# Patient Record
Sex: MALE | Race: WHITE | NOT HISPANIC OR LATINO | Employment: OTHER | URBAN - METROPOLITAN AREA
[De-identification: names, ages, dates, MRNs, and addresses within clinical notes are randomized per-mention and may not be internally consistent; named-entity substitution may affect disease eponyms.]

---

## 2017-07-17 ENCOUNTER — GENERIC CONVERSION - ENCOUNTER (OUTPATIENT)
Dept: OTHER | Facility: OTHER | Age: 77
End: 2017-07-17

## 2017-08-18 ENCOUNTER — GENERIC CONVERSION - ENCOUNTER (OUTPATIENT)
Dept: OTHER | Facility: OTHER | Age: 77
End: 2017-08-18

## 2017-10-03 ENCOUNTER — ALLSCRIPTS OFFICE VISIT (OUTPATIENT)
Dept: OTHER | Facility: OTHER | Age: 77
End: 2017-10-03

## 2017-10-03 ENCOUNTER — TRANSCRIBE ORDERS (OUTPATIENT)
Dept: ADMINISTRATIVE | Facility: HOSPITAL | Age: 77
End: 2017-10-03

## 2017-10-03 DIAGNOSIS — G91.2 IDIOPATHIC NORMAL PRESSURE HYDROCEPHALUS (HCC): ICD-10-CM

## 2017-10-03 DIAGNOSIS — Z98.2 PRESENCE OF CEREBROSPINAL FLUID DRAINAGE DEVICE: ICD-10-CM

## 2017-10-03 DIAGNOSIS — G91.9 HYDROCEPHALUS (HCC): ICD-10-CM

## 2017-10-03 DIAGNOSIS — G91.2 LOW PRESSURE HYDROCEPHALUS (HCC): ICD-10-CM

## 2017-10-03 DIAGNOSIS — R26.9 ABNORMALITY OF GAIT: Primary | ICD-10-CM

## 2017-10-03 DIAGNOSIS — R26.9 ABNORMALITY OF GAIT AND MOBILITY: ICD-10-CM

## 2017-10-04 NOTE — CONSULTS
Assessment  1  INPH (idiopathic normal pressure hydrocephalus) (331 5) (G91 2)   2  S/P  shunt (V45 2) (Z98 2)   3  Cervical spondylosis with myelopathy (721 1) (M47 12)   4  Gait disorder (781 2) (R26 9)    Plan  Cervical spondylosis with myelopathy, Gait disorder, INPH (idiopathic normal pressure  hydrocephalus), S/P  shunt    · Follow-up visit in 3 weeks Evaluation and Treatment  Follow-up  Status: Hold For -  Scheduling  Requested for: [id number]   Ordered; For: Cervical spondylosis with myelopathy, Gait disorder, INPH (idiopathic normal pressure hydrocephalus), S/P  shunt; Ordered By: Ronda Faust Performed:  Due: 79KHD6416  Gait disorder    · EMG TWO EXTREMITIES WITH OR W/O RELATED PARASPINAL AREAS; Status:Hold For  - Scheduling; Requested KFZ:[id number];    Perform:[facility]; YQS:[id number]; Last Updated By:Sandra Godfrey; 10/3/2017 11:40:08 AM;Ordered; For:Gait disorder; Ordered By:Marty Bruce;  TWO : RLE  ONE : LLE  INPH (idiopathic normal pressure hydrocephalus), S/P  shunt    · * CT HEAD WO CONTRAST; Status:Active; Requested GXW:[id number];    Perform:[facility]; [phone]; Last Updated By:Sandra Godfrey; 10/3/2017 11:40:24 AM;Ordered; For:INPH (idiopathic normal pressure hydrocephalus), S/P  shunt; Ordered By:Marty Bruce;   · XR SHUNT SERIES; Status:Active; Requested APOLLO:[id number];    Perform:[facility]; [phone]; Ordered; For:INPH (idiopathic normal pressure hydrocephalus), S/P  shunt; Ordered By:Marty Bruce;    Discussion/Summary    72-year-old gentleman with progressive difficulties with gait and numbness and weakness down the left arm  I reviewed his history, physical examination and imaging in detail with him today  presentation is somewhat complicated  Cervical spondylosis and stenosis  He may have an early myelopathy explaining some of his deterioration in gait and upper extremity function   He is a candidate for posterior cervical decompression and instrumented fixation and fusion  I explained that he is likely at higher risk of acute spinal cord injury in the event of extreme range of motion of the cervical spine, though surgical prophylactic decompression is not necessarily recommended  The goal of surgery would be to prevent decline in neurological function secondary to cervical spondylosis and stenosis  The risks of surgery were reviewed in detail as well   examination is also concerning for possible peripheral neuropathy  I will obtain an EMG and nerve conduction study for further evaluation  NPH  The shunt seems to be set at 1  The patient and his wife were under the impression it should be set to 3  They will obtain their last office visit with the neurosurgeon describing the shunt setting  It is possible that the shunt reprogrammed following the MRI  I will also obtain a CT scan of the brain and shunt series for baseline imaging and to confirm the impression of a setting of 1  They will try to obtain his most recent CT scan of the head for comparison  other questions were answered to their satisfaction  I will see them to review the results of these investigations and for further surgical discussion  They will also be referred to local PCP practice to establish care in the area  I reviewed the signs and symptoms of progressive cervical radiculopathy and myelopathy with him today and asked him to contact my office should any concerns arise in the interim  Both the patient and his wife are in agreement with this course of action  The patient has the current Goals: Improved balance and walking  The patent has the current Barriers: NPH  peripheral neuropathy  Patient is able to Self-Care  Self Referrals: Yes      Chief Complaint  New patient self referred for second opinion on neck pain and bilateral leg numbness      History of Present Illness  30-year-old right-hand-dominant gentleman accompanied by his wife today   Past medical history significant for insertion of a right occipital parietal  shunt at 2012 by Dr Andrew Tucker  The patient is wife reports significant improvement in his gait following insertion of the shunt, though he did require some adjustments  He has a Certas valve, though the last setting is somewhat unclear  3 months ago, without inciting event he began to develop numbness and weakness in the left arm and left leg  He denies any neck pain or significant pain in his arms or legs aside from some left hamstring pain which may be chronic  He may have some mild weakness in the right leg  He denies any difficulties with bowel bladder function or changing perineal sensation  There has been some decline in handwriting but no difficulties with fine motor tasks  Balance has been an issue  Overall, while his gait remains significantly improved compared to prior to placing the  shunt, there has been a decline over the last 3 months in his gait and balance  followed up with his neurosurgeon that place the shunt and felt that his overall presentation was more concerning for possible cervical myelopathy  An MRI was undertaken  There was no verification of the shunt setting before after the MRI per the patient and his wife  He presents today for 2nd surgical opinion, though he and his wife have recently relocated to this area and are considering transferring neurosurgical care  Review of Systems    Constitutional: feeling tired, but-no fever,-not feeling poorly,-no recent weight gain,-no chills-and-no recent weight loss  Eyes: eyesight problems, but-no eye pain,-no dryness of the eyes,-eyes not red,-no purulent discharge from the eyes-and-no itching of the eyes  ENT: no complaints of earache, no hearing loss, no nosebleeds, no nasal discharge, no sore throat, no hoarseness  Cardiovascular: No complaints of slow heart rate, no fast heart rate, no chest pain, no palpitations, no leg claudication, no lower extremity     Respiratory: No complaints of shortness of breath, no wheezing, no cough, no SOB on exertion, no orthopnea or PND  Gastrointestinal: constipation, but-no abdominal pain,-no nausea,-no vomiting,-no diarrhea-and-no blood in stools  Genitourinary: incontinence, but-no dysuria,-no urinary hesitancy,-no genital lesions,-no nocturia-and-no testicular pain  Musculoskeletal: No complaints of arthralgia, no myalgias, no joint swelling or stiffness, no limb pain or swelling  Integumentary: No complaints of skin rash or skin lesions, no itching, no skin wound, no dry skin  Neurological: limb weakness,-difficulty walking-and-Balance issues, but-no headache,-no numbness,-no tingling,-no dizziness,-no convulsions-and-no fainting-   The patient presents with complaints of confusion (occasional short term memory )  Psychiatric: sleep disturbances-(In general, takes naps), but-not suicidal,-no anxiety,-no personality change,-no depression-and-no emotional problems  Endocrine: muscle weakness-(Left arm, left leg)-and-feelings of weakness, but-no proptosis,-no deepening of the voice,-no hot flashes-and-no erectile dysfunction  Hematologic/Lymphatic: No complaints of swollen glands, no swollen glands in the neck, does not bleed easily, no easy bruising  ROS reviewed  Past Medical History  1  History of abdominal pain (V13 89) (Z87 898)   2  History of hepatitis A virus infection () (Z86 19)   3  History of nasal congestion (V1) (Z87 09)    The active problems and past medical history were reviewed and updated today  Surgical History  1  History of Creation Of Ventriculo-Peritoneal CSF Shunt    The surgical history was reviewed and updated today  Family History  Mother    1  Family history of   Father    2  Family history of     The family history was reviewed and updated today         Social History   · Has 1 child   · Lives with spouse   ·    · Never a smoker   · No alcohol use   · Post graduate  The social history was reviewed and updated today  Current Meds   1  Daily Value Multivitamin Oral Tablet; TAKE 1 TABLET DAILY; Therapy: (Recorded:40Ejv3593) to Recorded   2  Enalapril-Hydrochlorothiazide 10-25 MG Oral Tablet; take 0 5 tablet daily; Therapy: (Recorded:43Eaf5246) to Recorded   3  Ibuprofen 200 MG Oral Tablet; TAKE 2 TABLET 3 times daily PRN; Therapy: [phone]) to Recorded   4  Lipitor 40 MG Oral Tablet; take 0 5 tablet daily; Therapy: [phone]) to Recorded   5  Pantoprazole Sodium 40 MG Oral Tablet Delayed Release; Take 1 tablet daily; Therapy: [phone]) to Recorded   6  Vitamin D3 5000 UNIT Oral Tablet; take 1 tablet every other day; Therapy: [phone]) to Recorded   7  Zantac 150 Maximum Strength 150 MG Oral Tablet; TAKE 1 TABLET Bedtime PRN; Therapy: (Recorded:80Wjg9926) to Recorded    Allergies  1  No Known Drug Allergies    Vitals  Vital Signs    Recorded: 05VYT4506 10:09AM   Temperature 97 4 F   Heart Rate 72   Respiration 16   Systolic 254   Diastolic 72   Height 5 ft 10 5 in   Weight 228 lb 3 oz   BMI Calculated 32 28   BSA Calculated 2 22   Pain Scale 1     Physical Exam     Constitutional Patient appears the stated age  No signs of acute distress present  No involuntary movement  Patient is cooperative  Respiratory Auscultation of lungs: Clear to auscultation bilaterally  Musculo: Spine Cervical Spine: Normal    Skin warm and dry  No rashes, lesions or ecchymosis  -Right occipital parietal shunt depresses and refills easily  No tenderness or swelling along the shunt tract  Neurologic - Mental Status: Alert and Oriented x3  -Mood and affect: Affect is normal  -Memory is intact  Cranial Nerve Exam:  3rd, 4th, and 6th cranial nerves: PERRLA, EOMI without lateral gaze nystagmus  -7th cranial nerve: Face symmetrical at grimace and at rest -12th cranial nerve: Tongue mideline, no atrophy present    Motor System 5/5 power in upper and lower extremities  Motor System - Upper Extremities: Muscle tone: Normal bilaterally  -Muscle Bulk: Normal bilaterally  Motor System - Lower Extremities: Muscle tone: Normal bilaterally  -Muscle Bulk: Normal bilaterally  Reflexes: Biceps reflexes are intact bilaterally  Brachioradialis reflexes are intact bilaterally  Achilles reflexes are 2+ bilaterally  Babinski's reflex is down going bilaterally  Godwin's sign is absent bilaterally  Deep tendon reflexes: 1+ right biceps,-1+ left biceps,-1+ right triceps,-1+ left triceps,-1+ right patella,-1+ left patella,-0 right ankle jerk-and-0 left ankle jerkno ankle clonus on the right-and-no ankle clonus on the left  Superficial/Primitive Reflexes: Babinski reflex absent on the right-and-Babinski reflex absent on the left  Godwin sign was not present:   Coordination:   Coordination: Abnormal  -(Some difficulty with heel-toe gait) Coordination: impaired balance, but-negative Romberg's sign-and-no dysdiadochokinesia  Sensory:   Sensation: Abnormal  -(Diminished light touch and pinprick sensation in a stocking distribution  Normal light touch and pinprick sensation in both hands  Diminished vibration sensation at both medial malleoli and patella  Normal vibration sensation upper extremities) Sensory exam: light touch was not intact,-pain and temperature sensation was not intact-and-vibration sense was not intact  Gait and Station:   Gait and station: Abnormal  -(Walks with a mildly ataxic wide-based gait  Turns in 2 steps) Gait evaluation demonstrated a wide-based and ataxic gait  Results/Data    I personally reviewed the mri in detail with the patient  MRI Review MRI of the cervical spine without contrast dated August 21st, 2017  Normal cervical lordosis  Multiple levels of degenerative disc disease and facet arthropathy   Apparent auto fusion of C2 and C3 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

## 2017-10-12 ENCOUNTER — HOSPITAL ENCOUNTER (OUTPATIENT)
Dept: CT IMAGING | Facility: HOSPITAL | Age: 77
Discharge: HOME | End: 2017-10-12
Attending: NEUROLOGICAL SURGERY
Payer: MEDICARE

## 2017-10-12 ENCOUNTER — HOSPITAL ENCOUNTER (OUTPATIENT)
Dept: RADIOLOGY | Facility: HOSPITAL | Age: 77
Discharge: HOME | End: 2017-10-12
Attending: NEUROLOGICAL SURGERY
Payer: MEDICARE

## 2017-10-12 ENCOUNTER — TRANSCRIBE ORDERS (OUTPATIENT)
Dept: ADMINISTRATIVE | Facility: HOSPITAL | Age: 77
End: 2017-10-12

## 2017-10-12 DIAGNOSIS — Z98.2 PRESENCE OF CEREBROSPINAL FLUID DRAINAGE DEVICE: ICD-10-CM

## 2017-10-12 DIAGNOSIS — G91.2 IDIOPATHIC NORMAL PRESSURE HYDROCEPHALUS (HCC): ICD-10-CM

## 2017-10-12 PROCEDURE — 71020 HB CHEST X-RAY 2VW FRONTAL&LATL: CPT

## 2017-10-12 PROCEDURE — 74000 HB X-RAY EXAM OF ABDOMEN (SINGLE ANTEROPOSTERIOR VIEW): CPT

## 2017-10-12 PROCEDURE — 70450 CT HEAD/BRAIN W/O DYE: CPT

## 2017-10-12 PROCEDURE — 70250 X-RAY EXAM OF SKULL: CPT

## 2017-10-17 ENCOUNTER — OFFICE VISIT (OUTPATIENT)
Dept: RADIOLOGY | Facility: CLINIC | Age: 77
End: 2017-10-17
Payer: MEDICARE

## 2017-10-17 DIAGNOSIS — R26.9 ABNORMALITY OF GAIT: ICD-10-CM

## 2017-10-17 PROCEDURE — 95886 MUSC TEST DONE W/N TEST COMP: CPT

## 2017-10-17 PROCEDURE — 95909 NRV CNDJ TST 5-6 STUDIES: CPT

## 2017-10-26 ENCOUNTER — APPOINTMENT (OUTPATIENT)
Dept: RADIOLOGY | Facility: CLINIC | Age: 77
End: 2017-10-26
Payer: MEDICARE

## 2017-10-26 ENCOUNTER — GENERIC CONVERSION - ENCOUNTER (OUTPATIENT)
Dept: OTHER | Facility: OTHER | Age: 77
End: 2017-10-26

## 2017-10-26 ENCOUNTER — TRANSCRIBE ORDERS (OUTPATIENT)
Dept: RADIOLOGY | Facility: CLINIC | Age: 77
End: 2017-10-26

## 2017-10-26 DIAGNOSIS — Z98.2 PRESENCE OF CEREBROSPINAL FLUID DRAINAGE DEVICE: ICD-10-CM

## 2017-10-26 DIAGNOSIS — G91.9 HYDROCEPHALUS (HCC): ICD-10-CM

## 2017-10-26 PROCEDURE — 70250 X-RAY EXAM OF SKULL: CPT

## 2017-10-27 ENCOUNTER — GENERIC CONVERSION - ENCOUNTER (OUTPATIENT)
Dept: OTHER | Facility: OTHER | Age: 77
End: 2017-10-27

## 2017-11-08 ENCOUNTER — ALLSCRIPTS OFFICE VISIT (OUTPATIENT)
Dept: OTHER | Facility: OTHER | Age: 77
End: 2017-11-08

## 2017-11-08 DIAGNOSIS — G91.2 IDIOPATHIC NORMAL PRESSURE HYDROCEPHALUS (HCC): ICD-10-CM

## 2017-11-08 DIAGNOSIS — Z98.2 PRESENCE OF CEREBROSPINAL FLUID DRAINAGE DEVICE: ICD-10-CM

## 2017-11-08 DIAGNOSIS — R26.9 ABNORMALITY OF GAIT AND MOBILITY: ICD-10-CM

## 2017-11-08 DIAGNOSIS — H91.93 HEARING LOSS OF BOTH EARS: ICD-10-CM

## 2017-11-10 NOTE — PROGRESS NOTES
Assessment    1  S/P  shunt (V45 2) (Z98 2)   2  Hyperlipidemia (272 4) (E78 5)   3  Hearing loss, bilateral (389 9) (H91 93)   4  Elevated PSA (790 93) (R97 20)   5  Severe obesity with body mass index (BMI) of 35 0 to 39 9 (278 01) (E66 01)   6  Rhinitis (472 0) (J31 0)    Plan  Hearing loss, bilateral    · *1 - [facility] AUDIOLOGY Co-Management  *  Status: Active  Requested for:08Nov2017  Care Summary provided  : Yes    Discussion/Summary  Discussion Summary:   HTN, hyperlipidemia  BP controlled on enalapril  Recent lipids show elevated TG, takes statin sometimes  Unsteady gait, LE weakness  Physical therapy scheduled, using cane  Cervical spondylosis  Recently saw neurosurgery, ? surgerys/p  shunt  Hearing loss  Recommend hearing test Elevated PSA, family history of prostate cancer  Follow up with urology next year  Rhinitis  Recommend to try saline nasal spray 1 to 2 times a day  GERD  Takes pantoprazole daily  HM  Vaccinations updated  Colonoscopy 2016  in 4 months or p r n  Counseling Documentation With Imm: The patient was counseled regarding diagnostic results,-- instructions for management,-- risk factor reductions,-- impressions  Chief Complaint  Chief Complaint Free Text Note Form: PT IS HERE AS NEW PT FOR CHECK UP      History of Present Illness  HPI: Mr Lisa Bhatti is here with his wife  recently moved from [place]  is having problems with neck pain and extremity tingling or numbness, occasional weakness  He recently saw Neurosurgery  He is also having frequent low back pain, with balance issues  He feels dizzy sometimes, denies any chest pain, shortness of breath, palpitations  He uses a cane all the time  Denies any recent falls  He has physical therapy scheduled  takes enalapril and atorvastatin most days of the week, usually takes half a tablet only  He takes his pantoprazole regularly   had an infection in his prostate that have that led to an elevated PSA   He was recently seen by Urology in [facility]  Elevated PSA (Brief): The patient is being seen for an initial evaluation of an elevated prostate-specific antigen  The patient is currently asymptomatic  Hyperlipidemia (Initial): The patient is being seen for an initial evaluation of an existing diagnosis of hyperlipidemia  No associated symptoms are reported  Current treatment includes statins  By report, there is fair compliance with treatment and good symptom control  Hearing Loss (Brief): The patient is being seen for an initial evaluation of an existing diagnosis of hearing loss  The patient is currently experiencing symptoms  No associated symptoms are reported  Review of Systems  Complete-Male:  Constitutional: not feeling poorly-- and-- not feeling tired  Eyes: no eyesight problems  ENT: no nasal discharge  Cardiovascular: no chest pain,-- no palpitations-- and-- no extremity edema  Respiratory: no cough-- and-- no shortness of breath during exertion  Gastrointestinal: no abdominal pain,-- no nausea-- and-- no constipation  Genitourinary: no dysuria  Musculoskeletal: arthralgias-- and-- joint stiffness, but-- as noted in HPI  Integumentary: no rashes  Neurological: limb weakness, but-- no headache,-- no confusion-- and-- no fainting  Psychiatric: no sleep disturbances  Endocrine: feelings of weakness  Hematologic/Lymphatic: no tendency for easy bruising  Active Problems  1  Arthritis (716 90) (M19 90)   2  Cervical spondylosis with myelopathy (721 1) (M47 12)   3  Fatigue (780 79) (R53 83)   4  Gait disorder (781 2) (R26 9)   5  GERD (gastroesophageal reflux disease) (530 81) (K21 9)   6  Hydrocephalus (331 4) (G91 9)   7  INPH (idiopathic normal pressure hydrocephalus) (331 5) (G91 2)   8  Left leg pain (729 5) (M79 605)   9  S/P  shunt (V45 2) (Z98 2)   10  Sleep disturbances (780 50) (G47 9)   11  Urinary incontinence (788 30) (R32)   12  Wears glasses (V49 89) (Z97 3)    Past Medical History  1   History of abdominal pain (V13 89) (Z87 898)   2  History of hepatitis A virus infection (V12 09) (Z86 19)   3  History of nasal congestion (V12 69) (Z87 09)  Active Problems And Past Medical History Reviewed: The active problems and past medical history were reviewed and updated today  Surgical History  1  History of Appendectomy   2  History of Creation Of Ventriculo-Peritoneal CSF Shunt    Family History  Mother    1  Denied: Family history of Alcoholism and drug addiction in family   2  Denied: Family history of Anxiety and depression   3  Family history of    4  Family history of rheumatic heart disease (V17 49) (Z82 49)  Father    5  Denied: Family history of Alcoholism and drug addiction in family   6  Denied: Family history of Anxiety and depression   7  Family history of    6  Family history of malignant neoplasm of prostate (V16 42) (Z80 42)  Child    9  Denied: Family history of Alcoholism and drug addiction in family   8  Denied: Family history of Anxiety and depression  Sibling    6  Denied: Family history of Alcoholism and drug addiction in family   15  Denied: Family history of Anxiety and depression  Maternal Uncle    15  Family history of malignant neoplasm (V16 9) (Z80 9)    Social History     · Drinks coffee   · Has 1 child   · Lives with spouse   ·    · Never a smoker   · No alcohol use   · No illicit drug use   · Post graduate   · Work history  Social History Reviewed: The social history was reviewed and updated today  Current Meds   1  Daily Value Multivitamin Oral Tablet; TAKE 1 TABLET DAILY; Therapy: (Recorded:2017) to Recorded   2  Enalapril-Hydrochlorothiazide 10-25 MG Oral Tablet; take 0 5 tablet daily; Therapy: (Recorded:2017) to Recorded   3  Ibuprofen 200 MG Oral Tablet; TAKE 2 TABLET 3 times daily PRN; Therapy: ([id number]) to Recorded   4  Lipitor 40 MG Oral Tablet; take 0 5 tablet daily; Therapy: () to Recorded   5  Pantoprazole Sodium 40 MG Oral Tablet Delayed Release; Take 1 tablet daily; Therapy: ([phone]) to Recorded   6  Vitamin D3 5000 UNIT Oral Tablet; take 1 tablet every other day; Therapy: ([phone]) to Recorded   7  Zantac 150 Maximum Strength 150 MG Oral Tablet; TAKE 1 TABLET Bedtime PRN; Therapy: (Recorded:03Oct2017) to Recorded    Allergies    1  No Known Drug Allergies    Vitals  Signs   Recorded: 46CNE5309 03:27PM   Temperature: 98 1 F  Heart Rate: 76  Respiration: 18  Systolic: 005  Diastolic: 72  Height: 5 ft 6 5 in  Weight: 227 lb 4 oz  BMI Calculated: 36 13  BSA Calculated: 2 12  O2 Saturation: 98    Physical Exam   Constitutional  General appearance: No acute distress, well appearing and well nourished  comfortable,-- obese,-- clothing appropriate-- and-- well hydrated  Head and Face  Head and face: Normal    Palpation of the face and sinuses: No sinus tenderness  Eyes  Conjunctiva and lids: No erythema, swelling or discharge  Pupils and irises: Equal, round, reactive to light  Ears, Nose, Mouth, and Throat  External inspection of ears and nose: Normal    Otoscopic examination: Tympanic membranes translucent with normal light reflex  Canals patent without erythema  Oropharynx: Normal with no erythema, edema, exudate or lesions  Neck  Neck: Supple, symmetric, trachea midline, no masses  Thyroid: Normal, no thyromegaly  Pulmonary  Respiratory effort: No increased work of breathing or signs of respiratory distress  Auscultation of lungs: Clear to auscultation  Cardiovascular  Auscultation of heart: Normal rate and rhythm, normal S1 and S2, no murmurs  Examination of extremities for edema and/or varicosities: Normal    Abdomen  Abdomen: Non-tender, no masses  The abdomen was obese  The abdomen was soft and nontender  The abdomen was normal to percussion  Liver and spleen: No hepatomegaly or splenomegaly  Examination for hernias: No hernias appreciated     Lymphatic Palpation of lymph nodes in neck: No lymphadenopathy  no posterior cervical node enlargement-- and-- no supraclavicular node enlargement  Palpation of lymph nodes in other areas: No lymphadenopathy  no anterior cervical node enlargement-- and-- no submandibular node enlargement  Musculoskeletal  Gait and station: Abnormal  -- uses cane  Inspection/palpation of joints, bones, and muscles: Normal    Skin  Skin and subcutaneous tissue: Normal without rashes or lesions  Palpation of skin and subcutaneous tissue: Normal turgor  Neurologic  Cranial nerves: Cranial nerves 2-12 intact  Cortical function: Normal mental status  Psychiatric  Orientation to person, place and time: Normal    Mood and affect: Normal        Results/Data  PHQ-2 Adult Depression Screening [id number] 03:28PM User, s     Test Name Result Flag Reference   PHQ-2 Adult Depression Score 0       Over the last two weeks, how often have you been bothered by any of the following problems? Little interest or pleasure in doing things: Not at all - 0 Feeling down, depressed, or hopeless: Not at all - 0   PHQ-2 Adult Depression Screening Negative         Future Appointments    Date/Time Provider Specialty Site   11/29/2017 01:00 PM ELISA Parker   [address]       Signatures   Electronically signed by : Andrae Scanlon MD; Nov 8 2017  5:18PM EST                       (Author)

## 2017-11-27 ENCOUNTER — HOSPITAL ENCOUNTER (OUTPATIENT)
Dept: CT IMAGING | Facility: HOSPITAL | Age: 77
Discharge: HOME | End: 2017-11-27
Attending: NEUROLOGICAL SURGERY
Payer: MEDICARE

## 2017-11-27 DIAGNOSIS — G91.2 IDIOPATHIC NORMAL PRESSURE HYDROCEPHALUS (HCC): ICD-10-CM

## 2017-11-27 DIAGNOSIS — R26.9 ABNORMALITY OF GAIT AND MOBILITY: ICD-10-CM

## 2017-11-27 PROCEDURE — 70450 CT HEAD/BRAIN W/O DYE: CPT

## 2017-11-29 ENCOUNTER — APPOINTMENT (OUTPATIENT)
Dept: RADIOLOGY | Facility: CLINIC | Age: 77
End: 2017-11-29
Payer: MEDICARE

## 2017-11-29 ENCOUNTER — ALLSCRIPTS OFFICE VISIT (OUTPATIENT)
Dept: OTHER | Facility: OTHER | Age: 77
End: 2017-11-29

## 2017-11-29 ENCOUNTER — APPOINTMENT (OUTPATIENT)
Dept: PHYSICAL THERAPY | Facility: CLINIC | Age: 77
End: 2017-11-29
Payer: MEDICARE

## 2017-11-29 ENCOUNTER — TRANSCRIBE ORDERS (OUTPATIENT)
Dept: RADIOLOGY | Facility: CLINIC | Age: 77
End: 2017-11-29

## 2017-11-29 DIAGNOSIS — R26.9 ABNORMALITY OF GAIT AND MOBILITY: ICD-10-CM

## 2017-11-29 DIAGNOSIS — Z98.2 PRESENCE OF CEREBROSPINAL FLUID DRAINAGE DEVICE: ICD-10-CM

## 2017-11-29 DIAGNOSIS — G91.2 IDIOPATHIC NORMAL PRESSURE HYDROCEPHALUS (HCC): ICD-10-CM

## 2017-11-29 PROCEDURE — 70250 X-RAY EXAM OF SKULL: CPT

## 2017-11-29 PROCEDURE — 97161 PT EVAL LOW COMPLEX 20 MIN: CPT

## 2017-11-29 PROCEDURE — G8979 MOBILITY GOAL STATUS: HCPCS

## 2017-11-29 PROCEDURE — G8978 MOBILITY CURRENT STATUS: HCPCS

## 2017-11-30 NOTE — PROGRESS NOTES
Assessment  1  INPH (idiopathic normal pressure hydrocephalus) (331 5) (G91 2)   2  S/P  shunt (V45 2) (Z98 2)    Plan     · XR SKULL < 4 VIEW; Status:Active - Retrospective By Protocol Authorization; Requestedfor:[id number];    Perform: [facility]; [phone]; Last Updated By:Ganesh Maldonado; 11/29/2017 1:48:53 PM;Ordered; For:Gait disorder, INPH (idiopathic normal pressure hydrocephalus), S/P  shunt; Ordered By:Marty Bruce;     · Follow-up visit in 1 month Evaluation and Treatment  Follow-up  Status: Hold For -Scheduling  Requested for: [id number]   Ordered;INPH (idiopathic normal pressure hydrocephalus), S/P  shunt; Ordered By: Parker Yeung Performed:  Due: 16YRK0299    Discussion/Summary    68-year-old gentleman with normal pressure hydrocephalus and cervical spondylosis and stenosis with possible early myelopathy  I reviewed his history, physical examination and imaging in detail with him and his wife today  wife has noticed improvement in his overall function and activity level since adjusting his shunt from 1-3  He is more interested in exercising and can walk on the treadmill for longer periods of time  The patient himself has noticed some improvement but seems to be more concerned with the feeling of pressure when he coughs or sneezes  This causes in considerable anxiety  I explained that this may be related to the change in shunt setting, but I do not feel that this is likely to result in any injury  CT scan does demonstrate some increase in the ventricles, which would be expected but resolution of the small right-sided hygroma  extensive discussion regarding the risks, benefits and alternatives to adjusting the shunt, the patient asked to have his shunt adjusted to 2 to see if he can sustain the improvements in his gait and balance while diminishing the pressure sensation with coughing and sneezing  This was undertaken in the office and confirmed with plain films of the skull    other questions were answered to their satisfaction  He will be seen again in 1 months time with repeat CT scan of the head and PT and cognitive assessment  They will contact my office should any concerns arise in the interim  Both the patient and his wife are in agreement with this course of action  The patient, patient's family was counseled regarding diagnostic results,-- instructions for management,-- risk factor reductions,-- prognosis,-- patient and family education,-- impressions,-- risks and benefits of treatment options,-- importance of compliance with treatment  The patient has the current Goals: Improved standing and walking  The patent has the current Barriers: Obesity  cervical spondylotic myelopathy  pressure hydrocephalus  Patient is able to Self-Care  Self Referrals: No      Chief Complaint  Patient presents today for normal pressure hydrocephalus evaluation  History of Present Illness  Patient presents to follow up after adjusting his  shunt for normal pressure hydrocephalus from a setting of 1-3  He is accompanied by his wife today  She feels that his gait and overall activity level has improved significantly since his last visit  No difficulties with urinary incontinence  No significant change in cognition  The patient is somewhat concerned about a âpressureâ taken sensation when he coughs and sneezes spontaneously  Otherwise, he denies any headaches, nausea vomiting or change in vision  Review of Systems   Constitutional: feeling tired, but-- no fever,-- not feeling poorly,-- no recent weight gain,-- no chills-- and-- no recent weight loss  Eyes: no eye pain,-- no dryness of the eyes,-- eyes not red,-- no purulent discharge from the eyes-- and-- no itching of the eyes--   The patient presents with complaints of eyesight problems (Blurry vision)  ENT: no complaints of earache, no hearing loss, no nosebleeds, no nasal discharge, no sore throat, no hoarseness    Cardiovascular: No complaints of slow heart rate, no fast heart rate, no chest pain, no palpitations, no leg claudication, no lower extremity  Respiratory: No complaints of shortness of breath, no wheezing, no cough, no SOB on exertion, no orthopnea or PND  Gastrointestinal: constipation, but-- no abdominal pain,-- no nausea,-- no vomiting,-- no diarrhea-- and-- no blood in stools  Genitourinary: incontinence-- (Difficulty starting), but-- no dysuria,-- no urinary hesitancy,-- no genital lesions,-- no nocturia-- and-- no testicular pain  Musculoskeletal: myalgias-- and-- joint stiffness--   The patient presents with complaints of arthralgias (Left leg)  Integumentary: No complaints of skin rash or skin lesions, no itching, no skin wound, no dry skin  Neurological: numbness,-- limb weakness,-- difficulty walking-- and-- Balance issues, but-- no headache,-- no tingling,-- no convulsions-- and-- no fainting--   The patient presents with complaints of confusion (occasional short term memory )  The patient presents with complaints of dizziness (Increase in dizziness since last visit )  Psychiatric: sleep disturbances, but-- not suicidal,-- no anxiety,-- no personality change,-- no depression-- and-- no emotional problems  Endocrine: muscle weakness-- and-- feelings of weakness, but-- no proptosis,-- no deepening of the voice,-- no hot flashes-- and-- no erectile dysfunction  Hematologic/Lymphatic: No complaints of swollen glands, no swollen glands in the neck, does not bleed easily, no easy bruising  ROS reviewed  Active Problems  1  Arthritis (716 90) (M19 90)   2  Cervical spondylosis with myelopathy (721 1) (M47 12)   3  Elevated PSA (790 93) (R97 20)   4  Fatigue (780 79) (R53 83)   5  Gait disorder (781 2) (R26 9)   6  GERD (gastroesophageal reflux disease) (530 81) (K21 9)   7  Hearing loss, bilateral (389 9) (H91 93)   8  Hiatal hernia (553 3) (K44 9)   9  Hydrocephalus (331 4) (G91 9)   10   Hyperlipidemia (272 4) (E78 5)   11  Hypertension (401 9) (I10)   12  INPH (idiopathic normal pressure hydrocephalus) (331 5) (G91 2)   13  Left leg pain (729 5) (M79 605)   14  Rhinitis (472 0) (J31 0)   15  S/P  shunt (V45 2) (Z98 2)   16  Severe obesity with body mass index (BMI) of 35 0 to 39 9 (278 01) (E66 01)   17  Sleep disturbances (780 50) (G47 9)   18  Urinary incontinence (788 30) (R32)   19  Wears glasses (V49 89) (Z97 3)    Past Medical History  1  History of abdominal pain (V13 89) (Z87 898)   2  History of hepatitis A virus infection (V12 09) (Z86 19)   3  History of nasal congestion (V12 69) (Z87 09)    The active problems and past medical history were reviewed and updated today  Surgical History  1  History of Appendectomy   2  History of Creation Of Ventriculo-Peritoneal CSF Shunt    The surgical history was reviewed and updated today  Family History  Mother    1  Denied: Family history of Alcoholism and drug addiction in family   2  Denied: Family history of Anxiety and depression   3  Family history of    4  Family history of rheumatic heart disease (V17 49) (Z82 49)  Father    5  Denied: Family history of Alcoholism and drug addiction in family   6  Denied: Family history of Anxiety and depression   7  Family history of    6  Family history of malignant neoplasm of prostate (V16 42) (Z80 42)  Child    9  Denied: Family history of Alcoholism and drug addiction in family   8  Denied: Family history of Anxiety and depression  Sibling    6  Denied: Family history of Alcoholism and drug addiction in family   15  Denied: Family history of Anxiety and depression  Maternal Uncle    15  Family history of malignant neoplasm (V16 9) (Z80 9)    The family history was reviewed and updated today         Social History     · Drinks coffee   · Has 1 child   · Lives with spouse   ·    · Never a smoker   · No alcohol use   · No illicit drug use   · Post graduate   · Work history  The social history was reviewed and updated today  Current Meds   1  Daily Value Multivitamin Oral Tablet; TAKE 1 TABLET DAILY; Therapy: (Recorded:03Oct2017) to Recorded   2  Enalapril-Hydrochlorothiazide 10-25 MG Oral Tablet; TAKE 1 TABLET DAILY  Requested for: 06ILU0927; Last Rx:09Nov2017 Ordered   3  Glucosamine Chondroitin Complx TABS; Take 1 tablet daily; Therapy: (Recorded:29Nov2017) to Recorded   4  Ibuprofen 200 MG Oral Tablet; TAKE 2 TABLET 3 times daily PRN; Therapy: ([phone]) to Recorded   5  Lipitor 40 MG Oral Tablet; take 0 5 tablet daily; Therapy: () to Recorded   6  Pantoprazole Sodium 40 MG Oral Tablet Delayed Release; Take 1 tablet daily; Therapy: () to Recorded   7  Vitamin D3 5000 UNIT Oral Tablet; take 1 tablet every other day; Therapy: () to Recorded   8  Zantac 150 Maximum Strength 150 MG Oral Tablet; TAKE 1 TABLET Bedtime PRN; Therapy: ([phone]) to Recorded    The medication list was reviewed and updated today  Allergies  1  No Known Drug Allergies    Vitals  Vital Signs    Recorded: 11VVX2942 12:59PM   Temperature 97 3 F   Heart Rate 77   Respiration 20   Systolic 930   Diastolic 78   Height 5 ft 6 5 in   Weight 229 lb 2 oz   BMI Calculated 36 43   BSA Calculated 2 13   O2 Saturation 96   Pain Scale 0       Physical Exam    Constitutional Patient appears the stated age  No signs of acute distress present  No involuntary movement  Patient is cooperative  Musculo: Spine Cervical Spine: Normal   Skin warm and dry  No rashes, lesions or ecchymosis  -- Right occipital parietal shunt depresses and refills easily  No tenderness or swelling along the shunt tract  Neurologic - Mental Status: Alert and Oriented x3  -- Mood and affect: Affect is normal  -- Memory is intact  Cranial Nerve Exam:  3rd, 4th, and 6th cranial nerves: PERRLA, EOMI without lateral gaze nystagmus  -- 7th cranial nerve:  Face symmetrical at grimace and at rest -- 12th cranial nerve: Tongue mideline, no atrophy present  Motor System 5/5 power in upper and lower extremities  Motor System - Upper Extremities: Muscle tone: Normal bilaterally  -- Muscle Bulk: Normal bilaterally  Motor System - Lower Extremities: Muscle tone: Normal bilaterally  -- Muscle Bulk: Normal bilaterally  Reflexes: Biceps reflexes are intact bilaterally  Brachioradialis reflexes are intact bilaterally  Achilles reflexes are 2+ bilaterally  Babinski's reflex is down going bilaterally  Godwin's sign is absent bilaterally  Deep tendon reflexes: 1+ right biceps,-- 1+ left biceps,-- 1+ right triceps,-- 1+ left triceps,-- 1+ right patella,-- 1+ left patella,-- 0 right ankle jerk-- and-- 0 left ankle jerkno ankle clonus on the right-- and-- no ankle clonus on the left  Superficial/Primitive Reflexes: Babinski reflex absent on the right-- and-- Babinski reflex absent on the left  Godwin sign was not present:  Coordination:  Coordination: Abnormal  -- (Some difficulty with heel-toe gait) Coordination: impaired balance, but-- negative Romberg's sign-- and-- no dysdiadochokinesia  Sensory:  Sensation: Abnormal  -- (Diminished light touch and pinprick sensation in a stocking distribution  Normal light touch and pinprick sensation in both hands  Diminished vibration sensation at both medial malleoli and patella  Normal vibration sensation upper extremities) Sensory exam: light touch was not intact,-- pain and temperature sensation was not intact-- and-- vibration sense was not intact  Gait and Station:  Gait and station: Abnormal  -- (Walks with a steady gait  Turns in 2 steps  Overall seems to be improved compared to previous following shunt adjustment) Gait evaluation demonstrated a wide-based and ataxic gait  Results/Data  Diagnostic Studies Reviewed Neurosurger [facility]:  I personally reviewed the ct in detail with the patient     CT Scan Review CT scan of the head without contrast dated November 27, 2017  Comparison to previous imaging  Interval mild enlargement of ventricular system  Previous small right-sided subdural hygroma has resolved  No new intra-axial or extra-axial lesions  Results Review PT gait assessment dated November 29, 2017  TUG 12 sec, TUGc 11 sec, DGI 22/24  dated November 29, 2017, 22/30  Procedure  Right occipital parietal  shunt adjusted from setting of 3-2  Confirmed with plain films of the skull and with         Future Appointments    Date/Time Provider Specialty Site   02/16/2018 01:00 PM Barbara Stanley DO Geriatrics [address]       Signatures   Electronically signed by : ELISA Irby ; Nov 29 2017  2:32PM EST                       (Author)

## 2017-12-06 ENCOUNTER — GENERIC CONVERSION - ENCOUNTER (OUTPATIENT)
Dept: OTHER | Facility: OTHER | Age: 77
End: 2017-12-06

## 2018-01-04 ENCOUNTER — HOSPITAL ENCOUNTER (OUTPATIENT)
Dept: CT IMAGING | Facility: HOSPITAL | Age: 78
Discharge: HOME | End: 2018-01-04
Attending: NEUROLOGICAL SURGERY
Payer: MEDICARE

## 2018-01-04 DIAGNOSIS — Z98.2 PRESENCE OF CEREBROSPINAL FLUID DRAINAGE DEVICE: ICD-10-CM

## 2018-01-04 DIAGNOSIS — R26.9 ABNORMALITY OF GAIT AND MOBILITY: ICD-10-CM

## 2018-01-04 DIAGNOSIS — G91.2 IDIOPATHIC NORMAL PRESSURE HYDROCEPHALUS (HCC): ICD-10-CM

## 2018-01-04 PROCEDURE — 70450 CT HEAD/BRAIN W/O DYE: CPT

## 2018-01-05 ENCOUNTER — ALLSCRIPTS OFFICE VISIT (OUTPATIENT)
Dept: OTHER | Facility: OTHER | Age: 78
End: 2018-01-05

## 2018-01-05 ENCOUNTER — APPOINTMENT (OUTPATIENT)
Dept: PHYSICAL THERAPY | Facility: CLINIC | Age: 78
End: 2018-01-05
Payer: MEDICARE

## 2018-01-05 PROCEDURE — G8978 MOBILITY CURRENT STATUS: HCPCS

## 2018-01-05 PROCEDURE — 97162 PT EVAL MOD COMPLEX 30 MIN: CPT

## 2018-01-05 PROCEDURE — G8979 MOBILITY GOAL STATUS: HCPCS

## 2018-01-13 VITALS
HEIGHT: 67 IN | BODY MASS INDEX: 35.96 KG/M2 | DIASTOLIC BLOOD PRESSURE: 78 MMHG | OXYGEN SATURATION: 96 % | HEART RATE: 77 BPM | WEIGHT: 229.13 LBS | TEMPERATURE: 97.3 F | SYSTOLIC BLOOD PRESSURE: 150 MMHG | RESPIRATION RATE: 20 BRPM

## 2018-01-13 VITALS
SYSTOLIC BLOOD PRESSURE: 120 MMHG | DIASTOLIC BLOOD PRESSURE: 72 MMHG | BODY MASS INDEX: 35.67 KG/M2 | HEART RATE: 76 BPM | RESPIRATION RATE: 18 BRPM | HEIGHT: 67 IN | TEMPERATURE: 98.1 F | OXYGEN SATURATION: 98 % | WEIGHT: 227.25 LBS

## 2018-01-15 VITALS
TEMPERATURE: 97.4 F | DIASTOLIC BLOOD PRESSURE: 72 MMHG | RESPIRATION RATE: 16 BRPM | BODY MASS INDEX: 31.95 KG/M2 | SYSTOLIC BLOOD PRESSURE: 128 MMHG | HEART RATE: 72 BPM | WEIGHT: 228.19 LBS | HEIGHT: 71 IN

## 2018-01-17 NOTE — MISCELLANEOUS
Dear Reyes Fraise,     As discussed with Elgin Cash, you have been referred for further evaluation through the NPH (normal pressure hydrocephalus) Center  The following appointments  have been scheduled:     Monday, November 27th ([facility])  ·  CT head 10:15am (arrive 10:00am)   Script enclosed     Wednesday, November 29th  ([facility])    · PT (physical therapy) gait assessment at 12 noon  (arrive at 11:45 am)  appointment in our office with Dr Star Day to follow at 1:00pm       Both PT and our office are located on the 2nd floor of the Medical Office Building at [facility]; [facility] [address] [facility], located at the intersection of [facility] and [facility]  If you use a cane or walker, be sure  to bring these assistive devices with you  I am enclosing information about NPH and the NPH Center along with paperwork for our office; please complete prior and bring to your appointment with you  If you have any questions or need to cancel or reschedule,  please call [phone]  Thank you           Fabien Torrez RN  Coordinator, NPH Niecy Aguiar MD  Director, [facility]  [facility]        Electronically signed Yolie Watkins RN  Oct 27 2017  2:46PM EST Author

## 2018-01-22 VITALS
SYSTOLIC BLOOD PRESSURE: 140 MMHG | TEMPERATURE: 97.6 F | OXYGEN SATURATION: 98 % | BODY MASS INDEX: 31.85 KG/M2 | RESPIRATION RATE: 16 BRPM | WEIGHT: 227.5 LBS | DIASTOLIC BLOOD PRESSURE: 76 MMHG | HEART RATE: 74 BPM | HEIGHT: 71 IN

## 2018-01-23 VITALS
SYSTOLIC BLOOD PRESSURE: 148 MMHG | RESPIRATION RATE: 18 BRPM | TEMPERATURE: 97.7 F | WEIGHT: 230 LBS | HEART RATE: 80 BPM | DIASTOLIC BLOOD PRESSURE: 88 MMHG | HEIGHT: 67 IN | BODY MASS INDEX: 36.1 KG/M2

## 2018-01-23 NOTE — PROGRESS NOTES
Patient was seen in the office on 1/5/18  He had a [employer]  shunt placed back in June of 2012 for normal pressure hydrocephalus  Patient is okay to undergo any dental  work that is necessary and does NOT require any pre-medication prior to any dental procedures  Feel free to contact the office with any further questions or concerns  Thank you for your understanding  Varun Ortiz RN  Coordinator, NPH Shaun Gamino MD  Director, [facility]      Electronically signed Minus Vanessa COLLADO  Jan 5 2018 11:29AM EST Author       Electronically signed by:Marty CARDOZO    Jan 5 2018  1:10PM BENJAMIN

## 2018-01-23 NOTE — MISCELLANEOUS
Dear Eric Blue,     As discussed with Koby Veliz, you have been referred for further evaluation through the NPH (normal pressure hydrocephalus) Center  The following appointments  have been scheduled:     Thursday, January 4th ([address] [place] [address])  ·  CT head 3pm (arrive 2:45pm)   Script enclosed     Friday, January 5th ([address] [place] [address])    · PT (physical therapy) gait assessment at 10am  (arrive at 9:45am)  appointment in our office with Dr Josie Nguyen to follow at 11:00am       Both PT and our office are located on the 2nd floor of the Medical Office Building at [address] [place] [address]; [facility] [address] [facility], located at the intersection of [facility] and [facility]  If you use a cane or walker, be sure  to bring these assistive devices with you  I am enclosing information about NPH and the NPH Center along with paperwork for our office; please complete prior and bring to your appointment with you  If you have any questions or need to cancel or reschedule,  please call [phone]  Thank you           Gogo Vazquez RN  Coordinator, NPH Beatriz Genao MD  Director, [facility]  [facility]      Electronically signed Gemma Cortes RN  Dec  6 2017  2:17PM EST Author

## 2018-02-16 ENCOUNTER — OFFICE VISIT (OUTPATIENT)
Dept: GERIATRICS | Age: 78
End: 2018-02-16
Payer: MEDICARE

## 2018-02-16 VITALS
HEART RATE: 72 BPM | BODY MASS INDEX: 35.31 KG/M2 | HEIGHT: 68 IN | RESPIRATION RATE: 18 BRPM | WEIGHT: 233 LBS | SYSTOLIC BLOOD PRESSURE: 154 MMHG | DIASTOLIC BLOOD PRESSURE: 90 MMHG

## 2018-02-16 DIAGNOSIS — R41.89 COGNITIVE IMPAIRMENT: ICD-10-CM

## 2018-02-16 DIAGNOSIS — G91.2 NPH (NORMAL PRESSURE HYDROCEPHALUS) (HCC): ICD-10-CM

## 2018-02-16 DIAGNOSIS — R26.81 UNSTEADY GAIT: ICD-10-CM

## 2018-02-16 DIAGNOSIS — R41.89 COGNITIVE DECLINE: Primary | ICD-10-CM

## 2018-02-16 PROBLEM — H91.90 HEARING LOSS: Status: ACTIVE | Noted: 2018-02-16

## 2018-02-16 PROBLEM — E78.5 HLD (HYPERLIPIDEMIA): Status: ACTIVE | Noted: 2018-02-16

## 2018-02-16 PROBLEM — I10 HTN (HYPERTENSION): Status: ACTIVE | Noted: 2018-02-16

## 2018-02-16 PROCEDURE — 99215 OFFICE O/P EST HI 40 MIN: CPT | Performed by: INTERNAL MEDICINE

## 2018-02-16 RX ORDER — ATORVASTATIN CALCIUM 40 MG/1
0.5 TABLET, FILM COATED ORAL DAILY
COMMUNITY
End: 2021-01-19 | Stop reason: SDUPTHER

## 2018-02-16 RX ORDER — ENALAPRIL MALEATE AND HYDROCHLOROTHIAZIDE 10; 25 MG/1; MG/1
1 TABLET ORAL DAILY
COMMUNITY
End: 2018-04-25 | Stop reason: ALTCHOICE

## 2018-02-16 RX ORDER — PANTOPRAZOLE SODIUM 40 MG/1
1 TABLET, DELAYED RELEASE ORAL DAILY
COMMUNITY
End: 2018-04-25 | Stop reason: SDUPTHER

## 2018-02-16 NOTE — PROGRESS NOTES
Assessment/Plan:    1) Cognitive decline - in setting of NPH s/p shunt placement, reviewed CT 1/2018 no acute intracranial abnormality  Will order TSH, B12 and folate  Return to clinic in 2-3 weeks after [employer] testing  MOCA 24/29 (missing fluency component), GDS 3/15  2) NPH - s/p shunt placement 2012; close follow up with neurosurgery, ambulating well    3) Ambulatory dysfunction - multifactorial, continue with supportive care, TUGS 8 seconds  Agree with fall precautions around the house  4) Polypharmacy - reviewed medications, appropriate at this time; no medication changes recommended today  Blood pressure at goal with Enalapril-HCTZ  RTC in 2-3 weeks after [employer] testing is completed  Problem List Items Addressed This Visit     None      Visit Diagnoses     Cognitive decline    -  Primary            Subjective:      Patient ID: Flor Coelho is a 78 y o  male  Mr Dana Bragg is here with his wife today, referred by Dr Lenora Suero, followed for NPH; moved from [address] [place] [address] 5 months ago  Neurologist in [address] [place] [address] suggested neck sx, pt doesn't want the surgery  Mobility affected since hydrocephalus; frustrated he cant move around, headaches, foggy cloudiness, Tested for dementia, doesn't drive very much any more, wife does most of the driving  He was a , physics, , studied at [employer]  Recently unable to fill out a check at the [employer] and he was turned away; frustration and anger issues  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, he doesn't like it, angry  Inability to focus  Prior to shunt being placed there were minor memory changes but the main dysfunction was ambulatory, almost wheelchair bound  Shunt was successful  Cericval stenosis cause left sided lower and upper ext weakness  Nonspecific complaints  1 month ago had a fall in bathroom, head went through the sheet rock, had neuro eval, shunt eval and everything was negative  Doesnt shower when he is home alone  Home has been adapted for fall risks  No family hx of cognitive decline, parents are from [place], raised in [address], [place] is his 5th language  They have 1 daughter, lives in [address]  Interview with Mr Rosen - "all my troubles are physical", "problems with equilibrium"  Nerve impairment from cervical stenosis  "These tests are too simple"; forgetful since his 20's, states that in his 29's had an episode where he forgot someone's name  Worked for [employer]  Now tries to stay mentally sharp by going back to some old research on sequential analysis and doing a lot of reading  Independent for hygiene and grooming  The following portions of the patient's history were reviewed and updated as appropriate: allergies, current medications, past family history, past medical history, past social history, past surgical history and problem list     Review of Systems   Constitutional: Negative for appetite change and unexpected weight change  HENT: Positive for hearing loss  Negative for trouble swallowing  Eyes:        Wears eye glasses   Respiratory: Negative for shortness of breath  Cardiovascular: Negative for chest pain and palpitations  Gastrointestinal: Negative for abdominal distention  Genitourinary: Negative for difficulty urinating  Musculoskeletal: Positive for gait problem  Skin: Negative for rash  Age related skin changes   Neurological: Positive for weakness and numbness  Negative for dizziness, facial asymmetry, light-headedness and headaches  Psychiatric/Behavioral: Positive for sleep disturbance  Negative for behavioral problems           Objective:      /90 (BP Location: Left arm, Patient Position: Standing, Cuff Size: Standard)   Pulse 72   Resp 18   Ht 5' 8" (1 727 m)   Wt 106 kg (233 lb)   BMI 35 43 kg/m²       MOCA - 24/30 (with out fluency section)   GDS -  3/15  TUGS - 8 seconds     Physical Exam   Constitutional: He is oriented to person, place, and time  He appears well-developed and well-nourished  No distress  HENT:   Head: Normocephalic and atraumatic  Mouth/Throat: Oropharynx is clear and moist    Eyes: Conjunctivae and EOM are normal  Pupils are equal, round, and reactive to light  Right eye exhibits no discharge  Left eye exhibits no discharge  No scleral icterus  Cardiovascular: Normal rate and regular rhythm  Pulmonary/Chest: Effort normal and breath sounds normal  No respiratory distress  He has no wheezes  He has no rales  He exhibits no tenderness  Abdominal: Soft  He exhibits no distension  Musculoskeletal: Normal range of motion  He exhibits no edema  Neurological: He is alert and oriented to person, place, and time  No cranial nerve deficit  Coordination normal    Slight tremor bilateral hands, fine  No cogwheel rigdity on exam today, no shuffling gait  Skin: Skin is warm and dry  No rash noted  He is not diaphoretic  Age related skin changes    Psychiatric: He has a normal mood and affect  His behavior is normal    Vitals reviewed  Attending:  Patient seen examined with fellow Dr Pema Madison  I have reviewed her note and I am in agreement with findings and plan  Time spent 60 minutes with 35 minutes to coordination of care/plan of care/counseling with patient/staff/case management

## 2018-02-16 NOTE — PATIENT INSTRUCTIONS
RTC in 2-3 weeks; please return to clinic for [employer] testing, have TSH, CMP, CBC, folate and B12 done

## 2018-02-16 NOTE — PROGRESS NOTES
MSW met with patient for Geriatric Assessment  Pt presented as alert, and oriented x3  Pt completed MoCA 24/29 (missing fluency component) and Geriatric Depression Scale 3/15  Pt report [facility] recommend he have an overall geriatric evaluation  MSW met with pt wife Sudarshan Garcias) who reports pt lack of motivation and negativity as concerns  Reports pt did sign up for [facility] at [facility]  Will discuss with team and create care plan

## 2018-04-25 ENCOUNTER — OFFICE VISIT (OUTPATIENT)
Dept: INTERNAL MEDICINE CLINIC | Facility: CLINIC | Age: 78
End: 2018-04-25
Payer: MEDICARE

## 2018-04-25 VITALS
DIASTOLIC BLOOD PRESSURE: 80 MMHG | BODY MASS INDEX: 35.49 KG/M2 | SYSTOLIC BLOOD PRESSURE: 124 MMHG | OXYGEN SATURATION: 98 % | WEIGHT: 234.2 LBS | HEART RATE: 82 BPM | RESPIRATION RATE: 16 BRPM | HEIGHT: 68 IN

## 2018-04-25 DIAGNOSIS — I10 ESSENTIAL HYPERTENSION: ICD-10-CM

## 2018-04-25 DIAGNOSIS — E78.49 OTHER HYPERLIPIDEMIA: ICD-10-CM

## 2018-04-25 DIAGNOSIS — K21.9 GASTROESOPHAGEAL REFLUX DISEASE, ESOPHAGITIS PRESENCE NOT SPECIFIED: ICD-10-CM

## 2018-04-25 DIAGNOSIS — R26.81 UNSTEADY GAIT: ICD-10-CM

## 2018-04-25 DIAGNOSIS — D12.5 ADENOMATOUS POLYP OF SIGMOID COLON: ICD-10-CM

## 2018-04-25 DIAGNOSIS — R25.1 TREMOR: Primary | ICD-10-CM

## 2018-04-25 PROBLEM — M19.90 ARTHRITIS: Status: ACTIVE | Noted: 2017-10-03

## 2018-04-25 PROBLEM — E66.01 SEVERE OBESITY WITH BODY MASS INDEX (BMI) OF 35.0 TO 39.9: Status: ACTIVE | Noted: 2017-11-08

## 2018-04-25 PROBLEM — M47.12 CERVICAL SPONDYLOSIS WITH MYELOPATHY: Status: ACTIVE | Noted: 2017-10-03

## 2018-04-25 PROBLEM — G47.9 SLEEP DISTURBANCES: Status: ACTIVE | Noted: 2017-10-03

## 2018-04-25 PROBLEM — R60.0 LOCALIZED EDEMA: Status: ACTIVE | Noted: 2018-04-25

## 2018-04-25 PROBLEM — K44.9 HIATAL HERNIA: Status: ACTIVE | Noted: 2017-11-17

## 2018-04-25 PROBLEM — R97.20 ELEVATED PSA: Status: ACTIVE | Noted: 2017-11-08

## 2018-04-25 PROBLEM — J31.0 RHINITIS: Status: ACTIVE | Noted: 2017-11-08

## 2018-04-25 PROBLEM — R32 URINARY INCONTINENCE: Status: ACTIVE | Noted: 2017-10-03

## 2018-04-25 PROBLEM — R42 POSITIONAL LIGHTHEADEDNESS: Status: ACTIVE | Noted: 2018-01-05

## 2018-04-25 PROBLEM — G91.9 HYDROCEPHALUS (HCC): Status: ACTIVE | Noted: 2017-10-03

## 2018-04-25 PROBLEM — H91.93 HEARING LOSS, BILATERAL: Status: ACTIVE | Noted: 2018-02-16

## 2018-04-25 PROCEDURE — 99214 OFFICE O/P EST MOD 30 MIN: CPT | Performed by: INTERNAL MEDICINE

## 2018-04-25 RX ORDER — PANTOPRAZOLE SODIUM 40 MG/1
40 TABLET, DELAYED RELEASE ORAL DAILY
Qty: 180 TABLET | Refills: 1 | Status: SHIPPED | OUTPATIENT
Start: 2018-04-25 | End: 2019-01-27 | Stop reason: SDUPTHER

## 2018-04-25 RX ORDER — ENALAPRIL MALEATE 10 MG/1
5 TABLET ORAL DAILY
Qty: 90 TABLET | Refills: 1 | Status: SHIPPED | OUTPATIENT
Start: 2018-04-25 | End: 2019-04-26 | Stop reason: SDUPTHER

## 2018-04-25 RX ORDER — IBUPROFEN 200 MG
2 TABLET ORAL 3 TIMES DAILY
COMMUNITY
End: 2019-04-01 | Stop reason: ALTCHOICE

## 2018-04-25 RX ORDER — HYDROCHLOROTHIAZIDE 25 MG/1
TABLET ORAL
Qty: 90 TABLET | Refills: 1 | Status: SHIPPED | OUTPATIENT
Start: 2018-04-25 | End: 2020-02-05 | Stop reason: SDUPTHER

## 2018-04-25 NOTE — ASSESSMENT & PLAN NOTE
Suspect essential tremors  Recommend against medication at this time since symptoms not particularly bothersome  He is worried about Parkinson's, refer to Neurology

## 2018-05-01 ENCOUNTER — TELEPHONE (OUTPATIENT)
Dept: INTERNAL MEDICINE CLINIC | Facility: CLINIC | Age: 78
End: 2018-05-01

## 2018-05-01 NOTE — TELEPHONE ENCOUNTER
PT SAID HE USED TO BE ON PANTOPRAZOLE 40MG TAKEN TWICE A DAY  HIS NEW PRESCRIPTION IS FOR ONCE A DAY  PLEASE CALL PT  [phone] TO CLARIFY DOSING INSTRUCTIONS

## 2018-05-01 NOTE — TELEPHONE ENCOUNTER
Can try taking it once a day and see if you have no symptoms, if symptoms recur, to restart twice a day dosing  Prefer to take it first thing in the morning

## 2018-06-06 ENCOUNTER — TELEPHONE (OUTPATIENT)
Dept: NEUROSURGERY | Facility: CLINIC | Age: 78
End: 2018-06-06

## 2018-06-06 DIAGNOSIS — R26.9 GAIT DISTURBANCE: Primary | ICD-10-CM

## 2018-06-13 ENCOUNTER — DOCUMENTATION (OUTPATIENT)
Dept: NEUROSURGERY | Facility: CLINIC | Age: 78
End: 2018-06-13

## 2018-06-13 NOTE — PROGRESS NOTES
Dear Olivia Noonan,      As discussed with Jose Bolanos, it is time for your continued care through the NPH (normal pressure hydrocephalus) Center   The following appointments have been scheduled:         Friday, July 20th ([facility]):     ·        PT (physical therapy) gait assessment at 10am  (arrive at 9:45am)  ·        appointment in our office with Dr Gracia Edwards to follow completion of PT at 11am  ·        both PT and our office are on the 2nd floor of the Medical Office Building  ·        if you use a cane or walker, be sure to bring these assistive devices with you        If you need to cancel or reschedule, please call me directly at [phone]   Thank you              Anam Stover RN  Coordinator, NPH Brigid Brittle, MD  Director, [address] [place] [address]

## 2018-06-13 NOTE — TELEPHONE ENCOUNTER
Spoke with Pedro Pablo Leger and scheduled the following appts:    Friday, July 20th ([facility]):    PT @ 10am, followed by appt with DR @ 11am     Emailed wife information

## 2018-07-20 ENCOUNTER — APPOINTMENT (OUTPATIENT)
Dept: PHYSICAL THERAPY | Facility: CLINIC | Age: 78
End: 2018-07-20
Payer: MEDICARE

## 2018-07-24 ENCOUNTER — APPOINTMENT (OUTPATIENT)
Dept: PHYSICAL THERAPY | Facility: CLINIC | Age: 78
End: 2018-07-24
Payer: MEDICARE

## 2018-09-04 ENCOUNTER — OFFICE VISIT (OUTPATIENT)
Dept: PHYSICAL THERAPY | Facility: CLINIC | Age: 78
End: 2018-09-04
Payer: MEDICARE

## 2018-09-04 ENCOUNTER — OFFICE VISIT (OUTPATIENT)
Dept: NEUROSURGERY | Facility: CLINIC | Age: 78
End: 2018-09-04
Payer: MEDICARE

## 2018-09-04 VITALS
HEIGHT: 70 IN | RESPIRATION RATE: 16 BRPM | DIASTOLIC BLOOD PRESSURE: 88 MMHG | WEIGHT: 230 LBS | BODY MASS INDEX: 32.93 KG/M2 | SYSTOLIC BLOOD PRESSURE: 142 MMHG | TEMPERATURE: 97.8 F

## 2018-09-04 DIAGNOSIS — M47.12 CERVICAL SPONDYLOSIS WITH MYELOPATHY: Primary | ICD-10-CM

## 2018-09-04 DIAGNOSIS — G91.2 INPH (IDIOPATHIC NORMAL PRESSURE HYDROCEPHALUS) (HCC): ICD-10-CM

## 2018-09-04 DIAGNOSIS — R26.9 GAIT ABNORMALITY: Primary | ICD-10-CM

## 2018-09-04 PROCEDURE — 97161 PT EVAL LOW COMPLEX 20 MIN: CPT | Performed by: PHYSICAL THERAPIST

## 2018-09-04 PROCEDURE — 99215 OFFICE O/P EST HI 40 MIN: CPT | Performed by: NEUROLOGICAL SURGERY

## 2018-09-04 PROCEDURE — G8979 MOBILITY GOAL STATUS: HCPCS | Performed by: PHYSICAL THERAPIST

## 2018-09-04 PROCEDURE — G8980 MOBILITY D/C STATUS: HCPCS | Performed by: PHYSICAL THERAPIST

## 2018-09-04 PROCEDURE — G8978 MOBILITY CURRENT STATUS: HCPCS | Performed by: PHYSICAL THERAPIST

## 2018-09-04 NOTE — PROGRESS NOTES
Office Note - Neurosurgery   Umberto Rosen 70 y o  male MRN: [id number]      Assessment:    Patient is stable  22-year-old gentleman with known cervical spondylosis and stenosis with possible early myelopathy and status post placement of right sided ventriculoperitoneal shunt for normal pressure hydrocephalus  No objective signs or symptoms of radiculopathy or myelopathy  NPH testing is stable to improved  I reviewed the signs and symptoms of shunt malfunction and progressive myelopathy and asked him to contact my office should any concerns arise  Otherwise he will follow-up in 1 year's time with repeat PT and cognitive assessment  History, physical examination and diagnostic tests were reviewed and questions answered  Diagnosis, care plan and treatment options were discussed  The patient and spouse/SO understand instructions and will follow up as directed  Plan:    Follow-up: in 1 year(s)    Problem List Items Addressed This Visit        Nervous and Auditory    INPH (idiopathic normal pressure hydrocephalus)    Cervical spondylosis with myelopathy - Primary          Subjective/Objective     Chief Complaint    None  HPI    22-year-old gentleman accompanied by his wife today  Overall they feel that his gait and cognition are stable  Balance is stable  He denies any significant neck pain  Denies any pain, weakness or numbness is arms or legs or difficulties with bowel bladder function or changing perineal sensation  Both he and his wife are pleased with his level of function and quality of life  He presents today for ongoing follow-up for normal pressure hydrocephalus and cervical spondylotic stenosis with possible early myelopathy  ROS    Review of Systems   Constitutional: Negative  HENT: Negative  Eyes: Negative  Respiratory: Negative  Cardiovascular: Negative  Gastrointestinal: Negative  Endocrine: Negative      Genitourinary: Negative  Musculoskeletal: Negative  Skin: Negative  Allergic/Immunologic: Negative  Neurological: Positive for dizziness  Hematological: Negative  Psychiatric/Behavioral: Positive for decreased concentration         Family History    Family History   Problem Relation Age of Onset    Heart disease Mother         RHEUM    Prostate cancer Father     Cancer Maternal Uncle     Substance Abuse Neg Hx        Social History    Social History     Social History    Marital status: /Civil Union     Spouse name: N/A    Number of children: 1    Years of education: N/A     Occupational History    PHYSICIST, [employer]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[employer]      Social History Main Topics    Smoking status: Never Smoker    Smokeless tobacco: Not on file    Alcohol use No    Drug use: No    Sexual activity: Not on file     Other Topics Concern    Not on file     Social History Narrative    Lives at home with wife    Retired        [employer]     Ino Nur            Past Medical History    Past Medical History:   Diagnosis Date    Arthritis     Hypertension        Surgical History    Past Surgical History:   Procedure Laterality Date    APPENDECTOMY  1952    VENTRICULOATRIAL SHUNT  06/2012    VENTRICULOPERITONEAL SHUNT         Medications      Current Outpatient Prescriptions:     atorvastatin (LIPITOR) 40 mg tablet, Take 0 5 tablets by mouth daily, Disp: , Rfl:     enalapril (VASOTEC) 10 mg tablet, Take 0 5 tablets (5 mg total) by mouth daily, Disp: 90 tablet, Rfl: 1    hydrochlorothiazide (HYDRODIURIL) 25 mg tablet, Take 1/2 tablet daily, may take full tablet if with leg swelling, Disp: 90 tablet, Rfl: 1    ibuprofen (MOTRIN) 200 mg tablet, Take 2 tablets by mouth 3 (three) times a day, Disp: , Rfl:     Multiple Vitamin (MULTI-VITAMIN DAILY PO), Take 1 tablet by mouth daily, Disp: , Rfl:     pantoprazole (PROTONIX) 40 mg tablet, Take 1 tablet (40 mg total) by mouth daily, Disp: 180 tablet, Rfl: 1    RaNITidine HCl (ZANTAC PO), Take 1 tablet by mouth, Disp: , Rfl:     Cholecalciferol (VITAMIN D-3) 5000 units TABS, Take 1 tablet by mouth every other day, Disp: , Rfl:     Allergies    No Known Allergies    The following portions of the patient's history were reviewed and updated as appropriate: allergies, current medications, past family history, past medical history, past social history, past surgical history and problem list     Investigations    I personally reviewed the NPH PT and NPH Cognitive results with the patient:    NPH scale dated 9/4/2018, 14/15  PT gait assessment dated 9/4/2018  TUG 9 sec , TUGc 10 sec, DGI 22/24   MoCA dated 9/4/2018, 29/30  Physical Exam    Vitals:  Blood pressure 142/88, temperature 97 8 °F (36 6 °C), resp  rate 16, height 5' 10" (1 778 m), weight 104 kg (230 lb)  ,Body mass index is 33 kg/m²  Physical Exam   Constitutional: He is oriented to person, place, and time  He appears well-developed and well-nourished  No distress  Neurological: He is alert and oriented to person, place, and time  No cranial nerve deficit  Walks with a steady gait  On bloc turn in 3 steps  No shuffling  Full power in upper extremities  No pronator or parietal drift  No Rosalee  No dysdiadochokinesia  Skin: Skin is warm and dry  Psychiatric: His speech is normal and behavior is normal  His mood appears anxious  Cognition and memory are normal    Vitals reviewed  Neurologic Exam     Mental Status   Oriented to person, place, and time     Speech: speech is normal

## 2018-09-04 NOTE — LETTER
September 4, 2018     Ella Priest MD  [address]  [id number] [place] [address] Road [facility]    Patient: Karlene Skiff   YOB: 1940   Date of Visit: 9/4/2018       Dear Dr Wilver Rooney: Thank you for referring Karlene Skiff to me for evaluation  Below are my notes for this consultation  If you have questions, please do not hesitate to call me  I look forward to following your patient along with you  Sincerely,        Alan James MD        CC: No Recipients  Alan James MD  9/4/2018  1:19 PM  Sign at close encounter  Office Note - Neurosurgery   Yosi Ji [address] 70 y o  male MRN: [id number]      Assessment:    Patient is stable  79-year-old gentleman with known cervical spondylosis and stenosis with possible early myelopathy and status post placement of right sided ventriculoperitoneal shunt for normal pressure hydrocephalus  No objective signs or symptoms of radiculopathy or myelopathy  NPH testing is stable to improved  I reviewed the signs and symptoms of shunt malfunction and progressive myelopathy and asked him to contact my office should any concerns arise  Otherwise he will follow-up in 1 year's time with repeat PT and cognitive assessment  History, physical examination and diagnostic tests were reviewed and questions answered  Diagnosis, care plan and treatment options were discussed  The patient and spouse/SO understand instructions and will follow up as directed  Plan:    Follow-up: in 1 year(s)    Problem List Items Addressed This Visit        Nervous and Auditory    INPH (idiopathic normal pressure hydrocephalus)    Cervical spondylosis with myelopathy - Primary          Subjective/Objective     Chief Complaint    None  HPI    79-year-old gentleman accompanied by his wife today  Overall they feel that his gait and cognition are stable  Balance is stable  He denies any significant neck pain    Denies any pain, weakness or numbness is arms or legs or difficulties with bowel bladder function or changing perineal sensation  Both he and his wife are pleased with his level of function and quality of life  He presents today for ongoing follow-up for normal pressure hydrocephalus and cervical spondylotic stenosis with possible early myelopathy  ROS    Review of Systems   Constitutional: Negative  HENT: Negative  Eyes: Negative  Respiratory: Negative  Cardiovascular: Negative  Gastrointestinal: Negative  Endocrine: Negative  Genitourinary: Negative  Musculoskeletal: Negative  Skin: Negative  Allergic/Immunologic: Negative  Neurological: Positive for dizziness  Hematological: Negative  Psychiatric/Behavioral: Positive for decreased concentration         Family History    Family History   Problem Relation Age of Onset    Heart disease Mother         RHEUM    Prostate cancer Father     Cancer Maternal Uncle     Substance Abuse Neg Hx        Social History    Social History     Social History    Marital status: /Civil Union     Spouse name: N/A    Number of children: 1    Years of education: N/A     Occupational History    PHYSICIST, TELE[employer]      Social History Main Topics    Smoking status: Never Smoker    Smokeless tobacco: Not on file    Alcohol use No    Drug use: No    Sexual activity: Not on file     Other Topics Concern    Not on file     Social History Narrative    Lives at home with wife    Retired        [employer]     Ino Sterlingana            Past Medical History    Past Medical History:   Diagnosis Date    Arthritis     Hypertension        Surgical History    Past Surgical History:   Procedure Laterality Date    APPENDECTOMY  1952    VENTRICULOATRIAL SHUNT  06/2012    VENTRICULOPERITONEAL SHUNT         Medications      Current Outpatient Prescriptions:     atorvastatin (LIPITOR) 40 mg tablet, Take 0 5 tablets by mouth daily, Disp: , Rfl:     enalapril (VASOTEC) 10 mg tablet, Take 0 5 tablets (5 mg total) by mouth daily, Disp: 90 tablet, Rfl: 1    hydrochlorothiazide (HYDRODIURIL) 25 mg tablet, Take 1/2 tablet daily, may take full tablet if with leg swelling, Disp: 90 tablet, Rfl: 1    ibuprofen (MOTRIN) 200 mg tablet, Take 2 tablets by mouth 3 (three) times a day, Disp: , Rfl:     Multiple Vitamin (MULTI-VITAMIN DAILY PO), Take 1 tablet by mouth daily, Disp: , Rfl:     pantoprazole (PROTONIX) 40 mg tablet, Take 1 tablet (40 mg total) by mouth daily, Disp: 180 tablet, Rfl: 1    RaNITidine HCl (ZANTAC PO), Take 1 tablet by mouth, Disp: , Rfl:     Cholecalciferol (VITAMIN D-3) 5000 units TABS, Take 1 tablet by mouth every other day, Disp: , Rfl:     Allergies    No Known Allergies    The following portions of the patient's history were reviewed and updated as appropriate: allergies, current medications, past family history, past medical history, past social history, past surgical history and problem list     Investigations    I personally reviewed the NPH PT and NPH Cognitive results with the patient:    NPH scale dated 9/4/2018, 14/15  PT gait assessment dated 9/4/2018  TUG 9 sec , TUGc 10 sec, DGI 22/24   MoCA dated 9/4/2018, 29/30  Physical Exam    Vitals:  Blood pressure 142/88, temperature 97 8 °F (36 6 °C), resp  rate 16, height 5' 10" (1 778 m), weight 104 kg (230 lb)  ,Body mass index is 33 kg/m²  Physical Exam   Constitutional: He is oriented to person, place, and time  He appears well-developed and well-nourished  No distress  Neurological: He is alert and oriented to person, place, and time  No cranial nerve deficit  Walks with a steady gait  On bloc turn in 3 steps  No shuffling  Full power in upper extremities  No pronator or parietal drift  No Rosalee  No dysdiadochokinesia  Skin: Skin is warm and dry  Psychiatric: His speech is normal and behavior is normal  His mood appears anxious   Cognition and memory are normal    Vitals reviewed  Neurologic Exam     Mental Status   Oriented to person, place, and time     Speech: speech is normal

## 2018-09-04 NOTE — PROGRESS NOTES
PT Evaluation     Today's date: 2018  Patient name: Lucinda Beatty  : 1940  MRN: [id number]  Referring provider: Svetlana Arias MD  Dx:   Encounter Diagnosis     ICD-10-CM    1  Gait abnormality R26 9                   Assessment    Assessment details: Pt presents with minimal to nil risk for falls given recent history or objective findings  Pt at this time is actively engaged within his community, exercising regularly and likely does not need skilled PT intervention at this time  Pt if he is to have a decline in his current status, he is welcome to return as needed  Thank you very much for this kind and familiar referral    Understanding of Dx/Px/POC: good   Prognosis: good    Goals  STG 4 week:  1  Follow up PRN  LTG 8:  1  Follow up prn    Plan  Duration in weeks: 6  Treatment plan discussed with: family and patient  Plan details: Follow up prn  Subjective Evaluation    History of Present Illness  Date of onset: 2018  Mechanism of injury: Pt is a 70 yomale who is familiar to this facility presents today that he has not had any falls, he has been active within the community and exercising frequently, at least 1x a day  Pt reports that he has not had any falls, states that he is not concerned about falling at this time  He states that he does not have a RW, and he uses the [facility] only as needed  Pt lives with his wife in a twin home that is a split level with 6 Step up and down with railing on R  Pt reports 2 YANETH front of house, with railing, and no YANETH in the garage  Pt reports that he has been feeling well, and his wife's health is well  Pt follows up with Dr Juma Rhodes later today      Quality of life: good    Pain  No pain reported    Social Support  Steps to enter house: yes  Stairs in house: yes   Lives in: multiple-level home  Lives with: spouse          Objective     Ambulation     Comments   TU 51" without AD  TUG Cog:  10 36" without AD  DGI:   For all other details please refer to NPH form              Precautions: Falls, HTN, NPH s/p  Shunt

## 2018-09-20 NOTE — PROGRESS NOTES
PT Discharge    Today's date: 2018  Patient name: Karlene Skiff  : 1940  MRN: [id number]  Referring provider: Luna Orta MD  Dx:   Encounter Diagnosis     ICD-10-CM    1  Gait abnormality R26 9 PT plan of care cert/re-cert       Start Time: 1150  Stop Time: 1230  Total time in clinic (min): 40 minutes    Assessment/Plan Pt has not been present since 18  Pt's chart will be DC in compliance of facility policy as all Charts are DC within 30 days of last scheduled visit          Subjective    Objective    Flowsheet Rows      Most Recent Value   PT/OT G-Codes   Assessment Type  Discharge   G code set  Mobility: Walking & Moving Around   Mobility: Walking and Moving Around Current Status ()  CI   Mobility: Walking and Moving Around Goal Status ()  CI   Mobility: Walking and Moving Around Discharge Status ()  CI

## 2018-11-27 ENCOUNTER — OFFICE VISIT (OUTPATIENT)
Dept: GASTROENTEROLOGY | Facility: CLINIC | Age: 78
End: 2018-11-27
Payer: MEDICARE

## 2018-11-27 VITALS
BODY MASS INDEX: 32.47 KG/M2 | WEIGHT: 226.8 LBS | SYSTOLIC BLOOD PRESSURE: 128 MMHG | HEIGHT: 70 IN | DIASTOLIC BLOOD PRESSURE: 74 MMHG | HEART RATE: 89 BPM | TEMPERATURE: 97.5 F

## 2018-11-27 DIAGNOSIS — K59.00 CONSTIPATION, UNSPECIFIED CONSTIPATION TYPE: ICD-10-CM

## 2018-11-27 DIAGNOSIS — K44.9 HIATAL HERNIA: ICD-10-CM

## 2018-11-27 DIAGNOSIS — D12.5 ADENOMATOUS POLYP OF SIGMOID COLON: ICD-10-CM

## 2018-11-27 DIAGNOSIS — K21.9 GASTROESOPHAGEAL REFLUX DISEASE, ESOPHAGITIS PRESENCE NOT SPECIFIED: Primary | ICD-10-CM

## 2018-11-27 PROCEDURE — 99204 OFFICE O/P NEW MOD 45 MIN: CPT | Performed by: INTERNAL MEDICINE

## 2018-11-27 RX ORDER — POLYETHYLENE GLYCOL 3350, SODIUM SULFATE, SODIUM CHLORIDE, POTASSIUM CHLORIDE, ASCORBIC ACID, SODIUM ASCORBATE 7.5-2.691G
2000 KIT ORAL ONCE
Qty: 2000 ML | Refills: 0 | Status: SHIPPED | OUTPATIENT
Start: 2018-11-27 | End: 2019-04-01 | Stop reason: ALTCHOICE

## 2018-11-27 NOTE — PATIENT INSTRUCTIONS
Colon/EGD scheduled 01/23/19 with Thompson Srinivasan at [facility] instructions given in office

## 2018-11-27 NOTE — LETTER
November 27, 2018     Maggie Avendaño MD  [address]  [address] [place] [address]    Patient: Horacio Monterroso   YOB: 1940   Date of Visit: 11/27/2018       Dear Dr Hunter Villagomez: Thank you for referring Horacio Monterroso to me for evaluation  Below are my notes for this consultation  If you have questions, please do not hesitate to call me  I look forward to following your patient along with you  Sincerely,        Ganesh Hudson MD        CC: MD Ganesh Gomez MD  11/27/2018 10:44 AM  Sign at close encounter  [facility] - Outpatient Consultation  Horacio Monterroso 70 y o  male MRN: [id number]  Encounter: [id number]          ASSESSMENT AND PLAN:      1  Gastroesophageal reflux disease, esophagitis presence not specified  2  Hiatal hernia  He has reflux symptoms likely due to his known hiatal hernia  I will schedule him for an upper endoscopy to evaluate for Fairbanks's esophagus and I have asked him to continue the pantoprazole for now  - Case request operating room: ESOPHAGOGASTRODUODENOSCOPY (EGD)    3  Adenomatous polyp of sigmoid colon  He is due for a surveillance colonoscopy because of his history of adenomatous colon polyps  I will schedule this at the same time as his upper endoscopy  My medical assistant will review the instructions for his bowel preparation with him in the office today  - MOVIPREP 100 g; Take 2,000 mL by mouth once for 1 dose  Dispense: 2000 mL; Refill: 0  - Case request operating room: COLONOSCOPY    4  Constipation, unspecified constipation type  I encouraged him to try MiraLax daily for his constipation  This sounds like it is functional but during his colonoscopy I will also evaluate for evidence of an obstruction or stricture      ______________________________________________________________________    HPI:  He presents for evaluation because of his history of reflux and hiatal hernia as well as his history of adenomatous colon polyps and constipation  His last upper endoscopy was in 2017 and was notable for a hiatal hernia and possible Fairbanks's esophagus but we do not have the biopsy results to confirm this  He was told he had nothing to be concerned about after the upper endoscopy was completed  His last colonoscopy was in 2013 and that was notable for adenomatous colon polyps so he was asked repeat the procedure in five years  He has had some mild constipation but denies any diarrhea, bleeding, and weight loss  He has had some weight gain  He said his reflux symptoms are at his baseline and he has not had any dysphagia or vomiting  REVIEW OF SYSTEMS:    CONSTITUTIONAL: Denies any fever, chills, rigors, and weight loss  HEENT: No earache or tinnitus  Denies hearing loss or visual disturbances  CARDIOVASCULAR: No chest pain or palpitations  RESPIRATORY: Denies any cough, hemoptysis, shortness of breath or dyspnea on exertion  GASTROINTESTINAL: As noted in the History of Present Illness  GENITOURINARY: No problems with urination  Denies any hematuria or dysuria  NEUROLOGIC: No dizziness or vertigo, denies headaches  MUSCULOSKELETAL: Denies any joint pain, but has neck and back pain  SKIN: Denies skin rashes or itching  ENDOCRINE: Denies excessive thirst  Denies intolerance to heat or cold  PSYCHOSOCIAL: Denies depression or anxiety  Denies any recent memory loss         Historical Information   Past Medical History:   Diagnosis Date    Arthritis     Hypertension      Past Surgical History:   Procedure Laterality Date    APPENDECTOMY  200    VENTRICULOATRIAL SHUNT  06/2012    VENTRICULOPERITONEAL SHUNT       Social History   History   Alcohol Use No     History   Drug Use No     History   Smoking Status    Never Smoker   Smokeless Tobacco    Never Used     Family History   Problem Relation Age of Onset    Heart disease Mother         RHEUM    Prostate cancer Father     Cancer Maternal Uncle     Substance Abuse Neg Hx        Meds/Allergies       Current Outpatient Prescriptions:     atorvastatin (LIPITOR) 40 mg tablet    Cholecalciferol (VITAMIN D-3) 5000 units TABS    enalapril (VASOTEC) 10 mg tablet    hydrochlorothiazide (HYDRODIURIL) 25 mg tablet    ibuprofen (MOTRIN) 200 mg tablet    Multiple Vitamin (MULTI-VITAMIN DAILY PO)    pantoprazole (PROTONIX) 40 mg tablet    RaNITidine HCl (ZANTAC PO)    MOVIPREP 100 g    No Known Allergies        Objective     Blood pressure 128/74, pulse 89, temperature 97 5 °F (36 4 °C), temperature source Tympanic, height 5' 10" (1 778 m), weight 103 kg (226 lb 12 8 oz)  Body mass index is 32 54 kg/m²  PHYSICAL EXAM:      General Appearance:   Alert, cooperative, no distress   HEENT:   Normocephalic, atraumatic, anicteric      Neck:  Supple, symmetrical, trachea midline   Lungs:   Clear to auscultation bilaterally; no rales, rhonchi or wheezing; respirations unlabored    Heart[de-identified]   Regular rate and rhythm; no murmur, rub, or gallop  Abdomen:   Soft, obese, non-tender, non-distended; normal bowel sounds; no masses, no organomegaly    Genitalia:   Deferred    Rectal:   Deferred    Extremities:  No cyanosis, clubbing or edema    Pulses:  2+ and symmetric    Skin:  No jaundice, rashes, or lesions    Lymph nodes:  No palpable cervical lymphadenopathy        Lab Results:   No visits with results within 1 Day(s) from this visit  Latest known visit with results is:   No results found for any previous visit  Radiology Results:   No results found

## 2018-11-27 NOTE — PROGRESS NOTES
[facility] - Outpatient Consultation  Pastor Anderson 70 y o  male MRN: [id number]  Encounter: [id number]          ASSESSMENT AND PLAN:      1  Gastroesophageal reflux disease, esophagitis presence not specified  2  Hiatal hernia  He has reflux symptoms likely due to his known hiatal hernia  I will schedule him for an upper endoscopy to evaluate for Fairbanks's esophagus and I have asked him to continue the pantoprazole for now  - Case request operating room: ESOPHAGOGASTRODUODENOSCOPY (EGD)    3  Adenomatous polyp of sigmoid colon  He is due for a surveillance colonoscopy because of his history of adenomatous colon polyps  I will schedule this at the same time as his upper endoscopy  My medical assistant will review the instructions for his bowel preparation with him in the office today  - MOVIPREP 100 g; Take 2,000 mL by mouth once for 1 dose  Dispense: 2000 mL; Refill: 0  - Case request operating room: COLONOSCOPY    4  Constipation, unspecified constipation type  I encouraged him to try MiraLax daily for his constipation  This sounds like it is functional but during his colonoscopy I will also evaluate for evidence of an obstruction or stricture  ______________________________________________________________________    HPI:  He presents for evaluation because of his history of reflux and hiatal hernia as well as his history of adenomatous colon polyps and constipation  His last upper endoscopy was in 2017 and was notable for a hiatal hernia and possible Fairbanks's esophagus but we do not have the biopsy results to confirm this  He was told he had nothing to be concerned about after the upper endoscopy was completed  His last colonoscopy was in 2013 and that was notable for adenomatous colon polyps so he was asked repeat the procedure in five years  He has had some mild constipation but denies any diarrhea, bleeding, and weight loss  He has had some weight gain    He said his reflux symptoms are at his baseline and he has not had any dysphagia or vomiting  REVIEW OF SYSTEMS:    CONSTITUTIONAL: Denies any fever, chills, rigors, and weight loss  HEENT: No earache or tinnitus  Denies hearing loss or visual disturbances  CARDIOVASCULAR: No chest pain or palpitations  RESPIRATORY: Denies any cough, hemoptysis, shortness of breath or dyspnea on exertion  GASTROINTESTINAL: As noted in the History of Present Illness  GENITOURINARY: No problems with urination  Denies any hematuria or dysuria  NEUROLOGIC: No dizziness or vertigo, denies headaches  MUSCULOSKELETAL: Denies any joint pain, but has neck and back pain  SKIN: Denies skin rashes or itching  ENDOCRINE: Denies excessive thirst  Denies intolerance to heat or cold  PSYCHOSOCIAL: Denies depression or anxiety  Denies any recent memory loss         Historical Information   Past Medical History:   Diagnosis Date    Arthritis     Hypertension      Past Surgical History:   Procedure Laterality Date    APPENDECTOMY  200    VENTRICULOATRIAL SHUNT  06/2012    VENTRICULOPERITONEAL SHUNT       Social History   History   Alcohol Use No     History   Drug Use No     History   Smoking Status    Never Smoker   Smokeless Tobacco    Never Used     Family History   Problem Relation Age of Onset    Heart disease Mother         RHEUM    Prostate cancer Father     Cancer Maternal Uncle     Substance Abuse Neg Hx        Meds/Allergies       Current Outpatient Prescriptions:     atorvastatin (LIPITOR) 40 mg tablet    Cholecalciferol (VITAMIN D-3) 5000 units TABS    enalapril (VASOTEC) 10 mg tablet    hydrochlorothiazide (HYDRODIURIL) 25 mg tablet    ibuprofen (MOTRIN) 200 mg tablet    Multiple Vitamin (MULTI-VITAMIN DAILY PO)    pantoprazole (PROTONIX) 40 mg tablet    RaNITidine HCl (ZANTAC PO)    MOVIPREP 100 g    No Known Allergies        Objective     Blood pressure 128/74, pulse 89, temperature 97 5 °F (36 4 °C), temperature source Tympanic, height 5' 10" (1 778 m), weight 103 kg (226 lb 12 8 oz)  Body mass index is 32 54 kg/m²  PHYSICAL EXAM:      General Appearance:   Alert, cooperative, no distress   HEENT:   Normocephalic, atraumatic, anicteric      Neck:  Supple, symmetrical, trachea midline   Lungs:   Clear to auscultation bilaterally; no rales, rhonchi or wheezing; respirations unlabored    Heart[de-identified]   Regular rate and rhythm; no murmur, rub, or gallop  Abdomen:   Soft, obese, non-tender, non-distended; normal bowel sounds; no masses, no organomegaly    Genitalia:   Deferred    Rectal:   Deferred    Extremities:  No cyanosis, clubbing or edema    Pulses:  2+ and symmetric    Skin:  No jaundice, rashes, or lesions    Lymph nodes:  No palpable cervical lymphadenopathy        Lab Results:   No visits with results within 1 Day(s) from this visit  Latest known visit with results is:   No results found for any previous visit  Radiology Results:   No results found

## 2019-01-07 ENCOUNTER — TELEPHONE (OUTPATIENT)
Dept: INTERNAL MEDICINE CLINIC | Facility: CLINIC | Age: 79
End: 2019-01-07

## 2019-01-18 ENCOUNTER — TELEPHONE (OUTPATIENT)
Dept: GASTROENTEROLOGY | Facility: CLINIC | Age: 79
End: 2019-01-18

## 2019-01-18 NOTE — TELEPHONE ENCOUNTER
I let pt know there is a plenvu sample for pickup at [place] office   Pancho Martinez put one aside for pt

## 2019-01-18 NOTE — TELEPHONE ENCOUNTER
Danni Jacobs pt    Pt called to advise the pts prep isnt covered  Please send to [facility] in [place] [phone]   Pt is scheduled on 1/23

## 2019-01-22 ENCOUNTER — ANESTHESIA EVENT (OUTPATIENT)
Dept: GASTROENTEROLOGY | Facility: MEDICAL CENTER | Age: 79
End: 2019-01-22
Payer: MEDICARE

## 2019-01-23 ENCOUNTER — HOSPITAL ENCOUNTER (OUTPATIENT)
Facility: MEDICAL CENTER | Age: 79
Setting detail: OUTPATIENT SURGERY
Discharge: HOME | End: 2019-01-23
Attending: INTERNAL MEDICINE | Admitting: INTERNAL MEDICINE
Payer: MEDICARE

## 2019-01-23 ENCOUNTER — ANESTHESIA (OUTPATIENT)
Dept: GASTROENTEROLOGY | Facility: MEDICAL CENTER | Age: 79
End: 2019-01-23
Payer: MEDICARE

## 2019-01-23 VITALS
HEART RATE: 82 BPM | DIASTOLIC BLOOD PRESSURE: 78 MMHG | WEIGHT: 220 LBS | OXYGEN SATURATION: 97 % | RESPIRATION RATE: 20 BRPM | TEMPERATURE: 99.4 F | SYSTOLIC BLOOD PRESSURE: 142 MMHG | BODY MASS INDEX: 31.5 KG/M2 | HEIGHT: 70 IN

## 2019-01-23 DIAGNOSIS — K44.9 HIATAL HERNIA: ICD-10-CM

## 2019-01-23 DIAGNOSIS — D12.5 ADENOMATOUS POLYP OF SIGMOID COLON: ICD-10-CM

## 2019-01-23 DIAGNOSIS — K21.9 GASTROESOPHAGEAL REFLUX DISEASE, ESOPHAGITIS PRESENCE NOT SPECIFIED: ICD-10-CM

## 2019-01-23 PROCEDURE — G0105 COLORECTAL SCRN; HI RISK IND: HCPCS | Performed by: INTERNAL MEDICINE

## 2019-01-23 PROCEDURE — 43239 EGD BIOPSY SINGLE/MULTIPLE: CPT | Performed by: INTERNAL MEDICINE

## 2019-01-23 PROCEDURE — 88305 TISSUE EXAM BY PATHOLOGIST: CPT | Performed by: PATHOLOGY

## 2019-01-23 RX ORDER — PROPOFOL 10 MG/ML
INJECTION, EMULSION INTRAVENOUS AS NEEDED
Status: DISCONTINUED | OUTPATIENT
Start: 2019-01-23 | End: 2019-01-23 | Stop reason: SURG

## 2019-01-23 RX ORDER — METOCLOPRAMIDE HYDROCHLORIDE 5 MG/ML
10 INJECTION INTRAMUSCULAR; INTRAVENOUS ONCE
Status: COMPLETED | OUTPATIENT
Start: 2019-01-23 | End: 2019-01-23

## 2019-01-23 RX ORDER — SODIUM CHLORIDE 9 MG/ML
125 INJECTION, SOLUTION INTRAVENOUS CONTINUOUS
Status: DISCONTINUED | OUTPATIENT
Start: 2019-01-23 | End: 2019-01-23 | Stop reason: HOSPADM

## 2019-01-23 RX ORDER — LIDOCAINE HYDROCHLORIDE 20 MG/ML
INJECTION, SOLUTION EPIDURAL; INFILTRATION; INTRACAUDAL; PERINEURAL AS NEEDED
Status: DISCONTINUED | OUTPATIENT
Start: 2019-01-23 | End: 2019-01-23 | Stop reason: SURG

## 2019-01-23 RX ADMIN — LIDOCAINE HYDROCHLORIDE 100 MG: 20 INJECTION, SOLUTION EPIDURAL; INFILTRATION; INTRACAUDAL; PERINEURAL at 14:59

## 2019-01-23 RX ADMIN — PROPOFOL 50 MG: 10 INJECTION, EMULSION INTRAVENOUS at 15:17

## 2019-01-23 RX ADMIN — SODIUM CHLORIDE 125 ML/HR: 0.9 INJECTION, SOLUTION INTRAVENOUS at 14:41

## 2019-01-23 RX ADMIN — PROPOFOL 50 MG: 10 INJECTION, EMULSION INTRAVENOUS at 15:12

## 2019-01-23 RX ADMIN — PROPOFOL 100 MG: 10 INJECTION, EMULSION INTRAVENOUS at 14:59

## 2019-01-23 RX ADMIN — PROPOFOL 50 MG: 10 INJECTION, EMULSION INTRAVENOUS at 15:05

## 2019-01-23 RX ADMIN — METOCLOPRAMIDE 10 MG: 5 INJECTION, SOLUTION INTRAMUSCULAR; INTRAVENOUS at 14:41

## 2019-01-23 NOTE — H&P
History and Physical -  [facility]  Pastor Anderson 70 y o  male MRN: [id number]                  HPI: Pastor Anderson is a 70y o  year old male who presents for:    1  Gastroesophageal reflux disease, esophagitis presence not specified  2  Hiatal hernia  3  Adenomatous polyp of sigmoid colon  4  Constipation, unspecified constipation type      REVIEW OF SYSTEMS: Per the HPI, and otherwise unremarkable  Historical Information   Past Medical History:   Diagnosis Date    Arthritis     Hypertension      Past Surgical History:   Procedure Laterality Date    APPENDECTOMY  200    VENTRICULOATRIAL SHUNT  06/2012    VENTRICULOPERITONEAL SHUNT       Social History   History   Alcohol Use No     History   Drug Use No     History   Smoking Status    Never Smoker   Smokeless Tobacco    Never Used     Family History   Problem Relation Age of Onset    Heart disease Mother         RHEUM    Prostate cancer Father     Cancer Maternal Uncle     Substance Abuse Neg Hx        Meds/Allergies     Prescriptions Prior to Admission   Medication    Cholecalciferol (VITAMIN D-3) 5000 units TABS    enalapril (VASOTEC) 10 mg tablet    hydrochlorothiazide (HYDRODIURIL) 25 mg tablet    Multiple Vitamin (MULTI-VITAMIN DAILY PO)    atorvastatin (LIPITOR) 40 mg tablet    ibuprofen (MOTRIN) 200 mg tablet    MOVIPREP 100 g    pantoprazole (PROTONIX) 40 mg tablet    RaNITidine HCl (ZANTAC PO)       No Known Allergies    Objective     There were no vitals taken for this visit  PHYSICAL EXAM    Gen: NAD, but is wearing cervical collar  CV: RRR  CHEST: Clear  ABD: soft, NT/ND  EXT: no edema      ASSESSMENT/PLAN:  This is a 70y o  year old male here for upper endoscopy and colonoscopy, and he is stable and optimized for his procedure

## 2019-01-23 NOTE — OP NOTE
ESOPHAGOGASTRODUODENOSCOPY(EGD) and COLONOSCOPY    SEDATION: Monitored anesthesia care, check anesthesia records    ASA Class: 3    INDICATIONS:  Reflux, hiatal hernia, hx adenomatous colon polyp, and constipation    CONSENT:  Informed consent was obtained for the procedure, including sedation after explaining the risks and benefits of the procedure  Risks including but not limited to bleeding, perforation, infection, and missed lesion  PREPARATION:   Telemetry, pulse oximetry, blood pressure were monitored throughout the procedure  Patient was identified by myself both verbally and by visual inspection of ID band  PROCEDURE: EGD    DESCRIPTION:   Patient was placed in the left lateral decubitus position and was sedated with the above medication  The gastroscope was introduced in to the oropharynx and the esophagus was intubated under direct visualization  Scope was passed down the esophagus up to 2nd part of the duodenum  A careful inspection was made as the gastroscope was withdrawn, including a retroflexed view of the stomach; findings and interventions are described below  FINDINGS:    #1  Esophagus- there was evidence of 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 along with LA class D erosive reflux esophagitis  Biopsies were taken to confirm Fairbanks's  #2  Stomach- there was a medium-size hiatal hernia, but the stomach was otherwise unremarkable  Random biopsies were taken to rule out Helicobacter pylori infection  #3  Duodenum- normal   Biopsies were taken to rule out celiac sprue  PROCEDURE: COLONOSCOPY    DESCRIPTION:     Prior colonoscopy: 6 years ago  Informed consent was obtained for the procedure, including sedation  Risks of perforation, hemorrhage, adverse drug reaction and aspiration were discussed  The patient was placed in the left lateral decubitus position    Based on the pre-procedure assessment, including review of the patient's medical history, medications, allergies, and review of systems, he had been deemed to be an appropriate candidate for conscious sedation; he was therefore sedated with the medications listed below  The patient was monitored continuously with telemetry, pulse oximetry, blood pressure monitoring, and direct observations  A rectal examination was performed  The colonoscope was inserted into the rectum and advanced under direct vision to the cecum, which was identified by the ileocecal valve and appendiceal orifice  The quality of the colonic preparation was good  A careful inspection was made as the colonoscope was withdrawn, including a retroflexed view of the rectum; findings and interventions are described below  FINDINGS:  Normal colonoscopy to the cecum including the retroflexed view of the rectum  COMPLICATIONS:   None; patient tolerated the procedure well  IMPRESSION:    Normal colonoscopy to the cecum  RECOMMENDATIONS:  Repeat colonoscopy in 5 years or sooner if clinically indicated  Repeat colonoscopy is being recommended at an interval of less than 10 years, this is because of a personal history of polyps or colon cancer  Await pathology results    Follow-up with endoscopist     SPECIMENS:    ID Type Source Tests Collected by Time Destination   1 : Duodenum biopsy r/o celiac Tissue Duodenum TISSUE EXAM Tank Blanco MD 1/23/2019 1502    2 : GAstric body r/o h pylori Gastritis Tissue Stomach TISSUE EXAM Tank Blanco MD 1/23/2019 1503    3 : Distal esophagus biopsy r/o Fairbanks's esophagus  Tissue Esophagus TISSUE EXAM Tank Blanco MD 1/23/2019 1505        ESTIMATED BLOOD LOSS:  Minimal

## 2019-01-23 NOTE — DISCHARGE INSTR - AVS FIRST PAGE
ESOPHAGOGASTRODUODENOSCOPY(EGD) and COLONOSCOPY    SEDATION: Monitored anesthesia care, check anesthesia records    ASA Class: 3    INDICATIONS:  Reflux, hiatal hernia, hx adenomatous colon polyp, and constipation    CONSENT:  Informed consent was obtained for the procedure, including sedation after explaining the risks and benefits of the procedure  Risks including but not limited to bleeding, perforation, infection, and missed lesion  PREPARATION:   Telemetry, pulse oximetry, blood pressure were monitored throughout the procedure  Patient was identified by myself both verbally and by visual inspection of ID band  PROCEDURE: EGD    DESCRIPTION:   Patient was placed in the left lateral decubitus position and was sedated with the above medication  The gastroscope was introduced in to the oropharynx and the esophagus was intubated under direct visualization  Scope was passed down the esophagus up to 2nd part of the duodenum  A careful inspection was made as the gastroscope was withdrawn, including a retroflexed view of the stomach; findings and interventions are described below  FINDINGS:    #1  Esophagus- there was evidence of 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 along with LA class D erosive reflux esophagitis  Biopsies were taken to confirm Fairbanks's  #2  Stomach- there was a medium-size hiatal hernia, but the stomach was otherwise unremarkable  Random biopsies were taken to rule out Helicobacter pylori infection  #3  Duodenum- normal   Biopsies were taken to rule out celiac sprue  PROCEDURE: COLONOSCOPY    DESCRIPTION:     Prior colonoscopy: 6 years ago  Informed consent was obtained for the procedure, including sedation  Risks of perforation, hemorrhage, adverse drug reaction and aspiration were discussed  The patient was placed in the left lateral decubitus position    Based on the pre-procedure assessment, including review of the patient's medical history, medications, allergies, and review of systems, he had been deemed to be an appropriate candidate for conscious sedation; he was therefore sedated with the medications listed below  The patient was monitored continuously with telemetry, pulse oximetry, blood pressure monitoring, and direct observations  A rectal examination was performed  The colonoscope was inserted into the rectum and advanced under direct vision to the cecum, which was identified by the ileocecal valve and appendiceal orifice  The quality of the colonic preparation was good  A careful inspection was made as the colonoscope was withdrawn, including a retroflexed view of the rectum; findings and interventions are described below  FINDINGS:  Normal colonoscopy to the cecum including the retroflexed view of the rectum  COMPLICATIONS:   None; patient tolerated the procedure well  IMPRESSION:    Normal colonoscopy to the cecum  RECOMMENDATIONS:  Repeat colonoscopy in 5 years or sooner if clinically indicated  Repeat colonoscopy is being recommended at an interval of less than 10 years, this is because of a personal history of polyps or colon cancer  Await pathology results    Follow-up with endoscopist     SPECIMENS:    ID Type Source Tests Collected by Time Destination   1 : Duodenum biopsy r/o celiac Tissue Duodenum TISSUE EXAM Fany Fitzpatrick MD 1/23/2019 1502    2 : GAstric body r/o h pylori Gastritis Tissue Stomach TISSUE EXAM Fany Fitzpatrick MD 1/23/2019 1503    3 : Distal esophagus biopsy r/o Fairbanks's esophagus  Tissue Esophagus TISSUE EXAM Fany Fitzpatrick MD 1/23/2019 1505        ESTIMATED BLOOD LOSS:  Minimal

## 2019-01-23 NOTE — ANESTHESIA PREPROCEDURE EVALUATION
Review of Systems/Medical History  Patient summary reviewed  Chart reviewed      Cardiovascular  Hyperlipidemia, Hypertension controlled,    Pulmonary  Negative pulmonary ROS        GI/Hepatic    GERD , Liver disease , Hepatitis A,  Hiatal hernia,   Comment: Past hx of Hepatitis A     Negative  ROS        Endo/Other  Negative endo/other ROS      GYN  Negative gynecology ROS          Hematology  Negative hematology ROS      Musculoskeletal  Negative musculoskeletal ROS   Arthritis     Neurology  Negative neurology ROS     Comment: V P  Shunt for hydrocephalus Psychology   Negative psychology ROS              Physical Exam    Airway    Mallampati score: III  TM Distance: >3 FB  Neck ROM: limited     Dental   No notable dental hx     Cardiovascular  Rhythm: regular, Rate: normal, Cardiovascular exam normal    Pulmonary  Pulmonary exam normal Breath sounds clear to auscultation,     Other Findings        Anesthesia Plan  ASA Score- 2     Anesthesia Type- IV sedation with anesthesia with ASA Monitors  Additional Monitors:   Airway Plan:         Plan Factors- Patient instructed to abstain from smoking on day of procedure  Patient did not smoke on day of surgery  Induction- intravenous  Postoperative Plan-     Informed Consent- Anesthetic plan and risks discussed with patient

## 2019-01-23 NOTE — DISCHARGE INSTRUCTIONS

## 2019-01-27 DIAGNOSIS — K21.9 GASTROESOPHAGEAL REFLUX DISEASE, ESOPHAGITIS PRESENCE NOT SPECIFIED: ICD-10-CM

## 2019-01-27 RX ORDER — PANTOPRAZOLE SODIUM 40 MG/1
40 TABLET, DELAYED RELEASE ORAL
Qty: 180 TABLET | Refills: 1 | Status: SHIPPED | OUTPATIENT
Start: 2019-01-27 | End: 2020-02-03 | Stop reason: SDUPTHER

## 2019-01-28 ENCOUNTER — TELEPHONE (OUTPATIENT)
Dept: INTERNAL MEDICINE CLINIC | Facility: CLINIC | Age: 79
End: 2019-01-28

## 2019-01-28 ENCOUNTER — TELEPHONE (OUTPATIENT)
Dept: NEUROSURGERY | Facility: CLINIC | Age: 79
End: 2019-01-28

## 2019-01-28 NOTE — TELEPHONE ENCOUNTER
This pt was last seen 9/2018 with next f/u 9/2019 for cervical spondylosis, and INPH  Wife reports pt has been going to [facility] but wife feels it is not structured enough for the pt  He has lower extremity weakness, with balance issues  When questioned if these symptoms are new she reports they are unchanged from the last time he was seen they have not progressed  They request a script so he can have some formal more structured therapy to strengthen      Please advise if we can provide or refer to PCP  Thank You

## 2019-01-28 NOTE — PROGRESS NOTES
I called him with his results  Long segment Fairbanks's so I will repeat EGD in 6 months to take 4 quadrant biopsies every 2 cm and increase his Protonix to 40mg PO BID

## 2019-01-29 ENCOUNTER — TELEPHONE (OUTPATIENT)
Dept: INTERNAL MEDICINE CLINIC | Facility: CLINIC | Age: 79
End: 2019-01-29

## 2019-01-29 DIAGNOSIS — R26.81 UNSTEADY GAIT: Primary | ICD-10-CM

## 2019-01-29 NOTE — TELEPHONE ENCOUNTER
Call placed to Beck Dasilva to review response  No answer and message left reviewing response  He was encouraged to please call back with any questions or concerns

## 2019-01-29 NOTE — TELEPHONE ENCOUNTER
Patient would like a script to go to Physical Therapy for an exercise program      Rolanda Chatterjee doing a presentation at the [facility]  Been at [employer] and not working for him he would like to do an exercise program with PT to get a better workout        Dr Segundo Costa wouldn't write the script he told them they need to contact PCP office first

## 2019-02-01 ENCOUNTER — EVALUATION (OUTPATIENT)
Dept: PHYSICAL THERAPY | Facility: CLINIC | Age: 79
End: 2019-02-01
Payer: MEDICARE

## 2019-02-01 DIAGNOSIS — R26.9 GAIT ABNORMALITY: Primary | ICD-10-CM

## 2019-02-01 PROCEDURE — 97162 PT EVAL MOD COMPLEX 30 MIN: CPT | Performed by: PHYSICAL THERAPIST

## 2019-02-01 PROCEDURE — 97110 THERAPEUTIC EXERCISES: CPT | Performed by: PHYSICAL THERAPIST

## 2019-02-01 NOTE — PROGRESS NOTES
PT Evaluation     Today's date: 2019  Patient name: Ibeth Ramon  : 1940  MRN: [id number]  Referring provider: Ame Ramirez MD  Dx:   Encounter Diagnosis     ICD-10-CM    1  Gait abnormality R26 9                   Assessment  Assessment details: Pt presents with balance and gait dysfunction secondary to postural dysfunction, weakness and cervical stenosis  Pt will benefit from PT to address impairments and restore function  Impairments: abnormal coordination, abnormal gait, abnormal muscle firing, abnormal muscle tone, abnormal or restricted ROM, abnormal movement, activity intolerance, impaired balance, impaired physical strength, lacks appropriate home exercise program, pain with function, scapular dyskinesis, poor posture  and poor body mechanics    Goals  ST-4 weeks  1   Pt will decrease pain > 25%  2   Pt will increase B cv AROM Rot > 45 deg  3   Pt will increase B tandem stance > 30 sec  LT-8 weeks  1   Pt will decrease pain > 50%  2   Pt will increase SLS > 5 sec B  Plan  Planned therapy interventions: joint mobilization, manual therapy, neuromuscular re-education, balance, balance/weight bearing training, strengthening, stretching, therapeutic activities, therapeutic exercise, therapeutic training, flexibility, functional ROM exercises, gait training and home exercise program  Frequency: 2x week  Duration in weeks: 8        Subjective Evaluation    History of Present Illness  Mechanism of injury: Pt is a 70 yom c/o cervical pain, and decreased balance since 2017    Pain  Current pain ratin  At best pain ratin  At worst pain ratin  Quality: dull ache  Relieving factors: support    Patient Goals  Patient goals for therapy: decreased pain, improved balance, increased motion, increased strength, independence with ADLs/IADLs and return to sport/leisure activities          Objective     Neurological Testing     Sensation   Cervical/Thoracic   Left Intact: light touch    Right   Intact: light touch    Reflexes   Left   Deltoid (C5): normal (2+)  Triceps (C7): normal (2+)    Right   Deltoid (C5): normal (2+)  Triceps (C7): normal (2+)    Active Range of Motion   Cervical/Thoracic Spine       Cervical    Flexion: 30 degrees   Extension: 20 degrees      Left rotation: 30 degrees  Right rotation: 30 degrees           Lumbar   Flexion: 60 degrees   Extension: 5 degrees     Additional Active Range of Motion Details  Gait:  Pt demonstrated mod L trunk flexion t/o gait pattern, increased CRIS, decreased step-length  Pt ambulates with R SPC  Balance: FT EO: > 20 sec, FT EC: 13 sec, tandem stance: R: 15, L: 20 sec, SLS: R: 1 sec, L: 2 sec  Passive Range of Motion   Left Hip   Flexion: 90 degrees with pain  External rotation (90/90): 30 degrees with pain  Internal rotation (90/90): 0 degrees with pain    Right Hip   Flexion: 80 degrees with pain  External rotation (90/90): 30 degrees with pain  Internal rotation (90/90): 0 degrees with pain    Joint Play     Hypomobile: C2, C3, C4, C5, C6, C7 and T1     Strength/Myotome Testing     Left Shoulder     Planes of Motion   Flexion: 3+   Abduction: 3+     Right Shoulder     Planes of Motion   Flexion: 3+   Abduction: 3+     Left Elbow   Flexion: 4-  Extension: 4-    Right Elbow   Flexion: 4-  Extension: 4-    Left Hip   Planes of Motion   Flexion: 3  Extension: 3  Abduction: 3+  Adduction: 3+    Right Hip   Planes of Motion   Flexion: 3+  Extension: 3+  Abduction: 3+  Adduction: 3+    Left Knee   Flexion: 3+  Extension: 3+    Right Knee   Flexion: 3+  Extension: 3+    Left Ankle/Foot   Dorsiflexion: 3+  Plantar flexion: 3+    Right Ankle/Foot   Dorsiflexion: 4-  Plantar flexion: 4-    Additional Strength Details   strength: R: 42, L: 49#  Pt is R hand dominant  Precautions: increased fall risk, UTILIZE GAIT BELT  PMHx: Hydrocephalus, GERD, hiatal hernia, Rhinitis, HTN, hyperlipidemia      Dx: Cervical stenosis with myelopathy, balance deficits, gait dysfunction, L>R LE weakness and tightness      Daily Treatment Diary     Manual  2/1            C4-6 B Rot MWM             B hip flexion, ER, IR PROM                                                        Exercise Diary  2/1            Gait training             Tandem stance balance 1x30" ea            SLS 1x10" ea            Step-ups/ downs  4"           DL heelraises             Standing GA stretch             scap retraction iso 5"x3 ea            4 finger B cv Rot 1x5 ea            Pulleys flex, ABD                                                                                                                                                                Modalities

## 2019-02-05 ENCOUNTER — OFFICE VISIT (OUTPATIENT)
Dept: PHYSICAL THERAPY | Facility: CLINIC | Age: 79
End: 2019-02-05
Payer: MEDICARE

## 2019-02-05 DIAGNOSIS — R26.9 GAIT ABNORMALITY: Primary | ICD-10-CM

## 2019-02-05 PROCEDURE — 97110 THERAPEUTIC EXERCISES: CPT | Performed by: PHYSICAL THERAPIST

## 2019-02-05 PROCEDURE — 97116 GAIT TRAINING THERAPY: CPT | Performed by: PHYSICAL THERAPIST

## 2019-02-05 PROCEDURE — 97140 MANUAL THERAPY 1/> REGIONS: CPT | Performed by: PHYSICAL THERAPIST

## 2019-02-05 PROCEDURE — 97112 NEUROMUSCULAR REEDUCATION: CPT | Performed by: PHYSICAL THERAPIST

## 2019-02-05 NOTE — PROGRESS NOTES
Daily Note     Today's date: 2019  Patient name: Ame Hyde  : 1940  MRN: [id number]  Referring provider: Micki Harris MD  Dx:   Encounter Diagnosis     ICD-10-CM    1  Gait abnormality R26 9                   Subjective: Pt reports having difficulty with cv Rot AROM during his HEP  Pt reports no falls, L > R weakness with stairs and L quad soreness after performing a SL Leg Press at the gym 3 days ago  Objective: See treatment diary below  Corrected technique with cv Rot AROM for HEP  Assessment: Pt demonstrated difficulty with SLS balance B, poor eccentric control with L step-downs  Plan: Cont POC  Precautions: increased fall risk, UTILIZE GAIT BELT      PMHx: Hydrocephalus, GERD, hiatal hernia, Rhinitis, HTN, hyperlipidemia      Dx:  Cervical stenosis with myelopathy, balance deficits, gait dysfunction, L>R LE weakness and tightness      Daily Treatment Diary      Manual                     C4-6 B Rot MWM    1x10 ea                   B hip flexion, ER, IR PROM    5 min                   B HA stretch   15"x1 ea                                                                         Exercise Diary    25                   Gait training    no AD CG 1x300, SLS pause 1x150 ft                    Tandem stance balance 1x30" ea  1x60" ea                   SLS 1x10" ea  1x20" ea                   Step-ups/ downs   4"/ 1x10 ea, 6"/ 1x10 ea                   DL heelraises    1x15                   Standing GA stretch    15"x1 ea                   scap retraction iso 5"x3   5"x15                   B cv Rot 1x5 ea  1"x15 ea                   Pulleys flex, ABD    5"x10 ea                    lateral eccentric step-downs   L/ 6" 1x10                    Biodex FT EO   L9/ 1x60"                    Biodex FT EC   1x60"

## 2019-02-08 ENCOUNTER — OFFICE VISIT (OUTPATIENT)
Dept: PHYSICAL THERAPY | Facility: CLINIC | Age: 79
End: 2019-02-08
Payer: MEDICARE

## 2019-02-08 DIAGNOSIS — R26.9 GAIT ABNORMALITY: Primary | ICD-10-CM

## 2019-02-08 PROCEDURE — 97140 MANUAL THERAPY 1/> REGIONS: CPT | Performed by: PHYSICAL THERAPIST

## 2019-02-08 PROCEDURE — 97116 GAIT TRAINING THERAPY: CPT | Performed by: PHYSICAL THERAPIST

## 2019-02-08 PROCEDURE — 97110 THERAPEUTIC EXERCISES: CPT | Performed by: PHYSICAL THERAPIST

## 2019-02-08 PROCEDURE — 97112 NEUROMUSCULAR REEDUCATION: CPT | Performed by: PHYSICAL THERAPIST

## 2019-02-08 NOTE — PROGRESS NOTES
Daily Note     Today's date: 2019  Patient name: Alysha Mcintyre  : 1940  MRN: [id number]  Referring provider: Jessica Gray MD  Dx:   Encounter Diagnosis     ICD-10-CM    1  Gait abnormality R26 9                   Subjective: Pt reports 24 hours of improved depression and pain after last visit  Objective: See treatment diary below  Pt demonstrated correct form for HEP  Assessment: Pt demonstrated improved B cv Rot AROM  Pt demonstrated a near fall during low hurdles  Plan: Cont POC  Precautions: increased fall risk, UTILIZE GAIT BELT      PMHx: Hydrocephalus, GERD, hiatal hernia, Rhinitis, HTN, hyperlipidemia      Dx:  Cervical stenosis with myelopathy, balance deficits, gait dysfunction, L>R LE weakness and tightness      Daily Treatment Diary      Manual                   C4-6 B Rot MWM    1x10 ea  1x10 ea                 B hip flexion, ER, IR PROM    5 min  5 min                 B HA stretch   15"x1 ea  15"x1 ea                                                                       Exercise Diary                   Gait training    no AD CG 1x300, SLS pause 1x150 ft   no AD 1 x350  Ft, 1x150 ft                  Tandem stance balance 1x30" ea  1x60" ea  1x60" ea                 SLS 1x10" ea  1x20" ea  1x20" ea                 Step-ups/ downs   4"/ 1x10 ea, 6"/ 1x10 ea  6"/ 1x10 ea                 DL heelraises    1x15  1x20                 Standing GA stretch    15"x1 ea  15"x1 ea                 scap retraction iso 5"x3   5"x15  5"x15                 B cv Rot 1x5 ea  1"x15 ea  1"x15 ea                 Pulleys flex, ABD    5"x10 ea  5"x10 ea                  lateral eccentric step-downs   L/ 6" 1x10  L/ 6"/ 1x10                  Biodex FT EO   L9/ 1x60"  L9 1x60"                  Biodex FT EC   1x60"  1x60"                  Stepping over hurdles      4"/ 2x10 ft * 1 near fall                  standing toe taps     1x30

## 2019-02-12 ENCOUNTER — APPOINTMENT (OUTPATIENT)
Dept: PHYSICAL THERAPY | Facility: CLINIC | Age: 79
End: 2019-02-12
Payer: MEDICARE

## 2019-02-15 ENCOUNTER — APPOINTMENT (OUTPATIENT)
Dept: PHYSICAL THERAPY | Facility: CLINIC | Age: 79
End: 2019-02-15
Payer: MEDICARE

## 2019-02-18 ENCOUNTER — OFFICE VISIT (OUTPATIENT)
Dept: PHYSICAL THERAPY | Facility: CLINIC | Age: 79
End: 2019-02-18
Payer: MEDICARE

## 2019-02-18 DIAGNOSIS — R26.9 GAIT ABNORMALITY: Primary | ICD-10-CM

## 2019-02-18 PROCEDURE — 97110 THERAPEUTIC EXERCISES: CPT | Performed by: PHYSICAL THERAPIST

## 2019-02-18 PROCEDURE — 97112 NEUROMUSCULAR REEDUCATION: CPT | Performed by: PHYSICAL THERAPIST

## 2019-02-18 NOTE — PROGRESS NOTES
Daily Note     Today's date: 2019  Patient name: Shantal Villa  : 1940  MRN: [id number]  Referring provider: Lissett Li MD  Dx:   Encounter Diagnosis     ICD-10-CM    1  Gait abnormality R26 9                   Subjective: Pt reports noticing improved cervical ROM  Objective: See treatment diary below  Assessment: Pt demonstrated improved tandem balance and ambulation tolerance  Plan: Cont POC  Precautions: increased fall risk, UTILIZE GAIT BELT      PMHx: Hydrocephalus, GERD, hiatal hernia, Rhinitis, HTN, hyperlipidemia      Dx:  Cervical stenosis with myelopathy, balance deficits, gait dysfunction, L>R LE weakness and tightness      Daily Treatment Diary      Manual                 C4-6 B Rot MWM    1x10 ea  1x10 ea  1x10 ea               B hip flexion, ER, IR PROM    5 min  5 min  5 min               B HA stretch   15"x1 ea  15"x1 ea  15"x1 ea                                                                     Exercise Diary                 Gait training    no AD CG 1x300, SLS pause 1x150 ft   no AD 1 x350  Ft, 1x150 ft   no AD 1x450 ft               Tandem stance balance 1x30" ea  1x60" ea 1x60" ea 1x60" ea               SLS 1x10" ea  1x20" ea  3x10" ea               Step-ups/ downs   4"/ 1x10 ea, 6"/ 1x10 ea  6"/ 1x10 ea  6"/ 1x10 ea               DL heelraises    1x15  1x20 1x20                Standing GA stretch    15"x1 ea  15"x1 ea  15"x1 ea               scap retraction iso 5"x3   5"x15  5"x15  5'x15                B cv Rot 1x5 ea  1"x15 ea  1"x15 ea  1x15               Pulleys flex, ABD    5"x10 ea  5"x10 ea  5'x10 ea                lateral eccentric step-downs   L/ 6" 1x10  L/ 6"/ 1x10                  Biodex FT EO   L9/ 1x60"  L9 1x60"  L9/ 1x60"                Biodex FT EC   1x60"  1x60"  1x60"                Stepping over hurdles      4"/ 2x10 ft * 1 near fall                  standing toe taps     1x30  1x30             bridges        1x10                PPT with partial crunch        1x10

## 2019-02-21 ENCOUNTER — OFFICE VISIT (OUTPATIENT)
Dept: PHYSICAL THERAPY | Facility: CLINIC | Age: 79
End: 2019-02-21
Payer: MEDICARE

## 2019-02-21 DIAGNOSIS — R26.9 GAIT ABNORMALITY: Primary | ICD-10-CM

## 2019-02-21 PROCEDURE — 97112 NEUROMUSCULAR REEDUCATION: CPT | Performed by: PHYSICAL THERAPIST

## 2019-02-21 PROCEDURE — 97116 GAIT TRAINING THERAPY: CPT | Performed by: PHYSICAL THERAPIST

## 2019-02-21 PROCEDURE — 97110 THERAPEUTIC EXERCISES: CPT | Performed by: PHYSICAL THERAPIST

## 2019-02-21 NOTE — PROGRESS NOTES
Daily Note     Today's date: 2019  Patient name: Seth Montoya  : 1940  MRN: [id number]  Referring provider: Toña Mixon MD  Dx:   Encounter Diagnosis     ICD-10-CM    1  Gait abnormality R26 9                   Subjective: Pt reports feeling fatigued today  Objective: See treatment diary below  AROM Rot R: 50, L: 58 deg  Assessment: Pt demonstrated improved ambulation tolerance  Plan: Cont POC  Precautions: increased fall risk, UTILIZE GAIT BELT      PMHx: Hydrocephalus, GERD, hiatal hernia, Rhinitis, HTN, hyperlipidemia      Dx:  Cervical stenosis with myelopathy, balance deficits, gait dysfunction, L>R LE weakness and tightness      Daily Treatment Diary      Manual               C4-6 B Rot MWM    1x10 ea  1x10 ea  1x10 ea  1x10 ea             B hip flexion, ER, IR PROM    5 min  5 min  5 min  5 min             B HA stretch   15"x1 ea  15"x1 ea  15"x1 ea  15"x1 ea                                                                   Exercise Diary               Gait training    no AD CG 1x300, SLS pause 1x150 ft   no AD 1 x350  Ft, 1x150 ft   no AD 1x450 ft  no AD 1x750 ft             Tandem stance balance 1x30" ea  1x60" ea 1x60" ea 1x60" ea  1x60" ea             SLS 1x10" ea  1x20" ea  3x10" ea  3x10" ea             Step-ups/ downs   4"/ 1x10 ea, 6"/ 1x10 ea  6"/ 1x10 ea  6"/ 1x10 ea  6"/ 1x12 ea             DL heelraises    1x15  1x20 1x20   1x20             Standing GA stretch    15"x1 ea  15"x1 ea  15"x1 ea  15"x1 ea             scap retraction iso 5"x3   5"x15  5"x15  5"x15   5"x15             B cv Rot 1x5 ea  1"x15 ea  1"x15 ea  1x15  1x15             Pulleys flex, ABD    5"x10 ea  5"x10 ea  5"x10 ea  5"x10 ea              lateral eccentric step-downs   L/ 6" 1x10  L/ 6"/ 1x10                  Biodex FT EO   L9/ 1x60"  L9 1x60"  L9/ 1x60"  L9/ 1x60"              Biodex FT EC   1x60"  1x60"  1x60"  1x60"            Stepping over hurdles      4"/ 2x10 ft * 1 near fall                  standing toe taps     1x30  1x30  1x35             bridges        1x10  1x10              PPT with partial crunch        1x10  1x10

## 2019-02-25 ENCOUNTER — OFFICE VISIT (OUTPATIENT)
Dept: PHYSICAL THERAPY | Facility: CLINIC | Age: 79
End: 2019-02-25
Payer: MEDICARE

## 2019-02-25 DIAGNOSIS — R26.9 GAIT ABNORMALITY: Primary | ICD-10-CM

## 2019-02-25 PROCEDURE — 97140 MANUAL THERAPY 1/> REGIONS: CPT

## 2019-02-25 PROCEDURE — 97110 THERAPEUTIC EXERCISES: CPT

## 2019-02-25 PROCEDURE — 97112 NEUROMUSCULAR REEDUCATION: CPT

## 2019-02-25 NOTE — PROGRESS NOTES
Daily Note     Today's date: 2019  Patient name: Letty Buckley  : 1940  MRN: [id number]  Referring provider: Alejandro Alegria MD  Dx:   Encounter Diagnosis     ICD-10-CM    1  Gait abnormality R26 9                   Subjective: Pt reports poor balance but no falls since last tx session  Objective: See treatment diary below  Assessment: Pt demonstrated improved tandem stance but continued difficulty with SLS balance  Pt demonstrated moderate restriction with cv rotation PROM  Plan: Cont POC as per PT  Precautions: increased fall risk, UTILIZE GAIT BELT      PMHx: Hydrocephalus, GERD, hiatal hernia, Rhinitis, HTN, hyperlipidemia      Dx:  Cervical stenosis with myelopathy, balance deficits, gait dysfunction, L>R LE weakness and tightness      Daily Treatment Diary      Manual             C4-6 B Rot MWM    1x10 ea  1x10 ea  1x10 ea  1x10 ea  5"x10 ea   PROM           B hip flexion, ER, IR PROM    5 min  5 min  5 min  5 min  5 min           B HA stretch   15"x1 ea  15"x1 ea  15"x1 ea  15"x1 ea  15"x1 ea                                                                 Exercise Diary             Gait training    no AD CG 1x300, SLS pause 1x150 ft   no AD 1 x350  Ft, 1x150 ft   no AD 1x450 ft  no AD 1x750 ft  no AD 1x750 ft           Tandem stance balance 1x30" ea  1x60" ea 1x60" ea 1x60" ea  1x60" ea  1x60" ea           SLS 1x10" ea  1x20" ea  3x10" ea  3x10" ea  3x10"ea           Step-ups/ downs   4"/ 1x10 ea, 6"/ 1x10 ea  6"/ 1x10 ea  6"/ 1x10 ea  6"/ 1x12 ea  6"/ 1x12 ea           DL heelraises    1x15  1x20 1x20   1x20  1x25           Standing GA stretch    15"x1 ea  15"x1 ea  15"x1 ea  15"x1 ea  15"x1 ea           scap retraction iso 5"x3   5"x15  5"x15  5"x15   5"x15  5"x15           B cv Rot 1x5 ea  1"x15 ea  1"x15 ea  1x15  1x15  1x15           Pulleys flex, ABD    5"x10 ea  5"x10 ea  5"x10 ea  5"x10 ea  5"x10 ea            lateral eccentric step-downs   L/ 6" 1x10  L/ 6"/ 1x10                  Biodex FT EO   L9/ 1x60"  L9 1x60"  L9/ 1x60"  L9/ 1x60"  L8/  1x60"            Biodex FT EC   1x60"  1x60"  1x60"  1x60"  1x60"            Stepping over hurdles      4"/ 2x10 ft * 1 near fall                  standing toe taps     1x30  1x30  1x35  1x35           bridges        1x10  1x10  1x12            PPT with partial crunch        1x10  1x10  1x10

## 2019-03-04 ENCOUNTER — OFFICE VISIT (OUTPATIENT)
Dept: PHYSICAL THERAPY | Facility: CLINIC | Age: 79
End: 2019-03-04
Payer: MEDICARE

## 2019-03-04 DIAGNOSIS — R26.9 GAIT ABNORMALITY: Primary | ICD-10-CM

## 2019-03-04 PROCEDURE — 97110 THERAPEUTIC EXERCISES: CPT | Performed by: PHYSICAL THERAPIST

## 2019-03-04 PROCEDURE — 97140 MANUAL THERAPY 1/> REGIONS: CPT | Performed by: PHYSICAL THERAPIST

## 2019-03-04 PROCEDURE — 97112 NEUROMUSCULAR REEDUCATION: CPT | Performed by: PHYSICAL THERAPIST

## 2019-03-04 NOTE — PROGRESS NOTES
Daily Note     Today's date: 3/4/2019  Patient name: Shantal Villa  : 1940  MRN: [id number]  Referring provider: Lissett Li MD  Dx:   Encounter Diagnosis     ICD-10-CM    1  Gait abnormality R26 9                   Subjective: Pt reports several losses of balance since last treatment session; however no falls  Pt reports that he is able to self correct loss of balance at home  Objective: See treatment diary below  TTP C2/3  Assessment: Pt demonstrated difficulty with static balance exercise today and displayed two posterior LOB during FT EO on Biodex  Pt utilized upper extremity support and therapist assist posteriorly to prevent fall  Pt unable to maintain SLS for 10 seconds without support from contralateral LE or UE support  Plan: Cont as per current plan of care  Progress static and dynamic balance therapeutic exercise  Precautions: increased fall risk, UTILIZE GAIT BELT      PMHx: Hydrocephalus, GERD, hiatal hernia, Rhinitis, HTN, hyperlipidemia      Dx:  Cervical stenosis with myelopathy, balance deficits, gait dysfunction, L>R LE weakness and tightness      Daily Treatment Diary      Manual    3/4         C4-6 B Rot MWM    1x10 ea  1x10 ea  1x10 ea  1x10 ea  5"x10 ea   PROM  1x10 ea (C2/3)         B hip flexion, ER, IR PROM    5 min  5 min  5 min  5 min  5 min  5 min         B HA stretch   15"x1 ea  15"x1 ea  15"x1 ea  15"x1 ea  15"x1 ea  15"x1 ea                                                               Exercise Diary    3/4         Gait training    no AD CG 1x300, SLS pause 1x150 ft   no AD 1 x350  Ft, 1x150 ft   no AD 1x450 ft  no AD 1x750 ft  no AD 1x750 ft  no AD [id number]         Tandem stance balance 1x30" ea  1x60" ea 1x60" ea 1x60" ea  1x60" ea  1x60" ea  1x60" ea         SLS 1x10" ea  1x20" ea  3x10" ea  3x10" ea  3x10"ea  3x10" ea         Step-ups/ downs   4"/ 1x10 ea, 6"/ 1x10 ea  6"/ 1x10 ea  6"/ 1x10 ea  6"/ 1x12 ea  6"/ 1x12 ea  6" / 1x12 ea         DL heelraises    1x15  1x20 1x20   1x20  1x25  1x25         Standing GA stretch    15"x1 ea  15"x1 ea  15"x1 ea  15"x1 ea  15"x1 ea  15"x1 ea         scap retraction iso 5"x3   5"x15  5"x15  5"x15   5"x15  5"x15  5"x10         B cv Rot 1x5 ea  1"x15 ea  1"x15 ea  1x15  1x15  1x15  1x15         Pulleys flex, ABD    5"x10 ea  5"x10 ea  5"x10 ea  5"x10 ea  5"x10 ea  5"x10          lateral eccentric step-downs   L/ 6" 1x10  L/ 6"/ 1x10                  Biodex FT EO   L9/ 1x60"  L9 1x60"  L9/ 1x60"  L9/ 1x60"  L8/  1x60"  L8/ 1x60"          Biodex FT EC   1x60"  1x60"  1x60"  1x60"  1x60"  1x60"          Stepping over hurdles      4"/ 2x10 ft * 1 near fall                  standing toe taps     1x30  1x30  1x35  1x35  1x35         bridges        1x10  1x10  1x12  1x12          PPT with partial crunch        1x10  1x10  1x10  1x10

## 2019-03-11 ENCOUNTER — OFFICE VISIT (OUTPATIENT)
Dept: PHYSICAL THERAPY | Facility: CLINIC | Age: 79
End: 2019-03-11
Payer: MEDICARE

## 2019-03-11 DIAGNOSIS — R26.9 GAIT ABNORMALITY: Primary | ICD-10-CM

## 2019-03-11 PROCEDURE — 97112 NEUROMUSCULAR REEDUCATION: CPT | Performed by: PHYSICAL THERAPIST

## 2019-03-11 PROCEDURE — 97110 THERAPEUTIC EXERCISES: CPT | Performed by: PHYSICAL THERAPIST

## 2019-03-11 PROCEDURE — 97116 GAIT TRAINING THERAPY: CPT | Performed by: PHYSICAL THERAPIST

## 2019-03-11 PROCEDURE — 97140 MANUAL THERAPY 1/> REGIONS: CPT | Performed by: PHYSICAL THERAPIST

## 2019-03-11 NOTE — PROGRESS NOTES
Daily Note     Today's date: 3/11/2019  Patient name: Jon Benson  : 1940  MRN: [id number]  Referring provider: Primitivo Brannon MD  Dx:   Encounter Diagnosis     ICD-10-CM    1  Gait abnormality R26 9                   Subjective: Pt reports vision deficits that occasionally causes him to loose his balance  Objective: See treatment diary below  Assessment: Pt demonstrated a 20 minute ambulation tolerance outside on grass, with hills, ramps and curbs  Pt demonstrated 2 small LOB on grass  This happened when the pt was distracted  Plan: Cont POC  RE NV  Precautions: increased fall risk, UTILIZE GAIT BELT      PMHx: Hydrocephalus, GERD, hiatal hernia, Rhinitis, HTN, hyperlipidemia      Dx:  Cervical stenosis with myelopathy, balance deficits, gait dysfunction, L>R LE weakness and tightness      Daily Treatment Diary      Manual  2/1  2/5  2/8  2/18  2/21  3/11           C4-6 B Rot MWM    1x10 ea  1x10 ea  1x10 ea  1x10 ea  held           B hip flexion, ER, IR PROM    5 min  5 min  5 min  5 min 5 min           B HA stretch   15"x1 ea  15"x1 ea  15"x1 ea  15"x1 ea  15"x1 ea            Supine B CV PROM            5 min                                         Exercise Diary  2/1  2/5  2/8  2/18  2/21  3/11           Gait training    no AD CG 1x300, SLS pause 1x150 ft   no AD 1 x350  Ft, 1x150 ft   no AD 1x450 ft  no AD 1x750 ft  20 min outside on grass, hills, curbs and ramps           Tandem stance balance 1x30" ea  1x60" ea 1x60" ea 1x60" ea  1x60" ea  1x60" ea           SLS 1x10" ea  1x20" ea  3x10" ea  3x10" ea  3x10" ea           Step-ups/ downs   4"/ 1x10 ea, 6"/ 1x10 ea  6"/ 1x10 ea  6"/ 1x10 ea  6"/ 1x12 ea  8"/ 1x10 ea           DL heelraises    1x15  1x20 1x20   1x20             Standing GA stretch    15"x1 ea  15"x1 ea  15"x1 ea  15"x1 ea             scap retraction iso 5"x3   5"x15  5"x15  5"x15   5"x15  5"x15           B cv Rot 1x5 ea  1"x15 ea  1"x15 ea  1x15  1x15  1x15           Pulleys flex, ABD    5"x10 ea  5"x10 ea  5"x10 ea  5"x10 ea  3"x15 ea            lateral eccentric step-downs   L/ 6" 1x10  L/ 6"/ 1x10                  Biodex FT EO   L9/ 1x60"  L9 1x60"  L9/ 1x60"  L9/ 1x60"  L9/ 1x60"            Biodex FT EC   1x60"  1x60"  1x60"  1x60"  1x60"            Stepping over hurdles      4"/ 2x10 ft * 1 near fall                  standing toe taps     1x30  1x30  1x35             bridges        1x10  1x10  1x10            PPT with partial crunch        1x10  1x10  1x10

## 2019-03-21 ENCOUNTER — APPOINTMENT (OUTPATIENT)
Dept: PHYSICAL THERAPY | Facility: CLINIC | Age: 79
End: 2019-03-21
Payer: MEDICARE

## 2019-03-27 ENCOUNTER — APPOINTMENT (OUTPATIENT)
Dept: PHYSICAL THERAPY | Facility: CLINIC | Age: 79
End: 2019-03-27
Payer: MEDICARE

## 2019-04-01 ENCOUNTER — OFFICE VISIT (OUTPATIENT)
Dept: INTERNAL MEDICINE CLINIC | Facility: CLINIC | Age: 79
End: 2019-04-01
Payer: MEDICARE

## 2019-04-01 VITALS
HEIGHT: 70 IN | RESPIRATION RATE: 18 BRPM | BODY MASS INDEX: 33.44 KG/M2 | HEART RATE: 81 BPM | DIASTOLIC BLOOD PRESSURE: 70 MMHG | WEIGHT: 233.6 LBS | OXYGEN SATURATION: 97 % | SYSTOLIC BLOOD PRESSURE: 120 MMHG | TEMPERATURE: 97.8 F

## 2019-04-01 DIAGNOSIS — K21.9 GASTROESOPHAGEAL REFLUX DISEASE, ESOPHAGITIS PRESENCE NOT SPECIFIED: ICD-10-CM

## 2019-04-01 DIAGNOSIS — G47.9 SLEEP DISTURBANCES: ICD-10-CM

## 2019-04-01 DIAGNOSIS — Z00.00 HEALTH MAINTENANCE EXAMINATION: ICD-10-CM

## 2019-04-01 DIAGNOSIS — E66.01 SEVERE OBESITY WITH BODY MASS INDEX (BMI) OF 35.0 TO 39.9 WITH SERIOUS COMORBIDITY (HCC): ICD-10-CM

## 2019-04-01 DIAGNOSIS — I10 ESSENTIAL HYPERTENSION: Primary | ICD-10-CM

## 2019-04-01 DIAGNOSIS — K21.00 GASTROESOPHAGEAL REFLUX DISEASE WITH ESOPHAGITIS: ICD-10-CM

## 2019-04-01 DIAGNOSIS — K59.00 CONSTIPATION, UNSPECIFIED CONSTIPATION TYPE: ICD-10-CM

## 2019-04-01 DIAGNOSIS — E78.49 OTHER HYPERLIPIDEMIA: ICD-10-CM

## 2019-04-01 DIAGNOSIS — H61.22 IMPACTED CERUMEN OF LEFT EAR: ICD-10-CM

## 2019-04-01 DIAGNOSIS — R26.81 UNSTEADY GAIT: ICD-10-CM

## 2019-04-01 PROBLEM — H26.9 CATARACT: Status: ACTIVE | Noted: 2019-04-01

## 2019-04-01 PROCEDURE — G0438 PPPS, INITIAL VISIT: HCPCS | Performed by: INTERNAL MEDICINE

## 2019-04-01 PROCEDURE — 99214 OFFICE O/P EST MOD 30 MIN: CPT | Performed by: INTERNAL MEDICINE

## 2019-04-01 PROCEDURE — 69209 REMOVE IMPACTED EAR WAX UNI: CPT | Performed by: INTERNAL MEDICINE

## 2019-04-02 ENCOUNTER — OFFICE VISIT (OUTPATIENT)
Dept: PHYSICAL THERAPY | Facility: CLINIC | Age: 79
End: 2019-04-02
Payer: MEDICARE

## 2019-04-02 DIAGNOSIS — R26.9 GAIT ABNORMALITY: Primary | ICD-10-CM

## 2019-04-02 PROCEDURE — 97110 THERAPEUTIC EXERCISES: CPT | Performed by: PHYSICAL THERAPIST

## 2019-04-02 PROCEDURE — 97112 NEUROMUSCULAR REEDUCATION: CPT | Performed by: PHYSICAL THERAPIST

## 2019-04-26 DIAGNOSIS — I10 ESSENTIAL HYPERTENSION: ICD-10-CM

## 2019-04-26 RX ORDER — ENALAPRIL MALEATE 10 MG/1
TABLET ORAL
Qty: 45 TABLET | Refills: 1 | Status: SHIPPED | OUTPATIENT
Start: 2019-04-26 | End: 2020-02-05 | Stop reason: SDUPTHER

## 2019-05-13 ENCOUNTER — APPOINTMENT (OUTPATIENT)
Dept: LAB | Facility: CLINIC | Age: 79
End: 2019-05-13
Payer: MEDICARE

## 2019-05-13 LAB
ALBUMIN SERPL BCP-MCNC: 3.7 G/DL (ref 3.5–5)
ALP SERPL-CCNC: 87 U/L (ref 46–116)
ALT SERPL W P-5'-P-CCNC: 30 U/L (ref 12–78)
ANION GAP SERPL CALCULATED.3IONS-SCNC: 7 MMOL/L (ref 4–13)
AST SERPL W P-5'-P-CCNC: 16 U/L (ref 5–45)
BASOPHILS # BLD AUTO: 0.03 THOUSANDS/ΜL (ref 0–0.1)
BASOPHILS NFR BLD AUTO: 0 % (ref 0–1)
BILIRUB SERPL-MCNC: 0.6 MG/DL (ref 0.2–1)
BUN SERPL-MCNC: 18 MG/DL (ref 5–25)
CALCIUM SERPL-MCNC: 9.9 MG/DL (ref 8.3–10.1)
CHLORIDE SERPL-SCNC: 103 MMOL/L (ref 100–108)
CHOLEST SERPL-MCNC: 143 MG/DL (ref 50–200)
CO2 SERPL-SCNC: 27 MMOL/L (ref 21–32)
CREAT SERPL-MCNC: 1.11 MG/DL (ref 0.6–1.3)
EOSINOPHIL # BLD AUTO: 0.24 THOUSAND/ΜL (ref 0–0.61)
EOSINOPHIL NFR BLD AUTO: 3 % (ref 0–6)
ERYTHROCYTE [DISTWIDTH] IN BLOOD BY AUTOMATED COUNT: 13.2 % (ref 11.6–15.1)
GFR SERPL CREATININE-BSD FRML MDRD: 64 ML/MIN/1.73SQ M
GLUCOSE P FAST SERPL-MCNC: 94 MG/DL (ref 65–99)
HCT VFR BLD AUTO: 44.1 % (ref 36.5–49.3)
HDLC SERPL-MCNC: 53 MG/DL (ref 40–60)
HGB BLD-MCNC: 14.4 G/DL (ref 12–17)
IMM GRANULOCYTES # BLD AUTO: 0.02 THOUSAND/UL (ref 0–0.2)
IMM GRANULOCYTES NFR BLD AUTO: 0 % (ref 0–2)
LDLC SERPL CALC-MCNC: 68 MG/DL (ref 0–100)
LYMPHOCYTES # BLD AUTO: 1.71 THOUSANDS/ΜL (ref 0.6–4.47)
LYMPHOCYTES NFR BLD AUTO: 20 % (ref 14–44)
MCH RBC QN AUTO: 28.7 PG (ref 26.8–34.3)
MCHC RBC AUTO-ENTMCNC: 32.7 G/DL (ref 31.4–37.4)
MCV RBC AUTO: 88 FL (ref 82–98)
MONOCYTES # BLD AUTO: 0.78 THOUSAND/ΜL (ref 0.17–1.22)
MONOCYTES NFR BLD AUTO: 9 % (ref 4–12)
NEUTROPHILS # BLD AUTO: 5.61 THOUSANDS/ΜL (ref 1.85–7.62)
NEUTS SEG NFR BLD AUTO: 68 % (ref 43–75)
NONHDLC SERPL-MCNC: 90 MG/DL
NRBC BLD AUTO-RTO: 0 /100 WBCS
PLATELET # BLD AUTO: 242 THOUSANDS/UL (ref 149–390)
PMV BLD AUTO: 10.5 FL (ref 8.9–12.7)
POTASSIUM SERPL-SCNC: 4 MMOL/L (ref 3.5–5.3)
PROT SERPL-MCNC: 7.4 G/DL (ref 6.4–8.2)
RBC # BLD AUTO: 5.01 MILLION/UL (ref 3.88–5.62)
SODIUM SERPL-SCNC: 137 MMOL/L (ref 136–145)
T4 FREE SERPL-MCNC: 0.86 NG/DL (ref 0.76–1.46)
TRIGL SERPL-MCNC: 112 MG/DL
TSH SERPL DL<=0.05 MIU/L-ACNC: 4.37 UIU/ML (ref 0.36–3.74)
VIT B12 SERPL-MCNC: 661 PG/ML (ref 100–900)
WBC # BLD AUTO: 8.39 THOUSAND/UL (ref 4.31–10.16)

## 2019-05-13 PROCEDURE — 84439 ASSAY OF FREE THYROXINE: CPT | Performed by: INTERNAL MEDICINE

## 2019-05-13 PROCEDURE — 80053 COMPREHEN METABOLIC PANEL: CPT | Performed by: INTERNAL MEDICINE

## 2019-05-13 PROCEDURE — 84443 ASSAY THYROID STIM HORMONE: CPT | Performed by: INTERNAL MEDICINE

## 2019-05-13 PROCEDURE — 36415 COLL VENOUS BLD VENIPUNCTURE: CPT | Performed by: INTERNAL MEDICINE

## 2019-05-13 PROCEDURE — 85025 COMPLETE CBC W/AUTO DIFF WBC: CPT | Performed by: INTERNAL MEDICINE

## 2019-05-13 PROCEDURE — 80061 LIPID PANEL: CPT | Performed by: INTERNAL MEDICINE

## 2019-05-13 PROCEDURE — 82607 VITAMIN B-12: CPT | Performed by: INTERNAL MEDICINE

## 2019-05-14 ENCOUNTER — TELEPHONE (OUTPATIENT)
Dept: INTERNAL MEDICINE CLINIC | Facility: CLINIC | Age: 79
End: 2019-05-14

## 2019-08-05 ENCOUNTER — TELEPHONE (OUTPATIENT)
Dept: NEUROSURGERY | Facility: CLINIC | Age: 79
End: 2019-08-05

## 2019-08-05 NOTE — TELEPHONE ENCOUNTER
----- Message from David Kaminski RN sent at 7/8/2019 11:27 AM EDT -----  Regarding: FW: NPH-1 yr f/u due sept 2019      ----- Message -----  From: David Kaminski RN  Sent: 9/4/2018   1:24 PM EDT  To: David Kaminski RN  Subject: NPH-1 yr f/u                                     Patient to f/u in 1 yr (due in sept 2019)

## 2019-08-05 NOTE — TELEPHONE ENCOUNTER
Called patient/patient's wife, Azell Closs to schedule NPH follow up and was unable to leave message, will try calling again

## 2019-08-08 ENCOUNTER — APPOINTMENT (OUTPATIENT)
Dept: LAB | Facility: CLINIC | Age: 79
End: 2019-08-08
Payer: MEDICARE

## 2019-08-08 ENCOUNTER — TRANSCRIBE ORDERS (OUTPATIENT)
Dept: LAB | Facility: CLINIC | Age: 79
End: 2019-08-08

## 2019-08-08 DIAGNOSIS — N40.1 BENIGN PROSTATIC HYPERPLASIA WITH LOWER URINARY TRACT SYMPTOMS, SYMPTOM DETAILS UNSPECIFIED: ICD-10-CM

## 2019-08-08 DIAGNOSIS — N40.1 BENIGN PROSTATIC HYPERPLASIA WITH LOWER URINARY TRACT SYMPTOMS, SYMPTOM DETAILS UNSPECIFIED: Primary | ICD-10-CM

## 2019-08-08 LAB — PSA SERPL-MCNC: 6.4 NG/ML (ref 0–4)

## 2019-08-08 PROCEDURE — 84153 ASSAY OF PSA TOTAL: CPT

## 2019-08-13 DIAGNOSIS — R26.9 GAIT DISTURBANCE: Primary | ICD-10-CM

## 2019-08-13 NOTE — TELEPHONE ENCOUNTER
Received call from patient's wife, Kulwinder John, scheduled NPH appointment for Thursday, Oct 10 at 11 am with PT at 8 am   Patient's wife aware and agreeable

## 2019-08-13 NOTE — TELEPHONE ENCOUNTER
Called patient/patient's wife, Jose Begun to schedule NPH follow up and was unable to leave message, stated mailbox full  Called cell on file in patient's chart, spoke with patient, patient stated his wife does the scheduling and transportation, he will make her aware and have her call to schedule

## 2019-09-05 ENCOUNTER — OFFICE VISIT (OUTPATIENT)
Dept: INTERNAL MEDICINE CLINIC | Facility: CLINIC | Age: 79
End: 2019-09-05
Payer: MEDICARE

## 2019-09-05 VITALS
BODY MASS INDEX: 32.9 KG/M2 | HEART RATE: 62 BPM | RESPIRATION RATE: 18 BRPM | WEIGHT: 229.8 LBS | SYSTOLIC BLOOD PRESSURE: 110 MMHG | OXYGEN SATURATION: 95 % | HEIGHT: 70 IN | TEMPERATURE: 97.1 F | DIASTOLIC BLOOD PRESSURE: 68 MMHG

## 2019-09-05 DIAGNOSIS — Z01.818 PRE-OP EVALUATION: Primary | ICD-10-CM

## 2019-09-05 DIAGNOSIS — H25.9 AGE-RELATED CATARACT OF BOTH EYES, UNSPECIFIED AGE-RELATED CATARACT TYPE: ICD-10-CM

## 2019-09-05 DIAGNOSIS — I10 ESSENTIAL HYPERTENSION: ICD-10-CM

## 2019-09-05 DIAGNOSIS — M47.12 CERVICAL SPONDYLOSIS WITH MYELOPATHY: ICD-10-CM

## 2019-09-05 DIAGNOSIS — K21.9 GASTROESOPHAGEAL REFLUX DISEASE, ESOPHAGITIS PRESENCE NOT SPECIFIED: ICD-10-CM

## 2019-09-05 DIAGNOSIS — G91.2 INPH (IDIOPATHIC NORMAL PRESSURE HYDROCEPHALUS) (HCC): ICD-10-CM

## 2019-09-05 PROCEDURE — 99214 OFFICE O/P EST MOD 30 MIN: CPT | Performed by: NURSE PRACTITIONER

## 2019-09-05 NOTE — PROGRESS NOTES
Assessment/Plan:    Medically stable for cataract surgery  GERD (gastroesophageal reflux disease)  Symptoms controlled on PPI     Essential hypertension  Stable  Controlled on current regimen     INPH (idiopathic normal pressure hydrocephalus)   shunt, no recent issues     Cervical spondylosis with myelopathy  Upcoming appointment with neurosurgery to discuss options        Diagnoses and all orders for this visit:    Pre-op evaluation    Age-related cataract of both eyes, unspecified age-related cataract type    Essential hypertension    Gastroesophageal reflux disease, esophagitis presence not specified    INPH (idiopathic normal pressure hydrocephalus)    Cervical spondylosis with myelopathy          Subjective:      Patient ID: Alfredia Cockayne is a 70 y o  male  Here today for pre op evaluation   Scheduled for cataract surgery Left eye  9/17, right eye 10/1 with Dr Adelaida Wells  Denies any issues with anesthesia in the past   No cardiopulmonary complaints           The following portions of the patient's history were reviewed and updated as appropriate: allergies, current medications, past family history, past medical history, past social history, past surgical history and problem list     Review of Systems   Constitutional: Negative for activity change, appetite change and fatigue  Eyes: Negative for visual disturbance  Respiratory: Negative for cough, chest tightness and shortness of breath  Cardiovascular: Negative for chest pain, palpitations and leg swelling  Gastrointestinal: Negative for abdominal pain, blood in stool, constipation and diarrhea  Genitourinary: Negative for difficulty urinating  Musculoskeletal: Positive for arthralgias and neck pain  Skin: Negative for rash  Neurological: Negative for dizziness, weakness, light-headedness and headaches  Psychiatric/Behavioral: Negative for sleep disturbance           Objective:      /68   Pulse 62   Temp (!) 97 1 °F (36 2 °C) (Oral)   Resp 18   Ht 5' 10" (1 778 m)   Wt 104 kg (229 lb 12 8 oz)   SpO2 95%   BMI 32 97 kg/m²          Physical Exam   Constitutional: He is oriented to person, place, and time  He appears well-developed and well-nourished  HENT:   Head: Normocephalic and atraumatic  Mouth/Throat: Oropharynx is clear and moist    Eyes: Pupils are equal, round, and reactive to light  Conjunctivae are normal    Neck: No thyromegaly present  Cardiovascular: Normal rate, regular rhythm, normal heart sounds and intact distal pulses  Pulmonary/Chest: Effort normal and breath sounds normal    Abdominal: Soft  Bowel sounds are normal    Musculoskeletal: Normal range of motion  He exhibits edema  Trace edema bilateral lower extremities    Lymphadenopathy:     He has no cervical adenopathy  Neurological: He is alert and oriented to person, place, and time  Skin: Skin is warm and dry  Psychiatric: He has a normal mood and affect  His behavior is normal    Vitals reviewed

## 2019-10-10 ENCOUNTER — APPOINTMENT (OUTPATIENT)
Dept: PHYSICAL THERAPY | Facility: CLINIC | Age: 79
End: 2019-10-10
Payer: MEDICARE

## 2019-10-11 ENCOUNTER — TELEPHONE (OUTPATIENT)
Dept: NEUROSURGERY | Facility: CLINIC | Age: 79
End: 2019-10-11

## 2019-10-11 NOTE — TELEPHONE ENCOUNTER
Patient scheduled for the following appts:    Tuesday, November 5th (SLT):    PT @ 2pm  Appt with DR @ 3pm    Confirmed information over the phone with wife, Mallika Acevedo  She was appreciative

## 2019-10-11 NOTE — TELEPHONE ENCOUNTER
----- Message from Mason Higginbotham RN sent at 10/11/2019  1:54 PM EDT -----  Regarding: RE: needs to reschedule tues appt  Wife would like an afternoon on Nov 5th       ----- Message -----  From: Mason Higginbotham RN  Sent: 10/11/2019   1:49 PM EDT  To: Mason Higginbotham RN  Subject: needs to reschedule tues appt                    Received call from patient's wife stating that they need to reschedule his NPH appt that is scheduled for Tuesday and change it to a date in November  Attempted to call her back with no answer  Bina LOYA and awaiting call back

## 2019-10-15 ENCOUNTER — APPOINTMENT (OUTPATIENT)
Dept: PHYSICAL THERAPY | Facility: CLINIC | Age: 79
End: 2019-10-15
Payer: MEDICARE

## 2019-10-24 ENCOUNTER — APPOINTMENT (OUTPATIENT)
Dept: LAB | Facility: CLINIC | Age: 79
End: 2019-10-24
Payer: MEDICARE

## 2019-10-24 ENCOUNTER — TRANSCRIBE ORDERS (OUTPATIENT)
Dept: LAB | Facility: CLINIC | Age: 79
End: 2019-10-24

## 2019-10-24 DIAGNOSIS — R97.20 ELEVATED PROSTATE SPECIFIC ANTIGEN (PSA): ICD-10-CM

## 2019-10-24 DIAGNOSIS — R97.20 ELEVATED PROSTATE SPECIFIC ANTIGEN (PSA): Primary | ICD-10-CM

## 2019-10-24 PROCEDURE — 84153 ASSAY OF PSA TOTAL: CPT

## 2019-10-24 PROCEDURE — 36415 COLL VENOUS BLD VENIPUNCTURE: CPT

## 2019-10-24 PROCEDURE — 84154 ASSAY OF PSA FREE: CPT

## 2019-10-25 LAB
PSA FREE MFR SERPL: 9.4 %
PSA FREE SERPL-MCNC: 0.72 NG/ML
PSA SERPL-MCNC: 7.7 NG/ML (ref 0–4)

## 2019-10-28 ENCOUNTER — TELEPHONE (OUTPATIENT)
Dept: NEUROSURGERY | Facility: CLINIC | Age: 79
End: 2019-10-28

## 2019-10-28 NOTE — TELEPHONE ENCOUNTER
Received call from patient's wife asking to reschedule NPH appt on 11/5/19  Rescheduled for 11/12/19 PT @ 9, appt with DR @ 10  Wife was appreciative

## 2019-11-05 ENCOUNTER — APPOINTMENT (OUTPATIENT)
Dept: PHYSICAL THERAPY | Facility: CLINIC | Age: 79
End: 2019-11-05
Payer: MEDICARE

## 2019-11-08 NOTE — PROGRESS NOTES
Office Note - Neurosurgery   Gopi Toni Rosen 70 y o  male MRN: [id number]      Assessment:    51-year-old gentleman with known cervical spondylosis and stenosis with possible early myelopathy and right frontal  shunt for normal pressure hydrocephalus  Both he and his wife have noticed some decline in his overall activity level and possibly difficulties with gait  Cognitively he seems to be at his baseline  His wife does note that overall he has disc interested in any type of physical activity and really spends most of his time at home with relatively sedentary activities like reading or searching on his iPad  He has no objective findings of myelopathy or radiculopathy on examination today  He is in the course of being investigated for possible prostate cancer  We had a detailed discussion regarding his presentation and I suspect his shunt is working well  Would not recommend any adjustment at present  With respect to cervical spondylosis and stenosis, overall his examination appears to be stable  However if they are noticing some progression in difficulties with fine motor tasks and balance, surgical intervention in the form of cervical decompression fusion could be considered, though they understand there would be significant recovery time and the results would be difficult to predict  As such, we agreed that he will follow-up in 6 months time clinically for NPH and cervical spondylosis  An MRI of the cervical spine could be obtained should they feel that his symptoms are becoming worse and they are interested in addressing his cervical spine  The status of prostate cancer and treatment for this may influence time line as well  In the interim, I encouraged him to increase his physical activity level and try to avoid becoming deconditioned  History, physical examination and diagnostic tests were reviewed and questions answered  Diagnosis, care plan and treatment options were discussed   The patient and spouse/SO understand instructions and will follow up as directed  Plan:    Follow-up: in 6 month(s)    Problem List Items Addressed This Visit        Nervous and Auditory    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    Cervical spondylosis with myelopathy       Other    Severe obesity with body mass index (BMI) of 35 0 to 39 9          Subjective/Objective     Chief Complaint    Decreased balance  HPI    72-year-old gentleman accompanied by his wife today  He is being seen in follow-up for known cervical spondylosis and stenosis with possible early myelopathy in addition to idiopathic normal pressure hydrocephalus post placement of right frontal  shunt  Per the patient, he has had a decline in his overall level of function  He feels that his gait is less steady and he has some difficulties with coordination in his hands  He also has intermittent sensation of cold in his feet  He denies any significant neck pain  His wife has noticed that his balance seems somewhat worse than it did in the past   From a cognitive perspective he seems to be at his baseline and spends most of his time reading  She is somewhat frustrated that he is less active and really does not leave the house much  He is able to shave in turn pages of a book without difficulty  At times his wife notes him freezing when he walks down the hallway  KATIE WILSON personally reviewed and updated  Review of Systems   Constitutional: Negative  HENT: Negative  Eyes: Negative  Respiratory: Negative  Cardiovascular: Negative  Gastrointestinal: Negative  Endocrine: Negative  Genitourinary: Positive for urgency  Musculoskeletal: Negative  Skin: Negative  Allergic/Immunologic: Negative  Neurological: Positive for dizziness, weakness (feels "less nimble" and has fallen "slid" off bed and chair) and light-headedness  Hematological: Negative      Psychiatric/Behavioral: Negative          Family History    Family History   Problem Relation Age of Onset    Heart disease Mother         RHEUM    Prostate cancer Father     Cancer Maternal Uncle     Alcohol abuse Neg Hx     Depression Neg Hx     Mental illness Neg Hx     Substance Abuse Neg Hx        Social History    Social History     Socioeconomic History    Marital status: /Civil Union     Spouse name: Not on file    Number of children: 1    Years of education: Not on file    Highest education level: Not on file   Occupational History    Occupation: PHYSICIST, [employer] [place] Financial resource strain: Not on file    Food insecurity:     Worry: Not on file     Inability: Not on file   Flavorvanil needs:     Medical: Not on file     Non-medical: Not on file   Tobacco Use    Smoking status: Never Smoker    Smokeless tobacco: Never Used   Substance and Sexual Activity    Alcohol use: No    Drug use: No    Sexual activity: Not on file   Lifestyle    Physical activity:     Days per week: Not on file     Minutes per session: Not on file    Stress: Not on file   Relationships    Social connections:     Talks on phone: Not on file     Gets together: Not on file     Attends Buddhism service: Not on file     Active member of club or organization: Not on file     Attends meetings of clubs or organizations: Not on file     Relationship status: Not on file    Intimate partner violence:     Fear of current or ex partner: Not on file     Emotionally abused: Not on file     Physically abused: Not on file     Forced sexual activity: Not on file   Other Topics Concern    Not on file   Social History Narrative    Lives at home with wife    Retired        [employer]     [address]        Past Medical History    Past Medical History:   Diagnosis Date    Arthritis     GERD (gastroesophageal reflux disease)     Hiatal hernia     Hypertension     Infectious viral hepatitis     Obesity     Spinal stenosis        Surgical History    Past Surgical History:   Procedure Laterality Date    APPENDECTOMY  1952    CA COLONOSCOPY FLX DX W/COLLJ [facility] WHEN PFRMD N/A 1/23/2019    Procedure: COLONOSCOPY;  Surgeon: Jasmin Deleon MD;  Location: [facility]; Service: Gastroenterology    CA ESOPHAGOGASTRODUODENOSCOPY TRANSORAL DIAGNOSTIC N/A 1/23/2019    Procedure: ESOPHAGOGASTRODUODENOSCOPY (EGD); Surgeon: Jasmin Deleon MD;  Location: [facility]; Service: Gastroenterology    VENTRICULOATRIAL SHUNT  06/2012    VENTRICULOPERITONEAL SHUNT         Medications      Current Outpatient Medications:     Cholecalciferol (VITAMIN D-3) 5000 units TABS, Take 1 tablet by mouth every other day, Disp: , Rfl:     enalapril (VASOTEC) 10 mg tablet, TAKE 1/2 (ONE-HALF) TABLET BY MOUTH ONCE DAILY, Disp: 45 tablet, Rfl: 1    hydrochlorothiazide (HYDRODIURIL) 25 mg tablet, Take 1/2 tablet daily, may take full tablet if with leg swelling, Disp: 90 tablet, Rfl: 1    Multiple Vitamin (MULTI-VITAMIN DAILY PO), Take 1 tablet by mouth daily, Disp: , Rfl:     pantoprazole (PROTONIX) 40 mg tablet, Take 1 tablet (40 mg total) by mouth 2 (two) times a day before meals, Disp: 180 tablet, Rfl: 1    atorvastatin (LIPITOR) 40 mg tablet, Take 0 5 tablets by mouth daily, Disp: , Rfl:     Allergies    No Known Allergies    The following portions of the patient's history were reviewed and updated as appropriate: allergies, current medications, past family history, past medical history, past social history, past surgical history and problem list     Investigations    I personally reviewed the NPH PT and NPH Cognitive results with the patient:    NPH scale dated 11/12/2019, 12/15  PT gait assessment dated 11/12/2019  TUG 11 sec , TUGc 11 sec, DGI 20/24   MoCA dated 11/12/2019, 27/30  Physical Exam    Vitals:  Blood pressure 138/78, temperature (!) 97 3 °F (36 3 °C), resp   rate 18, height 5' 10" (1 778 m), weight 104 kg (229 lb)  ,Body mass index is 32 86 kg/m²  Physical Exam   Constitutional: He is oriented to person, place, and time  He appears well-developed and well-nourished  No distress  HENT:   Head: Atraumatic  Eyes: EOM are normal    Pulmonary/Chest: Effort normal  No respiratory distress  Musculoskeletal: Deformity: Stands with a stooped forward posture, but is able to correct when prompted to do so  Neurological: He is alert and oriented to person, place, and time  No cranial nerve deficit  5/5 power in upper lower extremities  Walks with a steady gait, with on bloc turn in 3 steps  No dysdiadochokinesia  Godwin's negative bilaterally  Reports normal light touch sensation in upper extremities  Romberg negative  No clonus  Skin: Skin is warm and dry  Right frontal  shunt depresses and refills easily  Psychiatric: His behavior is normal  His mood appears anxious  Vitals reviewed  Neurologic Exam     Mental Status   Oriented to person, place, and time       Cranial Nerves     CN III, IV, VI   Extraocular motions are normal

## 2019-11-12 ENCOUNTER — OFFICE VISIT (OUTPATIENT)
Dept: PHYSICAL THERAPY | Facility: CLINIC | Age: 79
End: 2019-11-12
Payer: MEDICARE

## 2019-11-12 ENCOUNTER — OFFICE VISIT (OUTPATIENT)
Dept: NEUROSURGERY | Facility: CLINIC | Age: 79
End: 2019-11-12
Payer: MEDICARE

## 2019-11-12 ENCOUNTER — APPOINTMENT (OUTPATIENT)
Dept: LAB | Facility: CLINIC | Age: 79
End: 2019-11-12
Payer: MEDICARE

## 2019-11-12 VITALS
RESPIRATION RATE: 18 BRPM | SYSTOLIC BLOOD PRESSURE: 138 MMHG | WEIGHT: 229 LBS | DIASTOLIC BLOOD PRESSURE: 78 MMHG | HEIGHT: 70 IN | TEMPERATURE: 97.3 F | BODY MASS INDEX: 32.78 KG/M2

## 2019-11-12 DIAGNOSIS — R97.20 ELEVATED PROSTATE SPECIFIC ANTIGEN (PSA): ICD-10-CM

## 2019-11-12 DIAGNOSIS — M47.12 CERVICAL SPONDYLOSIS WITH MYELOPATHY: ICD-10-CM

## 2019-11-12 DIAGNOSIS — G91.2 INPH (IDIOPATHIC NORMAL PRESSURE HYDROCEPHALUS) (HCC): Primary | ICD-10-CM

## 2019-11-12 DIAGNOSIS — R26.9 GAIT DISTURBANCE: Primary | ICD-10-CM

## 2019-11-12 DIAGNOSIS — E66.01 SEVERE OBESITY WITH BODY MASS INDEX (BMI) OF 35.0 TO 39.9 WITH SERIOUS COMORBIDITY (HCC): ICD-10-CM

## 2019-11-12 LAB
BUN SERPL-MCNC: 20 MG/DL (ref 5–25)
CREAT SERPL-MCNC: 1.07 MG/DL (ref 0.6–1.3)
GFR SERPL CREATININE-BSD FRML MDRD: 67 ML/MIN/1.73SQ M

## 2019-11-12 PROCEDURE — 84520 ASSAY OF UREA NITROGEN: CPT

## 2019-11-12 PROCEDURE — 82565 ASSAY OF CREATININE: CPT

## 2019-11-12 PROCEDURE — 99214 OFFICE O/P EST MOD 30 MIN: CPT | Performed by: NEUROLOGICAL SURGERY

## 2019-11-12 PROCEDURE — 36415 COLL VENOUS BLD VENIPUNCTURE: CPT

## 2019-11-12 PROCEDURE — 97161 PT EVAL LOW COMPLEX 20 MIN: CPT | Performed by: PHYSICAL THERAPIST

## 2019-11-12 NOTE — PROGRESS NOTES
PT Evaluation  and Discharge    Today's date: 2019  Patient name: Tram Salazar  : 1940  MRN: [id number]  Referring provider: Adarsh Cruz MD  Dx:   Encounter Diagnosis     ICD-10-CM    1  Gait disturbance R26 9 Ambulatory referral to Physical Therapy                  Assessment  Assessment details: Please refer to the NPH Exam form for all objective measures  Patient was seen for a gait and balance assessment/screen  He will be seen for re-assessment of their gait/balance as needed as part of the management of their condition  Thank you for this pleasant referral       Impairments: abnormal gait, abnormal or restricted ROM, activity intolerance, impaired balance, impaired physical strength, lacks appropriate home exercise program, pain with function and safety issue  Understanding of Dx/Px/POC: good   Prognosis: good    Goals  1  Patient to be reassessed as needed in 4 weeks  Plan  Plan details: Patient to return only as needed  Referral necessary: No  Planned modality interventions: cryotherapy  Planned therapy interventions: IADL retraining, joint mobilization, manual therapy, motor coordination training, neuromuscular re-education, patient education, postural training, self care, strengthening, stretching, therapeutic activities, therapeutic exercise, home exercise program, flexibility, ADL training, balance and body mechanics training  Treatment plan discussed with: patient        Subjective Evaluation    History of Present Illness  Mechanism of injury: Chief Complaint: Difficulty walking    History:  Patient has NPH and had a shunt placed in   Pt notes a worsening of symptoms over the past 6 months  He notes he is not walking as well as previously  He has a [facility] which he "rarely" uses  He feels more secure with it for community mobility  He lives with his wife  He is retired  He denies changes to his cognition or issues with his continence     His home is 2 floors and he uses a step to or reciprocal pattern  PMH: B eye lens implants secondary to cataracts            Quality of life: good    Pain  No pain reported          Objective     Ambulation     Comments   NPH Exam Finding:  TU 33 sec no AD  TUG Cognitive: 11 07 sec, no AD, did not count  DGI: 20/24  Stairs: Reciprocal, handrail use  Retropulsion: (-)               Precautions: (-)      Manual                                                                                   Exercise Diary                                                                                                                                                                                                                                                                                      Modalities

## 2020-01-08 ENCOUNTER — TRANSCRIBE ORDERS (OUTPATIENT)
Dept: NEUROSURGERY | Facility: CLINIC | Age: 80
End: 2020-01-08

## 2020-01-08 DIAGNOSIS — M62.81 MUSCLE WEAKNESS (GENERALIZED): Primary | ICD-10-CM

## 2020-01-08 DIAGNOSIS — R26.81 UNSTEADY GAIT: ICD-10-CM

## 2020-01-08 DIAGNOSIS — R42 POSITIONAL LIGHTHEADEDNESS: ICD-10-CM

## 2020-01-16 ENCOUNTER — APPOINTMENT (OUTPATIENT)
Dept: PHYSICAL THERAPY | Facility: CLINIC | Age: 80
End: 2020-01-16
Payer: MEDICARE

## 2020-01-23 ENCOUNTER — EVALUATION (OUTPATIENT)
Dept: PHYSICAL THERAPY | Facility: CLINIC | Age: 80
End: 2020-01-23
Payer: MEDICARE

## 2020-01-23 DIAGNOSIS — R53.1 GENERALIZED WEAKNESS: Primary | ICD-10-CM

## 2020-01-23 PROCEDURE — 97530 THERAPEUTIC ACTIVITIES: CPT | Performed by: PHYSICAL THERAPIST

## 2020-01-23 PROCEDURE — 97162 PT EVAL MOD COMPLEX 30 MIN: CPT | Performed by: PHYSICAL THERAPIST

## 2020-01-23 PROCEDURE — 97110 THERAPEUTIC EXERCISES: CPT | Performed by: PHYSICAL THERAPIST

## 2020-01-23 NOTE — PROGRESS NOTES
PT Evaluation     Today's date: 2020  Patient name: Lamont Mirza  : 1940  MRN: [id number]  Referring provider: Sapna Aiken MD  Dx:   Encounter Diagnosis     ICD-10-CM    1  Generalized weakness R53 1                   Assessment  Assessment details: Pt demonstrates gait abnormalities, balance deficits, LE weakness, limited functional mobility and increased fall risks  Pt will benefit from PT to address impairments and restore function  Impairments: abnormal gait, activity intolerance, impaired balance, impaired physical strength, lacks appropriate home exercise program, safety issue and poor posture     Goals  ST-4 weeks  1   Pt will increase 3 min walk test > 400 ft   2   Pt will increase balance > 10 sec on FT EC, tandem stance and SLS  LT-8 weeks  1   Pt will increase LE MMT > 3+/5 in all planes  2   Pt will ambulate safely for > 10 minutes  Plan  Planned therapy interventions: abdominal trunk stabilization, neuromuscular re-education, patient education, postural training, balance, strengthening, stretching, therapeutic activities, therapeutic exercise, therapeutic training, gait training and home exercise program  Frequency: 2x week  Duration in weeks: 4        Subjective Evaluation    History of Present Illness  Mechanism of injury: Pt is a 70 yom c/o progressively increasing weakness, difficulty getting up from a chair, difficulty with stairs and increased falls    Pain  No pain reported    Patient Goals  Patient goals for therapy: independence with ADLs/IADLs, increased strength, improved balance and return to sport/leisure activities          Objective     Strength/Myotome Testing     Left Hip   Planes of Motion   Flexion: 3+  Extension: 3+  Abduction: 3+  Adduction: 3+    Right Hip   Planes of Motion   Flexion: 3+  Extension: 3+  Abduction: 3+  Adduction: 3+    Left Knee   Flexion: 3+  Extension: 3+    Right Knee   Flexion: 4-  Extension: 4-    Left Ankle/Foot Dorsiflexion: 3+  Plantar flexion: 3+    Right Ankle/Foot   Dorsiflexion: 4-  Plantar flexion: 3+    Additional Strength Details  3 min walk test: 300 ft no AD  Balance: FT EC: 10 sec, tandem stance: R: 20 sec, L: 10 sec, SLS: R: 5 sec, L: 3 sec  STS: max UE assist, min post LOB  Gait: shuffling gait pattern, wide CRIS, mod lateral sway  Precautions: GERD, HTN, Cervical Spondylosis with myelopathy, obesity, Prostate CA  Dx:  Generalized muscle weakness, increased falls        Manual  1/23                                                                                 Exercise Diary  1/23            Gait training 1x300 ft CG no AD            Mini-squats holding onto railing 2x10            Step-ups  4"           Step-downs  4"           DL heelraises 2x10            DL toe raises 2x10            Standing Hip ABD 2x10            Standing GA stretch 2x10"            FT EC balance 1x20"            Tandem stance balance 1x10" ea            SLS balance 1x5" ea                                                                                                                                     Modalities

## 2020-01-28 ENCOUNTER — OFFICE VISIT (OUTPATIENT)
Dept: PHYSICAL THERAPY | Facility: CLINIC | Age: 80
End: 2020-01-28
Payer: MEDICARE

## 2020-01-28 DIAGNOSIS — R53.1 GENERALIZED WEAKNESS: Primary | ICD-10-CM

## 2020-01-28 PROCEDURE — 97110 THERAPEUTIC EXERCISES: CPT | Performed by: PHYSICAL THERAPIST

## 2020-01-28 PROCEDURE — 97112 NEUROMUSCULAR REEDUCATION: CPT | Performed by: PHYSICAL THERAPIST

## 2020-01-28 NOTE — PROGRESS NOTES
Daily Note     Today's date: 2020  Patient name: Mario Martin  : 1940  MRN: [id number]  Referring provider: Hay Christensen MD  Dx:   Encounter Diagnosis     ICD-10-CM    1  Generalized weakness R53 1                   Subjective: Pt reports fatigue with ambulation and STS  Objective: See treatment diary below  Assessment: Pt demonstrated mod lateral drift with ambulation, mod L trunk lean and poor L LE balance  Plan: Cont POC  Precautions: GERD, HTN, Cervical Spondylosis with myelopathy, obesity, Prostate CA  Dx:  Generalized muscle weakness, increased falls        Manual                                                                                   Exercise Diary             Gait training 1x300 ft CG no AD 1x400 ft CG no AD           Mini-squats holding onto railing 2x10            Step-ups   4"          Step-downs   4"          DL heelraises 2x10 2x10           DL toe raises 2x10 2x10           Standing Hip ABD 2x10 2x10           Standing GA stretch 2x10" 2x15" ea           FT EC balance 1x20" 1x45"            Tandem stance balance 1x10" ea 1x45" ea           SLS balance 1x5" ea 2x10" ea           STS foam  2x8                                                                                                                       Modalities

## 2020-02-03 ENCOUNTER — TELEPHONE (OUTPATIENT)
Dept: GASTROENTEROLOGY | Facility: AMBULARY SURGERY CENTER | Age: 80
End: 2020-02-03

## 2020-02-03 DIAGNOSIS — K21.9 GASTROESOPHAGEAL REFLUX DISEASE, ESOPHAGITIS PRESENCE NOT SPECIFIED: ICD-10-CM

## 2020-02-03 RX ORDER — PANTOPRAZOLE SODIUM 40 MG/1
40 TABLET, DELAYED RELEASE ORAL
Qty: 180 TABLET | Refills: 3 | Status: SHIPPED | OUTPATIENT
Start: 2020-02-03 | End: 2021-01-11 | Stop reason: SDUPTHER

## 2020-02-03 NOTE — TELEPHONE ENCOUNTER
Patient aware that he needs to schedule an EGD and will call to get it scheduled  He will also keep the OV scheduled for 5/7 with Dr Gail Amaya as a follow up after the EGD  Wife also aware

## 2020-02-03 NOTE — TELEPHONE ENCOUNTER
Left message that Protonix was senrt to pharmacy and asked patient to call back concerning a repeat EGD that needs to be scheduled

## 2020-02-03 NOTE — TELEPHONE ENCOUNTER
Would you be able to order the EGD for Serban? I will call him and give him the message you wrote  He will be due in July Thanks

## 2020-02-03 NOTE — TELEPHONE ENCOUNTER
Patients GI provider:  Dr David Dominguez    Number to return call: ( [phone]    Reason for call: Pt calling to set up egd w/ kayla    Scheduled procedure/appointment date if applicable: Apt/procedure

## 2020-02-04 ENCOUNTER — OFFICE VISIT (OUTPATIENT)
Dept: PHYSICAL THERAPY | Facility: CLINIC | Age: 80
End: 2020-02-04
Payer: MEDICARE

## 2020-02-04 DIAGNOSIS — R53.1 GENERALIZED WEAKNESS: Primary | ICD-10-CM

## 2020-02-04 PROCEDURE — 97112 NEUROMUSCULAR REEDUCATION: CPT | Performed by: PHYSICAL THERAPIST

## 2020-02-04 PROCEDURE — 97110 THERAPEUTIC EXERCISES: CPT | Performed by: PHYSICAL THERAPIST

## 2020-02-04 PROCEDURE — 97116 GAIT TRAINING THERAPY: CPT | Performed by: PHYSICAL THERAPIST

## 2020-02-04 NOTE — TELEPHONE ENCOUNTER
Patients GI provider:  Dr Mi Mercer    Number to return call: [phone]    Reason for call: Pt calling to schedule EGD with Dr Mi Mercer    Scheduled procedure/appointment date if applicable: Apt/procedure

## 2020-02-04 NOTE — TELEPHONE ENCOUNTER
Pt is scheduled with dr Aaron Man at [facility] for egd on 2/21/20, Patient is aware she will need a  to and from   She will get a call the day before with an exact time for arrival

## 2020-02-05 DIAGNOSIS — I10 ESSENTIAL HYPERTENSION: ICD-10-CM

## 2020-02-05 RX ORDER — ENALAPRIL MALEATE 10 MG/1
TABLET ORAL
Qty: 45 TABLET | Refills: 1 | Status: SHIPPED | OUTPATIENT
Start: 2020-02-05 | End: 2020-10-20

## 2020-02-05 RX ORDER — HYDROCHLOROTHIAZIDE 25 MG/1
TABLET ORAL
Qty: 45 TABLET | Refills: 1 | Status: SHIPPED | OUTPATIENT
Start: 2020-02-05 | End: 2020-10-20

## 2020-02-05 NOTE — PROGRESS NOTES
Daily Note     Today's date: 2020  Patient name: Carmina Pedersen  : 1940  MRN: [id number]  Referring provider: Shant Phipps MD  Dx:   Encounter Diagnosis     ICD-10-CM    1  Generalized weakness R53 1                   Subjective: Pt reports difficulty putting on shoes  Objective: See treatment diary below  Assessment: Pt demonstrated both HA and hip flexion restrictions limiting ability to don shoes  Plan: Cont POC  Precautions: GERD, HTN, Cervical Spondylosis with myelopathy, obesity, Prostate CA  Dx:  Generalized muscle weakness, increased falls        Manual                                                                                   Exercise Diary            Gait training 1x300 ft CG no AD 1x400 ft CG no AD 1x400 ft CG no AD          Mini-squats holding onto railing 2x10            Step-ups   4"          Step-downs   4"          DL heelraises 2x10 2x10           DL toe raises 2x10 2x10           Standing Hip ABD 2x10 2x10           Standing GA stretch 2x10" 2x15" ea           FT EC balance 1x20" 1x45"  L11/ 1x45"          Tandem stance balance 1x10" ea 1x45" ea L11/ 1x45" ea          SLS balance 1x5" ea 2x10" ea 2x10" ea          STS foam  2x8           bridges   2x10          SLR   2x10          Supine HA stretch   15"x3 ea          Heelslides   5"x10 ea          SKC   5"x3 ea                                                     Modalities

## 2020-02-11 ENCOUNTER — OFFICE VISIT (OUTPATIENT)
Dept: PHYSICAL THERAPY | Facility: CLINIC | Age: 80
End: 2020-02-11
Payer: MEDICARE

## 2020-02-11 DIAGNOSIS — R53.1 GENERALIZED WEAKNESS: Primary | ICD-10-CM

## 2020-02-11 PROCEDURE — 97112 NEUROMUSCULAR REEDUCATION: CPT | Performed by: PHYSICAL THERAPIST

## 2020-02-11 PROCEDURE — 97116 GAIT TRAINING THERAPY: CPT | Performed by: PHYSICAL THERAPIST

## 2020-02-11 PROCEDURE — 97110 THERAPEUTIC EXERCISES: CPT | Performed by: PHYSICAL THERAPIST

## 2020-02-11 NOTE — PROGRESS NOTES
Daily Note     Today's date: 2020  Patient name: Po Manjarrez  : 1940  MRN: [id number]  Referring provider: Olive Ye MD  Dx:   Encounter Diagnosis     ICD-10-CM    1  Generalized weakness R53 1                   Subjective: Pt reports having several good days where he feels stronger and able to do more recently  Objective: See treatment diary below  Assessment: Pt demonstrated significant difficulty with SLS, improved ambulation endurance  Plan: Cont POC  Precautions: GERD, HTN, Cervical Spondylosis with myelopathy, obesity, Prostate CA  Dx:  Generalized muscle weakness, increased falls        Manual                                                                                   Exercise Diary           Gait training 1x300 ft CG no AD 1x400 ft CG no AD 1x400 ft CG no AD 1x450 ft CG no AD         Mini-squats holding onto railing 2x10            Step-ups   4"          Step-downs   4"          DL heelraises 2x10 2x10  2x12         DL toe raises 2x10 2x10  2x10         Standing Hip ABD 2x10 2x10  2x10         Standing GA stretch 2x10" 2x15" ea  2x15"         FT EC balance 1x20" 1x45"  L11/ 1x45" Static/ 1x45         Tandem stance balance 1x10" ea 1x45" ea L11/ 1x45" ea Static/ 1x45" ea         SLS balance 1x5" ea 2x10" ea 2x10" ea 2x10" ea          STS foam  2x8  1x10         bridges   2x10          SLR   2x10          Supine HA stretch   15"x3 ea          Heelslides   5"x10 ea          SKC   5"x3 ea                                                     Modalities

## 2020-02-18 ENCOUNTER — OFFICE VISIT (OUTPATIENT)
Dept: PHYSICAL THERAPY | Facility: CLINIC | Age: 80
End: 2020-02-18
Payer: MEDICARE

## 2020-02-18 DIAGNOSIS — R53.1 GENERALIZED WEAKNESS: Primary | ICD-10-CM

## 2020-02-18 PROCEDURE — 97110 THERAPEUTIC EXERCISES: CPT | Performed by: PHYSICAL THERAPIST

## 2020-02-18 PROCEDURE — 97530 THERAPEUTIC ACTIVITIES: CPT | Performed by: PHYSICAL THERAPIST

## 2020-02-18 PROCEDURE — 97112 NEUROMUSCULAR REEDUCATION: CPT | Performed by: PHYSICAL THERAPIST

## 2020-02-18 PROCEDURE — 97116 GAIT TRAINING THERAPY: CPT | Performed by: PHYSICAL THERAPIST

## 2020-02-18 NOTE — PROGRESS NOTES
Daily Note     Today's date: 2020  Patient name: Char Olson  : 1940  MRN: [id number]  Referring provider: Summer Bustillos MD  Dx:   Encounter Diagnosis     ICD-10-CM    1  Generalized weakness R53 1                   Subjective: Pt reports no falls, but reports poor equilibrium today  Objective: See treatment diary below  Assessment: Pt demonstrated improved ambulation endurance, STS were limited secondary to knee pain  Plan: Cont POC  Precautions: GERD, HTN, Cervical Spondylosis with myelopathy, obesity, Prostate CA  Dx:  Generalized muscle weakness, increased falls        Manual          B GA/HA stretch     15"x3 ea        B knee flex PROM     5"x4 ea                                                   Exercise Diary          Gait training 1x300 ft CG no AD 1x400 ft CG no AD 1x400 ft CG no AD 1x450 ft CG no AD [id number] CG no AD        Mini-squats holding onto railing 2x10            Step-ups   4"  4"/ 1x10        Step-downs   4"  4"/ 1x10        DL heelraises 2x10 2x10  2x12         DL toe raises 2x10 2x10  2x10         Standing Hip ABD 2x10 2x10  2x10         Standing GA stretch 2x10" 2x15" ea  2x15"         FT EO balance     L9/ 1x60"        FT EC balance 1x20" 1x45"  L11/ 1x45" Static/ 1x45 S/ 1x60"        Tandem stance balance 1x10" ea 1x45" ea L11/ 1x45" ea Static/ 1x45" ea S/ 1x45" ea        SLS balance 1x5" ea 2x10" ea 2x10" ea 2x10" ea  2x10"ea        STS foam  2x8  1x10 1x10        bridges   2x10  2x10        SLR   2x10  2x10        Supine HA stretch   15"x3 ea          Heelslides   5"x10 ea          SKC   5"x3 ea          Leg Extension machine     DL/15# 2x10        Leg Curl machine     DL/ 40# 2x10                         Modalities

## 2020-02-20 ENCOUNTER — ANESTHESIA EVENT (OUTPATIENT)
Dept: GASTROENTEROLOGY | Facility: MEDICAL CENTER | Age: 80
End: 2020-02-20

## 2020-02-21 ENCOUNTER — HOSPITAL ENCOUNTER (OUTPATIENT)
Dept: GASTROENTEROLOGY | Facility: MEDICAL CENTER | Age: 80
Setting detail: OUTPATIENT SURGERY
Discharge: HOME | End: 2020-02-21
Admitting: ANESTHESIOLOGY
Payer: MEDICARE

## 2020-02-21 ENCOUNTER — ANESTHESIA (OUTPATIENT)
Dept: GASTROENTEROLOGY | Facility: MEDICAL CENTER | Age: 80
End: 2020-02-21

## 2020-02-21 VITALS
WEIGHT: 220 LBS | RESPIRATION RATE: 16 BRPM | HEART RATE: 66 BPM | BODY MASS INDEX: 31.5 KG/M2 | SYSTOLIC BLOOD PRESSURE: 121 MMHG | HEIGHT: 70 IN | TEMPERATURE: 99.1 F | OXYGEN SATURATION: 93 % | DIASTOLIC BLOOD PRESSURE: 68 MMHG

## 2020-02-21 DIAGNOSIS — K21.00 GASTROESOPHAGEAL REFLUX DISEASE WITH ESOPHAGITIS: ICD-10-CM

## 2020-02-21 PROCEDURE — 88305 TISSUE EXAM BY PATHOLOGIST: CPT | Performed by: PATHOLOGY

## 2020-02-21 PROCEDURE — 43239 EGD BIOPSY SINGLE/MULTIPLE: CPT | Performed by: INTERNAL MEDICINE

## 2020-02-21 RX ORDER — PROPOFOL 10 MG/ML
INJECTION, EMULSION INTRAVENOUS AS NEEDED
Status: DISCONTINUED | OUTPATIENT
Start: 2020-02-21 | End: 2020-02-21 | Stop reason: SURG

## 2020-02-21 RX ORDER — SODIUM CHLORIDE 9 MG/ML
125 INJECTION, SOLUTION INTRAVENOUS CONTINUOUS
Status: DISCONTINUED | OUTPATIENT
Start: 2020-02-21 | End: 2020-02-25 | Stop reason: HOSPADM

## 2020-02-21 RX ORDER — LIDOCAINE HYDROCHLORIDE 20 MG/ML
INJECTION, SOLUTION EPIDURAL; INFILTRATION; INTRACAUDAL; PERINEURAL AS NEEDED
Status: DISCONTINUED | OUTPATIENT
Start: 2020-02-21 | End: 2020-02-21 | Stop reason: SURG

## 2020-02-21 RX ORDER — PROPOFOL 10 MG/ML
INJECTION, EMULSION INTRAVENOUS CONTINUOUS PRN
Status: DISCONTINUED | OUTPATIENT
Start: 2020-02-21 | End: 2020-02-21 | Stop reason: SURG

## 2020-02-21 RX ADMIN — PROPOFOL 170 MCG/KG/MIN: 10 INJECTION, EMULSION INTRAVENOUS at 09:15

## 2020-02-21 RX ADMIN — LIDOCAINE HYDROCHLORIDE 3 ML: 20 INJECTION, SOLUTION EPIDURAL; INFILTRATION; INTRACAUDAL; PERINEURAL at 09:15

## 2020-02-21 RX ADMIN — SODIUM CHLORIDE: 0.9 INJECTION, SOLUTION INTRAVENOUS at 09:05

## 2020-02-21 RX ADMIN — PROPOFOL 120 MG: 10 INJECTION, EMULSION INTRAVENOUS at 09:15

## 2020-02-21 NOTE — DISCHARGE INSTRUCTIONS
Upper Endoscopy   WHAT YOU NEED TO KNOW:   An upper endoscopy is also called an upper gastrointestinal (GI) endoscopy, or an esophagogastroduodenoscopy (EGD)  You may feel bloated, gassy, or have some abdominal discomfort after your procedure  Your throat may be sore for 24 to 36 hours  You may burp or pass gas from air that is still inside your body  DISCHARGE INSTRUCTIONS:   Call 911 if:   · You have sudden chest pain or trouble breathing  Seek care immediately if:   · You feel dizzy or faint  · You have trouble swallowing  · You have severe throat pain  · Your bowel movements are very dark or black  · Your abdomen is hard and firm and you have severe pain  · You vomit blood  Contact your healthcare provider if:   · You feel full or bloated and cannot burp or pass gas  · You have not had a bowel movement for 3 days after your procedure  · You have neck pain  · You have a fever or chills  · You have nausea or are vomiting  · You have a rash or hives  · You have questions or concerns about your endoscopy  Relieve a sore throat:  Suck on throat lozenges or crushed ice  Gargle with a small amount of warm salt water  Mix 1 teaspoon of salt and 1 cup of warm water to make salt water  Relieve gas and discomfort from bloating:  Lie on your right side with a heating pad on your abdomen  Take short walks to help pass gas  Eat small meals until bloating is relieved  Rest after your procedure:  Do not drive or make important decisions until the day after your procedure  Return to your normal activity as directed  You can usually return to work the day after your procedure  Follow up with your healthcare provider as directed:  Write down your questions so you remember to ask them during your visits  © 2017 [employer] Information is for End User's use only and may not be sold, redistributed or otherwise used for commercial purposes   All illustrations and images included in CareNotes® are the copyrighted property of [employer]  or Hussein Castro  The above information is an  only  It is not intended as medical advice for individual conditions or treatments  Talk to your doctor, nurse or pharmacist before following any medical regimen to see if it is safe and effective for you  Fairbanks Esophagus   WHAT YOU NEED TO KNOW:   Fairbanks esophagus is a condition in which the cells that line your esophagus are damaged  The damage can cause abnormal changes in the cells  These abnormal changes increase your risk of esophageal cancer  DISCHARGE INSTRUCTIONS:   Medicines:   · Anti-reflux medicines  may be needed to help decrease the stomach acid that can irritate your esophagus and stomach  These medicines may include proton pump inhibitors (PPI) and histamine type-2 receptor (H2) blockers  You may also be given medicines to stop vomiting  · Take your medicine as directed  Contact your healthcare provider if you think your medicine is not helping or if you have side effects  Tell him if you are allergic to any medicine  Keep a list of the medicines, vitamins, and herbs you take  Include the amounts, and when and why you take them  Bring the list or the pill bottles to follow-up visits  Carry your medicine list with you in case of an emergency  Follow up with your healthcare provider as directed: Your healthcare provider may need to repeat your endoscopy and biopsy  These tests help look for early signs of esophageal cancer  Write down your questions so you remember to ask them during your visits  Nutrition:  Do not eat foods that make your symptoms worse  Examples are chocolate, garlic, onions, spicy or fatty foods, citrus fruits (oranges), and tomato-based foods (spaghetti sauce)  Do not drink alcohol, drinks that contain caffeine, or carbonated drinks, such as soda   Ask your healthcare provider if there are other foods and drinks you should not have  Maintain a healthy weight: If you are overweight, weight loss may help relieve symptoms  Ask your healthcare provider about safe ways to lose weight  Do not smoke: If you smoke, it is never too late to quit  Smoking may worsen acid reflux  Ask your healthcare provider for information if you need help quitting  Contact your healthcare provider if:   · Your symptoms do not improve with treatment  · You have questions or concerns about your condition or care  Seek care immediately or call 911 if:   · You have severe chest pain and shortness of breath  · Your bowel movements are black, bloody, or tarry  · Your vomit looks like coffee grounds or has blood in it  © 2017 [facility] Information is for End User's use only and may not be sold, redistributed or otherwise used for commercial purposes  All illustrations and images included in CareNotes® are the copyrighted property of [employer] M [employer]  or Hussein Castro  The above information is an  only  It is not intended as medical advice for individual conditions or treatments  Talk to your doctor, nurse or pharmacist before following any medical regimen to see if it is safe and effective for you  Hiatal Hernia   WHAT YOU NEED TO KNOW:   A hiatal hernia is a condition that causes part of your stomach to bulge through the hiatus (small opening) in your diaphragm  The part of the stomach may move up and down, or it may get trapped above the diaphragm  DISCHARGE INSTRUCTIONS:   Seek care immediately if:   · You have severe abdominal pain  · You try to vomit but nothing comes out (retching)  · You have severe chest pain and sudden trouble breathing  · Your bowel movements are black or bloody  · Your vomit looks like coffee grounds or has blood in it  Contact your healthcare provider if:   · Your symptoms are getting worse  · You have nausea, and you are vomiting      · You are losing weight without trying  · You have questions or concerns about your condition or care  Medicines:   · Medicines  may be given to relieve heartburn symptoms  These medicines help to decrease or block stomach acid  You may also be given medicines that help to tighten the esophageal sphincter  · Take your medicine as directed  Contact your healthcare provider if you think your medicine is not helping or if you have side effects  Tell him or her if you are allergic to any medicine  Keep a list of the medicines, vitamins, and herbs you take  Include the amounts, and when and why you take them  Bring the list or the pill bottles to follow-up visits  Carry your medicine list with you in case of an emergency  Follow up with your healthcare provider as directed:  Write down your questions so you remember to ask them during your visits  Self care:   · Avoid foods that make your symptoms worse  These may include spicy foods, fruit juices, alcohol, caffeine, chocolate, and mint  · Eat several small meals during the day  Small meals give your stomach less food to digest     · Avoid lying down and bending forward after you eat  Do not eat meals 2 to 3 hours before bedtime  This decreases your risk for reflux  · Maintain a healthy weight  If you are overweight, weight loss may help relieve your symptoms  · Sleep with your head elevated  at least 6 inches  · Do not smoke  Smoking can increase your symptoms of heartburn  © 2017 [employer] Information is for End User's use only and may not be sold, redistributed or otherwise used for commercial purposes  All illustrations and images included in CareNotes® are the copyrighted property of [employer]  or Hussein Castro  The above information is an  only  It is not intended as medical advice for individual conditions or treatments   Talk to your doctor, nurse or pharmacist before following any medical regimen to see if it is safe and effective for you

## 2020-02-21 NOTE — ANESTHESIA POSTPROCEDURE EVALUATION
Post-Op Assessment Note    CV Status:  Stable    Pain management: adequate     Mental Status:  Alert and awake   Hydration Status:  Euvolemic   PONV Controlled:  Controlled   Airway Patency:  Patent   Post Op Vitals Reviewed: Yes      Staff: Anesthesiologist           /68 (02/21/20 0943)    Temp      Pulse 66 (02/21/20 0943)   Resp 16 (02/21/20 0943)    SpO2 93 % (02/21/20 0943)

## 2020-02-21 NOTE — H&P
History and Physical - [facility]  Natalia Ennis 70 y o  male MRN: [id number]                  HPI: Natalia Ennis is a 70y o  year old male who presents for long segment Fairbanks's esophagus  REVIEW OF SYSTEMS: Per the HPI, and otherwise unremarkable  Historical Information   Past Medical History:   Diagnosis Date    Arthritis     GERD (gastroesophageal reflux disease)     Hiatal hernia     Hypertension     Infectious viral hepatitis     Obesity     Spinal stenosis      Past Surgical History:   Procedure Laterality Date    APPENDECTOMY  1952    OR COLONOSCOPY FLX DX W/COLLJ SPEC WHEN PFRMD N/A 1/23/2019    Procedure: COLONOSCOPY;  Surgeon: Blas Lyles MD;  Location: [facility]; Service: Gastroenterology    OR ESOPHAGOGASTRODUODENOSCOPY TRANSORAL DIAGNOSTIC N/A 1/23/2019    Procedure: ESOPHAGOGASTRODUODENOSCOPY (EGD); Surgeon: Blas Lyles MD;  Location: [facility]; Service: Gastroenterology    VENTRICULOATRIAL SHUNT  06/2012    VENTRICULOPERITONEAL SHUNT       Social History   Social History     Substance and Sexual Activity   Alcohol Use No     Social History     Substance and Sexual Activity   Drug Use No     Social History     Tobacco Use   Smoking Status Never Smoker   Smokeless Tobacco Never Used     Family History   Problem Relation Age of Onset    Heart disease Mother         RHEUM    Prostate cancer Father     Cancer Maternal Uncle     Alcohol abuse Neg Hx     Depression Neg Hx     Mental illness Neg Hx     Substance Abuse Neg Hx        Meds/Allergies       (Not in a hospital admission)    No Known Allergies    Objective     There were no vitals taken for this visit  PHYSICAL EXAM    Gen: NAD  CV: RRR  CHEST: Clear  ABD: soft, NT/ND  EXT: no edema      ASSESSMENT/PLAN:  This is a 70y o  year old male here for upper endoscopy with biopsies, and he is stable and optimized for his procedure

## 2020-02-25 ENCOUNTER — OFFICE VISIT (OUTPATIENT)
Dept: PHYSICAL THERAPY | Facility: CLINIC | Age: 80
End: 2020-02-25
Payer: MEDICARE

## 2020-02-25 DIAGNOSIS — R53.1 GENERALIZED WEAKNESS: Primary | ICD-10-CM

## 2020-02-25 PROCEDURE — 97110 THERAPEUTIC EXERCISES: CPT | Performed by: PHYSICAL THERAPIST

## 2020-02-25 PROCEDURE — 97112 NEUROMUSCULAR REEDUCATION: CPT | Performed by: PHYSICAL THERAPIST

## 2020-02-25 PROCEDURE — 97530 THERAPEUTIC ACTIVITIES: CPT | Performed by: PHYSICAL THERAPIST

## 2020-02-25 NOTE — PROGRESS NOTES
Daily Note     Today's date: 2020  Patient name: Char Olson  : 1940  MRN: [id number]  Referring provider: Summer Bustillos MD  Dx:   Encounter Diagnosis     ICD-10-CM    1  Generalized weakness R53 1                   Subjective: Pt reports no falls, but reports poor equilibrium today  Objective: See treatment diary below  Assessment: Pt demonstrated improved ambulation endurance, STS were limited secondary to knee pain  Plan: Cont POC  Precautions: GERD, HTN, Cervical Spondylosis with myelopathy, obesity, Prostate CA  Dx:  Generalized muscle weakness, increased falls        Manual         B GA/HA stretch     15"x3 ea 15"x3ea       B knee flex PROM     5"x4 ea 5"x5 ea                                                  Exercise Diary         Gait training 1x300 ft CG no AD 1x400 ft CG no AD 1x400 ft CG no AD 1x450 ft CG no AD [id number] CG no AD 1x650 ft CG no AD       Mini-squats holding onto railing 2x10            Step-ups   4"  4"/ 1x10 6"/ 1x10       Step-downs   4"  4"/ 1x10 6"/ 1x10       DL heelraises 2x10 2x10  2x12         DL toe raises 2x10 2x10  2x10         Standing Hip ABD 2x10 2x10  2x10         Standing GA stretch 2x10" 2x15" ea  2x15"         FT EO balance     L9/ 1x60" L12/1x60"       FT EC balance 1x20" 1x45"  L11/ 1x45" Static/ 1x45 S/ 1x60" S/ 1x60"       Tandem stance balance 1x10" ea 1x45" ea L11/ 1x45" ea Static/ 1x45" ea S/ 1x45" ea S/ 1x45" ea       SLS balance 1x5" ea 2x10" ea 2x10" ea 2x10" ea  2x10"ea 2x10" ea       STS foam  2x8  1x10 1x10        bridges   2x10  2x10        SLR   2x10  2x10        Supine HA stretch   15"x3 ea          Heelslides   5"x10 ea          SKC   5"x3 ea          Leg Extension machine     DL/15# 2x10 DL/ 15# 3x10       Leg Curl machine     DL/ 40# 2x10 DL/ 40#/ 3x10                        Modalities Note Text (......Xxx Chief Complaint.): This diagnosis correlates with the Other (Free Text): Courtesy LN2 x1 Detail Level: Zone

## 2020-03-02 ENCOUNTER — TELEPHONE (OUTPATIENT)
Dept: GASTROENTEROLOGY | Facility: CLINIC | Age: 80
End: 2020-03-02

## 2020-03-02 NOTE — TELEPHONE ENCOUNTER
----- Message from Johnny Hassan MD sent at 3/1/2020  7:00 PM EST -----  My medical assistant will call him with his results  Fairbanks's but no dysplasia so can repeat EGD for Fairbanks's surveillance in 3 years or sooner if clinically indicated

## 2020-03-02 NOTE — RESULT ENCOUNTER NOTE
My medical assistant will call him with his results  Fairbanks's but no dysplasia so can repeat EGD for Fairbanks's surveillance in 3 years or sooner if clinically indicated

## 2020-03-03 ENCOUNTER — OFFICE VISIT (OUTPATIENT)
Dept: PHYSICAL THERAPY | Facility: CLINIC | Age: 80
End: 2020-03-03
Payer: MEDICARE

## 2020-03-03 DIAGNOSIS — R53.1 GENERALIZED WEAKNESS: Primary | ICD-10-CM

## 2020-03-03 PROCEDURE — 97530 THERAPEUTIC ACTIVITIES: CPT | Performed by: PHYSICAL THERAPIST

## 2020-03-03 PROCEDURE — 97112 NEUROMUSCULAR REEDUCATION: CPT | Performed by: PHYSICAL THERAPIST

## 2020-03-03 PROCEDURE — 97116 GAIT TRAINING THERAPY: CPT | Performed by: PHYSICAL THERAPIST

## 2020-03-03 NOTE — PROGRESS NOTES
Daily Note     Today's date: 3/3/2020  Patient name: Steven Rosenbaum  : 1940  MRN: [id number]  Referring provider: Era Fleming MD  Dx:   Encounter Diagnosis     ICD-10-CM    1  Generalized weakness R53 1                   Subjective: Pt reports no falls, but reports poor equilibrium and low energy today  Objective: See treatment diary below  Assessment: Pt demonstrated improved quad strength and stability during STS and stairs  Plan: Cont POC  Precautions: GERD, HTN, Cervical Spondylosis with myelopathy, obesity, Prostate CA  Dx:  Generalized muscle weakness, increased falls        Manual  1/23    2/18 2/25 3/3      B GA/HA stretch     15"x3 ea 15"x3ea 15"x3 ea      B knee flex PROM     5"x4 ea 5"x5 ea 5"x5 ea                                                 Exercise Diary  1/23 1/28 2/4 2/11 2/18 2/25 3/3      Gait training 1x300 ft CG no AD 1x400 ft CG no AD 1x400 ft CG no AD 1x450 ft CG no AD [id number] CG no AD 1x650 ft CG no AD 1x700 ft CG no AD      Mini-squats holding onto railing 2x10            Step-ups   4"  4"/ 1x10 6"/ 1x10 8"/ 1x10      Step-downs   4"  4"/ 1x10 6"/ 1x10 8"/ 1x10      DL heelraises 2x10 2x10  2x12         DL toe raises 2x10 2x10  2x10         Standing Hip ABD 2x10 2x10  2x10         Standing GA stretch 2x10" 2x15" ea  2x15"   2x15"      FT EO balance     L9/ 1x60" L12/1x60" L11/ 1x60"      FT EC balance 1x20" 1x45"  L11/ 1x45" Static/ 1x45 S/ 1x60" S/ 1x60" S/ 1x60"      Tandem stance balance 1x10" ea 1x45" ea L11/ 1x45" ea Static/ 1x45" ea S/ 1x45" ea S/ 1x45" ea S/ 1x45" ea      SLS balance 1x5" ea 2x10" ea 2x10" ea 2x10" ea  2x10"ea 2x10" ea 1x10" ea      STS   2x8  1x10 1x10  1x10      bridges   2x10  2x10        SLR   2x10  2x10        Supine HA stretch   15"x3 ea          Heelslides   5"x10 ea          SKC   5"x3 ea          Leg Extension machine     DL/15# 2x10 DL/ 15# 3x10       Leg Curl machine     DL/ 40# 2x10 DL/ 40#/ 3x10 Modalities

## 2020-03-10 ENCOUNTER — APPOINTMENT (OUTPATIENT)
Dept: PHYSICAL THERAPY | Facility: CLINIC | Age: 80
End: 2020-03-10
Payer: MEDICARE

## 2020-03-12 ENCOUNTER — OFFICE VISIT (OUTPATIENT)
Dept: PHYSICAL THERAPY | Facility: CLINIC | Age: 80
End: 2020-03-12
Payer: MEDICARE

## 2020-03-12 DIAGNOSIS — R53.1 GENERALIZED WEAKNESS: Primary | ICD-10-CM

## 2020-03-12 PROCEDURE — 97116 GAIT TRAINING THERAPY: CPT | Performed by: PHYSICAL THERAPIST

## 2020-03-12 PROCEDURE — 97110 THERAPEUTIC EXERCISES: CPT | Performed by: PHYSICAL THERAPIST

## 2020-03-12 NOTE — PROGRESS NOTES
Daily Note     Today's date: 3/12/2020  Patient name: David Calles  : 1940  MRN: [id number]  Referring provider: Raghu Rapp MD  Dx:   Encounter Diagnosis     ICD-10-CM    1  Generalized weakness R53 1                   Subjective: Pt reports poor equilibrium and low energy today  Objective: See treatment diary below  Assessment: Pt demonstrated mod L lateral trunk lean with ambulation  Added L side glides with no change in trunk lean  Plan: Cont POC  Precautions: GERD, HTN, Cervical Spondylosis with myelopathy, obesity, Prostate CA  Dx:  Generalized muscle weakness, increased falls        Manual  1/23    2/18 2/25 3/3 3/12     B GA/HA stretch     15"x3 ea 15"x3ea 15"x3 ea 15"x3 ea     B knee flex PROM     5"x4 ea 5"x5 ea 5"x5 ea 5"x5 ea                                                Exercise Diary  1/23 1/28 2/4 2/11 2/18 2/25 3/3 3/12     Gait training 1x300 ft CG no AD 1x400 ft CG no AD 1x400 ft CG no AD 1x450 ft CG no AD [id number] CG no AD 1x650 ft CG no AD 1x700 ft CG no AD 1x300 ft, 1x200 ft     Mini-squats holding onto railing 2x10            Step-ups   4"  4"/ 1x10 6"/ 1x10 8"/ 1x10      Step-downs   4"  4"/ 1x10 6"/ 1x10 8"/ 1x10      DL heelraises 2x10 2x10  2x12         DL toe raises 2x10 2x10  2x10         Standing Hip ABD 2x10 2x10  2x10         Standing GA stretch 2x10" 2x15" ea  2x15"   2x15"      FT EO balance     L9/ 1x60" L12/1x60" L11/ 1x60"      FT EC balance 1x20" 1x45"  L11/ 1x45" Static/ 1x45 S/ 1x60" S/ 1x60" S/ 1x60"      Tandem stance balance 1x10" ea 1x45" ea L11/ 1x45" ea Static/ 1x45" ea S/ 1x45" ea S/ 1x45" ea S/ 1x45" ea      SLS balance 1x5" ea 2x10" ea 2x10" ea 2x10" ea  2x10"ea 2x10" ea 1x10" ea      STS   2x8  1x10 1x10  1x10      bridges   2x10  2x10   2x10     SLR   2x10  2x10   2x10     Supine HA stretch   15"x3 ea     15"x3 ea     Heelslides   5"x10 ea          SKC   5"x3 ea          Leg Extension machine     DL/15# 2x10 DL/ 15# 3x10  DL/ 15#/ 3x10     Leg Curl machine     DL/ 40# 2x10 DL/ 40#/ 3x10  DL/ 40#/ 3x10     L side glides        1x15         Modalities

## 2020-03-17 ENCOUNTER — APPOINTMENT (OUTPATIENT)
Dept: PHYSICAL THERAPY | Facility: CLINIC | Age: 80
End: 2020-03-17
Payer: MEDICARE

## 2020-03-24 ENCOUNTER — APPOINTMENT (OUTPATIENT)
Dept: PHYSICAL THERAPY | Facility: CLINIC | Age: 80
End: 2020-03-24
Payer: MEDICARE

## 2020-03-31 ENCOUNTER — APPOINTMENT (OUTPATIENT)
Dept: PHYSICAL THERAPY | Facility: CLINIC | Age: 80
End: 2020-03-31
Payer: MEDICARE

## 2020-04-22 ENCOUNTER — TELEPHONE (OUTPATIENT)
Dept: NEUROSURGERY | Facility: CLINIC | Age: 80
End: 2020-04-22

## 2020-04-22 DIAGNOSIS — R26.9 GAIT DISTURBANCE: Primary | ICD-10-CM

## 2020-04-27 ENCOUNTER — TELEMEDICINE (OUTPATIENT)
Dept: GASTROENTEROLOGY | Facility: MEDICAL CENTER | Age: 80
End: 2020-04-27
Payer: MEDICARE

## 2020-04-27 DIAGNOSIS — K22.70 BARRETT'S ESOPHAGUS WITHOUT DYSPLASIA: ICD-10-CM

## 2020-04-27 DIAGNOSIS — K58.2 IRRITABLE BOWEL SYNDROME WITH BOTH CONSTIPATION AND DIARRHEA: ICD-10-CM

## 2020-04-27 DIAGNOSIS — D12.5 ADENOMATOUS POLYP OF SIGMOID COLON: Primary | ICD-10-CM

## 2020-04-27 PROBLEM — K21.9 GASTROESOPHAGEAL REFLUX DISEASE: Status: RESOLVED | Noted: 2018-11-27 | Resolved: 2020-04-27

## 2020-04-27 PROBLEM — K21.9 GERD (GASTROESOPHAGEAL REFLUX DISEASE): Status: RESOLVED | Noted: 2017-10-03 | Resolved: 2020-04-27

## 2020-04-27 PROCEDURE — 99215 OFFICE O/P EST HI 40 MIN: CPT | Performed by: INTERNAL MEDICINE

## 2020-05-13 ENCOUNTER — TELEMEDICINE (OUTPATIENT)
Dept: GASTROENTEROLOGY | Facility: MEDICAL CENTER | Age: 80
End: 2020-05-13
Payer: MEDICARE

## 2020-05-13 DIAGNOSIS — K59.00 CONSTIPATION, UNSPECIFIED CONSTIPATION TYPE: ICD-10-CM

## 2020-05-13 DIAGNOSIS — D12.5 ADENOMATOUS POLYP OF SIGMOID COLON: Primary | ICD-10-CM

## 2020-05-13 DIAGNOSIS — R19.8 FULLNESS OF ABDOMEN: ICD-10-CM

## 2020-05-13 DIAGNOSIS — K22.70 BARRETT'S ESOPHAGUS WITHOUT DYSPLASIA: ICD-10-CM

## 2020-05-13 PROCEDURE — 99214 OFFICE O/P EST MOD 30 MIN: CPT | Performed by: INTERNAL MEDICINE

## 2020-06-09 ENCOUNTER — APPOINTMENT (OUTPATIENT)
Dept: PHYSICAL THERAPY | Facility: CLINIC | Age: 80
End: 2020-06-09
Payer: MEDICARE

## 2020-06-17 ENCOUNTER — OFFICE VISIT (OUTPATIENT)
Dept: PODIATRY | Facility: CLINIC | Age: 80
End: 2020-06-17
Payer: MEDICARE

## 2020-06-17 VITALS
SYSTOLIC BLOOD PRESSURE: 132 MMHG | DIASTOLIC BLOOD PRESSURE: 80 MMHG | WEIGHT: 228.4 LBS | HEART RATE: 73 BPM | HEIGHT: 70 IN | BODY MASS INDEX: 32.7 KG/M2

## 2020-06-17 DIAGNOSIS — I87.2 VENOUS INSUFFICIENCY: ICD-10-CM

## 2020-06-17 DIAGNOSIS — I87.2 VENOUS INSUFFICIENCY OF BOTH LOWER EXTREMITIES: Primary | ICD-10-CM

## 2020-06-17 PROCEDURE — 3075F SYST BP GE 130 - 139MM HG: CPT | Performed by: PODIATRIST

## 2020-06-17 PROCEDURE — 3079F DIAST BP 80-89 MM HG: CPT | Performed by: PODIATRIST

## 2020-06-17 PROCEDURE — 4040F PNEUMOC VAC/ADMIN/RCVD: CPT | Performed by: PODIATRIST

## 2020-06-17 PROCEDURE — 99203 OFFICE O/P NEW LOW 30 MIN: CPT | Performed by: PODIATRIST

## 2020-06-17 PROCEDURE — 1160F RVW MEDS BY RX/DR IN RCRD: CPT | Performed by: PODIATRIST

## 2020-06-17 PROCEDURE — 1036F TOBACCO NON-USER: CPT | Performed by: PODIATRIST

## 2020-06-18 ENCOUNTER — TELEPHONE (OUTPATIENT)
Dept: NEUROSURGERY | Facility: CLINIC | Age: 80
End: 2020-06-18

## 2020-06-23 ENCOUNTER — APPOINTMENT (OUTPATIENT)
Dept: PHYSICAL THERAPY | Facility: CLINIC | Age: 80
End: 2020-06-23
Payer: MEDICARE

## 2020-06-24 ENCOUNTER — HOSPITAL ENCOUNTER (OUTPATIENT)
Dept: NON INVASIVE DIAGNOSTICS | Facility: HOSPITAL | Age: 80
Discharge: HOME | End: 2020-06-24
Attending: PODIATRIST
Payer: MEDICARE

## 2020-06-24 DIAGNOSIS — I87.2 VENOUS INSUFFICIENCY OF BOTH LOWER EXTREMITIES: ICD-10-CM

## 2020-06-24 PROCEDURE — 93970 EXTREMITY STUDY: CPT | Performed by: SURGERY

## 2020-06-24 PROCEDURE — 93970 EXTREMITY STUDY: CPT

## 2020-06-29 PROBLEM — I87.2 VENOUS INSUFFICIENCY: Status: ACTIVE | Noted: 2020-06-29

## 2020-07-08 ENCOUNTER — OFFICE VISIT (OUTPATIENT)
Dept: PODIATRY | Facility: CLINIC | Age: 80
End: 2020-07-08
Payer: MEDICARE

## 2020-07-08 VITALS
DIASTOLIC BLOOD PRESSURE: 79 MMHG | HEIGHT: 70 IN | HEART RATE: 71 BPM | BODY MASS INDEX: 32.5 KG/M2 | SYSTOLIC BLOOD PRESSURE: 129 MMHG | TEMPERATURE: 98.4 F | WEIGHT: 227 LBS

## 2020-07-08 DIAGNOSIS — I87.2 VENOUS INSUFFICIENCY: Primary | ICD-10-CM

## 2020-07-08 PROCEDURE — 99213 OFFICE O/P EST LOW 20 MIN: CPT | Performed by: PODIATRIST

## 2020-07-08 PROCEDURE — 1160F RVW MEDS BY RX/DR IN RCRD: CPT | Performed by: PODIATRIST

## 2020-07-08 PROCEDURE — 3078F DIAST BP <80 MM HG: CPT | Performed by: PODIATRIST

## 2020-07-08 PROCEDURE — 4040F PNEUMOC VAC/ADMIN/RCVD: CPT | Performed by: PODIATRIST

## 2020-07-08 PROCEDURE — 1036F TOBACCO NON-USER: CPT | Performed by: PODIATRIST

## 2020-07-08 PROCEDURE — 3074F SYST BP LT 130 MM HG: CPT | Performed by: PODIATRIST

## 2020-07-08 PROCEDURE — 3008F BODY MASS INDEX DOCD: CPT | Performed by: PODIATRIST

## 2020-07-08 NOTE — PROGRESS NOTES
This patient was seen on 7/8/20  Assessment:    Problem List Items Addressed This Visit        Cardiovascular and Mediastinum    Venous insufficiency - Primary          Plan:  I reviewed the venous duplex noting no DVT nor incompetent perforators  At this point, I feel that simple edema management is going to give him relief  I don't think he nor his wife have the hand strength to pull on conventional compression hose so I will prescribe a home compression pump and CircAid-type wraps  I recommended they reappoint with me once they have the devices  Diagnoses and all orders for this visit:    Venous insufficiency          Subjective:      Patient ID: Laney Pritchard is a 68 y o  male  Reagan Phipps is here for the cc of leg pain and edema  He did complete the venous doppler  The following portions of the patient's history were reviewed and updated as appropriate: allergies, current medications, past family history, past medical history, past social history, past surgical history and problem list     Review of Systems   Constitutional: Negative  Respiratory: Negative  Cardiovascular: Positive for leg swelling  Gastrointestinal: Negative  Musculoskeletal: Positive for back pain  Neurological: Negative  Psychiatric/Behavioral: Negative  Objective:      /79   Pulse 71   Temp 98 4 °F (36 9 °C) (Tympanic)   Ht 5' 10" (1 778 m) Comment: verbal  Wt 103 kg (227 lb)   BMI 32 57 kg/m²          Physical Exam   Constitutional: He is oriented to person, place, and time  He appears well-developed and well-nourished  No distress  HENT:   Head: Normocephalic  Eyes: Pupils are equal, round, and reactive to light  Cardiovascular:   Pulses:       Dorsalis pedis pulses are 3+ on the right side, and 3+ on the left side  Posterior tibial pulses are 3+ on the right side, and 3+ on the left side  Pulmonary/Chest: Effort normal and breath sounds normal    Abdominal: Soft   Bowel sounds are normal    Musculoskeletal: Normal range of motion  Feet:   Right Foot:   Protective Sensation: 10 sites tested  10 sites sensed  Left Foot:   Protective Sensation: 10 sites tested  10 sites sensed  Neurological: He is alert and oriented to person, place, and time  Skin: Skin is warm  He is not diaphoretic  Nursing note and vitals reviewed

## 2020-07-08 NOTE — PATIENT INSTRUCTIONS
Lymphedema   WHAT YOU NEED TO KNOW:   What do I need to know about lymphedema? The lymphatic system contains fluid, vessels, tissue, and organs  This system removes and carries fluid throughout the body  It also helps the body fight infection  Lymphedema is the buildup of lymph fluid in fat tissue under your skin  The buildup causes the area to swell  Lymphedema can happen any time that lymphatic vessels are blocked or damaged  What increases your risk for lymphedema? · Surgery to remove lymph nodes in the underarm, groin, pelvis, or neck    · Radiation to lymph nodes in the underarm, groin, pelvis, or neck    · Surgery to the chest, head, neck, or pelvis    · Being overweight or obese    · A history of venous insufficiency    · Infection or injury    · A history of diseases that affect the lymphatic system such as Lima disease  What are the signs and symptoms of lymphedema? Signs and symptoms may happen where lymph nodes were removed, or in the arm, leg, chest, or underarm  · Swelling or itching    · Pain, burning, or aching    · Tight, hard, or red skin    · Hair loss    · Heaviness or fullness    · Numbness or tingling    · Stiffness  How is lymphedema diagnosed? Your healthcare provider will examine areas of swelling and ask about your symptoms  Tell him if you have ever had cancer, radiation treatment, or surgery  You may need an ultrasound, MRI, CT, or nuclear scan  These tests take pictures of the lymphatic system  They may show areas of fluid buildup  You may be given dye to help the lymphatic system show up better in pictures  Tell the healthcare provider if you have ever had an allergic reaction to contrast liquid  Do not enter the MRI room with anything metal  Metal can cause serious injury  Tell the healthcare provider if you have any metal in or on your body  You may need other tests to check for other causes of swelling  How is lymphedema treated? There is no cure for lymphedema   Treatment can relieve symptoms and prevent lymphedema from getting worse  You may need any of the following:  · Therapeutic massage  helps move lymphatic fluid through your body  It is done by a specialist, who may also teach you how to perform the massage yourself  · Compression devices  are sleeves, gloves, boots, or bandages  These devices use pressure to help move lymphatic fluid and prevent fluid buildup  · Physical therapy  may help decrease swelling and pain  It may also help improve strength  · Surgery  may be needed if other treatments do not work  Surgery may be done to remove tissue, drain fluid, or create a path for lymphatic fluid to flow  How can I manage my symptoms? · Elevate  your arm or leg above the level of your heart as often as you can  This will help decrease swelling and pain  Prop your arm or leg on pillows or blankets to keep it elevated comfortably  · Wear compression socks, sleeves, or bandages  as directed  Compression devices must be fitted by a healthcare provider  Compression devices may need to be replaced every 3 to 6 months  · Exercise  can help you maintain or regain function of your arm or leg  Ask your healthcare provider what type of exercise to do and how often to do it  Start slow, take breaks, and gradually do more each day  Do not do vigorous, repeated exercises  Watch for changes in your arm or leg during exercise  Stop and rest if you have swelling, increased pain, or heaviness  Elevate your arm or leg above the level of your heart  · Change your position often  to help move lymphatic fluid through your body  Do not sit or  one position for more than 30 minutes  Do not cross your legs when you sit  These actions can cause lymphatic fluid to buildup  · Maintain a healthy weight  Ask your healthcare provider what you should weigh  Weight loss may improve your symptoms   If you need to lose weight, your healthcare provider can help you create a weight loss program   How do I care for my skin and help prevent infection? A skin infection can make lymphedema worse  Do the following to decrease your risk for a skin infection in your arm or leg with lymphedema:  · Wash your skin gently and dry it well  Use a mild soap to wash your skin  Gently pat your skin dry after you bathe  Apply a mild cream or lotion to moisturize your skin and prevent dryness or cracking  Keep your feet clean and dry  · Protect your skin from injury  Wear gloves when you garden and wash dishes  Cut your nails straight across to prevent injury to your fingers and toes  Use sunscreen and insect repellant to avoid burns and punctures  Wear slippers in the house  Wear shoes when you go outside  · Check your skin every day for signs of infection  Signs of infection include redness, swelling, increased heat, or pus  You may also have a fever or chills  · Care for cuts, scratches or burns  Apply antibiotic ointment to cuts and other small breaks in the skin  Apply a cold pack or cold water to a burn for 15 minutes  Then wash it with soap and water  Cover scratches, cuts, or burns with a clean, dry gauze or bandage as directed  Keep the area clean and dry  · Tell healthcare providers that you have lymphedema  Tell them not to do, IVs, blood draws, and blood pressure readings in the arm or leg with lymphedema  If there is lymphedema in both arms, ask them to take your blood pressure on your leg  Do not allow flu shots or vaccinations in your arm with lymphedema  When should I contact my healthcare provider? · You have a fever or chills  · You have an open area of skin that looks red or swollen, or drains pus  · Your symptoms, such as swelling or pain, get worse  · Your arms or legs feel heavy, or you cannot move them  · Your skin becomes hard, thick, or rough       · You have a skin wound that will not heal      · Your shoes, clothes, or jewelry feel tighter  · You have questions or concerns about your condition or care  CARE AGREEMENT:   You have the right to help plan your care  Learn about your health condition and how it may be treated  Discuss treatment options with your caregivers to decide what care you want to receive  You always have the right to refuse treatment  The above information is an  only  It is not intended as medical advice for individual conditions or treatments  Talk to your doctor, nurse or pharmacist before following any medical regimen to see if it is safe and effective for you  © 2017 [address]  Information is for End User's use only and may not be sold, redistributed or otherwise used for commercial purposes  All illustrations and images included in CareNotes® are the copyrighted property of [employer]  or Hussein Castro  Venous Insufficiency   AMBULATORY CARE:   Venous insufficiency  is a condition that prevents blood from flowing out of your legs and back to your heart  Veins contain valves that help blood flow in one direction  Venous insufficiency means the valves do not close correctly or fully  Blood flows back and pools in your leg  This can cause problems such as varicose veins  Venous insufficiency may also be called chronic venous insufficiency or venous stasis  Common signs and symptoms:   · Visible veins on your legs that may be small and red or large, thick, and blue    · Swelling in your ankles or calves    · Changes in skin color, such as dark or purple skin    · An ulcer (open sore) on your leg    · Leg pain that is worse when you are menstruating (women) or when you stand, and better when you elevate your legs    · Burning or itching    · Cramps that happen at night    · Thick, hard skin on your legs and ankles    · Feeling of heaviness in your legs  Seek care immediately if:   · You have a wound that does not heal or is infected      · You have an injury that has broken your skin and caused your varicose veins to bleed  · Your leg is swollen and hard  · You have pain in your leg that does not go away or gets worse  · Your legs or feet are turning blue or black  · Your leg feels warm, tender, and painful  It may look swollen and red  Contact your healthcare provider if:   · You have a fever  · You have varicose veins and they are painful  · You have new or worsening leg pain, swelling, or redness  · You have new or worsening ulcers or other sores on your leg  · You have questions or concerns about your condition or care  Treatment  may include any of the following:  · Medicine  may be given to improve blood flow  The medicines may thin your blood or reduce swelling to help blood flow  You may also need medicine to treat a bacterial infection  · Ablation  is a procedure used to close varicose veins  A catheter is guided until it is near the vein  A device will then be guided to the area  The device may produce energy through radiofrequency or a laser  The energy creates heat that will close the blood vessel  · Sclerotherapy  is a procedure used to fade visible veins  Your healthcare provider will inject a liquid into a spider vein or varicose vein  The liquid causes irritation in the vein  The vein swells and sticks together  Your body will then absorb the vein  · Surgery  may be needed if other treatments do not work  Surgery may be used to repair a leg vein valve or to clip or tie off a vein so blood cannot flow through it  You may need to have a veins removed during surgery called stripping  Surgery may be used to bypass (go around) the damaged vein  Blood will flow through a vein transplanted from another part of your body  Manage your symptoms:   · Wear pressure stockings as directed  Pressure stockings help keep blood from pooling in your leg veins  Your healthcare provider can prescribe stockings that are right for you   Do not buy over-the-counter pressure stockings unless your healthcare provider says it is okay  They may not fit correctly or may have elastic that cuts off your circulation  Ask your healthcare provider when to start wearing pressure stockings and how long to wear them each day  · Do not sit or stand for long periods of time  If you have to sit for a long time, flex and extend your legs, feet, and ankles  Do this about 10 times every 30 minutes to help keep blood flowing  If you have to stand for a long time, take breaks and sit with your legs elevated  · Elevate your legs  Elevate your legs above the level of your heart to reduce swelling  Your healthcare provider may recommend that you keep your legs elevated for 30 minutes at a time  You may need to do this 3 to 4 times per day, or more if your healthcare provider recommends  · Do not smoke  Nicotine and other chemicals in cigarettes and cigars can cause blood vessel damage  Ask your healthcare provider for information if you currently smoke and need help to quit  E-cigarettes or smokeless tobacco still contain nicotine  Talk to your healthcare provider before you use these products  · Reach or maintain a healthy weight  Extra weight can make venous insufficiency worse  Ask your healthcare provider how much you should weigh  He can help you create a weight loss plan if you need to lose weight  · Exercise as directed  Walking can help increase blood flow in your calves  Ask your healthcare provider how much exercise you need each day and which exercises are best for you  · Care for your skin  Keep your skin clean  Do not use any soaps or lotions that may dry your skin  For example, do not use products that contain fragrance or alcohol  If you have a skin ulcer, your healthcare provider may recommend a wet-to-dry bandage  To do this, apply a wet bandage to your wound and allow it to dry   This will help remove drainage from your wound each time you change the bandage  Your healthcare provider will tell you how often to change your bandage and which kind of bandage to use  Check your wound for signs of infection, such as swelling or pus  · Go to physical therapy (PT) as directed  A physical therapist can help you increase movement and range of motion in your legs  Follow up with your healthcare provider as directed:  Write down your questions so you remember to ask them during your visits  © 2017 [address] [facility] Information is for End User's use only and may not be sold, redistributed or otherwise used for commercial purposes  All illustrations and images included in CareNotes® are the copyrighted property of [employer]  or Hussein Castro  The above information is an  only  It is not intended as medical advice for individual conditions or treatments  Talk to your doctor, nurse or pharmacist before following any medical regimen to see if it is safe and effective for you

## 2020-07-20 ENCOUNTER — EVALUATION (OUTPATIENT)
Dept: PHYSICAL THERAPY | Facility: CLINIC | Age: 80
End: 2020-07-20
Payer: MEDICARE

## 2020-07-20 DIAGNOSIS — R26.81 UNSTEADY GAIT: Primary | ICD-10-CM

## 2020-07-20 DIAGNOSIS — M47.12 CERVICAL SPONDYLOSIS WITH MYELOPATHY: ICD-10-CM

## 2020-07-20 PROCEDURE — 97162 PT EVAL MOD COMPLEX 30 MIN: CPT | Performed by: PHYSICAL THERAPIST

## 2020-07-20 PROCEDURE — 97110 THERAPEUTIC EXERCISES: CPT | Performed by: PHYSICAL THERAPIST

## 2020-07-20 NOTE — PROGRESS NOTES
PT Evaluation     Today's date: 2020  Patient name: Horacio Monterroso  : 1940  MRN: [id number]  Referring provider: Kevin You MD  Dx:   Encounter Diagnosis     ICD-10-CM    1  Unsteady gait R26 81    2  Cervical spondylosis with myelopathy M47 12        Start Time: 1500  Stop Time: 1600  Total time in clinic (min): 60 minutes    Assessment  Assessment details: Patient reports to PT with gait abnormalities, impaired balance, LE weakness, and impaired cervical ROM  Brown balance scale identified specific impairment with tandem stance, sit to stand transfers, and 360 degree turns  Pt will benefit from skilled PT to improve functional mobility and address identified impairments  Impairments: abnormal gait, abnormal or restricted ROM, activity intolerance, impaired balance, impaired physical strength, lacks appropriate home exercise program, pain with function and poor posture   Functional limitations: climbing stairs, "unsteadiness" with walking   Prognosis: good    Goals  ST weeks  1  Patient will be I with HEP  2  Patient will demonstrate improved LE strength 4+/5  3  Patient Brown Balance Scale will improve to 43/56    LT weeks  1  Patient will ambulate >400 feet during 3 minute walk test  2  Patient BBS will improve to 47/56 (fall risk cutoff: 45/56)  3   Patient will demonstrate improved ability to climb steps and gait pattern    Plan  Patient would benefit from: skilled physical therapy  Planned therapy interventions: balance, functional ROM exercises, gait training, home exercise program, therapeutic exercise, therapeutic activities, stretching, strengthening, postural training, neuromuscular re-education and manual therapy  Frequency: 2x week  Duration in weeks: 8  Plan of Care beginning date: 2020  Plan of Care expiration date: 2020  Treatment plan discussed with: patient        Subjective Evaluation    History of Present Illness  Date of onset: 2020  Mechanism of injury: Patient is a 69 y/o male with a PMHx including HTN, venous insufficiency, cervical spondylosis, unsteady gait, tremor, UI, and positional lightheadedness  He reports to physical therapy with complaint of generalized weakness and ability to ambulate long distances  He states that he has been less active over the last few months secondary to COVID-19 and quarantine restrictions  He reports he is able to walk comfortably but is unsteady on his feet  Increased difficulty with climbing stairs, requiring B handrails  He reports his doctor may opt to do C/S surgery secondary to spondylosis, and he will be deciding in 2 weeks so he is hoping to improve mobility before then  Pain  No pain reported    Social Support  Steps to enter house: no  Stairs in house: yes   12    Patient Goals  Patient goals for therapy: increased strength, decreased pain, independence with ADLs/IADLs and improved balance  Patient goal: Avoid c/s surgery        Objective     Postural Observations  Seated posture: poor    Additional Postural Observation Details  Forward head and shoulder posture; scapular elevation noted with resting position  Active Range of Motion   Cervical/Thoracic Spine       Cervical    Flexion: 50 degrees   Extension: 12 degrees      Left lateral flexion: 10 degrees      Right lateral flexion: 8 degrees      Left rotation: 65 degrees  Right rotation: 70 degrees             Strength/Myotome Testing     Left Hip   Planes of Motion   Flexion: 4-  Abduction: 4-    Right Hip   Planes of Motion   Flexion: 4-  Abduction: 4-    Left Knee   Flexion: 4-  Extension: 4-    Right Knee   Flexion: 4-  Extension: 4-    Left Ankle/Foot   Dorsiflexion: 4-  Plantar flexion: 4-    Right Ankle/Foot   Dorsiflexion: 4-  Plantar flexion: 4-    Ambulation     Ambulation: Level Surfaces     Additional Level Surfaces Ambulation Details  Pt brought SPC to session, but tends to carry it rather than use it   Shuffling gait, wide CRIS, moderate lateral trunk sway noted    Neuro Exam:     Functional outcomes   Functional outcome comment: Brown Balance Scale: 37/56  3 minute walk test: 290 feet             Precautions: USE GAIT BELT, shuffling gait and unsteadiness on feet  Dx: Generalized weakness, balance dysfunction, impaired C/S mobility      Manuals 7/20            P-A mob T4-T8 gr IV             HA str                                       Neuro Re-Ed             4-finger rot 1x10 ea            Scap retract 1x10            Bridges             Biodex: EC  L11           Biodex: Tandem stance  L11           Biodex: SLS                          Ther Ex             DL heel raises             Standing GA str             SLR             Supine hip abd             Mini squats                                                    Ther Activity             Sit to stands                          Gait Training             Ambulation on floor; stride length 1x290' no AD, CG            Amb over hurdles             Modalities

## 2020-07-27 ENCOUNTER — OFFICE VISIT (OUTPATIENT)
Dept: PHYSICAL THERAPY | Facility: CLINIC | Age: 80
End: 2020-07-27
Payer: MEDICARE

## 2020-07-27 DIAGNOSIS — M47.12 CERVICAL SPONDYLOSIS WITH MYELOPATHY: ICD-10-CM

## 2020-07-27 DIAGNOSIS — R26.81 UNSTEADY GAIT: Primary | ICD-10-CM

## 2020-07-27 PROCEDURE — 97140 MANUAL THERAPY 1/> REGIONS: CPT | Performed by: PHYSICAL THERAPIST

## 2020-07-27 PROCEDURE — 97110 THERAPEUTIC EXERCISES: CPT | Performed by: PHYSICAL THERAPIST

## 2020-07-27 PROCEDURE — 97112 NEUROMUSCULAR REEDUCATION: CPT | Performed by: PHYSICAL THERAPIST

## 2020-07-27 NOTE — PROGRESS NOTES
Daily Note     Today's date: 2020  Patient name: Terry Conner  : 1940  MRN: [id number]  Referring provider: Carson Barry MD  Dx:   Encounter Diagnosis     ICD-10-CM    1  Unsteady gait R26 81    2  Cervical spondylosis with myelopathy M47 12        Start Time: 1731  Stop Time: 1700  Total time in clinic (min): 45 minutes    Subjective: Pt reports compliance with HEP  He reports doing well after IE, but has noticed that his walking has "gotten a bit worse" as he takes smaller steps  Objective: See treatment diary below      Assessment: Pt demonstrated decreased step length and impaired balance during session  He has tendency to attempt to sit in chair without fully turning around; educated on safety and importance of squaring shoulders/feet to chair  Block practice used with two chairs 5 feet apart to facilitate turning, technique, and safety  Family and pt educated on use of verbal cue "toes" as reminder to completely turn before sitting to improve safety awareness  Impaired balance and strength noted with exercises, HHA used for chava steps  Frequent cues provided with ambulation to increase step length  Plan: Assess response to tx and carryover of safety with sitting in chairs       Precautions: USE GAIT BELT, shuffling gait and unsteadiness on feet  Dx: Generalized weakness, balance dysfunction, impaired C/S mobility      Manuals            P-A mob T4-T8 gr IV  3x20 ea           HA str  2 min ea                                     Neuro Re-Ed             4-finger rot 1x10 ea 1x10 ea           Scap retract 1x10 1x10           Bridges  1x10           Biodex: EC  L11/ 1 min           Biodex: Partial Tandem stance  L12/ 1 min ea           Biodex: SLS  nv                        Ther Ex             DL heel raises  2x10           Standing GA str  15"x2           SLR  1x10           Supine hip abd  1x10           Mini squats  nv Ther Activity             Sit to stands (block practice)  10x                        Gait Training             Ambulation on floor; stride length 1x290' no AD,  ft, no AD, CG           Amb over hurdles  Low hurdles, HHA, 4x4           Modalities

## 2020-07-30 ENCOUNTER — OFFICE VISIT (OUTPATIENT)
Dept: PHYSICAL THERAPY | Facility: CLINIC | Age: 80
End: 2020-07-30
Payer: MEDICARE

## 2020-07-30 DIAGNOSIS — R26.81 UNSTEADY GAIT: Primary | ICD-10-CM

## 2020-07-30 DIAGNOSIS — M47.12 CERVICAL SPONDYLOSIS WITH MYELOPATHY: ICD-10-CM

## 2020-07-30 PROCEDURE — 97112 NEUROMUSCULAR REEDUCATION: CPT | Performed by: PHYSICAL THERAPIST

## 2020-07-30 PROCEDURE — 97140 MANUAL THERAPY 1/> REGIONS: CPT | Performed by: PHYSICAL THERAPIST

## 2020-07-30 PROCEDURE — 97110 THERAPEUTIC EXERCISES: CPT | Performed by: PHYSICAL THERAPIST

## 2020-07-30 NOTE — PROGRESS NOTES
Daily Note     Today's date: 2020  Patient name: Alysha Mcintyre  : 1940  MRN: [id number]  Referring provider: Jessica Gray MD  Dx:   Encounter Diagnosis     ICD-10-CM    1  Unsteady gait R26 81    2  Cervical spondylosis with myelopathy M47 12        Start Time: 1400  Stop Time: 2636  Total time in clinic (min): 45 minutes    Subjective: Pt reports that he "wants to take it easy today" as he is seeing his surgeon tomorrow  He reports he feels an improvement with his safety with vc provided last session  Objective: See treatment diary below  Assessment: Pt demonstrated good carryover with safety for sit to stands, completing full turn before sitting on ~80% of trials  Plan: Cont per POC  Cont additional balance exercises upon next session       Precautions: USE GAIT BELT, shuffling gait and unsteadiness on feet  Dx: Generalized weakness, balance dysfunction, impaired C/S mobility      Manuals           P-A mob T4-T8 gr IV  3x20 ea 3x20 ea          HA str  2 min ea 2 min                                    Neuro Re-Ed             4-finger rot 1x10 ea 1x10 ea 2x10           Scap retract 1x10 1x10 2x10          Bridges  1x10 1x10          Biodex: EC  L11/ 1 min L11/ 1 min          Biodex: Partial Tandem stance  L12/ 1 min ea L12/ 1 min ea          Biodex: SLS  nv nv                       Ther Ex             DL heel raises  2x10 2x10          Standing GA str  15"x2 15"x2          SLR  1x10 1x10          Supine hip abd  1x10 1x10          Mini squats  nv nv                                                 Ther Activity             Sit to stands (block practice)  10x 10x                       Gait Training             Ambulation on floor; stride length 1x290' no AD,  ft, no AD,  ft, no AD, CG          Amb over hurdles  Low hurdles, HHA, 4x4 np          Modalities

## 2020-07-31 ENCOUNTER — OFFICE VISIT (OUTPATIENT)
Dept: PHYSICAL THERAPY | Facility: CLINIC | Age: 80
End: 2020-07-31
Payer: MEDICARE

## 2020-07-31 ENCOUNTER — OFFICE VISIT (OUTPATIENT)
Dept: NEUROSURGERY | Facility: CLINIC | Age: 80
End: 2020-07-31
Payer: MEDICARE

## 2020-07-31 VITALS
BODY MASS INDEX: 31.35 KG/M2 | WEIGHT: 219 LBS | TEMPERATURE: 97.7 F | SYSTOLIC BLOOD PRESSURE: 136 MMHG | RESPIRATION RATE: 18 BRPM | DIASTOLIC BLOOD PRESSURE: 80 MMHG | HEIGHT: 70 IN

## 2020-07-31 DIAGNOSIS — M47.12 CERVICAL SPONDYLOSIS WITH MYELOPATHY: Primary | ICD-10-CM

## 2020-07-31 DIAGNOSIS — E66.01 SEVERE OBESITY WITH BODY MASS INDEX (BMI) OF 35.0 TO 39.9 WITH SERIOUS COMORBIDITY (HCC): ICD-10-CM

## 2020-07-31 DIAGNOSIS — R26.81 UNSTEADY GAIT: Primary | ICD-10-CM

## 2020-07-31 DIAGNOSIS — G91.2 INPH (IDIOPATHIC NORMAL PRESSURE HYDROCEPHALUS) (HCC): ICD-10-CM

## 2020-07-31 PROCEDURE — 3075F SYST BP GE 130 - 139MM HG: CPT | Performed by: NEUROLOGICAL SURGERY

## 2020-07-31 PROCEDURE — 99214 OFFICE O/P EST MOD 30 MIN: CPT | Performed by: NEUROLOGICAL SURGERY

## 2020-07-31 PROCEDURE — 3079F DIAST BP 80-89 MM HG: CPT | Performed by: NEUROLOGICAL SURGERY

## 2020-07-31 PROCEDURE — 1036F TOBACCO NON-USER: CPT | Performed by: NEUROLOGICAL SURGERY

## 2020-07-31 PROCEDURE — 1160F RVW MEDS BY RX/DR IN RCRD: CPT | Performed by: NEUROLOGICAL SURGERY

## 2020-07-31 PROCEDURE — 4040F PNEUMOC VAC/ADMIN/RCVD: CPT | Performed by: NEUROLOGICAL SURGERY

## 2020-07-31 PROCEDURE — 3008F BODY MASS INDEX DOCD: CPT | Performed by: NEUROLOGICAL SURGERY

## 2020-07-31 PROCEDURE — 97164 PT RE-EVAL EST PLAN CARE: CPT | Performed by: PHYSICAL THERAPIST

## 2020-07-31 RX ORDER — IBUPROFEN 200 MG
600 TABLET ORAL EVERY 6 HOURS PRN
COMMUNITY

## 2020-07-31 NOTE — PROGRESS NOTES
Office Note - Neurosurgery   Akiko Rosen 68 y o  male MRN: [id number]      Assessment:    Patient is stable  72-year-old gentleman with right-sided  shunt for normal pressure hydrocephalus  He also has known cervical spondylosis and stenosis with possible mild myelopathy  Overall his neurological examination  Objectively, his gait is relatively stable, though I suspect he is somewhat deconditioned secondary to decreased activity during the [place] pandemic  No signs or symptoms of shunt malfunction  He will follow-up in 1 year's time through the integrated normal pressure hydrocephalus program for further surveillance  They will contact this office should any concerns arise in the interim  History, physical examination and diagnostic tests were reviewed and questions answered  Diagnosis, care plan and treatment options were discussed  The patient and spouse/SO understand instructions and will follow up as directed  Plan:    Follow-up:  1 year    Problem List Items Addressed This Visit        Nervous and Auditory    INPH (idiopathic normal pressure hydrocephalus) (HCC)    Cervical spondylosis with myelopathy - Primary       Other    Severe obesity with body mass index (BMI) of 35 0 to 39 9          Subjective/Objective     Chief Complaint    None  HPI    72-year-old gentleman accompanied by his wife today  He feels that his gait is decline to some extent, but feels that this is likely secondary to relative inactivity during the [place] pandemic  He denies any significant neck pain or pain, weakness or numbness in his arms  He denied any headaches, nausea vomiting or change in vision  No tenderness along the shunt tract  No significant cognitive changes  KATIE WILSON personally reviewed and updated  Review of Systems   Constitutional: Negative  HENT: Negative  Eyes: Negative  Respiratory: Positive for shortness of breath (with exertion)  Cardiovascular: Negative  Gastrointestinal: Negative  Endocrine: Negative  Genitourinary: Positive for urgency  Musculoskeletal: Negative  Skin: Negative  Allergic/Immunologic: Negative  Neurological: Positive for dizziness and weakness  Hematological: Negative  Psychiatric/Behavioral: Positive for decreased concentration         Family History    Family History   Problem Relation Age of Onset    Heart disease Mother         RHEUM    Prostate cancer Father     Cancer Maternal Uncle     Alcohol abuse Neg Hx     Depression Neg Hx     Mental illness Neg Hx     Substance Abuse Neg Hx        Social History    Social History     Socioeconomic History    Marital status: /Civil Union     Spouse name: Not on file    Number of children: 1    Years of education: Not on file    Highest education level: Not on file   Occupational History    Occupation: PHYSICIST, [employer] resource strain: Not on file    Food insecurity:     Worry: Not on file     Inability: Not on file   [email or web address] needs:     Medical: Not on file     Non-medical: Not on file   Tobacco Use    Smoking status: Never Smoker    Smokeless tobacco: Never Used   Substance and Sexual Activity    Alcohol use: No    Drug use: No    Sexual activity: Not on file   Lifestyle    Physical activity:     Days per week: Not on file     Minutes per session: Not on file    Stress: Not on file   Relationships    Social connections:     Talks on phone: Not on file     Gets together: Not on file     Attends [employer] service: Not on file     Active member of club or organization: Not on file     Attends meetings of clubs or organizations: Not on file     Relationship status: Not on file    Intimate partner violence:     Fear of current or ex partner: Not on file     Emotionally abused: Not on file     Physically abused: Not on file     Forced sexual activity: Not on file   Other Topics Concern    Not on file   Social History Narrative    Lives at home with wife    Retired        [employer]     LIVES [address] [place] [address] [address]        Past Medical History    Past Medical History:   Diagnosis Date    Arthritis     GERD (gastroesophageal reflux disease)     Hiatal hernia     Hypertension     Infectious viral hepatitis     Obesity     Spinal stenosis        Surgical History    Past Surgical History:   Procedure Laterality Date    APPENDECTOMY  1952    VA COLONOSCOPY FLX DX W/COLLJ SPEC WHEN PFRMD N/A 1/23/2019    Procedure: COLONOSCOPY;  Surgeon: Dayday Pryor MD;  Location: [facility]; Service: Gastroenterology    VA ESOPHAGOGASTRODUODENOSCOPY TRANSORAL DIAGNOSTIC N/A 1/23/2019    Procedure: ESOPHAGOGASTRODUODENOSCOPY (EGD); Surgeon: Dayday Pryor MD;  Location: [facility];   Service: Gastroenterology    VENTRICULOATRIAL SHUNT  06/2012    VENTRICULOPERITONEAL SHUNT         Medications      Current Outpatient Medications:     Cholecalciferol (VITAMIN D-3) 5000 units TABS, Take 1 tablet by mouth every other day, Disp: , Rfl:     enalapril (VASOTEC) 10 mg tablet, Take 1/2 tab PO daily, Disp: 45 tablet, Rfl: 1    hydrochlorothiazide (HYDRODIURIL) 25 mg tablet, Take 1/2 tablet daily, may take full tablet if with leg swelling, Disp: 45 tablet, Rfl: 1    ibuprofen (MOTRIN) 200 mg tablet, Take 600 mg by mouth every 6 (six) hours as needed for mild pain, Disp: , Rfl:     Multiple Vitamin (MULTI-VITAMIN DAILY PO), Take 1 tablet by mouth daily, Disp: , Rfl:     pantoprazole (PROTONIX) 40 mg tablet, Take 1 tablet (40 mg total) by mouth 2 (two) times a day before meals, Disp: 180 tablet, Rfl: 3    atorvastatin (LIPITOR) 40 mg tablet, Take 0 5 tablets by mouth daily, Disp: , Rfl:     Allergies    No Known Allergies    The following portions of the patient's history were reviewed and updated as appropriate: allergies, current medications, past family history, past medical history, past social history, past surgical history and problem list     Investigations    I personally reviewed the NPH PT and NPH Cognitive results with the patient:    NPH scale dated 7/31/2020, 13/15  PT gait assessment dated 7/31/2020  TUG 16 sec , TUGc 24 sec, DGI 20/24   MoCA dated 7/31/2020, 26/30  Physical Exam    Vitals:  Blood pressure 136/80, temperature 97 7 °F (36 5 °C), resp  rate 18, height 5' 10" (1 778 m), weight 99 3 kg (219 lb)  ,Body mass index is 31 42 kg/m²  Physical Exam   Constitutional: He is oriented to person, place, and time  He appears well-developed and well-nourished  No distress  HENT:   Head: Atraumatic  Eyes: EOM are normal    Pulmonary/Chest: Effort normal  No respiratory distress  Musculoskeletal: He exhibits deformity (Exaggerated thoracic kyphosis)  Neurological: He is alert and oriented to person, place, and time  No pronator or parietal drift  No sensory extinction or neglect  Walks with a steady but mildly wide-based gait  Minimal shuffling  On bloc turn in 3 steps  No dysdiadochokinesia  Godwin's negative bilaterally  No clonus  Skin: Skin is warm and dry  Right-sided  shunt depresses and refills easily  Psychiatric: He has a normal mood and affect  His behavior is normal    Vitals reviewed  Neurologic Exam     Mental Status   Oriented to person, place, and time       Cranial Nerves     CN III, IV, VI   Extraocular motions are normal

## 2020-07-31 NOTE — PROGRESS NOTES
PT Re-Evaluation  and PT Discharge    Today's date: 2020  Patient name: Dilshad Rouse  : 1940  MRN: [id number]  Referring provider: Earl Narayan MD  Dx:   Encounter Diagnosis     ICD-10-CM    1  Unsteady gait R26 81                   Assessment  Assessment details: Pt presents with minimal to minimal risk for falls given recent history or objective findings  Pt would still benefit from PT as he is going at this time  Thank you very much for this kind and familiar referral    Understanding of Dx/Px/POC: good   Prognosis: good    Goals  STG 4 week:  1  Follow up PRN  LTG 8:  1  Follow up prn    Plan  Plan details: Follow up prn  Duration in weeks: 6  Treatment plan discussed with: family and patient        Subjective Evaluation    History of Present Illness  Date of onset: 2018  Mechanism of injury: Pt is a 69 yo male who is familiar to this facility presents today that he has not had any falls in the last year  He did have a  shunt placement in   He does feel his balance is off  He is walking in the community at least 1x a day  He feels like he has declined over the last 3 months due to Covid-19 as he feels weaker  He has been going to PT in our [place] location  He walks with the cane just for safety  He states that he does not have a RW, and he uses the [facility] only as needed  Pt lives with his wife in a twin home that is a split level with 6 Step up and down with railings B  Pt reports 2 YANETH front of house, with railings, and no YANETH in the garage  Quality of life: good    Pain  No pain reported    Social Support  Steps to enter house: yes  Stairs in house: yes   Lives in: multiple-level home  Lives with: spouse          Objective     Ambulation     Comments   TU 09" without AD slight shuffle to start  TUG Co 81" without AD  DGI:   For all other details please refer to NPH form              Precautions: Falls, HTN, NPH s/p  Shunt

## 2020-08-04 ENCOUNTER — APPOINTMENT (OUTPATIENT)
Dept: PHYSICAL THERAPY | Facility: CLINIC | Age: 80
End: 2020-08-04
Payer: MEDICARE

## 2020-08-10 ENCOUNTER — OFFICE VISIT (OUTPATIENT)
Dept: PHYSICAL THERAPY | Facility: CLINIC | Age: 80
End: 2020-08-10
Payer: MEDICARE

## 2020-08-10 DIAGNOSIS — R26.81 UNSTEADY GAIT: Primary | ICD-10-CM

## 2020-08-10 DIAGNOSIS — M47.12 CERVICAL SPONDYLOSIS WITH MYELOPATHY: ICD-10-CM

## 2020-08-10 PROCEDURE — 97116 GAIT TRAINING THERAPY: CPT | Performed by: PHYSICAL THERAPIST

## 2020-08-10 PROCEDURE — 97140 MANUAL THERAPY 1/> REGIONS: CPT | Performed by: PHYSICAL THERAPIST

## 2020-08-10 PROCEDURE — 97110 THERAPEUTIC EXERCISES: CPT | Performed by: PHYSICAL THERAPIST

## 2020-08-10 NOTE — PROGRESS NOTES
Daily Note     Today's date: 8/10/2020  Patient name: Pastor Anderson  : 1940  MRN: [id number]  Referring provider: Tri Kaur MD  Dx:   Encounter Diagnosis     ICD-10-CM    1  Unsteady gait  R26 81    2  Cervical spondylosis with myelopathy  M47 12                   Subjective: Pt reports having a bad day today  Objective: See treatment diary below  Assessment: Pt demonstrated a severe L lateral trunk lean and his L foot was catching during ambulation  Pt had difficulty correcting with cueing  Plan: Cont POC       Precautions: USE GAIT BELT, shuffling gait and unsteadiness on feet  Dx: Generalized weakness, balance dysfunction, impaired C/S mobility      Manuals 7/20 7/27 7/30 8/10         P-A mob T4-T8 gr IV  3x20 ea 3x20 ea 3x20 ea         HA str  2 min ea 2 min          Quad stretch    15"x3 ea                      Neuro Re-Ed             4-finger rot 1x10 ea 1x10 ea 2x10           Scap retract 1x10 1x10 2x10          Bridges  1x10 1x10          Biodex: EC  L11/ 1 min L11/ 1 min          Biodex: Partial Tandem stance  L12/ 1 min ea L12/ 1 min ea          Biodex: SLS  nv nv                       Ther Ex             DL heel raises  2x10 2x10 3x10         Standing GA str  15"x2 15"x2 15"x3         SLR  1x10 1x10          Supine hip abd  1x10 1x10          Mini squats  nv nv          L side glides    1x10         R lumbar SB    1x10         Leg ext machine DL    25#/ 3x10         Ther Activity             Sit to stands (block practice)  10x 10x                       Gait Training             Ambulation on floor; stride length 1x290' no AD,  ft, no AD,  ft, no AD,  ft, no AD CG         Amb over hurdles  Low hurdles, HHA, 4x4 np 4"/ 2x4         Tandem walking    2x15'         Modalities

## 2020-08-24 ENCOUNTER — OFFICE VISIT (OUTPATIENT)
Dept: PHYSICAL THERAPY | Facility: CLINIC | Age: 80
End: 2020-08-24
Payer: MEDICARE

## 2020-08-24 DIAGNOSIS — R26.81 UNSTEADY GAIT: Primary | ICD-10-CM

## 2020-08-24 DIAGNOSIS — M47.12 CERVICAL SPONDYLOSIS WITH MYELOPATHY: ICD-10-CM

## 2020-08-24 PROCEDURE — 97110 THERAPEUTIC EXERCISES: CPT | Performed by: PHYSICAL THERAPIST

## 2020-08-24 PROCEDURE — 97116 GAIT TRAINING THERAPY: CPT | Performed by: PHYSICAL THERAPIST

## 2020-08-24 PROCEDURE — 97112 NEUROMUSCULAR REEDUCATION: CPT | Performed by: PHYSICAL THERAPIST

## 2020-08-24 NOTE — PROGRESS NOTES
Daily Note     Today's date: 2020  Patient name: Bhumi White  : 1940  MRN: [id number]  Referring provider: Laurel Jauregui MD  Dx:   Encounter Diagnosis     ICD-10-CM    1  Unsteady gait  R26 81    2  Cervical spondylosis with myelopathy  M47 12                   Subjective: Pt reports having a bad day today  Objective: See treatment diary below  Assessment: Pt demonstrated a severe L lateral trunk lean and his L foot was catching during ambulation  Pt had difficulty correcting with cueing  Plan: Cont POC       Precautions: USE GAIT BELT, shuffling gait and unsteadiness on feet  Dx: Generalized weakness, balance dysfunction, impaired C/S mobility      Manuals 7/20 7/27 7/30 8/10 8/24        P-A mob T4-T8 gr IV  3x20 ea 3x20 ea 3x20 ea 3x20        HA str  2 min ea 2 min  15"x3 ea        Quad stretch    15"x3 ea                      Neuro Re-Ed             4-finger rot 1x10 ea 1x10 ea 2x10           Scap retract 1x10 1x10 2x10          Bridges  1x10 1x10  1x10        Biodex: EC  L11/ 1 min L11/ 1 min  S/ 1x60"        Biodex: Tandem stance  L12/ 1 min ea L12/ 1 min ea  S/ 1x45" ea        Biodex: SLS  nv nv  S/ 3x10" ea                     Ther Ex             DL heel raises  2x10 2x10 3x10 3x12        DL toe raises     3x10        Standing GA str  15"x2 15"x2 15"x3 15"x3        SLR  1x10 1x10          Supine hip abd  1x10 1x10          Mini squats  nv nv          L side glides    1x10         R lumbar SB    1x10         Leg ext machine DL    25#/ 3x10 25#/ 3x10        Ther Activity             Sit to stands (block practice)  10x 10x  1x10                     Gait Training             Ambulation on floor; stride length 1x290' no AD,  ft, no AD,  ft, no AD,  ft, no AD CG 200ft no AD, 1# 100 ft no AD        Amb over hurdles  Low hurdles, HHA, 4x4 np 4"/ 2x4         Tandem walking    2x15'         Modalities

## 2020-08-28 ENCOUNTER — OFFICE VISIT (OUTPATIENT)
Dept: PHYSICAL THERAPY | Facility: CLINIC | Age: 80
End: 2020-08-28
Payer: MEDICARE

## 2020-08-28 DIAGNOSIS — R26.81 UNSTEADY GAIT: Primary | ICD-10-CM

## 2020-08-28 DIAGNOSIS — M47.12 CERVICAL SPONDYLOSIS WITH MYELOPATHY: ICD-10-CM

## 2020-08-28 PROCEDURE — 97116 GAIT TRAINING THERAPY: CPT | Performed by: PHYSICAL THERAPIST

## 2020-08-28 PROCEDURE — 97112 NEUROMUSCULAR REEDUCATION: CPT | Performed by: PHYSICAL THERAPIST

## 2020-08-28 PROCEDURE — 97110 THERAPEUTIC EXERCISES: CPT | Performed by: PHYSICAL THERAPIST

## 2020-08-28 NOTE — PROGRESS NOTES
Daily Note     Today's date: 2020  Patient name: Marli Sorensen  : 1940  MRN: [id number]  Referring provider: Trevor Manzanares MD  Dx:   Encounter Diagnosis     ICD-10-CM    1  Unsteady gait  R26 81    2  Cervical spondylosis with myelopathy  M47 12                   Subjective: Pt reports feeling weak today  Objective: See treatment diary below  Assessment: Pt demonstrated fatigue, L lateral trunk lean with ambulation and low energy levels  Plan: Cont POC       Precautions: USE GAIT BELT, shuffling gait and unsteadiness on feet  Dx: Generalized weakness, balance dysfunction, impaired C/S mobility      Manuals 7/20 7/27 7/30 8/10 8/24 8/28       P-A mob T4-T8 gr IV  3x20 ea 3x20 ea 3x20 ea 3x20 3x20       HA str  2 min ea 2 min  15"x3 ea        Quad stretch    15"x3 ea  15"x3 ea                    Neuro Re-Ed             4-finger rot 1x10 ea 1x10 ea 2x10    1x15       4 finger SB      1x10       Scap retract 1x10 1x10 2x10   1x10       Bridges  1x10 1x10  1x10        Biodex: EC  L11/ 1 min L11/ 1 min  S/ 1x60" S/ 1x45"       Biodex: Tandem stance  L12/ 1 min ea L12/ 1 min ea  S/ 1x45" ea S/ 1x45" ea       Biodex: SLS  nv nv  S/ 3x10" ea S/ 3x10" ea       Standing Hip ABD      1#/ 3x10 ea       Ther Ex             DL heel raises  2x10 2x10 3x10 3x12 3x10       DL toe raises     3x10        Standing GA str  15"x2 15"x2 15"x3 15"x3 15"x3       SLR  1x10 1x10          Supine hip abd  1x10 1x10          Mini squats  nv nv          L side glides    1x10         R lumbar SB    1x10         Leg ext machine DL    25#/ 3x10 25#/ 3x10        Ther Activity             Sit to stands (block practice)  10x 10x  1x10        Step-ups      6"/ 1# 1x10 ea       Gait Training             Ambulation on floor; stride length 1x290' no AD,  ft, no AD,  ft, no AD,  ft, no AD CG 200ft no AD, 1# 100 ft no  ft, 1# 300 ft no AD       Amb over hurdles  Low hurdles, HHA, 4x4 np 4"/ 2x4 Tandem walking    2x15'         Modalities

## 2020-09-03 ENCOUNTER — OFFICE VISIT (OUTPATIENT)
Dept: PHYSICAL THERAPY | Facility: CLINIC | Age: 80
End: 2020-09-03
Payer: MEDICARE

## 2020-09-03 DIAGNOSIS — R26.81 UNSTEADY GAIT: Primary | ICD-10-CM

## 2020-09-03 DIAGNOSIS — M47.12 CERVICAL SPONDYLOSIS WITH MYELOPATHY: ICD-10-CM

## 2020-09-03 PROCEDURE — 97116 GAIT TRAINING THERAPY: CPT | Performed by: PHYSICAL THERAPIST

## 2020-09-03 PROCEDURE — 97110 THERAPEUTIC EXERCISES: CPT | Performed by: PHYSICAL THERAPIST

## 2020-09-03 PROCEDURE — 97112 NEUROMUSCULAR REEDUCATION: CPT | Performed by: PHYSICAL THERAPIST

## 2020-09-03 NOTE — PROGRESS NOTES
Daily Note     Today's date: 9/3/2020  Patient name: Laney Pritchard  : 1940  MRN: [id number]  Referring provider: Mi Caceres MD  Dx:   Encounter Diagnosis     ICD-10-CM    1  Unsteady gait  R26 81    2  Cervical spondylosis with myelopathy  M47 12                   Subjective: Pt reports feeling weak today  Objective: See treatment diary below  Assessment: Pt demonstrated difficulty with c/s SB, and SLS balance  Pt demonstrated low energy levels  Plan: Cont POC       Precautions: USE GAIT BELT, shuffling gait and unsteadiness on feet  Dx: Generalized weakness, balance dysfunction, impaired C/S mobility      Manuals 7/20 7/27 7/30 8/10 8/24 8/28 9/3      P-A mob T4-T8 gr IV  3x20 ea 3x20 ea 3x20 ea 3x20 3x20 3x20      HA str  2 min ea 2 min  15"x3 ea        Quad stretch    15"x3 ea  15"x3 ea 15"x3 ea                   Neuro Re-Ed             4-finger rot 1x10 ea 1x10 ea 2x10    1x15 1x15      4 finger SB      1x10 1x15      Scap retract 1x10 1x10 2x10   1x10 1x15      Bridges  1x10 1x10  1x10        Biodex: EC  L11/ 1 min L11/ 1 min  S/ 1x60" S/ 1x45" S/ 1x45"      Biodex: Tandem stance  L12/ 1 min ea L12/ 1 min ea  S/ 1x45" ea S/ 1x45" ea S/ 1x45" ea      Biodex: SLS  nv nv  S/ 3x10" ea S/ 3x10" ea S/ 3x10" ea      Standing Hip ABD      1#/ 3x10 ea 1#/ 3x10 ea      Ther Ex             DL heel raises  2x10 2x10 3x10 3x12 3x10 3x10      DL toe raises     3x10        Standing GA str  15"x2 15"x2 15"x3 15"x3 15"x3 15"x3      SLR  1x10 1x10          Supine hip abd  1x10 1x10          Mini squats  nv nv          L side glides    1x10         R lumbar SB    1x10         Leg ext machine DL    25#/ 3x10 25#/ 3x10        Ther Activity             Sit to stands (block practice)  10x 10x  1x10  1x10      Step-ups      6"/ 1# 1x10 ea 8"/ 1# 1x10 R, 6"/ 1# 1x10 L      Gait Training             Ambulation on floor; stride length 1x290' no AD,  ft, no AD,  ft, no AD,  ft, no AD CG 200ft no AD, 1# 100 ft no  ft, 1# 300 ft no  ft, 1#/ 250ft      Amb over hurdles  Low hurdles, HHA, 4x4 np 4"/ 2x4         Tandem walking    2x15'         Modalities

## 2020-09-14 ENCOUNTER — OFFICE VISIT (OUTPATIENT)
Dept: PHYSICAL THERAPY | Facility: CLINIC | Age: 80
End: 2020-09-14
Payer: MEDICARE

## 2020-09-14 DIAGNOSIS — R26.81 UNSTEADY GAIT: Primary | ICD-10-CM

## 2020-09-14 DIAGNOSIS — M47.12 CERVICAL SPONDYLOSIS WITH MYELOPATHY: ICD-10-CM

## 2020-09-14 PROCEDURE — 97112 NEUROMUSCULAR REEDUCATION: CPT | Performed by: PHYSICAL THERAPIST

## 2020-09-14 PROCEDURE — 97110 THERAPEUTIC EXERCISES: CPT | Performed by: PHYSICAL THERAPIST

## 2020-09-14 PROCEDURE — 97116 GAIT TRAINING THERAPY: CPT | Performed by: PHYSICAL THERAPIST

## 2020-09-14 NOTE — PROGRESS NOTES
Daily Note     Today's date: 2020  Patient name: Bhumi White  : 1940  MRN: [id number]  Referring provider: Laurel Jauregui MD  Dx:   Encounter Diagnosis     ICD-10-CM    1  Unsteady gait  R26 81    2  Cervical spondylosis with myelopathy  M47 12                   Subjective: Pt reports having low energy levels today and requested limited walking and no obstacles today  Objective: See treatment diary below  Assessment: Pt demonstrated mod L trunk lean and his L LE occasionally drags  Plan: Cont POC       Precautions: USE GAIT BELT, shuffling gait and unsteadiness on feet  Dx: Generalized weakness, balance dysfunction, impaired C/S mobility      Manuals 7/20 7/27 7/30 8/10 8/24 8/28 9/3 9/14     P-A mob T4-T8 gr IV  3x20 ea 3x20 ea 3x20 ea 3x20 3x20 3x20 3x20     HA str  2 min ea 2 min  15"x3 ea   15"x3 ea     Quad stretch    15"x3 ea  15"x3 ea 15"x3 ea                   Neuro Re-Ed             4-finger rot 1x10 ea 1x10 ea 2x10    1x15 1x15 1x15     4 finger SB      1x10 1x15 1x15     Scap retract 1x10 1x10 2x10   1x10 1x15 1x15     Bridges  1x10 1x10  1x10   1x10     Biodex: EC  L11/ 1 min L11/ 1 min  S/ 1x60" S/ 1x45" S/ 1x45"      Biodex: Tandem stance  L12/ 1 min ea L12/ 1 min ea  S/ 1x45" ea S/ 1x45" ea S/ 1x45" ea      Biodex: SLS  nv nv  S/ 3x10" ea S/ 3x10" ea S/ 3x10" ea      Standing Hip ABD      1#/ 3x10 ea 1#/ 3x10 ea 3x10 ea     Ther Ex             DL heel raises  2x10 2x10 3x10 3x12 3x10 3x10 3x10     DL toe raises     3x10        Standing GA str  15"x2 15"x2 15"x3 15"x3 15"x3 15"x3 15"x3     SLR  1x10 1x10          Supine hip abd  1x10 1x10          Mini squats  nv nv          L side glides    1x10         R lumbar SB    1x10         Leg ext machine DL    25#/ 3x10 25#/ 3x10        Ther Activity             Sit to stands (block practice)  10x 10x  1x10  1x10      Step-ups      6"/ 1# 1x10 ea 8"/ 1# 1x10 R, 6"/ 1# 1x10 L 8"/ 1x10 ea     Gait Training Ambulation on floor; stride length 1x290' no AD,  ft, no AD,  ft, no AD,  ft, no AD CG 200ft no AD, 1# 100 ft no  ft, 1# 300 ft no  ft, 1#/ 250ft 250 ft     Amb over hurdles  Low hurdles, [facility], 4x4 np 4"/ 2x4         Tandem walking    2x15'         Modalities

## 2020-09-17 ENCOUNTER — OFFICE VISIT (OUTPATIENT)
Dept: PHYSICAL THERAPY | Facility: CLINIC | Age: 80
End: 2020-09-17
Payer: MEDICARE

## 2020-09-17 DIAGNOSIS — M47.12 CERVICAL SPONDYLOSIS WITH MYELOPATHY: ICD-10-CM

## 2020-09-17 DIAGNOSIS — R26.81 UNSTEADY GAIT: Primary | ICD-10-CM

## 2020-09-17 PROCEDURE — 97116 GAIT TRAINING THERAPY: CPT | Performed by: PHYSICAL THERAPIST

## 2020-09-17 PROCEDURE — 97110 THERAPEUTIC EXERCISES: CPT | Performed by: PHYSICAL THERAPIST

## 2020-09-17 PROCEDURE — 97530 THERAPEUTIC ACTIVITIES: CPT | Performed by: PHYSICAL THERAPIST

## 2020-09-17 NOTE — PROGRESS NOTES
Daily Note     Today's date: 2020  Patient name: Lisa Burrows  : 1940  MRN: [id number]  Referring provider: Butch Lizama MD  Dx:   Encounter Diagnosis     ICD-10-CM    1  Unsteady gait  R26 81    2  Cervical spondylosis with myelopathy  M47 12                   Subjective: Pt reports having low energy levels today and requested to keep it short ttoday  Objective: See treatment diary below  Assessment: Pt demonstrated limited STS tolerance due to fatigue  Plan: Cont POC       Precautions: USE GAIT BELT, shuffling gait and unsteadiness on feet  Dx: Generalized weakness, balance dysfunction, impaired C/S mobility      Manuals 7/20 7/27 7/30 8/10 8/24 8/28 9/3 9/14 9/17    P-A mob T4-L5 gr IV  3x20 ea 3x20 ea 3x20 ea 3x20 3x20 3x20 3x20 seated 1x30    HA str  2 min ea 2 min  15"x3 ea   15"x3 ea 15"x3 ea    Quad stretch    15"x3 ea  15"x3 ea 15"x3 ea                   Neuro Re-Ed             4-finger rot 1x10 ea 1x10 ea 2x10    1x15 1x15 1x15     4 finger SB      1x10 1x15 1x15     Scap retract 1x10 1x10 2x10   1x10 1x15 1x15     Bridges  1x10 1x10  1x10   1x10 1x10    Biodex: EC  L11/ 1 min L11/ 1 min  S/ 1x60" S/ 1x45" S/ 1x45"      Biodex: Tandem stance  L12/ 1 min ea L12/ 1 min ea  S/ 1x45" ea S/ 1x45" ea S/ 1x45" ea      Biodex: SLS  nv nv  S/ 3x10" ea S/ 3x10" ea S/ 3x10" ea      Standing Hip ABD      1#/ 3x10 ea 1#/ 3x10 ea 3x10 ea     Ther Ex             DL heel raises  2x10 2x10 3x10 3x12 3x10 3x10 3x10 3x10    DL toe raises     3x10        Standing GA str  15"x2 15"x2 15"x3 15"x3 15"x3 15"x3 15"x3 15"x3    SLR  1x10 1x10          Supine hip abd  1x10 1x10          Mini squats  nv nv          L side glides    1x10         R lumbar SB    1x10         Leg ext machine DL    25#/ 3x10 25#/ 3x10    25#/ 3x10    Ther Activity             Sit to stands (block practice)  10x 10x  1x10  1x10  1x3    Step-ups      6"/ 1# 1x10 ea 8"/ 1# 1x10 R, 6"/ 1# 1x10 L 8"/ 1x10 ea 8/ 1x10 ea    Gait Training             Ambulation on floor; stride length 1x290' no AD,  ft, no AD,  ft, no AD,  ft, no AD CG 200ft no AD, 1# 100 ft no  ft, 1# 300 ft no  ft, 1#/ 250ft 250 ft 350 ft    Amb over hurdles  Low hurdles, HH, 4x4 np 4"/ 2x4         Tandem walking    2x15'         Modalities

## 2020-10-20 DIAGNOSIS — I10 ESSENTIAL HYPERTENSION: ICD-10-CM

## 2020-10-20 RX ORDER — ENALAPRIL MALEATE 10 MG/1
TABLET ORAL
Qty: 45 TABLET | Refills: 0 | Status: SHIPPED | OUTPATIENT
Start: 2020-10-20 | End: 2021-01-11 | Stop reason: SDUPTHER

## 2020-10-20 RX ORDER — HYDROCHLOROTHIAZIDE 25 MG/1
TABLET ORAL
Qty: 45 TABLET | Refills: 0 | Status: SHIPPED | OUTPATIENT
Start: 2020-10-20 | End: 2021-01-11 | Stop reason: SDUPTHER

## 2020-10-27 ENCOUNTER — TRANSCRIBE ORDERS (OUTPATIENT)
Dept: LAB | Facility: CLINIC | Age: 80
End: 2020-10-27

## 2020-10-27 ENCOUNTER — LAB (OUTPATIENT)
Dept: LAB | Facility: CLINIC | Age: 80
End: 2020-10-27
Payer: MEDICARE

## 2020-10-27 DIAGNOSIS — C61 MALIGNANT NEOPLASM OF PROSTATE (HCC): ICD-10-CM

## 2020-10-27 DIAGNOSIS — C61 MALIGNANT NEOPLASM OF PROSTATE (HCC): Primary | ICD-10-CM

## 2020-10-27 LAB — PSA SERPL-MCNC: 8.1 NG/ML (ref 0–4)

## 2020-10-27 PROCEDURE — 84153 ASSAY OF PSA TOTAL: CPT

## 2021-01-11 ENCOUNTER — TELEMEDICINE (OUTPATIENT)
Dept: INTERNAL MEDICINE CLINIC | Facility: CLINIC | Age: 81
End: 2021-01-11
Payer: MEDICARE

## 2021-01-11 VITALS
SYSTOLIC BLOOD PRESSURE: 108 MMHG | BODY MASS INDEX: 31.77 KG/M2 | DIASTOLIC BLOOD PRESSURE: 64 MMHG | WEIGHT: 221.4 LBS | TEMPERATURE: 98.4 F

## 2021-01-11 DIAGNOSIS — M47.12 CERVICAL SPONDYLOSIS WITH MYELOPATHY: ICD-10-CM

## 2021-01-11 DIAGNOSIS — R26.81 UNSTEADY GAIT: ICD-10-CM

## 2021-01-11 DIAGNOSIS — G91.2 INPH (IDIOPATHIC NORMAL PRESSURE HYDROCEPHALUS) (HCC): ICD-10-CM

## 2021-01-11 DIAGNOSIS — Z00.00 HEALTH MAINTENANCE EXAMINATION: ICD-10-CM

## 2021-01-11 DIAGNOSIS — Z79.899 ENCOUNTER FOR LONG-TERM CURRENT USE OF MEDICATION: ICD-10-CM

## 2021-01-11 DIAGNOSIS — E78.49 OTHER HYPERLIPIDEMIA: ICD-10-CM

## 2021-01-11 DIAGNOSIS — R97.20 ELEVATED PSA: Primary | ICD-10-CM

## 2021-01-11 DIAGNOSIS — I10 ESSENTIAL HYPERTENSION: ICD-10-CM

## 2021-01-11 DIAGNOSIS — K22.70 BARRETT'S ESOPHAGUS WITHOUT DYSPLASIA: ICD-10-CM

## 2021-01-11 DIAGNOSIS — R60.0 LOCALIZED EDEMA: ICD-10-CM

## 2021-01-11 DIAGNOSIS — K21.9 GASTROESOPHAGEAL REFLUX DISEASE: ICD-10-CM

## 2021-01-11 DIAGNOSIS — K59.00 CONSTIPATION, UNSPECIFIED CONSTIPATION TYPE: ICD-10-CM

## 2021-01-11 PROCEDURE — 1123F ACP DISCUSS/DSCN MKR DOCD: CPT | Performed by: INTERNAL MEDICINE

## 2021-01-11 PROCEDURE — 99214 OFFICE O/P EST MOD 30 MIN: CPT | Performed by: INTERNAL MEDICINE

## 2021-01-11 PROCEDURE — G0439 PPPS, SUBSEQ VISIT: HCPCS | Performed by: INTERNAL MEDICINE

## 2021-01-11 RX ORDER — PANTOPRAZOLE SODIUM 40 MG/1
40 TABLET, DELAYED RELEASE ORAL
Qty: 180 TABLET | Refills: 1 | Status: SHIPPED | OUTPATIENT
Start: 2021-01-11 | End: 2022-05-13 | Stop reason: SDUPTHER

## 2021-01-11 RX ORDER — HYDROCHLOROTHIAZIDE 25 MG/1
TABLET ORAL
Qty: 90 TABLET | Refills: 1 | Status: SHIPPED | OUTPATIENT
Start: 2021-01-11 | End: 2022-03-30 | Stop reason: SDUPTHER

## 2021-01-11 RX ORDER — ENALAPRIL MALEATE 10 MG/1
TABLET ORAL
Qty: 45 TABLET | Refills: 1 | Status: SHIPPED | OUTPATIENT
Start: 2021-01-11 | End: 2021-11-01

## 2021-01-11 NOTE — PROGRESS NOTES
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     Takes PPI bid  Relevant Medications    pantoprazole (PROTONIX) 40 mg tablet       Cardiovascular and Mediastinum    Essential hypertension     Monitor BP, instructed to hold HCTZ if SBP >120  Takes 1/2 tablet of HCTZ and enalapril  Relevant Medications    enalapril (VASOTEC) 10 mg tablet    hydrochlorothiazide (HYDRODIURIL) 25 mg tablet    Other Relevant Orders    CBC and differential    Comprehensive metabolic panel    TSH, 3rd generation with Free T4 reflex       Nervous and Auditory    Cervical spondylosis with myelopathy     He reports symptoms worse, considering surgery  Follow up with neurosurg  INPH (idiopathic normal pressure hydrocephalus) (HCC)     Followed by neurosurg  Other    Constipation     Instructed to take Metamucil or fiber supplement 1-2x a day  Suspect IBS  Elevated PSA - Primary     PSA gradually increasing, followed by Urology  Hyperlipidemia    Relevant Orders    Comprehensive metabolic panel    Lipid panel    TSH, 3rd generation with Free T4 reflex    Localized edema     Unable to put on compression stockings, try Tubigrip stockings instead  Unsteady gait     No recent falls  Suspect LE neuropathy  Encouraged to walk daily  Relevant Orders    Vitamin B12      Other Visit Diagnoses     Gastroesophageal reflux disease        Relevant Medications    pantoprazole (PROTONIX) 40 mg tablet    Encounter for long-term current use of medication        Relevant Orders    Vitamin B12    Health maintenance examination        Updated  BMI Counseling: Body mass index is 31 77 kg/m²  The BMI is above normal  Exercise recommendations include exercising 3-5 times per week and strength training exercises           Preventive health issues were discussed with patient, and age appropriate screening tests were ordered as noted in patient's After Visit Summary  Personalized health advice and appropriate referrals for health education or preventive services given if needed, as noted in patient's After Visit Summary  History of Present Illness:     Patient presents for Medicare Annual Wellness visit    Patient Care Team:  Nader Cazares MD as PCP - General  MD Jomar Tucker MD Lowella Lofty, MD as Endoscopist     Problem List:     Patient Active Problem List   Diagnosis    INPH (idiopathic normal pressure hydrocephalus) ([address] )    Cognitive impairment    Unsteady gait    Hearing loss, bilateral    Hyperlipidemia    Essential hypertension    Adenomatous polyp of sigmoid colon    Tremor    Severe obesity with body mass index (BMI) of 35 0 to 39 9    Rhinitis    Localized edema    Cervical spondylosis with myelopathy    Elevated PSA    Hiatal hernia    Hydrocephalus (HCC)    Positional lightheadedness    Sleep disturbances    Urinary incontinence    Arthritis    Constipation    Cataract    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    Venous insufficiency      Past Medical and Surgical History:     Past Medical History:   Diagnosis Date    Arthritis     GERD (gastroesophageal reflux disease)     Hiatal hernia     Hypertension     Infectious viral hepatitis     Obesity     Spinal stenosis      Past Surgical History:   Procedure Laterality Date    APPENDECTOMY  1952    OR COLONOSCOPY FLX DX W/COLLJ SPEC WHEN PFRMD N/A 1/23/2019    Procedure: COLONOSCOPY;  Surgeon: Vishnu Pritchett MD;  Location: [facility]; Service: Gastroenterology    OR ESOPHAGOGASTRODUODENOSCOPY TRANSORAL DIAGNOSTIC N/A 1/23/2019    Procedure: ESOPHAGOGASTRODUODENOSCOPY (EGD); Surgeon: Vishnu Pritchett MD;  Location: [facility];   Service: Gastroenterology    VENTRICULOATRIAL SHUNT  06/2012    VENTRICULOPERITONEAL SHUNT        Family History:     Family History   Problem Relation Age of Onset    Heart disease Mother         RHEUM    Prostate cancer Father     Cancer Maternal Uncle     Alcohol abuse Neg Hx     Depression Neg Hx     Mental illness Neg Hx     Substance Abuse Neg Hx       Social History:     E-Cigarette/Vaping    E-Cigarette Use Never User      E-Cigarette/Vaping Substances    Nicotine No     THC No     CBD No     Flavoring No     Other No     Unknown No      Social History     Socioeconomic History    Marital status: /Civil Union     Spouse name: None    Number of children: 1    Years of education: None    Highest education level: None   Occupational History    Occupation: PHYSICIST, [employer]   Social Needs    Financial resource strain: None    Food insecurity     Worry: None     Inability: None    Transportation needs     Medical: None     Non-medical: None   Tobacco Use    Smoking status: Never Smoker    Smokeless tobacco: Never Used   Substance and Sexual Activity    Alcohol use: No    Drug use: No    Sexual activity: None   Lifestyle    Physical activity     Days per week: None     Minutes per session: None    Stress: None   Relationships    Social connections     Talks on phone: None     Gets together: None     Attends [employer]ism service: None     Active member of club or organization: None     Attends meetings of clubs or organizations: None     Relationship status: None    Intimate partner violence     Fear of current or ex partner: None     Emotionally abused: None     Physically abused: None     Forced sexual activity: None   Other Topics Concern    None   Social History Narrative    Lives at home with wife    Retired        [employer]     LIVES WITH SPOUSE     POST GRADUATE       Medications and Allergies:     Current Outpatient Medications   Medication Sig Dispense Refill    atorvastatin (LIPITOR) 40 mg tablet Take 0 5 tablets by mouth daily      Cholecalciferol (VITAMIN D-3) 5000 units TABS Take 1 tablet by mouth every other day      enalapril (VASOTEC) 10 mg tablet Take 1/2 (one-half) tablet by mouth once daily 45 tablet 1    hydrochlorothiazide (HYDRODIURIL) 25 mg tablet TAKE 1/2 (ONE-HALF) TABLET BY MOUTH ONCE DAILY MAY  TAKE  1  FULL  TABLET  IF  LEG  SWELLING 90 tablet 1    ibuprofen (MOTRIN) 200 mg tablet Take 600 mg by mouth every 6 (six) hours as needed for mild pain      Multiple Vitamin (MULTI-VITAMIN DAILY PO) Take 1 tablet by mouth daily      pantoprazole (PROTONIX) 40 mg tablet Take 1 tablet (40 mg total) by mouth 2 (two) times a day before meals 180 tablet 1     No current facility-administered medications for this visit  No Known Allergies   Immunizations:     Immunization History   Administered Date(s) Administered    INFLUENZA 09/19/2017, 10/01/2018    Influenza Split High Dose Preservative Free IM 10/10/2019    Influenza, high dose seasonal 0 7 mL 08/24/2020    Pneumococcal Polysaccharide PPV23 10/01/2018      Health Maintenance:         Topic Date Due    Colonoscopy Surveillance  01/23/2024         Topic Date Due    DTaP,Tdap,and Td Vaccines (1 - Tdap) 02/26/1961      Medicare Health Risk Assessment:     /64   Temp 98 4 °F (36 9 °C)   Wt 100 kg (221 lb 6 4 oz)   BMI 31 77 kg/m²      Yinka Hurtado is here for his Subsequent Wellness visit  Last Medicare Wellness visit information reviewed, patient interviewed and updates made to the record today  Health Risk Assessment:   Patient rates overall health as fair  Patient feels that their physical health rating is slightly worse  Eyesight was rated as same  Hearing was rated as slightly worse  Patient feels that their emotional and mental health rating is same  Pain experienced in the last 7 days has been none  Patient states that he has experienced no weight loss or gain in last 6 months  Depression Screening:   PHQ-2 Score: 0      Fall Risk Screening:    In the past year, patient has experienced: no history of falling in past year      Home Safety:  Patient has trouble with stairs inside or outside of their home  Patient has working smoke alarms and has working carbon monoxide detector  Home safety hazards include: none  Nutrition:   Current diet is Regular  Medications:   Patient is currently taking over-the-counter supplements  OTC medications include: see medication list  Patient is not able to manage medications  Activities of Daily Living (ADLs)/Instrumental Activities of Daily Living (IADLs):   Walk and transfer into and out of bed and chair?: No  Dress and groom yourself?: Yes    Bathe or shower yourself?: No    Feed yourself? Yes  Do your laundry/housekeeping?: No  Manage your money, pay your bills and track your expenses?: No  Make your own meals?: No    Do your own shopping?: No    Previous Hospitalizations:   Any hospitalizations or ED visits within the last 12 months?: No      Advance Care Planning:   Living will: Yes    Durable POA for healthcare: Yes    Advanced directive: Yes    End of Life Decisions reviewed with patient: Yes      Cognitive Screening:   Provider or family/friend/caregiver concerned regarding cognition?: No    PREVENTIVE SCREENINGS      Cardiovascular Screening:    General: History Lipid Disorder    Due for: Lipid Panel      Diabetes Screening:       Due for: Blood Glucose      Colorectal Cancer Screening:     General: Screening Current      Prostate Cancer Screening:    General: History Prostate Cancer and Screening Current      Osteoporosis Screening:    General: Screening Not Indicated      Abdominal Aortic Aneurysm (AAA) Screening:        General: Screening Not Indicated      Lung Cancer Screening:     General: Screening Not Indicated      Hepatitis C Screening:    General: Screening Not Indicated    Other Counseling Topics:   Regular weightbearing exercise         [facility], MD  Virtual AWV Consent    Reason for visit is f/u, AWV    Encounter provider Maddy Veloz MD    Provider located at [address] [place] [id number]      Recent Visits  No visits were found meeting these conditions  Showing recent visits within past 7 days and meeting all other requirements     Today's Visits  Date Type Provider Dept   01/11/21 Telemedicine Aditya Santoro, [facility] Internal Med   Showing today's visits and meeting all other requirements     Future Appointments  No visits were found meeting these conditions  Showing future appointments within next 150 days and meeting all other requirements        After connecting through [employer], the patient was identified by name and date of birth  [facility] was informed that this is a telemedicine visit and that the visit is being conducted through [facility] and patient was informed that this is a secure, HIPAA-compliant platform  He agrees to proceed  My office door was closed  No one else was in the room  He acknowledged consent and understanding of privacy and security of the video platform  The patient has agreed to participate and understands they can discontinue the visit at any time    Patient is aware this is a billable service  Time spent directly with patient: 16 minutes

## 2021-01-11 NOTE — PROGRESS NOTES
Assessment/Plan:    Elevated PSA  PSA gradually increasing, followed by Urology  Cervical spondylosis with myelopathy  He reports symptoms worse, considering surgery  Follow up with neurosurg  Constipation  Instructed to take Metamucil or fiber supplement 1-2x a day  Suspect IBS  Essential hypertension  Monitor BP, instructed to hold HCTZ if SBP >120  Takes 1/2 tablet of HCTZ and enalapril  Unsteady gait  No recent falls  Suspect LE neuropathy  Encouraged to walk daily  Localized edema  Unable to put on compression stockings, try Tubigrip stockings instead  INPH (idiopathic normal pressure hydrocephalus)  Followed by neurosurg  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  Takes PPI bid  Diagnoses and all orders for this visit:    Elevated PSA    Essential hypertension  -     enalapril (VASOTEC) 10 mg tablet; Take 1/2 (one-half) tablet by mouth once daily  -     hydrochlorothiazide (HYDRODIURIL) 25 mg tablet; TAKE 1/2 (ONE-HALF) TABLET BY MOUTH ONCE DAILY MAY  TAKE  1  FULL  TABLET  IF  LEG  SWELLING  -     CBC and differential  -     Comprehensive metabolic panel  -     TSH, 3rd generation with Free T4 reflex    Gastroesophageal reflux disease  -     pantoprazole (PROTONIX) 40 mg tablet; Take 1 tablet (40 mg total) by mouth 2 (two) times a day before meals    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    Unsteady gait  -     Vitamin B12    INPH (idiopathic normal pressure hydrocephalus) (HCC)    Cervical spondylosis with myelopathy    Constipation, unspecified constipation type    Other hyperlipidemia  -     Comprehensive metabolic panel  -     Lipid panel  -     TSH, 3rd generation with Free T4 reflex    Encounter for long-term current use of medication  -     Vitamin B12    Localized edema    Health maintenance examination  Comments:  Updated  Follow up in 1 year or as needed  Subjective:      Patient ID: Rajni Veloz is a 68 y o  male for follow up, wife is present        He reports blood pressure slightly low today, takes half a tablet of both his blood pressure pills  He usually goes to the [facility] to use the treadmill  When it shut down for a few weeks, he noticed that his balance was affected  He denies any falls, does use a cane  He is able to catch himself if he feels he is about to fall  He reports neuropathy in both his hands and feet  He is interested in neck surgery, will discuss this with his neurosurgeon  He feels his hands are weak, has not dropped any objects  Wife reports more frequent bilateral leg swelling  They did see a podiatrist who had suggested compression stockings  She is unable to put this on him, since it is too tight  He suffers from constipation, occasionally has loose stools  He takes MiraLax or stool softeners as needed  Denies any incontinence  He continues to have trouble swallowing sometimes, did see GI  The following portions of the patient's history were reviewed and updated as appropriate: allergies, current medications, past medical history, past social history and problem list     Review of Systems   Constitutional: Positive for activity change  Negative for appetite change and fatigue  HENT: Positive for hearing loss  Negative for congestion  Eyes: Positive for visual disturbance  Respiratory: Negative for cough, chest tightness and shortness of breath  Cardiovascular: Positive for leg swelling  Negative for chest pain  Gastrointestinal: Negative for abdominal pain and constipation  Genitourinary: Negative for dysuria and frequency  Musculoskeletal: Positive for gait problem, neck pain and neck stiffness  Negative for arthralgias  Skin: Negative for rash and wound  Neurological: Negative for dizziness, light-headedness, numbness and headaches  Psychiatric/Behavioral: Negative for sleep disturbance  Objective: There were no vitals taken for this visit           Physical Exam  Constitutional:       General: He is not in acute distress  Appearance: Normal appearance  He is not ill-appearing  HENT:      Head: Normocephalic and atraumatic  Eyes:      Pupils: Pupils are equal, round, and reactive to light  Pulmonary:      Effort: Pulmonary effort is normal  No respiratory distress  Neurological:      General: No focal deficit present  Mental Status: He is alert  Psychiatric:         Mood and Affect: Mood normal          Behavior: Behavior normal            Labs & imaging results reviewed with patient

## 2021-01-12 ENCOUNTER — TELEPHONE (OUTPATIENT)
Dept: LAB | Facility: HOSPITAL | Age: 81
End: 2021-01-12

## 2021-01-18 ENCOUNTER — TELEPHONE (OUTPATIENT)
Dept: INTERNAL MEDICINE CLINIC | Facility: CLINIC | Age: 81
End: 2021-01-18

## 2021-01-18 ENCOUNTER — APPOINTMENT (OUTPATIENT)
Dept: LAB | Facility: HOSPITAL | Age: 81
End: 2021-01-18
Payer: MEDICARE

## 2021-01-18 DIAGNOSIS — E78.49 OTHER HYPERLIPIDEMIA: Primary | ICD-10-CM

## 2021-01-18 LAB
ALBUMIN SERPL BCP-MCNC: 3.9 G/DL (ref 3.5–5)
ALP SERPL-CCNC: 98 U/L (ref 46–116)
ALT SERPL W P-5'-P-CCNC: 34 U/L (ref 12–78)
ANION GAP SERPL CALCULATED.3IONS-SCNC: 2 MMOL/L (ref 4–13)
AST SERPL W P-5'-P-CCNC: 19 U/L (ref 5–45)
BASOPHILS # BLD AUTO: 0.03 THOUSANDS/ΜL (ref 0–0.1)
BASOPHILS NFR BLD AUTO: 0 % (ref 0–1)
BILIRUB SERPL-MCNC: 0.57 MG/DL (ref 0.2–1)
BUN SERPL-MCNC: 15 MG/DL (ref 5–25)
CALCIUM SERPL-MCNC: 9.7 MG/DL (ref 8.3–10.1)
CHLORIDE SERPL-SCNC: 106 MMOL/L (ref 100–108)
CHOLEST SERPL-MCNC: 235 MG/DL (ref 50–200)
CO2 SERPL-SCNC: 29 MMOL/L (ref 21–32)
CREAT SERPL-MCNC: 1.04 MG/DL (ref 0.6–1.3)
EOSINOPHIL # BLD AUTO: 0.19 THOUSAND/ΜL (ref 0–0.61)
EOSINOPHIL NFR BLD AUTO: 3 % (ref 0–6)
ERYTHROCYTE [DISTWIDTH] IN BLOOD BY AUTOMATED COUNT: 13.2 % (ref 11.6–15.1)
GFR SERPL CREATININE-BSD FRML MDRD: 68 ML/MIN/1.73SQ M
GLUCOSE SERPL-MCNC: 81 MG/DL (ref 65–140)
HCT VFR BLD AUTO: 45.9 % (ref 36.5–49.3)
HDLC SERPL-MCNC: 61 MG/DL
HGB BLD-MCNC: 14.5 G/DL (ref 12–17)
IMM GRANULOCYTES # BLD AUTO: 0.02 THOUSAND/UL (ref 0–0.2)
IMM GRANULOCYTES NFR BLD AUTO: 0 % (ref 0–2)
LDLC SERPL CALC-MCNC: 151 MG/DL (ref 0–100)
LYMPHOCYTES # BLD AUTO: 2.15 THOUSANDS/ΜL (ref 0.6–4.47)
LYMPHOCYTES NFR BLD AUTO: 29 % (ref 14–44)
MCH RBC QN AUTO: 28 PG (ref 26.8–34.3)
MCHC RBC AUTO-ENTMCNC: 31.6 G/DL (ref 31.4–37.4)
MCV RBC AUTO: 89 FL (ref 82–98)
MONOCYTES # BLD AUTO: 0.76 THOUSAND/ΜL (ref 0.17–1.22)
MONOCYTES NFR BLD AUTO: 10 % (ref 4–12)
NEUTROPHILS # BLD AUTO: 4.33 THOUSANDS/ΜL (ref 1.85–7.62)
NEUTS SEG NFR BLD AUTO: 58 % (ref 43–75)
NONHDLC SERPL-MCNC: 174 MG/DL
NRBC BLD AUTO-RTO: 0 /100 WBCS
PLATELET # BLD AUTO: 272 THOUSANDS/UL (ref 149–390)
PMV BLD AUTO: 10.9 FL (ref 8.9–12.7)
POTASSIUM SERPL-SCNC: 4.4 MMOL/L (ref 3.5–5.3)
PROT SERPL-MCNC: 7.7 G/DL (ref 6.4–8.2)
RBC # BLD AUTO: 5.18 MILLION/UL (ref 3.88–5.62)
SODIUM SERPL-SCNC: 137 MMOL/L (ref 136–145)
TRIGL SERPL-MCNC: 115 MG/DL
TSH SERPL DL<=0.05 MIU/L-ACNC: 2.43 UIU/ML (ref 0.36–3.74)
VIT B12 SERPL-MCNC: 613 PG/ML (ref 100–900)
WBC # BLD AUTO: 7.48 THOUSAND/UL (ref 4.31–10.16)

## 2021-01-18 PROCEDURE — 80061 LIPID PANEL: CPT | Performed by: INTERNAL MEDICINE

## 2021-01-18 PROCEDURE — 36415 COLL VENOUS BLD VENIPUNCTURE: CPT | Performed by: INTERNAL MEDICINE

## 2021-01-18 PROCEDURE — 84443 ASSAY THYROID STIM HORMONE: CPT | Performed by: INTERNAL MEDICINE

## 2021-01-18 PROCEDURE — 80053 COMPREHEN METABOLIC PANEL: CPT | Performed by: INTERNAL MEDICINE

## 2021-01-18 PROCEDURE — 85025 COMPLETE CBC W/AUTO DIFF WBC: CPT | Performed by: INTERNAL MEDICINE

## 2021-01-18 PROCEDURE — 82607 VITAMIN B-12: CPT | Performed by: INTERNAL MEDICINE

## 2021-01-18 NOTE — TELEPHONE ENCOUNTER
Lab results: Your cholesterol is high, are you still taking atorvastatin 40 mg once a day?     The rest of your labs were normal

## 2021-01-19 RX ORDER — ATORVASTATIN CALCIUM 20 MG/1
20 TABLET, FILM COATED ORAL DAILY
Qty: 90 TABLET | Refills: 1 | Status: SHIPPED | OUTPATIENT
Start: 2021-01-19

## 2021-01-20 DIAGNOSIS — Z23 ENCOUNTER FOR IMMUNIZATION: ICD-10-CM

## 2021-01-23 ENCOUNTER — IMMUNIZATIONS (OUTPATIENT)
Dept: FAMILY MEDICINE CLINIC | Facility: HOSPITAL | Age: 81
End: 2021-01-23

## 2021-01-23 DIAGNOSIS — Z23 ENCOUNTER FOR IMMUNIZATION: Primary | ICD-10-CM

## 2021-01-23 PROCEDURE — 91301 SARS-COV-2 / COVID-19 MRNA VACCINE (MODERNA) 100 MCG: CPT

## 2021-01-23 PROCEDURE — 0011A SARS-COV-2 / COVID-19 MRNA VACCINE (MODERNA) 100 MCG: CPT

## 2021-02-20 ENCOUNTER — IMMUNIZATIONS (OUTPATIENT)
Dept: FAMILY MEDICINE CLINIC | Facility: HOSPITAL | Age: 81
End: 2021-02-20

## 2021-02-20 DIAGNOSIS — Z23 ENCOUNTER FOR IMMUNIZATION: Primary | ICD-10-CM

## 2021-02-20 PROCEDURE — 91301 SARS-COV-2 / COVID-19 MRNA VACCINE (MODERNA) 100 MCG: CPT

## 2021-02-20 PROCEDURE — 0012A SARS-COV-2 / COVID-19 MRNA VACCINE (MODERNA) 100 MCG: CPT

## 2021-04-01 ENCOUNTER — APPOINTMENT (OUTPATIENT)
Dept: LAB | Facility: CLINIC | Age: 81
End: 2021-04-01
Payer: MEDICARE

## 2021-04-01 ENCOUNTER — TRANSCRIBE ORDERS (OUTPATIENT)
Dept: LAB | Facility: CLINIC | Age: 81
End: 2021-04-01

## 2021-04-01 DIAGNOSIS — C61 PROSTATE CANCER (HCC): Primary | ICD-10-CM

## 2021-04-01 DIAGNOSIS — C61 PROSTATE CANCER (HCC): ICD-10-CM

## 2021-04-01 LAB — PSA SERPL-MCNC: 7.9 NG/ML (ref 0–4)

## 2021-04-01 PROCEDURE — 84153 ASSAY OF PSA TOTAL: CPT

## 2021-04-27 ENCOUNTER — TELEPHONE (OUTPATIENT)
Dept: NEUROSURGERY | Facility: CLINIC | Age: 81
End: 2021-04-27

## 2021-04-27 ENCOUNTER — TRANSCRIBE ORDERS (OUTPATIENT)
Dept: NEUROSURGERY | Facility: CLINIC | Age: 81
End: 2021-04-27

## 2021-04-27 DIAGNOSIS — R26.9 GAIT DISTURBANCE: Primary | ICD-10-CM

## 2021-04-27 NOTE — TELEPHONE ENCOUNTER
Patient scheduled for PT gait assessment at [address] [place] [address] on 5/14/21 @ 11am     Appt with  on the same teri @ 1pm in the [place] office  I provided Raisa Ho the addresses for both locations  She was appreciative for the coordination and will request a disc with the images to bring with to the appt with

## 2021-04-27 NOTE — TELEPHONE ENCOUNTER
Spoke with patient's wife, Noe Tellez, who would like to schedule an appointment for Isabella Palmer to see Dr Evelio Candelaria due to worsening symptoms  He is due for their yearly NPH follow up in July  She said that they followed up with a neurosurgeon at [facility], Dr Ebony Junior, as they were referred to him by their neighbor for his cspine (thought symptoms he is experiencing could be r/t to c-spine stenosis)  Dr Ebony Junior did not believe patients symptoms were from the cervical spine and thought that could be related to either shunt malfunction or it could be neurological in nature like Parkinson's  He has been experiencing worsening gait, he has trouble turning around she said and he started drooling a little bit  He cannot write or sign his name any longer  They did have a CT head completed and patient's wife will obtain the disc for the appointment she has the report as well  She said they have been less active due to the pandemic but they try to get to the community center when they can can  They would be interested in some ongoing therapy and I did explain the [facility] rehab center to him and his wife    I will reach out to Benjy Park at [address] [place] [address] to see if we could set up the NPH gait assessment on 5/14/21 and bring patient in with doctor Cassie Rogers in the [place] office

## 2021-05-14 ENCOUNTER — TRANSCRIBE ORDERS (OUTPATIENT)
Dept: NEUROSURGERY | Facility: CLINIC | Age: 81
End: 2021-05-14

## 2021-05-14 ENCOUNTER — OFFICE VISIT (OUTPATIENT)
Dept: NEUROSURGERY | Facility: CLINIC | Age: 81
End: 2021-05-14
Payer: MEDICARE

## 2021-05-14 ENCOUNTER — EVALUATION (OUTPATIENT)
Dept: PHYSICAL THERAPY | Facility: CLINIC | Age: 81
End: 2021-05-14
Payer: MEDICARE

## 2021-05-14 VITALS
WEIGHT: 216 LBS | SYSTOLIC BLOOD PRESSURE: 130 MMHG | TEMPERATURE: 98.9 F | DIASTOLIC BLOOD PRESSURE: 80 MMHG | HEIGHT: 70 IN | BODY MASS INDEX: 30.92 KG/M2

## 2021-05-14 DIAGNOSIS — R26.89 BALANCE DISORDER: ICD-10-CM

## 2021-05-14 DIAGNOSIS — R25.1 TREMOR: ICD-10-CM

## 2021-05-14 DIAGNOSIS — R26.9 GAIT DISTURBANCE: Primary | ICD-10-CM

## 2021-05-14 DIAGNOSIS — G91.2 NORMAL PRESSURE HYDROCEPHALUS (HCC): Primary | ICD-10-CM

## 2021-05-14 DIAGNOSIS — R26.89 FUNCTIONAL GAIT ABNORMALITY: ICD-10-CM

## 2021-05-14 DIAGNOSIS — G91.2 INPH (IDIOPATHIC NORMAL PRESSURE HYDROCEPHALUS) (HCC): Primary | ICD-10-CM

## 2021-05-14 PROCEDURE — 97162 PT EVAL MOD COMPLEX 30 MIN: CPT

## 2021-05-14 PROCEDURE — 97112 NEUROMUSCULAR REEDUCATION: CPT

## 2021-05-14 PROCEDURE — 99214 OFFICE O/P EST MOD 30 MIN: CPT | Performed by: NEUROLOGICAL SURGERY

## 2021-05-14 NOTE — PROGRESS NOTES
PT Evaluation     Today's date: 2021  Patient name: Marshal Mathur  : 1940  MRN: [id number]  Referring provider: Nancy Vyas MD  Dx:   Encounter Diagnosis     ICD-10-CM    1  Normal pressure hydrocephalus (HCC)  G91 2    2  Balance disorder  R26 89    3  Functional gait abnormality  R26 89        Start Time: 1100  Stop Time: 1200  Total time in clinic (min): 60 minutes    Assessment  Assessment details: Mr Marshal Mathur is a 80year-old male reporting to [facility] OP PT for initial evaluation of c/o ongoing declinations in gait quality and balance in the last year secondary to NPH and parkinsonian-type symptoms characterized as reduced movement amplitude and difficulties with initiating movement  Pt was referred for consultation by Dr Ramona Dorantes MD  PMx significant for HTN, cervical spondylosis, and NPH  Upon examination testing, pt was found to present with objective findings of cognitive deficits per [address] and diminished mobility and balance per TUG and FGA testing respectively  Per gait speed, TUG, and FGA performances he is currently classified as a significantly greater risk for falls  He would benefit from skilled PT care to maximize his movement quality, balance, and level of function to reduce his risk for falls and improve his quality of life  Per our discussion, we arranged for an occupational therapy evaluation per our objective findings and his subjective reports of UE and cognitive deficits  Impairments: abnormal coordination, abnormal gait, abnormal movement, activity intolerance, impaired balance, lacks appropriate home exercise program, safety issue, poor posture  and poor body mechanics    Symptom irritability: moderateUnderstanding of Dx/Px/POC: good   Prognosis: good    Goals  ST weeks  1  Pt will demonstrate independence with initial HEP    2  Pt will improve TUG minimally by 3s with no UE assistance    3  Pt will improve gait speed minimally by 0 1 m/s   4  Pt will improve FGA score minimally by 3 points  LT-12 weeks  1  Pt will demonstrate independence with finalized HEP  2  Pt will qualitatively ambulate with improved gait quality characterized by increased step amplitude/length with reciprocal arm swing  3  Pt will subjectively report significant improvements in balance and balance confidence with all ADL's   4  Pt will no longer be considered a fall risk per FGA, gait speed, and TUG assessment  Plan  Plan details: Next session initiate POC focused on improving movement amplitude, gait quality, and balance    Patient would benefit from: OT eval and skilled physical therapy  Referral necessary: Yes  Planned modality interventions: biofeedback, cryotherapy and thermotherapy: hydrocollator packs  Planned therapy interventions: abdominal trunk stabilization, activity modification, ADL retraining, ADL training, balance, balance/weight bearing training, body mechanics training, behavior modification, breathing training, community reintegration, cognitive skills, coordination, fine motor coordination training, flexibility, functional ROM exercises, gait training, graded activity, graded exercise, graded motor, home exercise program, transfer training, therapeutic training, therapeutic exercise, therapeutic activities, stretching, strengthening, sensory integrative techniques, self care, postural training, patient education, neuromuscular re-education, muscle pump exercises, motor coordination training, massage, manual therapy, joint mobilization and IADL retraining  Frequency: 2x week  Duration in weeks: 8  Plan of Care beginning date: 2021  Plan of Care expiration date: 2021  Treatment plan discussed with: patient and family        Subjective Evaluation    History of Present Illness  Date of onset: 2020  Mechanism of injury: Mr Lisa Burrows is a 79 y/o male presenting to [facility] OP PT at [address] [place] [address] for NPH functional assessment in accordance with ongoing c/o recently worsening gait quality, balance, mobility, and cognition  He is accompanied to his session by his wife Remy Marino who drove him  Years ago he had a  shunt placed, but says that recently in the last year he feels as though the quality of his walking, balance, ability to turn, initiate movement, and overall maintain his activity levels have declined, especially with limitations in daily activities for exercise secondary to the ongoing COVID-19 pandemic  He presents today carrying his NBQC  Concomittantly, he has additionally been experiencing more frequent episodes of constipation, BL hand tremors, drooling, and difficulties with his memory  Recurrent probem    Quality of life: fair    Pain  No pain reported    Social Support  Steps to enter house: yes  Stairs in house: yes   Lives in: multiple-level home  Lives with: spouse    Employment status: not working  Exercise history:  Active pre-COVID pandemic    Treatments  Previous treatment: physical therapy  Current treatment: physical therapy  Patient Goals  Patient goals for therapy: increased strength, independence with ADLs/IADLs, return to sport/leisure activities, increased motion and improved balance  Patient goal: "To move better "        Objective    VITALS: Seated, manual, L UE  BP: 132/82 mmHg  HR: 74 BPM  O2: 97%  RR: 16 RPM     NPH Clinic Assessment:    Gait Quality :   Wide Base of Support:    Present  Outwardly rotated feet:    Present  Step Height Diminished:  Present   Gait Speed:  - 10 meter walk test: 0 86 m/s NO AD (<1m/s predictive of falls)  Comments: Widened CRIS, guarded posturing, stooped posturing, diminished BL step amplitude, length and UE swing  Fall Risk: YES   TUG  17 87  sec   (>12 sec predictive of falls)   FGA  13/30 (< 24 predictive of falls)   Number of falls in the last month: 0 (2 in last year in living room with turns)  Cognition:   MOCA:  23/30 (<26 cognitive deficit present)  Comments: Most difficulties appreciated with recall of information  Urinary incontinence: NO - c/o increased bouts of constipation  Recommendations:  - Physical Therapy: YES   -     Occupational Therapy: YES (BL UE tremors, dexterity, cognition)       Short Term Goal Expiration Date:(6/14/21)  Long Term Goal Expiration Date: (7/14/21)  POC Expiration Date: (8/14/21)     Precautions FALL RISK       Manuals 5/14  IE                                       Neuro Re-Ed         Pt Education NPH vs parkinsonian symptoms/presentation, outcome measure results/discussion, MOCA administration/discussion   x15 min                                                       Ther Ex                                                                        Ther Activity                        Gait Training                        Modalities

## 2021-05-14 NOTE — PROGRESS NOTES
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  He also has known cervical spondylosis and stenosis without overt myelopathy  He has had some decline in his gait and balance  However he also has some increasing tremors upper extremities and some increased drooling  I wonder if his presentation may be related to movement disorder, possibly parkinsonism  We will obtain a CT scan of the brain and shunt series  He will follow up afterwards to review the results and we could discuss adjustment of the shunt at that time  However, I think it would be worthwhile for him to be seen by Neurology for further evaluation and management of alternative etiologies for progressive gait issues  History, physical examination and diagnostic tests were reviewed and questions answered  Diagnosis, care plan and treatment options were discussed  The patient and spouse/SO understand instructions and will follow up as directed  Plan:    Follow-up:  After tests complete    Problem List Items Addressed This Visit        Nervous and Auditory    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    Relevant Orders    CT head without contrast    XR shunt series       Other    Tremor    Relevant Orders    Ambulatory referral to Neurology          Subjective/Objective     Chief Complaint    Progressive gait and balance issues  HPI    Pleasant 72-year-old gentleman accompanied by his wife today  Over the past year so they have noticed progressive decline in his gait and balance  The patient feels as if his tremors in his upper extremities have become worse as well  His wife has notice that he is drooling more especially when speaking    He denies any difficulties with swallowing or hoarseness of voice though his voice may be somewhat softer than it used to be  He finds that he freezes more easily while walking and is very difficult for him to turn and change direction  He does have some new left knee pain  He has recently seen by an orthopedic surgeon who felt that his presentation is not consistent with myelopathy though the patient has known cervical spondylosis and stenosis  He presents today for further evaluation with respect to his NPH  ROS  ROS personally reviewed and updated  Review of Systems   Constitutional: Negative  HENT: Negative  Eyes: Negative  Respiratory: Negative  Cardiovascular: Negative  Gastrointestinal: Positive for constipation  Drooling   Endocrine: Negative  Genitourinary: Negative  Musculoskeletal: Positive for gait problem (balance is worse)  Skin: Negative  Allergic/Immunologic: Negative  Hematological: Negative  Psychiatric/Behavioral: Negative          Family History    Family History   Problem Relation Age of Onset    Heart disease Mother         RHEUM    Prostate cancer Father     Cancer Maternal Uncle     Alcohol abuse Neg Hx     Depression Neg Hx     Mental illness Neg Hx     Substance Abuse Neg Hx        Social History    Social History     Socioeconomic History    Marital status: /Civil Union     Spouse name: Not on file    Number of children: 1    Years of education: Not on file    Highest education level: Not on file   Occupational History    Occupation: PHYSICIST, "[employer] resource strain: Not on file    Food insecurity     Worry: Not on file     Inability: Not on file   [facility] needs     Medical: Not on file     Non-medical: Not on file   Tobacco Use    Smoking status: Never Smoker    Smokeless tobacco: Never Used   Substance and Sexual Activity    Alcohol use: No    Drug use: No    Sexual activity: Not on file   Lifestyle    Physical activity     Days per week: Not on file     Minutes per session: Not on file    Stress: Not on file   Relationships    Social connections     Talks on phone: Not on file     Gets together: Not on file     Attends Mu-ism service: Not on file     Active member of club or organization: Not on file     Attends meetings of clubs or organizations: Not on file     Relationship status: Not on file    Intimate partner violence     Fear of current or ex partner: Not on file     Emotionally abused: Not on file     Physically abused: Not on file     Forced sexual activity: Not on file   Other Topics Concern    Not on file   Social History Narrative    Lives at home with wife    Retired        [employer]     Ino Nur        Past Medical History    Past Medical History:   Diagnosis Date    Arthritis     GERD (gastroesophageal reflux disease)     Hiatal hernia     Hypertension     Infectious viral hepatitis     Obesity     Spinal stenosis        Surgical History    Past Surgical History:   Procedure Laterality Date    APPENDECTOMY  1952    KS COLONOSCOPY FLX DX W/COLLJ SPEC WHEN PFRMD N/A 1/23/2019    Procedure: COLONOSCOPY;  Surgeon: Oneyda Garces MD;  Location: [facility] GI LAB; Service: Gastroenterology    KS ESOPHAGOGASTRODUODENOSCOPY TRANSORAL DIAGNOSTIC N/A 1/23/2019    Procedure: ESOPHAGOGASTRODUODENOSCOPY (EGD); Surgeon: Oneyda Garces MD;  Location: [facility] GI LAB;   Service: Gastroenterology    VENTRICULOATRIAL SHUNT  06/2012    VENTRICULOPERITONEAL SHUNT         Medications      Current Outpatient Medications:     atorvastatin (LIPITOR) 20 mg tablet, Take 1 tablet (20 mg total) by mouth daily (Patient taking differently: Take 10 mg by mouth daily ), Disp: 90 tablet, Rfl: 1    Cholecalciferol (VITAMIN D-3) 5000 units TABS, Take 1 tablet by mouth every other day, Disp: , Rfl:     enalapril (VASOTEC) 10 mg tablet, Take 1/2 (one-half) tablet by mouth once daily, Disp: 45 tablet, Rfl: 1    hydrochlorothiazide (HYDRODIURIL) 25 mg tablet, TAKE 1/2 (ONE-HALF) TABLET BY MOUTH ONCE DAILY MAY  TAKE  1  FULL  TABLET  IF  LEG  SWELLING, Disp: 90 tablet, Rfl: 1    ibuprofen (MOTRIN) 200 mg tablet, Take 600 mg by mouth every 6 (six) hours as needed for mild pain, Disp: , Rfl:     Multiple Vitamin (MULTI-VITAMIN DAILY PO), Take 1 tablet by mouth daily, Disp: , Rfl:     pantoprazole (PROTONIX) 40 mg tablet, Take 1 tablet (40 mg total) by mouth 2 (two) times a day before meals, Disp: 180 tablet, Rfl: 1    Allergies    No Known Allergies    The following portions of the patient's history were reviewed and updated as appropriate: allergies, current medications, past family history, past medical history, past social history, past surgical history and problem list     Investigations    NPH scale dated 5/14/21, 11/15  PT gait assessment dated  May 14th, 2021  TUG 0 85 m/sec , TUGc 18 sec, FGA 13/30   Physical Exam    Vitals:  /80 (BP Location: Left arm, Patient Position: Sitting, Cuff Size: Standard)   Temp 98 9 °F (37 2 °C) (Temporal)   Ht 5' 10" (1 778 m)   Wt 98 kg (216 lb)   BMI 30 99 kg/m²   Physical Exam  Vitals signs reviewed  Constitutional:       General: He is not in acute distress  Eyes:      Extraocular Movements: Extraocular movements intact  Pulmonary:      Effort: Pulmonary effort is normal    Skin:     General: Skin is warm and dry  Comments: Right frontal  shunt depresses and refills easily  Shunt set to 2 during in office interrogation  Neurological:      Mental Status: He is alert  Comments: Stands from a seated position slowly  Walks with short shuffling steps  Appears to freeze when trying to turn  Symmetric resting tremor with seems to be exacerbated with certain motion  Some cogwheeling in upper extremities       Psychiatric:         Mood and Affect: Mood normal          Behavior: Behavior normal        Neurologic Exam

## 2021-05-17 ENCOUNTER — TELEPHONE (OUTPATIENT)
Dept: NEUROLOGY | Facility: CLINIC | Age: 81
End: 2021-05-17

## 2021-05-17 NOTE — TELEPHONE ENCOUNTER
Best contact number for patient: [phone]     Emergency Contact name and number: Lu Lemos [phone]    Referring provider and telephone number:    Primary Care Provider Name and if affiliated with [facility]:     Reason for Appointment/Dx: tremors     Have you seen and followed up with a pediatric Neurologist for this disease in the past?  No    No (If yes ok to schedule with Dr Tee Hyman)    Neurology Location patient would like to be seen: [facility] received? Yes                                                 Records Received? Yes    Have you ever seen another Neurologist?       No    Insurance Information    Insurance Name:    ID/Policy #:    Secondary Insurance:    ID/Policy#: Workman's Comp/ Accident/ School  Information      Workman's Comp/Accident/School related?        No    If yes name of Insurance company:    Claim #:    Date of Injury:    Type of Injury:     Name and Telephone Number:    Notes:                   Appointment date:   09/10/2021

## 2021-05-19 ENCOUNTER — EVALUATION (OUTPATIENT)
Dept: OCCUPATIONAL THERAPY | Facility: CLINIC | Age: 81
End: 2021-05-19
Payer: MEDICARE

## 2021-05-19 DIAGNOSIS — R25.1 TREMORS OF NERVOUS SYSTEM: Primary | ICD-10-CM

## 2021-05-19 PROCEDURE — 97530 THERAPEUTIC ACTIVITIES: CPT

## 2021-05-19 PROCEDURE — 97166 OT EVAL MOD COMPLEX 45 MIN: CPT

## 2021-05-19 NOTE — PROGRESS NOTES
OCCUPATIONAL THERAPY INITIAL EVALUATION:    2021  Beatriz Anthony Jessika  1940  [id number]  Earl Narayan MD  1  Tremors of nervous system        Subjective    "Everytime the doctor's give me a test for cognition, I pass it "    PATIENT GOAL: "I just want to function better physically"      Assessment/Plan    Skilled Analysis:  Pt is a 80 y o  male referred to Occupational Therapy s/p Tremors of nervous system [R25 1]  Pt participated in skilled OT evaluation and following formalized testing, presents with the following areas of deficit: FMC/FMS, GMC/GMS, hand to target accuracy and in hand manipulation/dexterity impacting indep and completion of ADL/IADL, salient, and leisure tasks  Pt does demo the need for skilled Occupational Therapy services 2x/week for 4-8 weeks with focus on tremor management, HEP, handwriting, FMC/FMS/GMC/GMS to address the goals as listed below  Pt in agreement with POC, POC to  w/in 90 days  Pt referred to OP OT from PT at [address] [place] [address] and will require referral from physician to continue OP OT  Goals:      Short Term Goals 4-8 wks STG=LTG    Tremors  1  Pt will demo G understanding of tremor management strategies such as proximal stabilization as demonstrated by incorporation in functional ADL/IADL tasks    2  Pt will demo competency with use of weighted utensils, weighted writing tools, use of wrist weights, built up handles, scoop dishes, and all AE as appropriate/needed for ADL/IADL fxn    3  Pt will demo G understanding and carryover of HEP including FMC/dexterity, FMS, and hand to target tasks to maximize fxnl performance in ADL/IADL fxn    4  Pt will improve accuracy and speed of [facility] testing (9 hole peg, functional dexterity, [place] dexterity, etc) of b/l UE by 25% following education/carryover of tremor reduction strategies and HEP    5      Pt will improve strength testing (dynamometer, pinch meter, MMT) of RUE by 25% following education/carryover of tremor reduction strategies and HEP    6  Pt will tolerate IASTM to inc fxnl coordination, targeting, sensation, and strength of b/l UE to improve overall fxnl use for ADL/IADL fxn to Mod I    Treatment Interventions  Trial Weighted Tools (pen, spoon, fork, etc)  Wrist wts with reaching  FMS/FMC  HEP development (putty, tband, dexterity)  FMC tasks  Digit Isolation/Opposition tasks  Tremor reduction/management strategy education  Big amplitude movements          HISTORY OF PRESENT ILLNESS:     Pt is a 80 y o  male who was referred to Occupational Therapy s/p  Tremors of nervous system [R25 1]  Pt reported he has spinal stenosis detected in 2017  Since then, pt noticed he has noticed tremors starting this past year that inc when he gets tired  Pt reports difficulty with handwriting that affects him signing checks and no issues with buttoning, zippers, tying shoes  Pt notices inc weakness/fatigue and lack of endurance in UE  Pt reports he has graduate degrees in physics and electrical engineering and though he has noticed changes in STM, does not feel that this impacts his daily life  Per pt's chart review, Pt presents with "parkinsonian-type symptoms characterized as reduced movement amplitude and difficulties with initiating movement " Pt keeps a cane on hand while ambulating, reporting he has it "just in case" he feels unbalanced       PMH:   Past Medical History:   Diagnosis Date    Arthritis     GERD (gastroesophageal reflux disease)     Hiatal hernia     Hypertension     Infectious viral hepatitis     Obesity     Spinal stenosis                Pain Levels:     Restin    With Activity:  2 (hand)    Objective    Impairment Observations:    Upper Extremity       RUE LUE Comments                 UPPER EXTREMITY FXN Impaired Impaired Dominant Hand: R                         /Pinch Strength         Dynamometer      - Gross Grasp 50 lbs 40 5 lbs low   Pinch Meter       - PINCER 7 lbs 6 lbs     - TRIPOD 12 lbs 9 lbs Some pain noted    - LATERAL 14 lbs  10 5 lbs              AROM (seated) LUE WFL/WNL      Elevation/Depression  WFL      [facility]       Shoulder Abd WFL       Shoulder Add WFL       Horizontal Abd WFL       Horizontal Add WFL       Elbow Flex WFL       Elbow Ext WFL       Pronation WFL       Supination  3/4      Wrist Flex WFL       Wrist Ext 3/4       Digit Flex WFL       Digit Ex WFL       Composite Grasp WFL       Hook Grasp WFL       Opposition WFL       Subluxation                           MMT         Elevation 3/5 3+/5    Shoulder Flex/Ext 3/5 3+/5    Shoulder Abd 3/5 3+/5    Shoulder ADd 3/5 3+/5    Elbow Flex 3/5 3+/5    Elbow Ext 3+/5 4/5    Wrist Flex 4/5 4/5    Wrist Ext 4/5 4/5    Gross Grasp 4/5 4/5          SENSATION      Myofilaments (3 61 Wnl)      Sharp Dull  Intact Intact    Proprioception Will further assess Will further assess    Hot/Cold Temp Intact Intact          COORDINATION      9 Hole Peg Test 34 seconds 37 seconds low   Fxnl Dexterity Test 35 seconds 44 seconds low   Concentric circles testing completed w/ noted tremoring  Handwriting completed, including signature & sentence with noted micrographia and inc shakiness noted  Tx today  Pt completed handwriting exercise to complete multiple repetitions of circles & shapes with paper positioned on slant board to inc accuracy  Pt provided w/ handwriting worksheet to complete at home       INTERVENTION COMMENTS:  Diagnosis: Tremors of nervous system [R25 1]  Precautions: FALL RISK  FOTO:   Insurance: Payor: Gerhard Uriah / Plan: MEDICARE A AND B / Product Type: Medicare A & B Fee for Service /   1 of 30 visits, PN due 6/19/21

## 2021-06-01 ENCOUNTER — OFFICE VISIT (OUTPATIENT)
Dept: PHYSICAL THERAPY | Facility: CLINIC | Age: 81
End: 2021-06-01
Payer: MEDICARE

## 2021-06-01 ENCOUNTER — OFFICE VISIT (OUTPATIENT)
Dept: OCCUPATIONAL THERAPY | Facility: CLINIC | Age: 81
End: 2021-06-01
Payer: MEDICARE

## 2021-06-01 DIAGNOSIS — G91.2 NORMAL PRESSURE HYDROCEPHALUS (HCC): Primary | ICD-10-CM

## 2021-06-01 DIAGNOSIS — R26.89 FUNCTIONAL GAIT ABNORMALITY: ICD-10-CM

## 2021-06-01 DIAGNOSIS — R25.1 TREMORS OF NERVOUS SYSTEM: Primary | ICD-10-CM

## 2021-06-01 DIAGNOSIS — R26.89 BALANCE DISORDER: ICD-10-CM

## 2021-06-01 PROCEDURE — 97110 THERAPEUTIC EXERCISES: CPT

## 2021-06-01 PROCEDURE — 97530 THERAPEUTIC ACTIVITIES: CPT

## 2021-06-01 PROCEDURE — 97112 NEUROMUSCULAR REEDUCATION: CPT

## 2021-06-01 NOTE — PROGRESS NOTES
Daily Note     Today's date: 2021  Patient name: Reinaldo Friend  : 1940  MRN: [id number]  Referring provider: Tricia Clark MD  Dx:   Encounter Diagnosis     ICD-10-CM    1  Normal pressure hydrocephalus (HCC)  G91 2    2  Balance disorder  R26 89    3  Functional gait abnormality  R26 89        Start Time: 1000  Stop Time: 1100  Total time in clinic (min): 60 minutes    Subjective: Presents to his OP PT session accompanied by his wife with no new c/o fatigue, discomfort, or pain  Reports no new falls  Objective: See treatment diary below      Assessment: Initiated POC with introductions to functional conditioning, balance, and movement quality interventions  Was appropriately fatigued throughout session; noted improvements in turning quality with strategies reviewed  Was most challenged with stepping accuracy of step-ups and hurdles  Would continue to benefit from skilled PT care to maximize movement quality and balance while minimizing risk for falls  Plan: Continue per plan of care  Progress treatment as tolerated  Short Term Goal Expiration Date:(21)  Long Term Goal Expiration Date: (21)  POC Expiration Date: (21)     Precautions FALL RISK       Manuals   IE                                       Neuro Re-Ed         Pt Education NPH vs parkinsonian symptoms/presentation, outcome measure results/discussion, MOCA administration/discussion   x15 min       HKM  // bars  No UE  x3 laps      Turn Strategy  Initiate marching  x5 min, incorporation into all // bar activities      Hurdles  // bars  UE PRN  6" x3 laps fwd focus on foot clearance       Step-Ups  // bars UE PRN  6" x12 fwd/lat ea       Static Balance Progression  FT EC x1'  FT EC w/HT x30"              Ther Ex        NuStep  L4 x10 min 55-60 SPM AVG                                                              Ther Activity                        Gait Training                        Modalities

## 2021-06-01 NOTE — PROGRESS NOTES
Daily Note     Today's date: 2021  Patient name: Tyrese Mora  : 1940  MRN: [id number]  Referring provider: Rusty Gupta MD  Dx:   Encounter Diagnosis   Name Primary?  Tremors of nervous system Yes                  Subjective: Therapy kind of showed me what I actually need work on  Objective: See treatment diary below    Pt participated in skilled OT focusing on FMC/FMS, GMC/GMS, activity tolerance/endurance, hand to target accuracy, functional tool use, and verbal direction follow, for increased safety and participation in ADL/IADL activities  Pt engaged in cylinder peg board activity this date using gripper on level 3 to place large cylinders into board and green tension pin to place small cylinders inside  Increased difficulty noted with hand to target accuracy with small pegs  Drops noted 50% of the time with both large and small pegs  Pt used red tension pin to place small letter cubes on top of cylinders, with 75% drops noted and therapist providing mod verbal cues for BIG squeezes of pins  Therapist encouraging bimanual use and integration to grab small pegs out of jar  Pt then disassembled same way  Therapist decreasing difficulty of large gripper for decreased drops and increased pt success  Drops noted 25% of the time  Therapist educating pt on prayer stretch 15 sec x 3  Pt completed two times throughout session to decreased tightness from all the gripping  Pt engaged in seated noodle stack this date using b/l UEs for increased UE endurance/tolerance, UE strength, hand to target accuracy, and GMC/GMS  Therapist placing bucket to pts sides and stack on tabletop, while encouraging cross body reaching  Drops noted < 25% of the time during this date  Assessment: Tolerated treatment well   Patient exhibited good technique with therapeutic exercises and would benefit from continued OT Therapist educating pt on BIG technique when squeezing and retrieving items, for increased accuracy and decreased drops  Increased difficulty noted with hand to target accuracy  Therapist providing verbal cues for BIGNESS throughout        Plan: Continued skilled OT per POC     INTERVENTION COMMENTS:  Diagnosis: Tremors of nervous system [R25 1]  Precautions: FALL RISK  FOTO:   Insurance: Payor: Clarence Iha / Plan: MEDICARE A AND B / Product Type: Medicare A & B Fee for Service /   2 of 30 visits, PN due 6/19/21

## 2021-06-02 ENCOUNTER — HOSPITAL ENCOUNTER (OUTPATIENT)
Dept: CT IMAGING | Facility: HOSPITAL | Age: 81
Discharge: HOME | End: 2021-06-02
Attending: NEUROLOGICAL SURGERY
Payer: MEDICARE

## 2021-06-02 DIAGNOSIS — G91.2 INPH (IDIOPATHIC NORMAL PRESSURE HYDROCEPHALUS) (HCC): ICD-10-CM

## 2021-06-02 PROCEDURE — 70450 CT HEAD/BRAIN W/O DYE: CPT

## 2021-06-07 ENCOUNTER — HOSPITAL ENCOUNTER (OUTPATIENT)
Dept: RADIOLOGY | Facility: HOSPITAL | Age: 81
Discharge: HOME | End: 2021-06-07
Attending: NEUROLOGICAL SURGERY
Payer: MEDICARE

## 2021-06-07 DIAGNOSIS — G91.2 INPH (IDIOPATHIC NORMAL PRESSURE HYDROCEPHALUS) (HCC): ICD-10-CM

## 2021-06-07 PROCEDURE — 74018 RADEX ABDOMEN 1 VIEW: CPT

## 2021-06-07 PROCEDURE — 70250 X-RAY EXAM OF SKULL: CPT

## 2021-06-07 PROCEDURE — 71046 X-RAY EXAM CHEST 2 VIEWS: CPT

## 2021-06-08 ENCOUNTER — APPOINTMENT (OUTPATIENT)
Dept: OCCUPATIONAL THERAPY | Facility: CLINIC | Age: 81
End: 2021-06-08
Payer: MEDICARE

## 2021-06-08 ENCOUNTER — APPOINTMENT (OUTPATIENT)
Dept: PHYSICAL THERAPY | Facility: CLINIC | Age: 81
End: 2021-06-08
Payer: MEDICARE

## 2021-06-16 ENCOUNTER — OFFICE VISIT (OUTPATIENT)
Dept: PHYSICAL THERAPY | Facility: CLINIC | Age: 81
End: 2021-06-16
Payer: MEDICARE

## 2021-06-16 ENCOUNTER — OFFICE VISIT (OUTPATIENT)
Dept: NEUROSURGERY | Facility: CLINIC | Age: 81
End: 2021-06-16
Payer: MEDICARE

## 2021-06-16 VITALS
HEIGHT: 70 IN | WEIGHT: 216 LBS | SYSTOLIC BLOOD PRESSURE: 123 MMHG | DIASTOLIC BLOOD PRESSURE: 80 MMHG | BODY MASS INDEX: 30.92 KG/M2 | TEMPERATURE: 97.6 F

## 2021-06-16 DIAGNOSIS — R26.89 FUNCTIONAL GAIT ABNORMALITY: ICD-10-CM

## 2021-06-16 DIAGNOSIS — G91.2 INPH (IDIOPATHIC NORMAL PRESSURE HYDROCEPHALUS) (HCC): Primary | ICD-10-CM

## 2021-06-16 DIAGNOSIS — R26.89 BALANCE DISORDER: ICD-10-CM

## 2021-06-16 DIAGNOSIS — G91.2 NORMAL PRESSURE HYDROCEPHALUS (HCC): Primary | ICD-10-CM

## 2021-06-16 PROCEDURE — 97150 GROUP THERAPEUTIC PROCEDURES: CPT

## 2021-06-16 PROCEDURE — 97112 NEUROMUSCULAR REEDUCATION: CPT

## 2021-06-16 PROCEDURE — 99213 OFFICE O/P EST LOW 20 MIN: CPT | Performed by: NEUROLOGICAL SURGERY

## 2021-06-16 NOTE — PROGRESS NOTES
Office Note - Neurosurgery   Sonya Ashford [address] 80 y o  male MRN: [id number]      Assessment:    Patient is gradually worsening  19-year-old gentleman with subjective and objective decline in gait and balance which is likely multifactorial   CT imaging of the head is stable and shunt seems to be in continuity  I suspect some of his issues are related to a possible superimposed movement disorder in addition to NPH, possibly parkinsonism  The patient is also somewhat less motivated to participate in activities and seems to fatigue easily  There may be an element of deconditioning  While we did discuss adjustment of his shunt today, we also discussed increasing his activity level and working with PT and OT to try to improve his overall level of function  He is scheduled to see Neurology in September for further assessment and management  After discussion of the options of adjusting the shunt, proceeding with a shuntogram or monitoring his progress, the patient and his wife were agreeable to ongoing surveillance  He will follow-up at the end of September to check on his progress  He will contact this office should any concerns arise in the interim  History, physical examination and diagnostic tests were reviewed and questions answered  Diagnosis, care plan and treatment options were discussed  The patient and spouse/SO understand instructions and will follow up as directed  Plan:    Follow-up:  Three months  Problem List Items Addressed This Visit        Nervous and Auditory    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          Subjective/Objective     Chief Complaint    Progressive difficulties with gait and balance  HPI    19-year-old gentleman with right frontal  shunt placed for normal pressure hydrocephalus  No significant changes in his gait or balance since his last visit  He continues to have tremors in his upper extremities    His wife describes freezing while he walks and difficulties with turns  She is also somewhat frustrated that he does not wish to participate in PT or OT or even exercise on a daily basis  They present today to review the results of CT scan of the brain and shunt series  KATIE WILSON personally reviewed and updated  Review of Systems   Constitutional: Negative  HENT: Negative  Eyes: Negative  Respiratory: Negative  Cardiovascular: Negative  Gastrointestinal: Positive for constipation  Endocrine: Negative  Genitourinary: Negative  Musculoskeletal: Positive for gait problem (shuffling gait)  Skin: Negative  Allergic/Immunologic: Negative  Neurological: Positive for speech difficulty (can't get words out) and weakness (legs)  Hematological: Negative      Psychiatric/Behavioral:        Trouble with memory         Family History    Family History   Problem Relation Age of Onset    Heart disease Mother         RHEUM    Prostate cancer Father     Cancer Maternal Uncle     Alcohol abuse Neg Hx     Depression Neg Hx     Mental illness Neg Hx     Substance Abuse Neg Hx        Social History    Social History     Socioeconomic History    Marital status: /Civil Union     Spouse name: Not on file    Number of children: 1    Years of education: Not on file    Highest education level: Not on file   Occupational History    Occupation: PHYSICIST, [employer]   Tobacco Use    Smoking status: Never Smoker    Smokeless tobacco: Never Used   Vaping Use    Vaping Use: Never used   Substance and Sexual Activity    Alcohol use: No    Drug use: No    Sexual activity: Not on file   Other Topics Concern    Not on file   Social History Narrative    Lives at home with wife    Retired        [employer]     [address] Strain:     Difficulty of Paying Living Expenses:    Food Insecurity:     Worried About [address] in the Last Year:     Ran Out of Food in the Last Year:    Transportation Needs:     Lack of Transportation (Medical):  Lack of Transportation (Non-Medical):    Physical Activity:     Days of Exercise per Week:     Minutes of Exercise per Session:    Stress:     Feeling of Stress :    Social Connections:     Frequency of Communication with Friends and Family:     Frequency of Social Gatherings with Friends and Family:     Attends Yazidi Services:     Active Member of Clubs or Organizations:     Attends Club or Organization Meetings:     Marital Status:    Intimate Partner Violence:     Fear of Current or Ex-Partner:     Emotionally Abused:     Physically Abused:     Sexually Abused:        Past Medical History    Past Medical History:   Diagnosis Date    Arthritis     GERD (gastroesophageal reflux disease)     Hiatal hernia     Hypertension     Infectious viral hepatitis     Obesity     Spinal stenosis        Surgical History    Past Surgical History:   Procedure Laterality Date    APPENDECTOMY  1952    NH COLONOSCOPY FLX DX W/COLLJ SPEC WHEN PFRMD N/A 1/23/2019    Procedure: COLONOSCOPY;  Surgeon: Reed Diaz MD;  Location: [facility]; Service: Gastroenterology    NH ESOPHAGOGASTRODUODENOSCOPY TRANSORAL DIAGNOSTIC N/A 1/23/2019    Procedure: ESOPHAGOGASTRODUODENOSCOPY (EGD); Surgeon: Reed Diaz MD;  Location: [facility];   Service: Gastroenterology    VENTRICULOATRIAL SHUNT  06/2012    VENTRICULOPERITONEAL SHUNT         Medications      Current Outpatient Medications:     Cholecalciferol (VITAMIN D-3) 5000 units TABS, Take 1 tablet by mouth daily , Disp: , Rfl:     enalapril (VASOTEC) 10 mg tablet, Take 1/2 (one-half) tablet by mouth once daily, Disp: 45 tablet, Rfl: 1    hydrochlorothiazide (HYDRODIURIL) 25 mg tablet, TAKE 1/2 (ONE-HALF) TABLET BY MOUTH ONCE DAILY MAY  TAKE  1  FULL  TABLET  IF  LEG  SWELLING, Disp: 90 tablet, Rfl: 1    ibuprofen (MOTRIN) 200 mg tablet, Take 600 mg by mouth every 6 (six) hours as needed for mild pain, Disp: , Rfl:     Multiple Vitamin (MULTI-VITAMIN DAILY PO), Take 1 tablet by mouth daily, Disp: , Rfl:     pantoprazole (PROTONIX) 40 mg tablet, Take 1 tablet (40 mg total) by mouth 2 (two) times a day before meals, Disp: 180 tablet, Rfl: 1    atorvastatin (LIPITOR) 20 mg tablet, Take 1 tablet (20 mg total) by mouth daily (Patient not taking: Reported on 6/16/2021), Disp: 90 tablet, Rfl: 1    Allergies    No Known Allergies    The following portions of the patient's history were reviewed and updated as appropriate: allergies, current medications, past family history, past medical history, past social history, past surgical history and problem list     Investigations    CT scan of the head without contrast dated June 2nd, 2021  Comparison to previous imaging  Stable appearance of ventricular system  Stable appearance of right frontal ventricular catheter  No new intra-axial or extra-axial lesions  Shunt series dated June 2nd, 2021 right frontal  shunt in continuity terminating in the abdomen  Shunt setting 2  NPH scale dated 6/16/2021, 11/15  PT gait assessment dated 6/16/2021  10 meter walking test 0 86 m/s (<1m/s predictive of falls), TUG 17 87 sec (<12 sec predictive of falls), FGA 13/30 (< 24 predictive of falls)  MoCA dated 6/16/2021, 23/30 (<26 cognitive deficit present)  Physical Exam    Vitals: /80 (BP Location: Left arm, Patient Position: Sitting, Cuff Size: Standard)   Temp 97 6 °F (36 4 °C) (Temporal)   Ht 5' 10" (1 778 m)   Wt 98 kg (216 lb)   BMI 30 99 kg/m²   Physical Exam  Vitals reviewed  Constitutional:       General: He is not in acute distress  Eyes:      Extraocular Movements: Extraocular movements intact  Pulmonary:      Effort: Pulmonary effort is normal  No respiratory distress  Skin:     General: Skin is warm and dry  Comments: Right-sided  shunt depresses and refills easily  Neurological:      Mental Status: He is alert and oriented to person, place, and time  Comments: Stands from a seated position slowly  Walks with a short shuffling gait with cane  Resting tremor in right more so than left upper extremity  Psychiatric:         Mood and Affect: Mood normal          Behavior: Behavior normal        Neurologic Exam     Mental Status   Oriented to person, place, and time

## 2021-06-16 NOTE — PROGRESS NOTES
NPH Update     Today's date: 2021  Patient name: Stacey Dyer  : 1940  MRN: [id number]  Referring provider: Christine Carmona MD  Dx:   Encounter Diagnosis     ICD-10-CM    1  Normal pressure hydrocephalus (HCC)  G91 2    2  Balance disorder  R26 89    3  Functional gait abnormality  R26 89        Start Time: 1100  Stop Time: 1135  Total time in clinic (min): 35 minutes    Subjective: Presents to his OP PT session one month since evaluation with one treatment session; has had numerous scheduling difficulties per wife with other medical visits and transportation  F/u with Dr Matthews President for further NPH/PD differential diagnosis this afternoon; per his office's request we will re-perform NPH testing today  Feels he has had several "bad days" recently with the greatest difficulty experienced initiating walking        Objective: See treatment diary below    One-to-one with PT: [id number]  Grouped Exercise: 9612-1362       NPH Clinic Assessment:     Gait Quality :   Wide Base of Support:    Present  Outwardly rotated feet:    Present  Step Height Diminished:  Present   Gait Speed:  · 10 meter walk test: 0 86 m/s NO AD (<1m/s predictive of falls)  PN 21: 0 68 m/s NO AD  Comments: Widened CRIS, guarded posturing, stooped posturing, diminished BL step amplitude, length and UE swing  Fall Risk: YES  · TUG  17 87  sec   (>12 sec predictive of falls)  PN 21: 23 20s  · FGA  /30 (< 24 predictive of falls)   PN :21: 30  · Number of falls in the last month: 0 (2 in last year in living room with turns)  Cognition:              MOCA:   (<26 cognitive deficit present)  Comments: Most difficulties appreciated with recall of information      Urinary incontinence: NO - c/o increased bouts of constipation      Recommendations:  · Physical Therapy: YES              -     Occupational Therapy: YES (BL UE tremors, dexterity, cognition)    Assessment: Progress NPH assessment performed today for upcoming appt with Dr Blanca End this afternoon; per lack of availability for scheduling skilled PT and OT appts over the last month Roxanna Ramirez has experienced significant declines in function as supported with gait speed testing, TUG mobility, and FGA assessment today  Per his c/o, his most significant difficulties currently lie with initiating movement and his overall lack in balance confidence  He would benefit from continued skilled PT care to maximize his current level of function, balance, and reduce his risk for falls  Plan: Continue per plan of care  Progress treatment as tolerated  Short Term Goal Expiration Date:(6/14/21)  Long Term Goal Expiration Date: (7/14/21)  POC Expiration Date: (8/14/21)     Precautions FALL RISK       Manuals 5/14  IE 6/1 6/16                                     Neuro Re-Ed         Pt Education NPH vs parkinsonian symptoms/presentation, outcome measure results/discussion, MOCA administration/discussion  x15 min  Discussion with pt and wife regarding his current decline in functional status, movement difficulties representative of NPH/PD characteristics       Assessment   NPH reassessment     HKM  // bars  No UE  x3 laps      Turn Strategy  Initiate marching  x5 min, incorporation into all // bar activities      Hurdles  // bars  UE PRN  6" x3 laps fwd focus on foot clearance       Step-Ups  // bars UE PRN  6" x12 fwd/lat ea       Static Balance Progression  FT EC x1'  FT EC w/HT x30"              Ther Ex        NuStep  L4 x10 min 55-60 SPM AVG L4 x10 min 50 SPM avg                                                             Ther Activity                        Gait Training                        Modalities

## 2021-06-22 ENCOUNTER — OFFICE VISIT (OUTPATIENT)
Dept: OCCUPATIONAL THERAPY | Facility: CLINIC | Age: 81
End: 2021-06-22
Payer: MEDICARE

## 2021-06-22 ENCOUNTER — OFFICE VISIT (OUTPATIENT)
Dept: PHYSICAL THERAPY | Facility: CLINIC | Age: 81
End: 2021-06-22
Payer: MEDICARE

## 2021-06-22 DIAGNOSIS — R25.1 TREMORS OF NERVOUS SYSTEM: Primary | ICD-10-CM

## 2021-06-22 DIAGNOSIS — R26.89 BALANCE DISORDER: ICD-10-CM

## 2021-06-22 DIAGNOSIS — R26.89 FUNCTIONAL GAIT ABNORMALITY: ICD-10-CM

## 2021-06-22 DIAGNOSIS — G91.2 NORMAL PRESSURE HYDROCEPHALUS (HCC): Primary | ICD-10-CM

## 2021-06-22 PROCEDURE — 97110 THERAPEUTIC EXERCISES: CPT

## 2021-06-22 PROCEDURE — 97530 THERAPEUTIC ACTIVITIES: CPT

## 2021-06-22 PROCEDURE — 97112 NEUROMUSCULAR REEDUCATION: CPT

## 2021-06-22 PROCEDURE — 97116 GAIT TRAINING THERAPY: CPT

## 2021-06-22 NOTE — PROGRESS NOTES
Daily Note     Today's date: 2021  Patient name: Lisa Burrows  : 1940  MRN: [id number]  Referring provider: Melissa Campa MD  Dx:   Encounter Diagnosis   Name Primary?  Tremors of nervous system Yes                  Subjective: I notice it when Im putting and effort towards something  Im getting tired that's why, my arm is feeling heavy  Objective: See treatment diary below    Pt participated in skilled OT focusing on FMC/FMS, GMC/GMS, activity tolerance/endurance, hand to target accuracy, functional tool use, and verbal direction follow, for increased safety and participation in ADL/IADL activities  Pt engaged in wooden cylinder and tweezer activity this date for FMC/FMS, GMC/GMS, functional reach, hand to target accuracy, activity tolerance, and functional tool use  Pt use b/l hands to manipulate marbles from palm into fingertips and place into appropriate colored cups  Pt then used wooden tweezers to place corresponding color ball to design on tabletop  Therapist noting pt leaning to right as pt gets tired, in addition to losing   Pt completed with 25% drops noted  Pt engaged in standing functional reaching activity with bean bags, encouraging cross body reaching, weight shifting, verbal direction follow, and activity tolerance  Pt required seated break post all bean bags completed  Therapist educating pt on purpose of therapy and benefits as well as options for scheduling  Therapist providing verbal cues for weight shifting and balance in addition to large movements  Assessment: Tolerated treatment well  Patient exhibited good technique with therapeutic exercises and would benefit from continued OT Pt presents this date with quad cane when completing functional mobility, with therapist noting pt holding cane off of the ground when walking, in addition to stutter steps when initiating functional mobility   Therapist noting pt favoring using the R hand when asked to work on L hand       Plan: Continued skilled OT per POC    INTERVENTION COMMENTS:  Diagnosis: Tremors of nervous system [R25 1]  Precautions: FALL RISK  FOTO:   Insurance: Payor: MEDICARE / Plan: MEDICARE A AND B / Product Type: Medicare A & B Fee for Service /   3 UI 37 FVALLJ, PN due 6/19/21

## 2021-06-22 NOTE — PROGRESS NOTES
Daily Note     Today's date: 2021  Patient name: Anjum Benoit  : 1940  MRN: [id number]  Referring provider: Jennifer Bolanos MD  Dx:   Encounter Diagnosis     ICD-10-CM    1  Normal pressure hydrocephalus (HCC)  G91 2    2  Balance disorder  R26 89    3  Functional gait abnormality  R26 89        Start Time: 1400  Stop Time: 1455  Total time in clinic (min): 55 minutes    Subjective: Presents to his OP PT session following skilled OT; continued c/o fatigue today; saw Dr Agnes Batista last Friday - no shunt adjustment, is awaiting upcoming neurology consult in September for ongoing c/o freezing of gait, difficulties with balance, and generalized fatigue  Objective: See treatment diary below      Assessment: Required consistent motivation to participate throughout his session in light of his worries he would not be able to walk after activity  I encouraged Gal Dorantes that if we can take steps towards increasing his activity levels he will ultimately feel better about his energy levels and mobility  He was receptive of this  He continues to have difficulty maintaining kinesthetic awareness of postural positioning and movement amplitude significantly skewing and limiting his activity levels  He would benefit from continued skilled PT care to maximize his activity levels, conditioning, balance, and quality of movement  Plan: Continue per plan of care  Progress treatment as tolerated  Short Term Goal Expiration Date:(21)  Long Term Goal Expiration Date: (21)  POC Expiration Date: (21)     Precautions FALL RISK       Manuals   IE                                     Neuro Re-Ed         Pt Education NPH vs parkinsonian symptoms/presentation, outcome measure results/discussion, MOCA administration/discussion  x15 min  Discussion with pt and wife regarding his current decline in functional status, movement difficulties representative of NPH/PD characteristics       Assessment NPH reassessment     HKM  // bars  No UE  x3 laps  // bars  No UE  x3 laps    Consistent VC for increased step amplitude    Turn Strategy  Initiate marching  x5 min, incorporation into all // bar activities      Hurdles  // bars  UE PRN  6" x3 laps fwd focus on foot clearance   // bars UL UE  6" x5 laps fwd     VC for trunk positioning and committing to stepping    Step-Ups  // bars UE PRN  6" x12 fwd/lat ea   // bars UL UE   6" x10 fwd/lat ea    Static Balance Progression  FT EC x1'  FT EC w/HT x30"                                              Ther Ex        NuStep  L4 x10 min 55-60 SPM AVG L4 x10 min 50 SPM avg L5 x10 min   50 SPM avg  VC for elongated strides and faster pacing                                                            Ther Activity                        Gait Training        Indoors    In clinic no AD VC for reciprocal arm swing 2x100'            Modalities

## 2021-06-28 ENCOUNTER — OFFICE VISIT (OUTPATIENT)
Dept: OCCUPATIONAL THERAPY | Facility: CLINIC | Age: 81
End: 2021-06-28
Payer: MEDICARE

## 2021-06-28 ENCOUNTER — OFFICE VISIT (OUTPATIENT)
Dept: PHYSICAL THERAPY | Facility: CLINIC | Age: 81
End: 2021-06-28
Payer: MEDICARE

## 2021-06-28 DIAGNOSIS — R25.1 TREMORS OF NERVOUS SYSTEM: Primary | ICD-10-CM

## 2021-06-28 DIAGNOSIS — R26.89 FUNCTIONAL GAIT ABNORMALITY: ICD-10-CM

## 2021-06-28 DIAGNOSIS — R26.89 BALANCE DISORDER: Primary | ICD-10-CM

## 2021-06-28 DIAGNOSIS — G91.2 NORMAL PRESSURE HYDROCEPHALUS (HCC): ICD-10-CM

## 2021-06-28 PROCEDURE — 97110 THERAPEUTIC EXERCISES: CPT

## 2021-06-28 PROCEDURE — 97116 GAIT TRAINING THERAPY: CPT

## 2021-06-28 PROCEDURE — 97530 THERAPEUTIC ACTIVITIES: CPT

## 2021-06-28 PROCEDURE — 97112 NEUROMUSCULAR REEDUCATION: CPT

## 2021-06-28 NOTE — PROGRESS NOTES
Daily Note     Today's date: 2021  Patient name: Ame Hyde  : 1940  MRN: [id number]  Referring provider: Karri Avila MD  Dx:   Encounter Diagnosis     ICD-10-CM    1  Balance disorder  R26 89    2  Functional gait abnormality  R26 89    3  Normal pressure hydrocephalus (HCC)  G91 2        Start Time: 1300  Stop Time: 1355  Total time in clinic (min): 55 minutes    Subjective: Presents to his OP PT session received from OT; additional c/o fatigue today although no c/o discomfort or pain  Says he had "several good days" after our work together last visit  Objective: See treatment diary below      Assessment: Renato Santa was able to tolerate his greatest level of activity yet today characterized by TM ambulation and work within the [place] step with no UE support  He still had c/o significant fatigue throughout the session as well as multiple instances of freezing with turns and inability to form/carry out a motor plan to overcome the episodes without VC from PT  Renato Santa would continue to benefit from skilled PT care to maximize use of movement and balance strategies, increase activity tolerance, and minimize risks for falls  Plan: Continue per plan of care  Progress treatment as tolerated  Short Term Goal Expiration Date:(21)  Long Term Goal Expiration Date: (21)  POC Expiration Date: (21)     Precautions FALL RISK       Manuals   IE                                    Neuro Re-Ed         Pt Education NPH vs parkinsonian symptoms/presentation, outcome measure results/discussion, MOCA administration/discussion  x15 min  Discussion with pt and wife regarding his current decline in functional status, movement difficulties representative of NPH/PD characteristics       Assessment   NPH reassessment     HKM  // bars  No UE  x3 laps  // bars  No UE  x3 laps    Consistent VC for increased step amplitude SOLO  x3 full laps    VC for arm swing   Turn Strategy Initiate marching  x5 min, incorporation into all // bar activities   Turning to hands on clock face x5 min   Hurdles  // bars  UE PRN  6" x3 laps fwd focus on foot clearance   // bars UL UE  6" x5 laps fwd     VC for trunk positioning and committing to stepping SOL  6"  Staggered to maintain widened CRIS x3 laps   Step-Ups  // bars UE PRN  6" x12 fwd/lat ea   // bars UL UE   6" x10 fwd/lat ea SOLO  4" x15 ea fwd   Static Balance Progression  FT EC x1'  FT EC w/HT x30"                                              Ther Ex        NuStep  L4 x10 min 55-60 SPM AVG L4 x10 min 50 SPM avg L5 x10 min   50 SPM avg  VC for elongated strides and faster pacing                                                            Ther Activity                        Gait Training        Indoors    In clinic no AD VC for reciprocal arm swing 2x100' TM  SOLO  BUE  2 0->1 7 mph x10 min    In clinic no AD VC for reciprocal UE x200'           Modalities

## 2021-06-28 NOTE — PROGRESS NOTES
Occupational Therapy Daily Note:    Today's date: 2021  Patient name: Solomon Campbell  : 1940  MRN: [id number]  Referring provider: Tata Henriquez MD  Dx:   Encounter Diagnosis   Name Primary?  Tremors of nervous system Yes                  Subjective: "This is hard for me because my hands shakes "    Objective: Pt seen for OT treatment session focusing on BIG amplitude movements, GMC/GMS/FMC/FMS, tremor mgmt, handwriting to inc indep in ADL/IADL  Pt performed therex with tbar #3 0 seated EOM- 10 reps x 2 sets FF w/ chest presses, fwd/bwd rows & scaption, requiring RB between each set & demo backward slouching position when seated  Pt completed 5 reps FMC/FMS task using gross grasp with velcro tools focusing on wrist flexion/extension strength/endurance/ROM  Pt then completed handwriting exercise using a built up handle on pen with paper positioned on slant board to for tremor mgmt  Pt demo improved handwriting using strategies  Assessment: Tolerated treatment well  Pt continues to demo lack of UE endurance/tolerance & improved insight into deficits  Patient would benefit from continued skilled OT  Plan: Continued skilled OT per POC      INTERVENTION COMMENTS:  Diagnosis: Tremors of nervous system [R25 1]  Precautions: FALL RISK  FOTO:   Insurance: Payor: MEDICARE / Plan: MEDICARE A AND B / Product Type: Medicare A & B Fee for Service /   1 CM 95 HXTLFD, PN due 21

## 2021-07-06 ENCOUNTER — OFFICE VISIT (OUTPATIENT)
Dept: PHYSICAL THERAPY | Facility: CLINIC | Age: 81
End: 2021-07-06
Payer: MEDICARE

## 2021-07-06 ENCOUNTER — OFFICE VISIT (OUTPATIENT)
Dept: OCCUPATIONAL THERAPY | Facility: CLINIC | Age: 81
End: 2021-07-06
Payer: MEDICARE

## 2021-07-06 DIAGNOSIS — G91.2 NORMAL PRESSURE HYDROCEPHALUS (HCC): ICD-10-CM

## 2021-07-06 DIAGNOSIS — R26.89 FUNCTIONAL GAIT ABNORMALITY: ICD-10-CM

## 2021-07-06 DIAGNOSIS — R25.1 TREMORS OF NERVOUS SYSTEM: Primary | ICD-10-CM

## 2021-07-06 DIAGNOSIS — R26.89 BALANCE DISORDER: Primary | ICD-10-CM

## 2021-07-06 PROCEDURE — 97110 THERAPEUTIC EXERCISES: CPT

## 2021-07-06 PROCEDURE — 97150 GROUP THERAPEUTIC PROCEDURES: CPT

## 2021-07-06 PROCEDURE — 97116 GAIT TRAINING THERAPY: CPT

## 2021-07-06 PROCEDURE — 97112 NEUROMUSCULAR REEDUCATION: CPT

## 2021-07-06 PROCEDURE — 97530 THERAPEUTIC ACTIVITIES: CPT

## 2021-07-06 NOTE — PROGRESS NOTES
OCCUPATIONAL THERAPY RE EVALUATION:    2021  Cherri Rosen  1940  [id number]  Liam Pascual MD  1  Tremors of nervous system      Time:    245-3 group    Subjective    "I had a bad weekend and I feel weak "    PATIENT GOAL: "I just want to function better physically"      Assessment/Plan    Skilled Analysis:  Pt is a 80 y o  male referred to Occupational Therapy s/p Tremors of nervous system [R25 1]  Pt reports since participation in OP OT services, pt reports noticing ups and downs in his physical abilities  Pt reports he slid out of bed without hurting himself and is having a harder time getting out of bed  Pt participated in skilled OT re evaluation and following formalized testing, continues to present with the following areas of deficit: FMC/FMS, GMC/GMS, hand to target accuracy, in hand manipulation/dexterity and low UE endurance/strength impacting indep and completion of ADL/IADL, salient, and leisure tasks  Pt continues to demo the need for skilled Occupational Therapy services 2x/week for 4-8 weeks with focus on BIG amplitude movements, tremor management, HEP, handwriting, FMC/FMS/GMC/GMS to address the goals as listed below  Pt in agreement with POC, POC to  w/in 90 days  Goals:      Short Term Goals 4-8 wks STG=LTG    Tremors  1  Pt will demo G understanding of tremor management strategies such as proximal stabilization as demonstrated by incorporation in functional ADL/IADL tasks NOT MET    2  Pt will demo competency with use of weighted utensils, weighted writing tools, use of wrist weights, built up handles, scoop dishes, and all AE as appropriate/needed for ADL/IADL fxn NOT MET    3  Pt will demo G understanding and carryover of HEP including FMC/dexterity, FMS, and hand to target tasks to maximize fxnl performance in ADL/IADL fxn NOT MET    4      Pt will improve accuracy and speed of [address] testing (9 hole peg, functional dexterity, [place] dexterity, etc) of b/l UE by 25% following education/carryover of tremor reduction strategies and HEP NOT MET    5  Pt will improve strength testing (dynamometer, pinch meter, MMT) of RUE by 25% following education/carryover of tremor reduction strategies and HEP NOT MET    6     Pt will tolerate IASTM to inc fxnl coordination, targeting, sensation, and strength of b/l UE to improve overall fxnl use for ADL/IADL fxn to Mod I NOT MET    Treatment Interventions  Trial Weighted Tools (pen, spoon, fork, etc)  Wrist wts with reaching  FMS/FMC  HEP development (putty, tband, dexterity)  FMC tasks  Digit Isolation/Opposition tasks  Tremor reduction/management strategy education  Big amplitude movements      Pain Levels:     Restin    With Activity:  2 (hand)    Objective    Impairment Observations:    Upper Extremity       RUE LUE Comments                 UPPER EXTREMITY FXN Impaired Impaired Dominant Hand: R                         /Pinch Strength         Dynamometer      - Gross Grasp 40 lbs 30 lbs dec   Pinch Meter       - PINCER 6 lbs 5 lbs dec    - TRIPOD 10 lbs 5 lbs Some pain noted; dec    - LATERAL 13 lbs  7 lbs dec             AROM (seated) LUE WFL/WNL      Elevation/Depression  WFL      Shoulder Flex/Ext WFL       Shoulder Abd WFL       Shoulder Add WFL       Horizontal Abd WFL       Horizontal Add WFL       Elbow Flex WFL       Elbow Ext WFL       Pronation WFL       Supination WFL      Wrist Flex WFL       Wrist Ext WFL      Digit Flex WFL       Digit Ex WFL       Composite Grasp WFL       Hook Grasp WFL       Opposition WFL       Subluxation                           MMT         Elevation 4/5 4/5 improved   Shoulder Flex/Ext 3+/5 3+/5 improved   Shoulder Abd 3+/5 3+/5 improved   Shoulder ADd 3+/5 3+/5 improved   Elbow Flex 3+/5 3+/5    Elbow Ext 3+/5 4/5    Wrist Flex 4/5 4/5    Wrist Ext 4/5 4/5    Gross Grasp 4/5 4/5          SENSATION      Myofilaments (3 61 Wnl)      Sharp Dull  Intact Intact    Proprioception      Hot/Cold

## 2021-07-06 NOTE — PROGRESS NOTES
Daily Note     Today's date: 2021  Patient name: Jermaine Morris  : 1940  MRN: [id number]  Referring provider: Merry Helms MD  Dx:   Encounter Diagnosis     ICD-10-CM    1  Balance disorder  R26 89    2  Functional gait abnormality  R26 89    3  Normal pressure hydrocephalus (HCC)  G91 2        Start Time: 1500  Stop Time: 1555  Total time in clinic (min): 55 minutes    Subjective: Presents to his OP PT session received from OT; says he "slid" out of bed this morning and almost onto his floor; felt like he has had "a bad couple of days " Otherwise no new c/o discomfort or pain  Objective: See treatment diary below      Assessment: Despite his initial c/o fatigue, Yogesh Olson was able to fully participate in his session, finishing with TM ambulation  He had numerous freezing episodes throughout his session, requiring frequent VC and redirecting from PT to maintain maximal safety with turns, as his feet tended to remain planted on the floor while he excessively rotated his trunk  He would continue to benefit from skilled PT care to maximize his balance and movement quality while reducing his risk for falls  Plan: Continue per plan of care  Progress treatment as tolerated  Short Term Goal Expiration Date:(21)  Long Term Goal Expiration Date: (21)  POC Expiration Date: (21)     Precautions FALL RISK       Manuals                                    Neuro Re-Ed         Pt Education   Discussion with pt and wife regarding his current decline in functional status, movement difficulties representative of NPH/PD characteristics       Assessment   NPH reassessment     HKM // bars No UE x4 laps // bars  No UE  x3 laps  // bars  No UE  x3 laps    Consistent VC for increased step amplitude SOLO  x3 full laps    VC for arm swing   Turn Strategy x5 min, frequent VC to initiate with UL LE step Initiate marching  x5 min, incorporation into all // bar activities   Turning to hands on clock face x5 min   Hurdles // bars  UL/BL UE PRN  6"  x3 laps fwd/lat, frequent VC for foot placement and WS forward // bars  UE PRN  6" x3 laps fwd focus on foot clearance   // bars UL UE  6" x5 laps fwd     VC for trunk positioning and committing to stepping SOL  6"  Staggered to maintain widened CRIS x3 laps   Step-Ups  // bars UE PRN  6" x12 fwd/lat ea   // bars UL UE   6" x10 fwd/lat ea SOLO  4" x15 ea fwd   Static Balance Progression  FT EC x1'  FT EC w/HT x30"                                              Ther Ex        NuStep L6 x10 min 45 SPM avg, consistent VC for elongated strides and pacing L4 x10 min 55-60 SPM AVG L4 x10 min 50 SPM avg L5 x10 min   50 SPM avg  VC for elongated strides and faster pacing                                                            Ther Activity                        Gait Training        Indoors TM  SOLO  BUE  1 7 mph x10 min    Consistent VC for increasing step amplitude rather than focusing on time   In clinic no AD VC for reciprocal arm swing 2x100' TM  SOLO  BUE  2 0->1 7 mph x10 min    In clinic no AD VC for reciprocal UE x200'           Modalities

## 2021-07-13 ENCOUNTER — OFFICE VISIT (OUTPATIENT)
Dept: PHYSICAL THERAPY | Facility: CLINIC | Age: 81
End: 2021-07-13
Payer: MEDICARE

## 2021-07-13 ENCOUNTER — OFFICE VISIT (OUTPATIENT)
Dept: OCCUPATIONAL THERAPY | Facility: CLINIC | Age: 81
End: 2021-07-13
Payer: MEDICARE

## 2021-07-13 DIAGNOSIS — R26.89 FUNCTIONAL GAIT ABNORMALITY: ICD-10-CM

## 2021-07-13 DIAGNOSIS — R26.89 BALANCE DISORDER: Primary | ICD-10-CM

## 2021-07-13 DIAGNOSIS — G91.2 NORMAL PRESSURE HYDROCEPHALUS (HCC): ICD-10-CM

## 2021-07-13 DIAGNOSIS — R25.1 TREMORS OF NERVOUS SYSTEM: Primary | ICD-10-CM

## 2021-07-13 PROCEDURE — 97150 GROUP THERAPEUTIC PROCEDURES: CPT | Performed by: PHYSICAL THERAPIST

## 2021-07-13 PROCEDURE — 97110 THERAPEUTIC EXERCISES: CPT | Performed by: PHYSICAL THERAPIST

## 2021-07-13 PROCEDURE — 97110 THERAPEUTIC EXERCISES: CPT

## 2021-07-13 PROCEDURE — 97530 THERAPEUTIC ACTIVITIES: CPT

## 2021-07-13 PROCEDURE — 97112 NEUROMUSCULAR REEDUCATION: CPT | Performed by: PHYSICAL THERAPIST

## 2021-07-13 NOTE — PROGRESS NOTES
Daily Note     Today's date: 2021  Patient name: Bee Salazar  : 1940  MRN: [id number]  Referring provider: Surjit Lopez MD  Dx:   Encounter Diagnosis   Name Primary?  Tremors of nervous system Yes                  Subjective: Why am I doing this again? Objective: See treatment diary below    Pt participated in skilled OT focusing on sequencing, FMC/FMS, functional cognition, GMC/GMS, hand to target, activity tolerance/endurance, and verbal direction follow, for increased safety and participation in ADL/IADL activities  Pt engaged in [place] trailmaking task this date with forward and backward sequencing of alphabet/numbers  Pt completed forward sequencing well with 100% accuracy and backward sequencing with mod difficulty losing track x 2 and requiring mod cues for verbal direction x 2  Therapist providing mod cues for grasping marbles properly with D 1/5 for placing and D 1/2 for picking up  Activity focusing on in hand manipulation, FMC/FMS, functional cognition, GMC/GMS, hand to target accuracy, verbal direction following, and activity tolerance/endurance  Pt engaged in functional reaching simulation task this date while standing focused on forward reaching and encouraging safe weight shifting in bilateral feet, for safety when reaching in forward plane in the home setting  Therapist providing min verbal cues for safety and form during task  Therapist educating pt on weight shifting in feet in order to complete big step to initiate functional mobility x 2 during session  Therapist stating verbal understanding and in agreeance that it works well when he completes it  Assessment: Tolerated treatment well  Patient would benefit from continued OT Therapist noting slow pace and increased misunderstanding of why pt is at therapy in addition to questioning why he is completing activities, with therapist educating pt as able on activities completed this date   Pt asking OTR details on driving exam this date        Plan: Continued skilled OT per POC     INTERVENTION COMMENTS:  Diagnosis: Tremors of nervous system [R25 1]  Precautions: FALL RISK  FOTO:   Insurance: Payor: Bethany Gunderson / Plan: MEDICARE A AND B / Product Type: Medicare A & B Fee for Service /   3 PT 30 ORPHTB, PN due 7/6/21

## 2021-07-13 NOTE — PROGRESS NOTES
Daily Note     Today's date: 2021  Patient name: Gwen Ibarra  : 1940  MRN: [id number]  Referring provider: Sherley Payan MD  Dx:   Encounter Diagnosis     ICD-10-CM    1  Balance disorder  R26 89    2  Functional gait abnormality  R26 89    3  Normal pressure hydrocephalus (HCC)  G91 2                   Subjective: Feeling OK today, no new changes to report  Objective: See treatment diary below      Assessment: Pt was fearful with HK marcos today in //bars but improved with confidence as he went  External focus of bringing knee to chest had good results for a few repetitions and then he returned to previous pattern of poor clearance  Plan: Continue per plan of care        Short Term Goal Expiration Date:(21)  Long Term Goal Expiration Date: (21)  POC Expiration Date: (21)     Precautions FALL RISK       Manuals                                    Neuro Re-Ed         Pt Education        Assessment        HKM // bars No UE x4 laps Min UE 5 laps 3# wts   SOLO  x3 full laps    VC for arm swing   Turn Strategy x5 min, frequent VC to initiate with UL LE step    Turning to hands on clock face x5 min   Hurdles // bars  UL/BL UE PRN  6"  x3 laps fwd/lat, frequent VC for foot placement and WS forward //bars 4 laps fwd, 2 laps lat 6" 3#   SOL  6"  Staggered to maintain widened CRIS x3 laps   Step-Ups  6" 10x fwd //bars   SOLO  4" x15 ea fwd   Static Balance Progression                                                Ther Ex        NuStep L6 x10 min 45 SPM avg, consistent VC for elongated strides and pacing                                                               Ther Activity                        Gait Training        Indoors TM  SOLO  BUE  1 7 mph x10 min    Consistent VC for increasing step amplitude rather than focusing on time TM SOLO:    1 8 mph 10 min - VC for increased amplitude   TM  SOLO  BUE  2 0->1 7 mph x10 min    In clinic no AD VC for reciprocal UE x200'           Modalities

## 2021-07-20 ENCOUNTER — OFFICE VISIT (OUTPATIENT)
Dept: PHYSICAL THERAPY | Facility: CLINIC | Age: 81
End: 2021-07-20
Payer: MEDICARE

## 2021-07-20 ENCOUNTER — OFFICE VISIT (OUTPATIENT)
Dept: OCCUPATIONAL THERAPY | Facility: CLINIC | Age: 81
End: 2021-07-20
Payer: MEDICARE

## 2021-07-20 DIAGNOSIS — R25.1 TREMORS OF NERVOUS SYSTEM: Primary | ICD-10-CM

## 2021-07-20 DIAGNOSIS — R26.89 FUNCTIONAL GAIT ABNORMALITY: ICD-10-CM

## 2021-07-20 DIAGNOSIS — R26.89 BALANCE DISORDER: Primary | ICD-10-CM

## 2021-07-20 DIAGNOSIS — G91.2 NORMAL PRESSURE HYDROCEPHALUS (HCC): ICD-10-CM

## 2021-07-20 PROCEDURE — 97110 THERAPEUTIC EXERCISES: CPT

## 2021-07-20 PROCEDURE — 97150 GROUP THERAPEUTIC PROCEDURES: CPT

## 2021-07-20 PROCEDURE — 97112 NEUROMUSCULAR REEDUCATION: CPT

## 2021-07-20 PROCEDURE — 97530 THERAPEUTIC ACTIVITIES: CPT

## 2021-07-20 NOTE — PROGRESS NOTES
Occupational Therapy Daily Note:    Today's date: 2021  Patient name: Irine Phoenix  : 1940  MRN: [id number]  Referring provider: Phyllis Sloan MD  Dx:   Encounter Diagnosis   Name Primary?  Tremors of nervous system Yes                  Subjective: "My arm is just tired "    Objective: Pt seen for OT treatment session focusing on GMC/GMS/FMC/FMS, proximal UE strength, hand to target accuracy & reaction time to inc indep in ADL/IADL  Pt & OT disc process of FTD testing  Pt instructed to obtain referral from physician if interested in FTD test  Pt tolerated 5 min x2 prograde/retrograde UEB no resistance for GMC/GMS, proximal UE strength/endurance  Pt completed BITS task with #2WC donned focusing on GMC/GMS, hand to target accuracy & reaction time  Pt demo reduced speed with RUE, demoing 3 00 sec reaction time, & with LUE 1 98 secs  Pt c/o fatigue with LUE WC, OT removed WC  Pt then completed 3-4 words BITS cog/memory task focusing on fnxl reach and fnxl memory  Pt demo 95% success with recall of 3 word sequence, and 91% success with immediate recall of 4 word sequence  Pt completed FMC/FMS focused task  Pt seated at table, pt p/u small resistive clips and placed on pattern board located on slant board to inc [facility] for sustained fnxl reach  Assessment: Tolerated treatment well  Pt demo inc freezing this date, pt req support from therapist for balance and stability  Patient would benefit from continued skilled OT focusing on sit to stand transfers      Plan: Continued skilled OT per POC        INTERVENTION COMMENTS:  Diagnosis: Tremors of nervous system [R25 1]  Precautions: FALL RISK  FOTO:   Insurance: Payor: Tevin Carrillo / Plan: MEDICARE A AND B / Product Type: Medicare A & B Fee for Service /   0 YW 23 BOYSRK, PN due 21

## 2021-07-20 NOTE — PROGRESS NOTES
Daily Note     Today's date: 2021  Patient name: Loc Ye  : 1940  MRN: [id number]  Referring provider: Rosas Moore MD  Dx:   Encounter Diagnosis     ICD-10-CM    1  Balance disorder  R26 89    2  Functional gait abnormality  R26 89    3  Normal pressure hydrocephalus (HCC)  G91 2        Start Time: 1100  Stop Time: 1200  Total time in clinic (min): 60 minutes    Subjective: Presents to his OP PT session with ongoing c/o he has been having multiple "bad days " Emphasizes his current feelings of fatigue today, and is hesitant he will be able to participate in physical therapy  Objective: See treatment diary below    Self-directed Exercise: 3107-8691  Grouped Exercise: [id number]   One-to-one with PT: [id number]      Assessment: Prosper Davis participated in his skilled PT session focused on improving movement amplitude, dynamic balance, and conditioning  Ankle cuff weights held this session due to constant c/o fatigue  He required frequent VC and encouragement throughout the session to minimize rest breaks, maximize activity levels, and attempt stepping activities with reduced UE support  He continues to demonstrate significant freezing episodes, especially with turning, and incorporates minimal carryover of constant VC for larger steps and stepping to a clock face  Prosper Davis would continue to benefit from skilled PT intervention to address deficits in balance and movement amplitude to maximize overall functional mobility, quality of life, and decrease risk for falls  Plan: Continue per plan of care  Progress treatment as tolerated         Short Term Goal Expiration Date:(21)  Long Term Goal Expiration Date: (21)  POC Expiration Date: (21)     Precautions FALL RISK       Manuals                                    Neuro Re-Ed         Pt Education        Assessment        HKM // bars No UE x4 laps Min UE 5 laps 3# wts // bars no UE x4 laps  SOLO  x3 full laps    VC for arm swing   Turn Strategy x5 min, frequent VC to initiate with UL LE step    Turning to hands on clock face x5 min   Hurdles // bars  UL/BL UE PRN  6"  x3 laps fwd/lat, frequent VC for foot placement and WS forward //bars 4 laps fwd, 2 laps lat 6" 3# // bars UL UE x4 laps fwd, 4 laps lat  6"  SOL  6"  Staggered to maintain widened CRIS x3 laps   Step-Ups  6" 10x fwd //bars // bars BL UE 6" 2x10 fwd ea  SOLO  4" x15 ea fwd   Static Balance Progression                                                Ther Ex        NuStep L6 x10 min 45 SPM avg, consistent VC for elongated strides and pacing  L4 x10 min    Consistent VC for elongated strides                                                             Ther Activity                        Gait Training        Indoors TM  SOLO  BUE  1 7 mph x10 min    Consistent VC for increasing step amplitude rather than focusing on time TM SOLO:    1 8 mph 10 min - VC for increased amplitude Unavailable  TM  SOLO  BUE  2 0->1 7 mph x10 min    In clinic no AD VC for reciprocal UE x200'           Modalities

## 2021-07-27 ENCOUNTER — APPOINTMENT (OUTPATIENT)
Dept: PHYSICAL THERAPY | Facility: CLINIC | Age: 81
End: 2021-07-27
Payer: MEDICARE

## 2021-07-27 ENCOUNTER — APPOINTMENT (OUTPATIENT)
Dept: OCCUPATIONAL THERAPY | Facility: CLINIC | Age: 81
End: 2021-07-27
Payer: MEDICARE

## 2021-07-30 NOTE — PROGRESS NOTES
Pt called to disc services  Pt reported wanting to take a break from OT services at this time  Pt placed on 30 day hold, hold will  on 21 after which pt will require script from physician should they wish to return to OT services  Pt in agreement with plan

## 2021-08-02 ENCOUNTER — OFFICE VISIT (OUTPATIENT)
Dept: PHYSICAL THERAPY | Facility: CLINIC | Age: 81
End: 2021-08-02
Payer: MEDICARE

## 2021-08-02 DIAGNOSIS — R26.89 BALANCE DISORDER: Primary | ICD-10-CM

## 2021-08-02 PROCEDURE — 97110 THERAPEUTIC EXERCISES: CPT

## 2021-08-02 PROCEDURE — 97112 NEUROMUSCULAR REEDUCATION: CPT

## 2021-08-02 NOTE — PROGRESS NOTES
Daily Note     Today's date: 2021  Patient name: Antoinette Brandon  : 1940  MRN: [id number]  Referring provider: Parker Yeung MD  Dx:   No diagnosis found  Subjective: Patient reports he is not feeling the greatest since he hasn't been sleeping well  Objective: See treatment diary below        Assessment:  Patient tolerated treatment session well, fatigued post session and would benefit from continued physical therapy  Patient is hesitant and fearful of falling, especially with walking backwards  He demonstrated significant freezing episodes, especially with turning and initiating gait  Required rest breaks due to fatigue levels  Plan: Continue per plan of care  Progress treatment as tolerated  Short Term Goal Expiration Date:(21)  Long Term Goal Expiration Date: (21)  POC Expiration Date: (21)     Precautions FALL RISK       Manuals 21                                            Neuro Re-Ed           Pt Education          Assessment          HKM // bars No UE x4 laps Min UE 5 laps 3# wts // bars no UE x4 laps Solo step x 3 full laps, vc's for arm swing    Side stepping with large arm movements 40'x 3 each  SOLO  x3 full laps    VC for arm swing    Turn Strategy x5 min, frequent VC to initiate with UL LE step   360 degree turns with single UE support x 3 reps  Turning to hands on clock face x5 min    Hurdles // bars  UL/BL UE PRN  6"  x3 laps fwd/lat, frequent VC for foot placement and WS forward //bars 4 laps fwd, 2 laps lat 6" 3# // bars UL UE x4 laps fwd, 4 laps lat  6" Solo step, with small hurdles, x 2 laps each direction forwards, without UE support    SOL  6"  Staggered to maintain widened CRIS x3 laps    Step-Ups  6" 10x fwd //bars // bars BL UE 6" 2x10 fwd ea   SOLO  4" x15 ea fwd    Static Balance Progression              Backwards walking in solo step x 40 feet          Box step with emphasis on large steps x 5 min Ther Ex          NuStep L6 x10 min 45 SPM avg, consistent VC for elongated strides and pacing  L4 x10 min    Consistent VC for elongated strides                                                                             Ther Activity                              Gait Training          Indoors TM  SOLO  BUE  1 7 mph x10 min    Consistent VC for increasing step amplitude rather than focusing on time TM SOLO:    1 8 mph 10 min - VC for increased amplitude Unavailable TM Solo step  1  8mph for 10 minutes   vc's for heel strike and step length  TM  SOLO  BUE  2 0->1 7 mph x10 min    In clinic no AD VC for reciprocal UE x200'              Modalities

## 2021-08-03 ENCOUNTER — TELEPHONE (OUTPATIENT)
Dept: NEUROSURGERY | Facility: CLINIC | Age: 81
End: 2021-08-03

## 2021-08-03 NOTE — TELEPHONE ENCOUNTER
Called to schedule appt with Dr Magdiel Ramos for the end of Sept   Offered Monday 9/27 in the [place] office which works well for his wife  She said anytime 11am or after  I sent a message to Anushka at [address] [facility] to get an exact time for him

## 2021-08-03 NOTE — TELEPHONE ENCOUNTER
----- Message from Nas Nguyen RN sent at 6/17/2021  8:33 AM EDT -----  Regarding: NPH-3 month f/u (due in sept 2021)  Call patient to schedule 3 month f/u through [facility]  Due around end of sept per Dr Elizabeth Anthony  Will contact wife to schedule

## 2021-08-05 NOTE — TELEPHONE ENCOUNTER
Called and confirmed appt info with patient's wife  He has PT @ [address] at 11am on 9/27 followed by an appt with  in the [place] office at 1pm on the same day    She was appreciative for the coordination

## 2021-08-10 ENCOUNTER — OFFICE VISIT (OUTPATIENT)
Dept: PHYSICAL THERAPY | Facility: CLINIC | Age: 81
End: 2021-08-10
Payer: MEDICARE

## 2021-08-10 DIAGNOSIS — G91.2 NORMAL PRESSURE HYDROCEPHALUS (HCC): ICD-10-CM

## 2021-08-10 DIAGNOSIS — R26.89 FUNCTIONAL GAIT ABNORMALITY: ICD-10-CM

## 2021-08-10 DIAGNOSIS — R26.89 BALANCE DISORDER: Primary | ICD-10-CM

## 2021-08-10 PROCEDURE — 97116 GAIT TRAINING THERAPY: CPT

## 2021-08-10 PROCEDURE — 97112 NEUROMUSCULAR REEDUCATION: CPT

## 2021-08-10 NOTE — PROGRESS NOTES
Progress Note     Today's date: 8/10/2021  Patient name: David Lopez  : 1940  MRN: [id number]  Referring provider: David Gomez MD  Dx:   Encounter Diagnosis     ICD-10-CM    1  Balance disorder  R26 89    2  Normal pressure hydrocephalus (HCC)  G91 2    3  Functional gait abnormality  R26 89        Start Time: 1400  Stop Time: 1500  Total time in clinic (min): 60 minutes    Subjective: Returns to OP PT today in better spirits than last week; per POC is scheduled for progress update today  Since initiation of PT care for NPH evaluations and subsequent treatment for undiagnosed movement disorder in preparation for upcoming neurology visit on 21, Sumanth Alfaro has had difficulty coping with his gradual mobility loss and chronic fatigue which has lead to multiple cancellations and treatment frequency limited to 1x/week while concomitantly discontinuing OT care  He has no new c/o discomfort or pain today  Objective: See treatment diary below    TUG:  IE: 17 87s  PN 21: 23 20s  PN 8//10/21: 25 09s     5xSTS: 37 45s no UE, min-modAx1 from PT for weight shift to accommodate significant retropulsion    FGA:  IE:   PN 21:   PN 8/10/21: DGI:     Gait Speed:  IE: 0 86 m/s no AD  PN 21: 0 68 m/s no AD  PN 8/10/21: 0 85 m/s no AD      Assessment: Progress update performed today; overall Ulises demonstrated improvements in testing participation since last NPH assessment, with improvements in gait speed to baseline level and ability to successfully participate in 5xSTS testing  Continues to demonstrate significant retropulsion, freezing episodes, and inabilities to motorplan with undiagnosed movement disorder [facility]; would benefit from continued skilled PT care to maximize mobility and reduce risk for falls for 5 additional weeks until then  Goals  ST weeks NEW AS OF 8/10/21:  1   Pt will demonstrate independence with initial HEP    2  Pt will improve TUG minimally by 3s with no UE assistance  3  Pt will improve gait speed minimally by 0 1 m/s   4  Pt will improve DGI score minimally by 3 points  Plan: Continue per plan of care  Progress treatment as tolerated       Referral necessary: Yes  Planned modality interventions: biofeedback, cryotherapy and thermotherapy: hydrocollator packs  Planned therapy interventions: abdominal trunk stabilization, activity modification, ADL retraining, ADL training, balance, balance/weight bearing training, body mechanics training, behavior modification, breathing training, community reintegration, cognitive skills, coordination, fine motor coordination training, flexibility, functional ROM exercises, gait training, graded activity, graded exercise, graded motor, home exercise program, transfer training, therapeutic training, therapeutic exercise, therapeutic activities, stretching, strengthening, sensory integrative techniques, self care, postural training, patient education, neuromuscular re-education, muscle pump exercises, motor coordination training, massage, manual therapy, joint mobilization and IADL retraining  Frequency: 2x week  Duration in weeks: 4  Plan of Care beginning date: 8/10/2021  Plan of Care expiration date: 9/10/2021  Treatment plan discussed with: patient and family     Short Term Goal Expiration Date:(6/14/21)  Long Term Goal Expiration Date: (7/14/21)  POC Expiration Date: (8/14/21)     Precautions FALL RISK       Manuals 7/6 7/13 7/20 8/2/21 8/10 PN                                             Neuro Re-Ed           Pt Education          Assessment          HKM // bars No UE x4 laps Min UE 5 laps 3# wts // bars no UE x4 laps Solo step x 3 full laps, vc's for arm swing    Side stepping with large arm movements 40'x 3 each      Turn Strategy x5 min, frequent VC to initiate with UL LE step   360 degree turns with single UE support x 3 reps      Hurdles // bars  UL/BL UE PRN  6"  x3 laps fwd/lat, frequent VC for foot placement and WS forward //bars 4 laps fwd, 2 laps lat 6" 3# // bars UL UE x4 laps fwd, 4 laps lat  6" Solo step, with small hurdles, x 2 laps each direction forwards, without UE support  Step-Ups  6" 10x fwd //bars // bars BL UE 6" 2x10 fwd ea       Static Balance Progression              Backwards walking in solo step x 40 feet          Box step with emphasis on large steps x 5 min                STS     x10 min focused on avoidance of retropulsion and emphasis of forward weight shift     Assessment     Progress update testing, discussion, POC     Ther Ex          NuStep L6 x10 min 45 SPM avg, consistent VC for elongated strides and pacing  L4 x10 min    Consistent VC for elongated strides                                                                             Ther Activity                              Gait Training          Indoors TM  SOLO  BUE  1 7 mph x10 min    Consistent VC for increasing step amplitude rather than focusing on time TM SOLO:    1 8 mph 10 min - VC for increased amplitude Unavailable TM Solo step  1  8mph for 10 minutes   vc's for heel strike and step length TM no SOLO 1 8 mph x10 min      VC for step amplitude      Hallway w/dowels x600'  No dowels x300' reciprocal arm swing               Modalities

## 2021-08-16 ENCOUNTER — OFFICE VISIT (OUTPATIENT)
Dept: PHYSICAL THERAPY | Facility: CLINIC | Age: 81
End: 2021-08-16
Payer: MEDICARE

## 2021-08-16 DIAGNOSIS — R26.89 FUNCTIONAL GAIT ABNORMALITY: ICD-10-CM

## 2021-08-16 DIAGNOSIS — G91.2 NORMAL PRESSURE HYDROCEPHALUS (HCC): ICD-10-CM

## 2021-08-16 DIAGNOSIS — R26.89 BALANCE DISORDER: Primary | ICD-10-CM

## 2021-08-16 PROCEDURE — 97112 NEUROMUSCULAR REEDUCATION: CPT

## 2021-08-16 PROCEDURE — 97116 GAIT TRAINING THERAPY: CPT

## 2021-08-16 PROCEDURE — 97150 GROUP THERAPEUTIC PROCEDURES: CPT

## 2021-08-16 NOTE — PROGRESS NOTES
Daily Note     Today's date: 2021  Patient name: Ronnell Funez  : 1940  MRN: [id number]  Referring provider: Chloe Nguyen MD  Dx:   Encounter Diagnosis     ICD-10-CM    1  Balance disorder  R26 89    2  Normal pressure hydrocephalus (HCC)  G91 2    3  Functional gait abnormality  R26 89        Start Time: 1400  Stop Time: 1500  Total time in clinic (min): 60 minutes    Subjective: Presents to his OP PT session accompanied by his wife; states he had "three good days since last visit but then I took a turn for the worse " Continues to feel he would be too tired increasing PT frequency to 2x/week despite our discussion today regarding my opinion that this would actually have the opposite effect of his mobility  Continues to present with NBQC held in hand with no contact made on floor  Increased prominence in L lateral truncal shift, windswept posturing noted due to c/o increase in L/S discomfort  Objective: See treatment diary below    One-to-one with PT [id number]  Grouped Exercise: [id number]  Self-Directed Exercise: [id number]    Assessment: Attempted to correct truncal shift, unable to maintain correction secondary to ongoing l/s discomfort and diminished kinesthetic awareness secondary to neurologic deficits  Increased time and VC required to complete sit to/from stand transfers as well as execute turns noting increase in overall anxiety with movement  Required increased motivation to participate throughout session  Still does not want to increase visit frequency to 2x/week for fear of being too fatiguing despite subjective reports as highlighted above  Plan: Continue per plan of care  Progress treatment as tolerated         Short Term Goal Expiration Date:(21)  Long Term Goal Expiration Date: (21)  POC Expiration Date: (21)     Precautions FALL RISK       Manuals 7/6 7/13 7/20 8/2/21 8/10 PN                                             Neuro Re-Ed           Pt Education Assessment          HKM // bars No UE x4 laps Min UE 5 laps 3# wts // bars no UE x4 laps Solo step x 3 full laps, vc's for arm swing    Side stepping with large arm movements 40'x 3 each  // bars 4# AW x2 laps    Turn Strategy x5 min, frequent VC to initiate with UL LE step   360 degree turns with single UE support x 3 reps  x15 min, consistent VC for initiation and follow through    Hurdles // bars  UL/BL UE PRN  6"  x3 laps fwd/lat, frequent VC for foot placement and WS forward //bars 4 laps fwd, 2 laps lat 6" 3# // bars UL UE x4 laps fwd, 4 laps lat  6" Solo step, with small hurdles, x 2 laps each direction forwards, without UE support  Step-Ups  6" 10x fwd //bars // bars BL UE 6" 2x10 fwd ea       Static Balance Progression              Backwards walking in solo step x 40 feet          Box step with emphasis on large steps x 5 min                STS     x10 min focused on avoidance of retropulsion and emphasis of forward weight shift     Assessment     Progress update testing, discussion, POC     Ther Ex          NuStep L6 x10 min 45 SPM avg, consistent VC for elongated strides and pacing  L4 x10 min    Consistent VC for elongated strides   L5 x10 min    Consistent VC and occasional TC for speed                                                                          Ther Activity                              Gait Training          Indoors TM  SOLO  BUE  1 7 mph x10 min    Consistent VC for increasing step amplitude rather than focusing on time TM SOLO:    1 8 mph 10 min - VC for increased amplitude Unavailable TM Solo step  1  8mph for 10 minutes   vc's for heel strike and step length TM no SOLO 1 8 mph x10 min      VC for step amplitude      Hallway w/dowels x600'  No dowels x300' reciprocal arm swing TM no SOLO 1 8 mph x10 min    VC for step amplitude    Hallway w/ dowels x200'    No dowels no AD x200'              Modalities

## 2021-08-23 NOTE — PROGRESS NOTES
Pt d/c OP OT services due to 30 day hold lapse  Should pt wish to return to OP OT services, pt will require new script from physician  Pt d/c OP OT services

## 2021-08-24 ENCOUNTER — APPOINTMENT (OUTPATIENT)
Dept: PHYSICAL THERAPY | Facility: CLINIC | Age: 81
End: 2021-08-24
Payer: MEDICARE

## 2021-08-31 ENCOUNTER — OFFICE VISIT (OUTPATIENT)
Dept: PHYSICAL THERAPY | Facility: CLINIC | Age: 81
End: 2021-08-31
Payer: MEDICARE

## 2021-08-31 DIAGNOSIS — R26.89 BALANCE DISORDER: Primary | ICD-10-CM

## 2021-08-31 DIAGNOSIS — G91.2 NORMAL PRESSURE HYDROCEPHALUS (HCC): ICD-10-CM

## 2021-08-31 DIAGNOSIS — R26.89 FUNCTIONAL GAIT ABNORMALITY: ICD-10-CM

## 2021-08-31 PROCEDURE — 97116 GAIT TRAINING THERAPY: CPT

## 2021-08-31 PROCEDURE — 97112 NEUROMUSCULAR REEDUCATION: CPT

## 2021-08-31 NOTE — PROGRESS NOTES
Daily Note     Today's date: 2021  Patient name: Anne Wallace  : 1940  MRN: [id number]  Referring provider: Reza Lees MD  Dx:   Encounter Diagnosis     ICD-10-CM    1  Balance disorder  R26 89    2  Normal pressure hydrocephalus (HCC)  G91 2    3  Functional gait abnormality  R26 89        Start Time: 1200  Stop Time: 1258  Total time in clinic (min): 58 minutes    Subjective: Presents to his OP PT session accompanied by his wife; per his reports is having a much better week this week since last week; both are excited for his upcoming neurology appointment in two weeks  No new c/o fatigue, discomfort, or pain  Objective: See treatment diary below      Assessment: Able to tolerate 15 minute consecutive ambulatory bout today compared to shorter bouts at prior visits although continues to experience limitations in mobility secondary to decreased L LE kinesthetic awareness, decreased L LE step amplitude, freezing episodes, tremors, and lack of retention of VC for gait quality and initiation of movement  Majority of session focused on gait training and mobility with no AD with emphasis on attention to feet rather than UE support with awareness of L LE positioning  Plan: Continue per plan of care  Progress treatment as tolerated         Short Term Goal Expiration Date:(21)  Long Term Goal Expiration Date: (9/10/21)  POC Expiration Date: (9/10/21)     Precautions FALL RISK       Manuals 7/6 7/13 7/20 8/2/21 8/10 PN                                            Neuro Re-Ed           Pt Education          Assessment          HKM // bars No UE x4 laps Min UE 5 laps 3# wts // bars no UE x4 laps Solo step x 3 full laps, vc's for arm swing    Side stepping with large arm movements 40'x 3 each  // bars 4# AW x2 laps    Turn Strategy x5 min, frequent VC to initiate with UL LE step   360 degree turns with single UE support x 3 reps  x15 min, consistent VC for initiation and follow through [place]  x15 min shifting focus from UE reaching to foot positioning and stepping   Hurdles // bars  UL/BL UE PRN  6"  x3 laps fwd/lat, frequent VC for foot placement and WS forward //bars 4 laps fwd, 2 laps lat 6" 3# // bars UL UE x4 laps fwd, 4 laps lat  6" Solo step, with small hurdles, x 2 laps each direction forwards, without UE support  Step-Ups  6" 10x fwd //bars // bars BL UE 6" 2x10 fwd ea       Static Balance Progression              Backwards walking in solo step x 40 feet          Box step with emphasis on large steps x 5 min                STS     x10 min focused on avoidance of retropulsion and emphasis of forward weight shift     Assessment     Progress update testing, discussion, POC     Ther Ex          NuStep L6 x10 min 45 SPM avg, consistent VC for elongated strides and pacing  L4 x10 min    Consistent VC for elongated strides   L5 x10 min    Consistent VC and occasional TC for speed                                                                          Ther Activity                              Gait Training          Indoors TM  SOLO  BUE  1 7 mph x10 min    Consistent VC for increasing step amplitude rather than focusing on time TM SOLO:    1 8 mph 10 min - VC for increased amplitude Unavailable TM Solo step  1  8mph for 10 minutes   vc's for heel strike and step length TM no SOLO 1 8 mph x10 min      VC for step amplitude      Hallway w/dowels x600'  No dowels x300' reciprocal arm swing TM no SOLO 1 8 mph x10 min    VC for step amplitude    Hallway w/ dowels x200'    No dowels no AD x200' TM no SOLO  1 8 mph x15 min    VC for L LE step amplitude    No dowels no AD focused on UE swing reciprocity 4x100"       Outdoors       No AD CSAx1 focused on step/UE swing reciprocity with added distractions x10 min   Modalities

## 2021-09-14 ENCOUNTER — CONSULT (OUTPATIENT)
Dept: NEUROLOGY | Facility: CLINIC | Age: 81
End: 2021-09-14
Payer: MEDICARE

## 2021-09-14 VITALS
DIASTOLIC BLOOD PRESSURE: 60 MMHG | SYSTOLIC BLOOD PRESSURE: 100 MMHG | BODY MASS INDEX: 29.78 KG/M2 | HEART RATE: 81 BPM | WEIGHT: 208 LBS | HEIGHT: 70 IN | TEMPERATURE: 97.3 F

## 2021-09-14 DIAGNOSIS — G20 PARKINSONISM, UNSPECIFIED PARKINSONISM TYPE (HCC): Primary | ICD-10-CM

## 2021-09-14 DIAGNOSIS — R25.1 TREMOR: ICD-10-CM

## 2021-09-14 PROBLEM — G20.C PARKINSONISM: Status: ACTIVE | Noted: 2021-09-14

## 2021-09-14 PROCEDURE — 99205 OFFICE O/P NEW HI 60 MIN: CPT | Performed by: PSYCHIATRY & NEUROLOGY

## 2021-09-14 NOTE — PATIENT INSTRUCTIONS
Parkinson's Disease Resources     Helpful web sites   -  www [employer]  org   -  www parkinson  org (the [employer])   -  www [employer] org ([address] [employer]) - Order the "Every Victory Counts" panda under the "resources" tab  Or visit DPF org/EVC or call [phone]  - [facility]  org/resources/parkinsons-exercise-essentials   - Contact the [employer] for an "Aware in care kit" @ 3946.145.8656 (this is a kit to take with you if you ever have to go to the hospital)   - www pmdalliance  org  -  parkinsons  macrina edu/exercise_videos  html  - [address] [facility] Exercise helpline: (279) 765-2161  - [employer]    - Check out "The [address]" for help paying for your medications: www tafcares  org        Parkinsons Specific Exercise/Therapy Groups     [facility] BIG and LOUD Programs    BIG and LOUD  [address] [facility]  [address] [facility] [address]  [facility], [address]  [phone]    BIG and 306 [facility] [address], [address]  [phone]    BIG and 355 [facility], [address]  [facility]    BIG and LOUD  [address] [facility], [address] [facility], [address] [place] [address]    [address] [place] [address] Road [address] [facility] [phone]    [place] [facility]  Tuesday & Thursdays 3:30pm - 5:00pm  [facility] [place] [facility] [place]  [address] [place], [place], [address]      [address] [facility]   Erika JASON   Phone: [phone]  Email: [email or web address]    [address] [facility] (Now also in [place] and [place])   [address] [facility], [address]  Phone: [phone]  Email: [email or web address]     Get Up and Go  Mondays 10:30-11:30 am, Thursdays 12:00-1:00 pm   [address]  [facility]    [address], [address]  [phone]    [facility]   [address] [place] [address]  [facility], [address]  [address] [place] Program  Tuesdays and Thursdays, 3:30 - 5:00 p m  Get S M A R T  Aquatic Program  Tuesdays and Mondays, 1 - 1:45 p m

## 2021-09-14 NOTE — PROGRESS NOTES
NEUROLOGY RESIDENCY CLINIC     CONSULT  NOTE         Patient ID: Rolo Victor is a 80 y o  male  Assessment/Plan:    Parkinsonism [facility])  Patient is a 80 y o  male presenting for evaluation of tremors and gait disturbance that have significantly worsened over the last 4 months  Reports shuffling gait, difficulty initiating movement, freezing, difficulty turning in either direction  On examination, Denver Sanford has increased tone and rigidity in the upper extremities that is worse on the left  There is a fine pill-rolling tremor in bilateral hands  He also has hypomimia, masked facies, bradykinesia, hesitant and shuffled gait, and hypophonia  He also reports minor features including vivid dreams, constipation, fatigue, and possible postural instability  With this presentation, concern remains highest for advanced Parkinsonism versus Parkinson disease  The more acute decline described by both patient and spouse is quicker than what would be expected for PD, however, may represent a greater impact on his daily functional capacity  Additionally he has not been on any dopaminergic medications to evaluate for symptomatic improvement which would also aid in confirming diagnosis  Plan:  · Offered option of CLIFFORD scan to evaluate for evidence of PD on imaging  This test would provide more data to help confirm diagnosis of PD but will not alter treatment strategies  Patient politely declined  · Will start Sinemet  mg TID with meals  We reviewed in detail potential side effects and wearing off effects  Discussed that the most common side effect is diarrhea  With his current issues with constipation, this may also be a benefit for him  · Will monitor for improvement with dopaminergic medications   Patient and family advised to keep log of medication timing and if they notice any difference in his ability to walk, turn around, etc   · Reviewed local resources for Parkinson Disease  · Referral to PT rock steady boxing program  · He is to continue with his regular PT/OT program  · We will follow up with the patient in 2 months to evaluate for benefit from medication  Would then consider having him follow with the movement team with his advanced presentation  Diagnoses and all orders for this visit:    Parkinsonism, unspecified Parkinsonism type ([address] )  -     Ambulatory referral to Physical Therapy; Future  -     carbidopa-levodopa (SINEMET)  mg per tablet; Take 1 tablet by mouth 3 (three) times a day with meals    Tremor  -     Ambulatory referral to Neurology         Subjective:    HPI    I had the pleasure of seeing your patient, Bee Salazar, today in the Neurology clinic for initial consultation  He was referred to the clinic by Dr Magdiel Ramos, Neurosurgery  Raúl Frazier is a 80 y o  male with history of right frontal  shunt for NPH,HTN, spinal stenosis, who presents today for evaluation of tremors in bilateral upper extremities and shuffled gait  Both patient and family report that he has had a significant decline over the last 4 months  He is significantly weaker than he used to be and less active  Patient reports that he has trouble with his balance  It requires a lot of effort to start walking  He will get stuck mid step and cannot move  Shuffled gait  Difficulty turning around in either direction  Feels like body won't move  Does not lean forward when he walks  He uses a cane when out of the house and uses a walker at home  He reports having a tremor in both hands that is worse on the left  This has not significantly impacted his life and is a minor issue for him  Additionally, Raúl Frazier report that he is having frequent drooling both during the day and the night  He is tired all of the time  He used to walk on the treadmill four times per week  Now hasn't been on it in over 2 weeks  Going to PT three times per week and will not want to go at least once per week due to feeling tired   Ended up stopping OT as he was not seeing much benefit  Reports frequent constipation  Using enemas 2-3 times per week  Also uses miralax 2-3 days per week when he gets constipated  He will have very vivid dreams occasionally  Never acts out dreams  Does not punch/kick during sleep  Up 2-3 times per night to use the restroom  Difficult for him to get in and out of bed  Sometimes won't fall asleep until 6 am and then naps during the day  Denies any changes to his sense of smell or taste  He is not eating as much as he used to eat and has unintentionally lost 15 lbs  Appetite is poor  The following portions of the patient's history were reviewed and updated as appropriate: allergies, current medications, past family history, past medical history, past social history, past surgical history, and problem list          Objective:    Blood pressure 100/60, pulse 81, temperature (!) 97 3 °F (36 3 °C), temperature source Temporal, height 5' 10" (1 778 m), weight 94 3 kg (208 lb)  Physical Exam  Vitals and nursing note reviewed  Constitutional:       General: He is not in acute distress  Appearance: Normal appearance  He is not ill-appearing  Comments: Hypophonia   HENT:      Head: Normocephalic and atraumatic  Right Ear: External ear normal  Decreased hearing noted  Left Ear: External ear normal  Decreased hearing noted  Ears:      Comments: Hard of hearing  Not wearing hearing aids     Nose: Nose normal       Mouth/Throat:      Mouth: Mucous membranes are moist       Pharynx: Oropharynx is clear  Eyes:      General: Lids are normal       Extraocular Movements: Extraocular movements intact  Conjunctiva/sclera: Conjunctivae normal       Pupils: Pupils are equal, round, and reactive to light  Cardiovascular:      Rate and Rhythm: Normal rate  Pulses: Normal pulses  Pulmonary:      Effort: Pulmonary effort is normal  No respiratory distress  Abdominal:      General: Abdomen is flat   There is no distension  Musculoskeletal:      Cervical back: Normal range of motion and neck supple  Right lower leg: No edema  Left lower leg: No edema  Skin:     General: Skin is warm and dry  Capillary Refill: Capillary refill takes less than 2 seconds  Neurological:      Deep Tendon Reflexes:      Reflex Scores:       Tricep reflexes are 1+ on the right side and 1+ on the left side  Bicep reflexes are 1+ on the right side and 1+ on the left side  Brachioradialis reflexes are 1+ on the right side and 1+ on the left side  Patellar reflexes are 1+ on the right side and 1+ on the left side  Achilles reflexes are 1+ on the right side and 1+ on the left side  Psychiatric:         Mood and Affect: Mood normal          Speech: Speech normal          Behavior: Behavior normal          Neurological Exam  Mental Status  Awake, alert and oriented to person, place and time  Speech is normal  Language is fluent with no aphasia  Hypophonic voice  Cranial Nerves  CN II: Visual acuity is normal  Visual fields full to confrontation  Right funduscopic exam: disc intact  Left funduscopic exam: disc intact  CN III, IV, VI: Extraocular movements intact bilaterally  Normal lids and orbits bilaterally  Pupils equal round and reactive to light bilaterally  CN V:  Right: Facial sensation is normal   Left: Facial sensation is normal on the left  CN VII: No facial motor weakness identified  Masked facies present  CN VIII: Hard of hearing  CN IX, X: Palate elevates symmetrically  CN XI: Shoulder shrug strength is normal   CN XII: Tongue midline without atrophy or fasciculations  Motor  Normal muscle bulk throughout  Increased muscle tone  Increased tone, rigidity, and cog-wheeling in bilateral upper extremities that is worse on the left compared to right  The following abnormal movements were seen: Very mild low-amplitude low-frequency tremor in a pill-rolling motion   This is present in both hands but worse on the left  Right                     Left   Shoulder abduction               5                          5   Shoulder adduction               5                          5   Shoulder internal rotation      5                          5  Shoulder external rotation     5                          5  Elbow flexion                         5                          5  Elbow extension                    5                          5  Finger flexion                         5                          5  Hip flexion                              5-                          5-  Hip extension                         5                          5  Hip abduction                         5-                          5-  Hip adduction                         5-                          5-  Knee flexion                           5                          5  Knee extension                      5                          5  Plantarflexion                         5                          5  Dorsiflexion                            5                          5    Sensory  Light touch is normal in upper and lower extremities  Temperature is normal in upper and lower extremities  Vibration is normal in upper and lower extremities  Reflexes                                           Right                      Left  Brachioradialis                    1+                         1+  Biceps                                 1+                         1+  Triceps                                1+                         1+  Patellar                                1+                         1+  Achilles                                1+                         1+    Right pathological reflexes: Rosalee's present  Left pathological reflexes: Rosalee's present  Coordination  On VINCE, patient has decrement in both speed and amplitude that is worse on the left compared to the right  Finger to nose slowed and deliberate without ataxia       Gait  Casual gait: Narrow stance  Reduced stride length  Freezing, hesitant and shuffling gait  Reduced right arm swing  Reduced left arm swing  Unable to rise from chair without using arms  Steps are reduced in both amplitude and stride length  Minimal leaning forward with walking  Takes repeated tries to initiate walking  Takes at least 15 small steps to turn in either direction with freezing multiple times during the turn  This is distressing to him and causes him to panic  Results:  XR Shunt series 6/7/21:  Intact  shunt catheter  Stable programmable dial setting  [facility] wo contrast 6/2/21:  Stable mild ventriculomegaly  Shunt location unchanged from prior study  ROS:  Review of Systems   Constitutional: Negative  Positive for decreased appetite and fatigue  Negative for fevers  HENT: Negative  Negative for hearing loss, tinnitus, trouble swallowing and voice change  Eyes: Negative  Negative for photophobia and pain  Respiratory: Negative  Negative for shortness of breath  Cardiovascular: Negative  Negative for palpitations  Gastrointestinal: Negative  Negative for nausea and vomiting  Endocrine: Negative  Negative for cold intolerance  Genitourinary: Negative  Negative for dysuria, frequency and urgency  Musculoskeletal: Positive for gait problem  Negative for myalgias and neck pain  Skin: Negative  Negative for rash  Neurological: Positive for tremors  Negative for dizziness, seizures, syncope, facial asymmetry, speech difficulty, weakness, light-headedness, numbness and headaches  Patient wife stated that he has bilateral hand tremors  Hematological: Negative  Does not bruise/bleed easily  Psychiatric/Behavioral: Positive for confusion   Negative for hallucinations and sleep disturbance        ======    Thank you for allowing me to participate in the care of your patient, Libby Couch DO [facility] Neurology Residency, PGY-2

## 2021-09-15 ENCOUNTER — APPOINTMENT (OUTPATIENT)
Dept: PHYSICAL THERAPY | Facility: CLINIC | Age: 81
End: 2021-09-15
Payer: MEDICARE

## 2021-09-17 ENCOUNTER — TELEPHONE (OUTPATIENT)
Dept: NEUROLOGY | Facility: CLINIC | Age: 81
End: 2021-09-17

## 2021-09-17 NOTE — TELEPHONE ENCOUNTER
Called pt's wife and states that pt started sinemet 1 tab tid on 9/15/21  Took it that day and Thursday and he started having some nausea that comes and goes  States that pt's liver is compromised bec of h/o hepatitis  He does get some nausea once in awhile  Denies vomiting  Pt has been eating, but not much  Pt did not take his sinemet today  Asking if pt should continue taking this med? If so, can you prescribe med for nausea?  Requesting script be sent to [facility] on file        [facility] to leave detailed message

## 2021-09-17 NOTE — TELEPHONE ENCOUNTER
Marilyn'd VM from pt's wife Kj Dickerson [phone]  Pt  Taking Sinimet and has been experiencing nausea on it  Asking if he should continue taking?

## 2021-09-20 NOTE — TELEPHONE ENCOUNTER
[address] to check if PA is required  Tamara Gibson and states that 30 day supply (Carbidopa) did go through [facility] but copay is $371  SW, in case pt decides to take carbidopa 25 mg tid  Is there assistance we can offer?

## 2021-09-20 NOTE — TELEPHONE ENCOUNTER
I know you said he is is not eating much but is he taking the Sinemet with food? If not he should try a small, low protein snack like Saltines or a angel cracker and see if that helps

## 2021-09-20 NOTE — TELEPHONE ENCOUNTER
Called and advised pt's wife Alec Cordero of all of the below  She verbalized clear understanding of all instructions and agreeable to start med more slowly  States that in case carbidopa 25 mg might need PA  She would like script be sent to [address] in case pt still gets nauseous

## 2021-09-20 NOTE — TELEPHONE ENCOUNTER
We have 2 options, sometimes if patients start the medication more slowly they will adjust to side effects so we could have him take Sinemet in the morning for 1 week, then morning and evening for 1 week then morning, afternoon and evening and see if his body adjusts to the Sinemet  The other option would be Lodosyn (carbidopa) 25mg with each dose of Sinemet  Sometimes there are problems obtaining the medication however  Let me know what they decide to try  Thanks

## 2021-09-20 NOTE — TELEPHONE ENCOUNTER
Called and advised pt's wife Marcella Marker of the below  States that pt did took it w/ food  And he was also told that he should stay active after taking it  He was walking back but pt still gets the nausea  Last dose was Thursday morning

## 2021-09-20 NOTE — TELEPHONE ENCOUNTER
Pt's wife Marilou Fernández called again re: below  Dr Brown Mena and Dr Huma Madison are not in the office today  Pt would like to know provider's recommendation  Flora Hodges, can you assist? Any recommendation?  If not, will TT either Dr Brown Mena or Dr Huma Madison

## 2021-09-20 NOTE — TELEPHONE ENCOUNTER
MSW phoned the number listed for patient and reached his wife, Miguel Ortiz  MSW advised that this writer would contact the prescription coverage to see if anything (such as a tier exception) can be done to lower the cost of the carbidopa  Patient's wife stated that she just picked up a 90 day supply of carbidopa on 9/16 at [facility] and it was $5, so there is no issue affording the carbidopa  Patient's wife stated that the nurse alluded to an anti-nausea medication which may be costly  Inga Lewis, is Lodosyn the anti-nausea medication that patient needs help affording? Please advise  Would a tier exception be available to help lower the cost? Please advise  Thanks!

## 2021-09-21 NOTE — TELEPHONE ENCOUNTER
Tier exception form signed by Osmar Kaminski  Consult notes and form faxed to [facility] at [phone]  Form placed in clerical bin to be scanned       Awaiting determination

## 2021-09-21 NOTE — TELEPHONE ENCOUNTER
Awaiting results of Tier Exception  If Tier Exception is denied, MSW was able to locate another program to help with the cost of Lodosyn- the Patient "[employer] if patient meets their income criteria  Their info is as follows:    Assistance Amount  $3,200 per year    Patients may apply for additional assistance at the end of their eligibility period, subject to the availability of funding  Eligibility Criteria  To get financial assistance for Parkinsons Disease, you must:    -Be getting treatment for Parkinsons Disease   -Reside and receive treatment in the [place] or [facility]  ([place]  citizenship is not a requirement )  -Have Medicare health insurance that covers your qualifying medication or product  - Be prescribed a medication or product that is listed on PANs list of covered medications  Carina Carroll is listed)  -Have an income that falls at or below 500% of the Federal Poverty Level

## 2021-09-21 NOTE — TELEPHONE ENCOUNTER
Pt picked up the carbidopa levodopa (sinemet) on 9/16/21, not the carbidopa (lodosyn)  So, yes it is the lodosyn that pt needs assistance  Alva Luceor  Will call PA to check if tier exception is avail  Called PA dept at [phone], spoke w/ Serenity Ndiaye to check if tier exception to lower the cost is avail but there is no guarantee that this will be approved  She will fax the form to [phone] and [phone]  OQL#[id number]  Awaiting fax

## 2021-09-22 ENCOUNTER — EVALUATION (OUTPATIENT)
Dept: PHYSICAL THERAPY | Facility: CLINIC | Age: 81
End: 2021-09-22
Payer: MEDICARE

## 2021-09-22 DIAGNOSIS — G91.2 NORMAL PRESSURE HYDROCEPHALUS (HCC): ICD-10-CM

## 2021-09-22 DIAGNOSIS — R26.89 FUNCTIONAL GAIT ABNORMALITY: ICD-10-CM

## 2021-09-22 DIAGNOSIS — R26.89 BALANCE DISORDER: Primary | ICD-10-CM

## 2021-09-22 PROCEDURE — 97164 PT RE-EVAL EST PLAN CARE: CPT

## 2021-09-22 PROCEDURE — 97112 NEUROMUSCULAR REEDUCATION: CPT

## 2021-09-22 NOTE — PROGRESS NOTES
PT Re-Evaluation     Today's date: 2021  Patient name: Latosha Fried   : 1940  MRN: [id number]  Referring provider: Cristy Corbin MD  Dx:   Encounter Diagnosis     ICD-10-CM    1  Balance disorder  R26 89    2  Normal pressure hydrocephalus (HCC)  G91 2    3  Functional gait abnormality  R26 89        Start Time: 1100  Stop Time: 1200  Total time in clinic (min): 60 minutes    Assessment  Assessment details: Mr Latosha Fried is a 80year-old male reporting to [facility] OP PT for re-evaluation of mobility and function following missing several weeks of skilled PT care due to "feeling ill" both prior to and after his neurology consultation with ongoing differential diagnosis of advanced parkinsonism/parkinsonism versus Parkinson's Disease  Per subjective review, he has taken limited doses of prescribed sinemet secondary to nausea  Per his wife's reports with this new nausea medication, he will continue sinemet daily as prescribed from now on  I educated them on the purpose of sinemet, the importance of taking it routinely, and the effects it may have on his overall movement  [place] was more receptive to trying it again following this education  Per objective measure testing today his overall level of function has remained largely the same since last progress update; his mobility slightly improved per TUG; balance slightly improved per FGA; and 5xSTS improved in speed however [place]ree [place] was adamant about maintaining BL UE support the entire time  In this sense, there are some improvements noted in function however these are clinically insignificant and cannot be attributed to the effects of sinemet as he has not been taking it as consistently and for the duration he should be  At this time he would benefit from continued skilled PT care per new POC and introduction of sinemet to reduce his significantly greater risk for falls, improve his mobility, and improve his overall quality of life       Impairments: abnormal coordination, abnormal gait, abnormal muscle tone, abnormal or restricted ROM, abnormal movement, activity intolerance, difficulty understanding, impaired balance, impaired physical strength, lacks appropriate home exercise program, poor posture  and poor body mechanics    Symptom irritability: highBarriers to therapy: Significant limitations Cherri nunez puts on himself  Understanding of Dx/Px/POC: good   Prognosis: fair    Goals  ST weeks  1  Pt will demonstrate independence with initial HEP    2  Pt will improve 5 x STS minimally by 3s with no UE assistance  3  Pt will improve TUG minimally by 3s with no UE assistance  4  Pt will improve gait speed minimally by 0 1 m/s   5  Pt will improve FGA score minimally by 3 points  LT-12 weeks  1  Pt will demonstrate independence with finalized HEP    2  Pt will complete 5 x STS to 12s or faster with no UE assistance  3  Pt will improve TUG to 10s or faster with no UE assistance  4  Pt will improve gait speed to minimally 1 2 m/s   5  Pt will improve FGA score to 22/30 or greater  6  Pt and wife will subjectively report significant improvements in mobility and quality of life  Plan  Plan details: Next session - NPH eval - following sessions - continue balance, mobility, and conditioning interventions    Patient would benefit from: skilled physical therapy  Referral necessary: No  Planned modality interventions: biofeedback  Planned therapy interventions: abdominal trunk stabilization, activity modification, ADL retraining, ADL training, balance, balance/weight bearing training, behavior modification, body mechanics training, breathing training, cognitive skills, community reintegration, coordination, fine motor coordination training, flexibility, functional ROM exercises, gait training, graded activity, graded exercise, graded motor, home exercise program, transfer training, therapeutic training, therapeutic exercise, therapeutic activities, stretching, strengthening, sensory integrative techniques, self care, postural training, patient education, neuromuscular re-education, motor coordination training, IADL retraining, joint mobilization, manual therapy and massage  Frequency: 1x week (Optimally 2x/week as tolerated )  Duration in weeks: 12  Plan of Care beginning date: 9/22/2021  Plan of Care expiration date: 12/24/2021  Treatment plan discussed with: patient, family and caregiver        Subjective Evaluation    History of Present Illness  Date of onset: 5/14/2020  Mechanism of injury: Laura Hinton returns to OP PT today following a several week hiatus after he had his initial consultation with [facility] Neurology for what is currently ongoing differentially diagnosed parkinsonism/advanced parkinsonism versus Parkinson's Disease  Per chart review and his wife's reports, he was prescribed sinemet following this visit, but has only been able to take it several times due to feeling nausea  He was prescribed anti-nausea medication following, and per wife's reports today, has now been cleared by insurance to pay for the medication  Laura Hinton feels since he last saw us, he continues to decline in function  His wife notices no new changes in his movement  He does have a follow-up visit for NPH testing here and then with Dr Nancy Winkler next Monday            Recurrent probem    Quality of life: poor    Pain  No pain reported    Social Support  Steps to enter house: yes  Stairs in house: yes   Lives in: multiple-level home  Lives with: significant other    Employment status: not working  Treatments  Previous treatment: physical therapy and occupational therapy  Current treatment: medication and physical therapy  Patient Goals  Patient goals for therapy: improved balance, increased motion, increased strength, independence with ADLs/IADLs and return to sport/leisure activities  Patient goal: "To feel better "        Objective    5xSTS:   PN 8/10/21[de-identified] 37 45s no UE, min-modAx1 from PT for weight shift to accommodate significant retropulsion  Re-Eval 21: 27 89s, unable to remove UE from arm rests despite new[place] VC, inability to fully stand without letting go of arm rests         TUG:  IE: 17 87s  PN 21: 23 20s  PN 8/10/21: 25 09s  Re-Eval 21: 24 28s no AD, TUG Co 20s no AD       FGA:  IE:   PN 21:   PN 8/10/21: DGI:   Re-Eval 21:      Gait Speed:  IE: 0 86 m/s no AD  PN 21: 0 68 m/s no AD  PN 8/10/21: 0 85 m/s no AD  Re-Eval 21: 0 69 m/s no AD       Short Term Goal Expiration Date:(21)  Long Term Goal Expiration Date: (21)  POC Expiration Date: (21)       Precautions: FALL RISK, parkinsonism       Manuals 21 Re-Eval                                       Neuro Re-Ed         Pt Education Parkinsonism presentation, parkinson's disease presentation, NPH presentation, purpose and importance of sinemet, goals of sinemet use relative to movement x10 min                                                       Ther Ex                                                                        Ther Activity                        Gait Training                        Modalities

## 2021-09-22 NOTE — TELEPHONE ENCOUNTER
Received fax from [facility] re: Carbidopa 25 mg  The drug tier has been lowered to a Tier 2  How mch you pay for a covered Part D prescriptions depends on which payment stage you are in when you fill it  The tier change will only apply to your cost during the initial coverage phase  Approval timeframe: start date 9/21/21  End date 12/31/2021    [facility] to check the cost of this drug being tier exception has been approved  Spoke w/ Andrey Hurtado and advised of the above  She was able to run a test claim  Carbidopa 25 mg tabs, quantity of 90 for 30 day supply will cost $5  FAR#[id number]  Called [facility] to make sure that they are able to run the claim for Carbidopa 25 mg tabs  Spoke w/Tameka and states that there is an issue w/ the [facility]  She will try to run using another NDC  She was able to run the claim successfully  Copay is $5    Pt's wife Felecia Mcnair made aware of all of the above  She verbalized understanding

## 2021-09-22 NOTE — TELEPHONE ENCOUNTER
ISHAAN was notified by [facility] that the Tier Exception was approved, and it has been confirmed that patient will have a $5 copay for Lodosyn  No further social work assistance needed at this time  MSW will be available as needed

## 2021-09-27 ENCOUNTER — OFFICE VISIT (OUTPATIENT)
Dept: NEUROSURGERY | Facility: CLINIC | Age: 81
End: 2021-09-27
Payer: MEDICARE

## 2021-09-27 ENCOUNTER — OFFICE VISIT (OUTPATIENT)
Dept: PHYSICAL THERAPY | Facility: CLINIC | Age: 81
End: 2021-09-27
Payer: MEDICARE

## 2021-09-27 VITALS
BODY MASS INDEX: 30.96 KG/M2 | SYSTOLIC BLOOD PRESSURE: 114 MMHG | TEMPERATURE: 97.9 F | DIASTOLIC BLOOD PRESSURE: 78 MMHG | HEIGHT: 69 IN | WEIGHT: 209 LBS | HEART RATE: 82 BPM

## 2021-09-27 DIAGNOSIS — G91.2 NORMAL PRESSURE HYDROCEPHALUS (HCC): ICD-10-CM

## 2021-09-27 DIAGNOSIS — R26.89 FUNCTIONAL GAIT ABNORMALITY: ICD-10-CM

## 2021-09-27 DIAGNOSIS — G20 PARKINSONISM, UNSPECIFIED PARKINSONISM TYPE (HCC): ICD-10-CM

## 2021-09-27 DIAGNOSIS — G91.2 INPH (IDIOPATHIC NORMAL PRESSURE HYDROCEPHALUS) (HCC): Primary | ICD-10-CM

## 2021-09-27 DIAGNOSIS — R26.89 BALANCE DISORDER: Primary | ICD-10-CM

## 2021-09-27 PROCEDURE — 99215 OFFICE O/P EST HI 40 MIN: CPT | Performed by: NEUROLOGICAL SURGERY

## 2021-09-27 PROCEDURE — 97164 PT RE-EVAL EST PLAN CARE: CPT

## 2021-09-27 NOTE — PROGRESS NOTES
PT Evaluation : NPH Assessment    Today's date: 2021  Patient name: Alfredia Cockayne  : 1940  MRN: [id number]  Referring provider: Stevie Corrigan MD  Dx:   Encounter Diagnosis     ICD-10-CM    1  Balance disorder  R26 89    2  Normal pressure hydrocephalus (HCC)  G91 2    3  Functional gait abnormality  R26 89        Start Time: 1100  Stop Time: 1145  Total time in clinic (min): 45 minutes    Assessment/Plan     NPH assessment completed today; Kathia Spicer has made significant improvements in his overall mobility per TUG since his last NPH assessment and last progress assessment last week with the assistance of routine administration of his dopamine supplementation medication  He continues to be considered a greater risk for falls per gait speed, TUG, and FGA testing - we will continue to progress with skilled PT care now that his parkinsonian-symptoms are being better managed with medication  Per his [facility], I will again make the recommendation for occupational therapy services however he remains hesitant about his need for this  Subjective     Kathia Spicer presents today for follow-up NPH evaluation  In the last two weeks, he has seen neurology for his parkinsonism consult, has initiated dopamine supplementation, and per his wife has noted significant improvements in his overall mobility, motivation to move, and decreased c/o fatigue  He has been more agreeable to taking this medication with with his anti-nausea medication   Today he was hesitant to walk with [facility] stating "I don't think I need this "    Objective     NPH Clinic Assessment:    Gait Quality :   Wide Base of Support:    Present  Outwardly rotated feet:  Present  Step Height Diminished:  Present   Gait Speed:  - 10 meter walk test: 1 03 m/s no AD (<1m/s predictive of falls)  Comments:  Fall Risk: YES   TUG  14 45s  sec no AD   (<12 sec predictive of falls)   FGA  17/30 (< 24 predictive of falls)   Number of falls in the last month: NONE  Cognition:   MOCA:  21 /30 (<26 cognitive deficit present)  Comments:    Urinary incontinence: NO    Recommendations:  - Physical Therapy: Continue per POC as noted in progress assessment performed last week  - Speech Therapy: No recommendations  -     Occupational Therapy: Recommended for UE dexterity secondary to parkinsonian presentation as well as difficulties with memory recall

## 2021-09-27 NOTE — PROGRESS NOTES
Office Note - Neurosurgery   Jayashree Jaya Rosen 80 y o  male MRN: [id number]      Assessment:    Patient is gradually improving  72-year-old gentleman with right frontal  shunt for normal pressure hydrocephalus  He has had recent improvement in his gait and balance after starting Parkinson's medication for Parkinsonism  He will continue to titrate the dose under the supervision of Neurology  Would not recommend any adjustment to his shunt at present  He will follow-up with repeat PT and cognitive assessment in 6 months time through the integrated normal pressure hydrocephalus program       History, physical examination and diagnostic tests were reviewed and questions answered  Diagnosis, care plan and treatment options were discussed  The patient and spouse/SO understand instructions and will follow up as directed  Plan:    Follow-up:  Six months    Problem List Items Addressed This Visit        Nervous and Auditory    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    Parkinsonism ([address] )          Subjective/Objective     Chief Complaint    None  HPI    72-year-old gentleman with right frontal  shunt for normal pressure hydrocephalus  Since his last visit, he was seen by Neurology and started on carbidopa/levodopa  He started this medication this week and has noted some nausea with it  However both he and his wife do note that about an hour after taking the medication his gait and balance seen much more fluid and he is able to walk quicker  They are quite encouraged by this early progress  He still has to titrate the dose up to 3 times a day  Otherwise, he denies any headaches or tenderness along the shunt tract  KATIE WILSON personally reviewed and updated  Review of Systems   Constitutional: Negative  HENT: Positive for hearing loss  Eyes: Negative  Respiratory: Negative  Cardiovascular: Negative  Gastrointestinal: Positive for constipation     Endocrine: Negative  Genitourinary: Negative  Musculoskeletal: Positive for gait problem (shuffling gait, has spurts where he walks faster)  Skin: Negative  Allergic/Immunologic: Negative  Neurological: Positive for weakness (legs)  Hematological: Negative  Psychiatric/Behavioral:        Trouble with memory; long term is good, short tem not as good     All other systems reviewed and are negative  Family History    Family History   Problem Relation Age of Onset    Heart disease Mother         RHEUM    Prostate cancer Father     Cancer Maternal Uncle     Alcohol abuse Neg Hx     Depression Neg Hx     Mental illness Neg Hx     Substance Abuse Neg Hx        Social History    Social History     Socioeconomic History    Marital status: /Civil Union     Spouse name: Not on file    Number of children: 1    Years of education: Not on file    Highest education level: Not on file   Occupational History    Occupation: PHYSICIST, TE[employer]   Tobacco Use    Smoking status: Never Smoker    Smokeless tobacco: Never Used   Vaping Use    Vaping Use: Never used   Substance and Sexual Activity    Alcohol use: No    Drug use: No    Sexual activity: Not on file   Other Topics Concern    Not on file   Social History Narrative    Lives at home with wife    Retired        [employer]     [address] Strain:     Difficulty of Paying Living Expenses:    Food Insecurity:     Worried About [facility] in the Last Year:    [address] in the Last Year:    Transportation Needs:     Lack of Transportation (Medical):      Lack of Transportation (Non-Medical):    Physical Activity:     Days of Exercise per Week:     Minutes of Exercise per Session:    Stress:     Feeling of Stress :    Social Connections:     Frequency of Communication with Friends and Family:     Frequency of Social Gatherings with Friends and Family:     Attends [employer]ist Services:     Active Member of Clubs or Organizations:     Attends Club or Organization Meetings:     Marital Status:    Intimate Partner Violence:     Fear of Current or Ex-Partner:     Emotionally Abused:     Physically Abused:     Sexually Abused:        Past Medical History    Past Medical History:   Diagnosis Date    Arthritis     GERD (gastroesophageal reflux disease)     Hiatal hernia     Hypertension     Infectious viral hepatitis     Obesity     Spinal stenosis        Surgical History    Past Surgical History:   Procedure Laterality Date    APPENDECTOMY  1952    LA COLONOSCOPY FLX DX W/COLLJ SPEC WHEN PFRMD N/A 1/23/2019    Procedure: COLONOSCOPY;  Surgeon: Miya Li MD;  Location: [facility] GI LAB; Service: Gastroenterology    LA ESOPHAGOGASTRODUODENOSCOPY TRANSORAL DIAGNOSTIC N/A 1/23/2019    Procedure: ESOPHAGOGASTRODUODENOSCOPY (EGD); Surgeon: Miya Li MD;  Location: [facility] GI LAB;   Service: Gastroenterology    VENTRICULOATRIAL SHUNT  06/2012    VENTRICULOPERITONEAL SHUNT         Medications      Current Outpatient Medications:     carbidopa (LODOSYN) 25 MG tablet, Take 1 tablet by mouth 3 (three) times a day, Disp: 90 tablet, Rfl: 90    carbidopa-levodopa (SINEMET)  mg per tablet, Take 1 tablet by mouth 3 (three) times a day with meals, Disp: 90 tablet, Rfl: 2    Cholecalciferol (VITAMIN D-3) 5000 units TABS, Take 1 tablet by mouth daily , Disp: , Rfl:     enalapril (VASOTEC) 10 mg tablet, Take 1/2 (one-half) tablet by mouth once daily, Disp: 45 tablet, Rfl: 1    hydrochlorothiazide (HYDRODIURIL) 25 mg tablet, TAKE 1/2 (ONE-HALF) TABLET BY MOUTH ONCE DAILY MAY  TAKE  1  FULL  TABLET  IF  LEG  SWELLING, Disp: 90 tablet, Rfl: 1    ibuprofen (MOTRIN) 200 mg tablet, Take 600 mg by mouth every 6 (six) hours as needed for mild pain, Disp: , Rfl:     Multiple Vitamin (MULTI-VITAMIN DAILY PO), Take 1 tablet by mouth daily, Disp: , Rfl:     pantoprazole (PROTONIX) 40 mg tablet, Take 1 tablet (40 mg total) by mouth 2 (two) times a day before meals, Disp: 180 tablet, Rfl: 1    Aspirin-Calcium Carbonate  MG TABS, Take by mouth daily (Patient not taking: Reported on 9/14/2021), Disp: , Rfl:     atorvastatin (LIPITOR) 20 mg tablet, Take 1 tablet (20 mg total) by mouth daily (Patient not taking: Reported on 9/14/2021), Disp: 90 tablet, Rfl: 1    Allergies    No Known Allergies    The following portions of the patient's history were reviewed and updated as appropriate: allergies, current medications, past family history, past medical history, past social history, past surgical history and problem list     Investigations    I personally reviewed the NPH PT and NPH Cognitive results with the patient:    NPH scale dated 9/27/21, 1215  PT gait assessment dated 9/27/21  10 meter walking test 1 03 m/s (<1m/s predictive of falls), TUG 14 45 sec (<12 sec predictive of falls), FGA 17/30 (< 24 predictive of falls)  MoCA dated 9/27/21, 21/30 (<26 cognitive deficit present)  Physical Exam    Vitals:  Blood pressure 114/78, pulse 82, temperature 97 9 °F (36 6 °C), temperature source Temporal, height 5' 9" (1 753 m), weight 94 8 kg (209 lb)  ,Body mass index is 30 86 kg/m²  Physical Exam  Vitals reviewed  Constitutional:       General: He is not in acute distress  Eyes:      Extraocular Movements: Extraocular movements intact  Skin:     General: Skin is warm and dry  Comments: Right frontal  shunt depresses and refills easily  Neurological:      Mental Status: He is alert and oriented to person, place, and time  Comments: Stands from seated position without difficulty  Takes a few early magnetic steps and then walks with minimal shuffling  On bloc turn in 4 steps  Not using a walker or cane     Psychiatric:         Mood and Affect: Mood normal          Behavior: Behavior normal        Neurologic Exam     Mental Status Oriented to person, place, and time

## 2021-10-06 ENCOUNTER — OFFICE VISIT (OUTPATIENT)
Dept: PHYSICAL THERAPY | Facility: CLINIC | Age: 81
End: 2021-10-06
Payer: MEDICARE

## 2021-10-06 DIAGNOSIS — R26.89 FUNCTIONAL GAIT ABNORMALITY: ICD-10-CM

## 2021-10-06 DIAGNOSIS — G91.2 NORMAL PRESSURE HYDROCEPHALUS (HCC): ICD-10-CM

## 2021-10-06 DIAGNOSIS — R26.89 BALANCE DISORDER: Primary | ICD-10-CM

## 2021-10-06 PROCEDURE — 97112 NEUROMUSCULAR REEDUCATION: CPT

## 2021-10-06 PROCEDURE — 97116 GAIT TRAINING THERAPY: CPT

## 2021-10-13 ENCOUNTER — APPOINTMENT (OUTPATIENT)
Dept: PHYSICAL THERAPY | Facility: CLINIC | Age: 81
End: 2021-10-13
Payer: MEDICARE

## 2021-10-14 ENCOUNTER — TELEPHONE (OUTPATIENT)
Dept: LAB | Facility: HOSPITAL | Age: 81
End: 2021-10-14

## 2021-10-18 ENCOUNTER — TELEPHONE (OUTPATIENT)
Dept: NEUROLOGY | Facility: CLINIC | Age: 81
End: 2021-10-18

## 2021-10-18 ENCOUNTER — APPOINTMENT (OUTPATIENT)
Dept: PHYSICAL THERAPY | Facility: CLINIC | Age: 81
End: 2021-10-18
Payer: MEDICARE

## 2021-10-19 ENCOUNTER — PATIENT MESSAGE (OUTPATIENT)
Dept: OTHER | Facility: HOSPITAL | Age: 81
End: 2021-10-19

## 2021-10-20 ENCOUNTER — APPOINTMENT (OUTPATIENT)
Dept: LAB | Facility: HOSPITAL | Age: 81
End: 2021-10-20
Payer: MEDICARE

## 2021-10-20 ENCOUNTER — APPOINTMENT (OUTPATIENT)
Dept: PHYSICAL THERAPY | Facility: CLINIC | Age: 81
End: 2021-10-20
Payer: MEDICARE

## 2021-10-20 DIAGNOSIS — C61 MALIGNANT NEOPLASM OF PROSTATE (HCC): Primary | ICD-10-CM

## 2021-10-20 LAB — PSA SERPL-MCNC: 11.9 NG/ML (ref 0–4)

## 2021-10-20 PROCEDURE — 84153 ASSAY OF PSA TOTAL: CPT

## 2021-10-21 ENCOUNTER — TELEPHONE (OUTPATIENT)
Dept: NEUROLOGY | Facility: CLINIC | Age: 81
End: 2021-10-21

## 2021-10-21 ENCOUNTER — TELEPHONE (OUTPATIENT)
Dept: NEUROSURGERY | Facility: CLINIC | Age: 81
End: 2021-10-21

## 2021-10-25 ENCOUNTER — CLINICAL SUPPORT (OUTPATIENT)
Dept: INTERNAL MEDICINE CLINIC | Facility: CLINIC | Age: 81
End: 2021-10-25
Payer: MEDICARE

## 2021-10-25 DIAGNOSIS — Z23 NEED FOR INFLUENZA VACCINATION: Primary | ICD-10-CM

## 2021-10-25 PROCEDURE — 90662 IIV NO PRSV INCREASED AG IM: CPT

## 2021-10-25 PROCEDURE — G0008 ADMIN INFLUENZA VIRUS VAC: HCPCS

## 2021-10-27 ENCOUNTER — APPOINTMENT (OUTPATIENT)
Dept: PHYSICAL THERAPY | Facility: CLINIC | Age: 81
End: 2021-10-27
Payer: MEDICARE

## 2021-11-02 ENCOUNTER — OFFICE VISIT (OUTPATIENT)
Dept: NEUROLOGY | Facility: CLINIC | Age: 81
End: 2021-11-02
Payer: MEDICARE

## 2021-11-02 VITALS
TEMPERATURE: 97.6 F | HEART RATE: 85 BPM | BODY MASS INDEX: 30.04 KG/M2 | SYSTOLIC BLOOD PRESSURE: 118 MMHG | DIASTOLIC BLOOD PRESSURE: 70 MMHG | WEIGHT: 203.4 LBS

## 2021-11-02 DIAGNOSIS — G20 PARKINSON'S DISEASE (HCC): Primary | ICD-10-CM

## 2021-11-02 DIAGNOSIS — K59.00 CONSTIPATION, UNSPECIFIED CONSTIPATION TYPE: ICD-10-CM

## 2021-11-02 PROCEDURE — 99213 OFFICE O/P EST LOW 20 MIN: CPT | Performed by: NURSE PRACTITIONER

## 2021-11-11 ENCOUNTER — TELEPHONE (OUTPATIENT)
Dept: NEUROLOGY | Facility: CLINIC | Age: 81
End: 2021-11-11

## 2021-11-23 DIAGNOSIS — G20 PARKINSONISM, UNSPECIFIED PARKINSONISM TYPE (HCC): ICD-10-CM

## 2021-11-23 NOTE — TELEPHONE ENCOUNTER
Patient of [facility], last office visit 11/2, office note documents: "Suggest increasing the Sinemet (carbidopa/levodopa) to 1 5 tablets 3x/day slowly (one dose at a time for a few days at a time)  Patient is taking     Tristin Espinoza, wife, calling for refill for patient of sinemet,  mg; she said patient is taking 1 5 tablets in AM and afternoon and 1 tab at night; She will increase last nightime dose to 1 5 mg as recommended if needed  Last filled; just picked up 90 days supply but now no more refills  Please sign script if in agreement  Thank you      Pharmacy: [facility] [place]

## 2021-11-29 ENCOUNTER — TELEPHONE (OUTPATIENT)
Dept: LAB | Facility: HOSPITAL | Age: 81
End: 2021-11-29

## 2021-12-06 ENCOUNTER — APPOINTMENT (OUTPATIENT)
Dept: LAB | Facility: HOSPITAL | Age: 81
End: 2021-12-06
Payer: MEDICARE

## 2021-12-08 ENCOUNTER — TELEPHONE (OUTPATIENT)
Dept: NEUROLOGY | Facility: CLINIC | Age: 81
End: 2021-12-08

## 2021-12-08 DIAGNOSIS — G20 PARKINSONISM, UNSPECIFIED PARKINSONISM TYPE (HCC): Primary | ICD-10-CM

## 2021-12-23 ENCOUNTER — TELEPHONE (OUTPATIENT)
Dept: INTERNAL MEDICINE CLINIC | Facility: CLINIC | Age: 81
End: 2021-12-23

## 2021-12-23 NOTE — TELEPHONE ENCOUNTER
Discharged yesterday from PT home care  Recommending to continue outpatient PT with [facility] (OUTPATIENT CAMPUS) since they can do home based PT for Parkinsons and gait dysfunction    If okay, please fax order to [phone] Attention:  Lazarus Robinson

## 2022-01-05 ENCOUNTER — TRANSCRIBE ORDERS (OUTPATIENT)
Dept: ADMINISTRATIVE | Facility: HOSPITAL | Age: 82
End: 2022-01-05

## 2022-01-05 DIAGNOSIS — C61 PROSTATE CANCER (HCC): Primary | ICD-10-CM

## 2022-01-12 ENCOUNTER — TELEPHONE (OUTPATIENT)
Dept: LAB | Facility: HOSPITAL | Age: 82
End: 2022-01-12

## 2022-02-02 ENCOUNTER — TELEPHONE (OUTPATIENT)
Dept: NEUROSURGERY | Facility: CLINIC | Age: 82
End: 2022-02-02

## 2022-02-02 ENCOUNTER — APPOINTMENT (OUTPATIENT)
Dept: LAB | Facility: HOSPITAL | Age: 82
End: 2022-02-02
Payer: MEDICARE

## 2022-02-02 DIAGNOSIS — C61 PROSTATE CANCER (HCC): ICD-10-CM

## 2022-02-02 LAB — BUN SERPL-MCNC: 18 MG/DL (ref 5–25)

## 2022-02-02 PROCEDURE — 84520 ASSAY OF UREA NITROGEN: CPT

## 2022-02-02 PROCEDURE — 36415 COLL VENOUS BLD VENIPUNCTURE: CPT

## 2022-02-02 NOTE — TELEPHONE ENCOUNTER
----- Message from Amanda Munroe RN sent at 9/27/2021  1:29 PM EDT -----  Regarding: NPH-6 month f/u (due in march 2022 )  Call patient to schedule 6 month f/u through NPH Clinic    Due in March 2022

## 2022-02-02 NOTE — TELEPHONE ENCOUNTER
LM for call back at patient/wife's convenience to schedule 6 month f/u through [facility]

## 2022-02-03 NOTE — TELEPHONE ENCOUNTER
Called Orlando Hart back today to schedule appt  He is scheduled at [facility] [place] on Monday 3/21 @ 1pm   He is currently doing PT at home [facility]  She gave me a contact number for [facility] [phone] and I will reach out to see if she can complete the gait assessment form with the patient prior to her appt on 3/21

## 2022-02-07 ENCOUNTER — HOSPITAL ENCOUNTER (OUTPATIENT)
Dept: RADIOLOGY | Facility: HOSPITAL | Age: 82
Discharge: HOME | End: 2022-02-07
Payer: MEDICARE

## 2022-02-07 DIAGNOSIS — C61 PROSTATE CANCER (HCC): ICD-10-CM

## 2022-02-07 PROCEDURE — A9585 GADOBUTROL INJECTION: HCPCS | Performed by: RADIOLOGY

## 2022-02-07 PROCEDURE — 72197 MRI PELVIS W/O & W/DYE: CPT

## 2022-02-07 PROCEDURE — 76377 3D RENDER W/INTRP POSTPROCES: CPT

## 2022-02-07 RX ADMIN — GADOBUTROL 9 ML: 604.72 INJECTION INTRAVENOUS at 12:43

## 2022-02-08 NOTE — TELEPHONE ENCOUNTER
Received call from Tom perla at [facility] ([facility])  She had me email her the PT gait assessment form to [email or web address]  She is going to get it to the clinician working with [place]  Will check  to appointment if paperwork has not been received

## 2022-02-08 NOTE — TELEPHONE ENCOUNTER
Spoke with a gentleman at the [facility]  He is going to reach out to the treating PT and have them give me a call back  I provided him with my direct number  He informed me that [facility] is actually the OT not the PT  Awaiting call back  If I do not receive a call back by tomorrow, I will reach back out to the patient's wife

## 2022-02-15 ENCOUNTER — PATIENT MESSAGE (OUTPATIENT)
Dept: INTERNAL MEDICINE CLINIC | Facility: CLINIC | Age: 82
End: 2022-02-15

## 2022-02-15 DIAGNOSIS — I10 ESSENTIAL HYPERTENSION: ICD-10-CM

## 2022-02-15 RX ORDER — ENALAPRIL MALEATE 10 MG/1
TABLET ORAL
Qty: 45 TABLET | Refills: 0 | Status: SHIPPED | OUTPATIENT
Start: 2022-02-15 | End: 2022-05-13 | Stop reason: SDUPTHER

## 2022-03-02 ENCOUNTER — TELEPHONE (OUTPATIENT)
Dept: INTERNAL MEDICINE CLINIC | Facility: CLINIC | Age: 82
End: 2022-03-02

## 2022-03-02 NOTE — TELEPHONE ENCOUNTER
Patient's wife is requesting a script for a [facility] #[id number] as recommended by physical therapist   Darrel Pickens never used a medical supply company so would need recommendation

## 2022-03-03 NOTE — TELEPHONE ENCOUNTER
[employer]el roll[employer] approved, David Cazares aware, states she was told the package will be delivered in 2-3 days

## 2022-03-07 NOTE — TELEPHONE ENCOUNTER
Sent email to Tom perla at [facility] to follow up on [place] paperwork  Will call tomorrow if nothing is received back

## 2022-03-07 NOTE — TELEPHONE ENCOUNTER
Masha emailed me back  They are going ot be completing the paperwork with the patient this week  She said that the patient wanted to do it this week so that it was closer to the appointment  Will touch base on thurs if I have no received anything

## 2022-03-11 NOTE — TELEPHONE ENCOUNTER
Sent a message to [facility] since I have not received a PT form and patient is scheduled to come into the office on Monday with Dr Malgorzata Puentes  She said patient refused this week but is willing to complete it next week  The PT will be coming to his house tues and thurs  I spoke with Ulises's wife Mirna William and we both agreed to r/s his appt from Monday at [facility] to Friday in [place] to allow for time for the form to be received  I will be out of the office so I made both Masha and Messi aware and [facility] will email the form to Jasmyn Mccormack in my absence

## 2022-03-11 NOTE — TELEPHONE ENCOUNTER
Wife called me back after we scheduled appt for Friday, 3/18 informing me that this day will not work because she will be out of town  She wants to r/s for another Monday at [facility] [place] but cannot do March 28th    Will r/s for Monday April 4th at 1030 am

## 2022-04-04 ENCOUNTER — OFFICE VISIT (OUTPATIENT)
Dept: NEUROSURGERY | Facility: CLINIC | Age: 82
End: 2022-04-04
Payer: MEDICARE

## 2022-04-04 VITALS
HEIGHT: 69 IN | WEIGHT: 211 LBS | SYSTOLIC BLOOD PRESSURE: 102 MMHG | DIASTOLIC BLOOD PRESSURE: 68 MMHG | RESPIRATION RATE: 20 BRPM | HEART RATE: 73 BPM | BODY MASS INDEX: 31.25 KG/M2 | TEMPERATURE: 98.2 F

## 2022-04-04 DIAGNOSIS — G91.2 INPH (IDIOPATHIC NORMAL PRESSURE HYDROCEPHALUS) (HCC): Primary | ICD-10-CM

## 2022-04-04 DIAGNOSIS — M47.12 CERVICAL SPONDYLOSIS WITH MYELOPATHY: ICD-10-CM

## 2022-04-04 DIAGNOSIS — G20 PARKINSONISM, UNSPECIFIED PARKINSONISM TYPE (HCC): ICD-10-CM

## 2022-04-04 PROCEDURE — 99214 OFFICE O/P EST MOD 30 MIN: CPT | Performed by: NEUROLOGICAL SURGERY

## 2022-04-04 NOTE — PROGRESS NOTES
Office Note - Neurosurgery   Bina Alas [address] [de-identified] y o  male MRN: [id number]      Assessment:    Patient is stable  22-year-old gentleman with known cervical spondylosis and stenosis with possible early myelopathy and right frontal  shunt for normal pressure hydrocephalus  Since his last visit he has been formally diagnosed with Parkinson's disease it is currently working on titrating medications to optimal benefit  He is now walking with a walker, though I suspect this is related to his Parkinson's as opposed NPH or cervical myelopathy  He is working with at home PT which has been quite helpful  I encouraged him to remain active  He will follow-up in 1 year's time through the integrated normal pressure hydrocephalus program at [facility]  They will contact this office should any concerns arise in the interim  History, physical examination and diagnostic tests were reviewed and questions answered  Diagnosis, care plan and treatment options were discussed  The patient and spouse/SO understand instructions and will follow up as directed  Plan:    Follow-up:  1 year    Problem List Items Addressed This Visit        Nervous and Auditory    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    Cervical spondylosis with myelopathy    Parkinsonism (HCC)          Subjective/Objective     Chief Complaint    Gait imbalance  HPI    HPI    ROS  ROS personally reviewed and updated  Review of Systems   Constitutional: Negative  HENT: Positive for hearing loss  Eyes: Negative  Respiratory: Negative  Cardiovascular: Negative  Gastrointestinal: Positive for constipation and nausea (SOMETIMES)  Endocrine: Negative  Genitourinary: Negative  Last ov with urology 2/22/22 for prostate cancer   Musculoskeletal: Positive for gait problem (USING WALKER-LAST FALL 10/2021-shuffling gait, has spurts where he walks faster)  Skin: Negative  Allergic/Immunologic: Negative  Neurological: Positive for tremors (HX OF PD), weakness (HANDS AND LEGS) and numbness (B/L HANDS)  LAST OV WITH NEUROLOGY 11/2/21   Hematological: Negative  Psychiatric/Behavioral: Positive for confusion (SHORT TERM MEMORY LOSS) and decreased concentration  Trouble with memory; long term is good, short tem not as good     All other systems reviewed and are negative        Family History    Family History   Problem Relation Age of Onset    Heart disease Mother         RHEUM    Prostate cancer Father     Cancer Maternal Uncle     Alcohol abuse Neg Hx     Depression Neg Hx     Mental illness Neg Hx     Substance Abuse Neg Hx        Social History    Social History     Socioeconomic History    Marital status: /Civil Union     Spouse name: Not on file    Number of children: 1    Years of education: Not on file    Highest education level: Not on file   Occupational History    Occupation: PHYSICIST, TELE[employer]   Tobacco Use    Smoking status: Never Smoker    Smokeless tobacco: Never Used   Vaping Use    Vaping Use: Never used   Substance and Sexual Activity    Alcohol use: No    Drug use: No    Sexual activity: Not on file   Other Topics Concern    Not on file   Social History Narrative    Lives at home with wife    Retired        [employer]      Social Determinants of Health     Financial Resource Strain: Not on file   Food Insecurity: Not on file   Transportation Needs: Not on file   Physical Activity: Not on file   Stress: Not on file   Social Connections: Not on file   Intimate Partner Violence: Not on file   Housing Stability: Not on file       Past Medical History    Past Medical History:   Diagnosis Date    Arthritis     GERD (gastroesophageal reflux disease)     Hiatal hernia     Hypertension     Infectious viral hepatitis     Obesity     Spinal stenosis        Surgical History    Past Surgical History:   Procedure Laterality Date    APPENDECTOMY  1952    HI COLONOSCOPY FLX DX W/COLLJ LTAC, [facility] WHEN PFRMD N/A 1/23/2019    Procedure: COLONOSCOPY;  Surgeon: Mt Gomez MD;  Location: [facility] GI [facility]; Service: Gastroenterology    HI ESOPHAGOGASTRODUODENOSCOPY TRANSORAL DIAGNOSTIC N/A 1/23/2019    Procedure: ESOPHAGOGASTRODUODENOSCOPY (EGD); Surgeon: Mt Gomez MD;  Location: [facility] GI [facility];   Service: Gastroenterology    VENTRICULOATRIAL SHUNT  06/2012    VENTRICULOPERITONEAL SHUNT         Medications      Current Outpatient Medications:     Aspirin-Calcium Carbonate  MG TABS, Take by mouth daily  , Disp: , Rfl:     atorvastatin (LIPITOR) 20 mg tablet, Take 1 tablet (20 mg total) by mouth daily, Disp: 90 tablet, Rfl: 1    carbidopa-levodopa (SINEMET)  mg per tablet, Take 1 5 tablets by mouth 3 (three) times a day with meals Will increase to 1 5 tablets three times/day, Disp: 135 tablet, Rfl: 5    Cholecalciferol (VITAMIN D-3) 5000 units TABS, Take 1 tablet by mouth daily , Disp: , Rfl:     enalapril (VASOTEC) 10 mg tablet, Take half tablet by mouth once daily, Disp: 45 tablet, Rfl: 0    hydrochlorothiazide (HYDRODIURIL) 25 mg tablet, TAKE 1/2 (ONE-HALF) TABLET BY MOUTH ONCE DAILY MAY  TAKE  1  FULL  TABLET  IF  LEG  SWELLING, Disp: 30 tablet, Rfl: 0    ibuprofen (MOTRIN) 200 mg tablet, Take 600 mg by mouth every 6 (six) hours as needed for mild pain, Disp: , Rfl:     Multiple Vitamin (MULTI-VITAMIN DAILY PO), Take 1 tablet by mouth daily, Disp: , Rfl:     pantoprazole (PROTONIX) 40 mg tablet, Take 1 tablet (40 mg total) by mouth 2 (two) times a day before meals, Disp: 180 tablet, Rfl: 1    Allergies    No Known Allergies    The following portions of the patient's history were reviewed and updated as appropriate: allergies, current medications, past family history, past medical history, past social history, past surgical history and problem list     Investigations    I personally reviewed the NPH PT and NPH Cognitive results with the patient:    NPH scale dated 4/4/22, 12/15  PT gait assessment dated 2/22/22  10 meter walking test  068 m/s (<1m/s predictive of falls), TUG 44 sec (<12 sec predictive of falls), FGA 6/30 (< 24 predictive of falls)  MoCA dated 4/4/22, 22/30 (<26 cognitive deficit present)  Physical Exam    Vitals:  Blood pressure 102/68, pulse 73, temperature 98 2 °F (36 8 °C), temperature source Temporal, resp  rate 20, height 5' 9" (1 753 m), weight 95 7 kg (211 lb)  ,Body mass index is 31 16 kg/m²  Physical Exam  Vitals reviewed  Constitutional:       General: He is not in acute distress  Eyes:      Extraocular Movements: Extraocular movements intact  Pulmonary:      Effort: Pulmonary effort is normal  No respiratory distress  Skin:     General: Skin is warm and dry  Neurological:      Mental Status: He is alert  Comments: Stands from a seated position slowly  Frozen stance  Takes approximately 20 seconds to initiate for step  Walks with short shuffling gait  Toes pointed forward  On bloc turn in multiple small steps  No dysdiadochokinesia  Godwin's negative bilaterally  Full power in upper extremities  Reports normal light touch sensation upper extremities     Psychiatric:         Mood and Affect: Mood normal          Behavior: Behavior normal        Neurologic Exam

## 2022-04-06 ENCOUNTER — OFFICE VISIT (OUTPATIENT)
Dept: NEUROLOGY | Facility: CLINIC | Age: 82
End: 2022-04-06
Payer: MEDICARE

## 2022-04-06 VITALS
SYSTOLIC BLOOD PRESSURE: 139 MMHG | DIASTOLIC BLOOD PRESSURE: 72 MMHG | BODY MASS INDEX: 31.31 KG/M2 | WEIGHT: 212 LBS | HEART RATE: 68 BPM | TEMPERATURE: 97 F

## 2022-04-06 DIAGNOSIS — G20 PARKINSONISM, UNSPECIFIED PARKINSONISM TYPE (HCC): ICD-10-CM

## 2022-04-06 PROCEDURE — 99214 OFFICE O/P EST MOD 30 MIN: CPT | Performed by: PHYSICIAN ASSISTANT

## 2022-04-06 NOTE — ASSESSMENT & PLAN NOTE
Patient with Parkinson's disease  He has had improvement of symptoms with the addition of Sinemet and does feel that his tremors, freezing and mobility are better on the higher dosing  He dopes however continue to struggle with wearing off between doses which can be bothersome  Because of this we discussed having him change the Sinemet timing to shorten the interval between doses  He will start taking Sinemet 1 5tabs at 8am, 1pm, 6pm, 1tab at 10pm to see if this helps with wearing off  He will call with any side effects with  this change  He was also encouraged to remain active    Discussed the [employer]

## 2022-04-06 NOTE — PROGRESS NOTES
Patient ID: Ruddy Mendez is a [de-identified] y o  male  Assessment/Plan:    Parkinsonism [facility])  Patient with Parkinson's disease  He has had improvement of symptoms with the addition of Sinemet and does feel that his tremors, freezing and mobility are better on the higher dosing  He dopes however continue to struggle with wearing off between doses which can be bothersome  Because of this we discussed having him change the Sinemet timing to shorten the interval between doses  He will start taking Sinemet 1 5tabs at 8am, 1pm, 6pm, 1tab at 10pm to see if this helps with wearing off  He will call with any side effects with  this change  He was also encouraged to remain active  Discussed the [employer]        Subjective:    Ruddy Mendez is an [de-identified]year old male with a past medical history of HTN, NPH with shunt placement about 13 years ago (follows with neurosurgery), GERD, cervical spinal and parkinsonism who presents for evaluation with the [facility]  To review, gait issues and tremors have been slowly progressive over years, but more recently it has significantly affected his day to day activities  He was started on Sinemet (9/2021) and referred to PT in the past with some overall benefit  At his last visit with Isabel Gonzalez his Sinemet dose was increased to 1 5tabs tid  INTERVAL HISTORY:  He had improvement with the increased Sinemet dose  Less tremors and freezing of gait     If he misses a dose of medication he will have more tremors, freeze and feels less mobile   He has a walker however his therapist feels that he should use it at all times   He currently has home therapy   He did have a fall down the stairs at home in October   No fall since that time   He can dress on his own, he brushes his own teeth   He does have a shower chair and grab bars   He sleeps well at night however he does often wake around 2-3am and he may struggle with falling back to sleep   No yelling or thrashing in sleep   No issues with swallowing   No issues with long term memory, he does have some trouble with short term however this has always been the issues   No hallucinations   He feels his best in the AM after the 8am dose, his wife can notice that the medication seems to wear off about 30 min prior to the next dose  He does belong to the local gym and would like to start going here again     Current medications:  Sinemet 1 5tabs 8am, 2pm, 1tab at 8pm     Prior medications:  Carbidopa - used for nausea, this improved on own and medication no longer needed       I personally reviewed and updated the ROS  Objective:    Blood pressure 139/72, pulse 68, temperature (!) 97 °F (36 1 °C), temperature source Temporal, weight 96 2 kg (212 lb)  Physical Exam  Constitutional:       General: He is awake  HENT:      Right Ear: Hearing normal       Left Ear: Hearing normal    Eyes:      General: Lids are normal       Extraocular Movements: Extraocular movements intact  Pupils: Pupils are equal, round, and reactive to light  Neurological:      Mental Status: He is alert  Deep Tendon Reflexes: Strength normal    Psychiatric:         Speech: Speech normal          Neurological Exam  Mental Status  Awake and alert  Oriented to person, place and time  Speech is normal     Cranial Nerves  CN III, IV, VI: Extraocular movements intact bilaterally  Normal lids and orbits bilaterally  Pupils equal round and reactive to light bilaterally  CN V:  Right: Facial sensation is normal   Left: Facial sensation is normal on the left  CN VII:  Right: There is no facial weakness  Left: There is no facial weakness  CN VIII:  Right: Hearing is normal   Left: Hearing is normal   CN IX, X: Palate elevates symmetrically  CN XI: Shoulder shrug strength is normal   CN XII: Tongue midline without atrophy or fasciculations  Motor   Increased muscle tone   Strength is 5/5 throughout all four extremities  Sensory  Light touch is normal in upper and lower extremities  Reflexes  Glabellar tap present  Coordination  Right: Finger-to-nose abnormality:  Left: Finger-to-nose abnormality:    Gait  Casual gait:                                  Date of exam:           Speech  1    Facial Expression      Rigidity - Neck      Rigidity - Upper Extremity (Right)  1    Rigidity - Upper Extremity (Left)   0    Rigidity - Lower Extremity (Right)  0    Rigidity - Lower Extremity (Left)   0    Finger Taps (Right)   2    Finger Taps (Left)   1    Hand  (Right)  1    Hand  (Left)   0    Pronation/Supination (Right)  2    Pronation/Supination (Left)   1    Heel Taps (Right) 1    Heel Taps(Left) 2    Arising from Chair   1    Gait   2    Postural Stability       Posture 1    Global spontaneity of movement 1    Postural Tremor (Right) 0    Postural Tremor (Left) 0    Kinetic Tremor (Right)  0    Kinetic Tremor (Left)  0    Rest tremor  RUE 0    Rest tremor  LUE 0    Rest tremor  RLE 0    Reset tremor  LLE 0    Lip/Jaw Tremor      Motor Exam Total:              ROS:    Review of Systems   Constitutional: Negative  Negative for appetite change and fever  HENT: Negative  Negative for hearing loss, tinnitus, trouble swallowing and voice change  Eyes: Negative  Negative for photophobia and pain  Respiratory: Negative  Negative for shortness of breath  Cardiovascular: Negative  Negative for palpitations  Gastrointestinal: Negative  Negative for nausea and vomiting  Endocrine: Negative  Negative for cold intolerance  Genitourinary: Negative  Negative for dysuria, frequency and urgency  Musculoskeletal: Negative  Negative for myalgias and neck pain  Skin: Negative  Negative for rash  Neurological: Positive for tremors and numbness (L hand)  Negative for dizziness, seizures, syncope, facial asymmetry, speech difficulty, weakness, light-headedness and headaches     Hematological: Negative  Does not bruise/bleed easily  Psychiatric/Behavioral: Positive for sleep disturbance  Negative for confusion and hallucinations

## 2022-04-06 NOTE — PATIENT INSTRUCTIONS
Patient with Parkinson's disease  Will change the timing of the Sinemet dose to 1 5tabs at 8am, 1pm, 6pm, 1tab at 10pm to see if this helps with wearing off  He was also encouraged to remain active    Discussed the [employer]

## 2022-04-18 DIAGNOSIS — G20 PARKINSONISM, UNSPECIFIED PARKINSONISM TYPE (HCC): ICD-10-CM

## 2022-04-20 DIAGNOSIS — G20 PARKINSONISM, UNSPECIFIED PARKINSONISM TYPE (HCC): ICD-10-CM

## 2022-04-20 NOTE — TELEPHONE ENCOUNTER
Patient of Arielle Lobato PA-C    She called to advise pharmacy did not receive script for carbidopa levodopa, script sent 4/6 (It was sent to "no print" and did not send)  Please sign attached script    Thank you     [facility]

## 2022-04-21 NOTE — TELEPHONE ENCOUNTER
Received call from patient's wife Barbara Mills (on consent form) stating [facility] did not receive script for carbidopa-levodopa   Called [facility]  Spoke w/Amos-Pharmacist  Confirmed sig instructions  Ashly Peters verbalized understanding and confirms he only has one script on file

## 2022-04-21 NOTE — TELEPHONE ENCOUNTER
pharm called and states that qty was for 540 which is a 98 day supply  Insurance will only pay for 90 day supply  He states that qty should be 495, if pt is taking Take 1 5tabs 8am, 1p, 6pm, and 1tab before bed  I gave a verbal to change qty to 495    Script updated and set to no print for documentation purposes

## 2022-04-22 NOTE — TELEPHONE ENCOUNTER
Mr. Pina saw Dr. Nguyễn on 04/18 for allergies/cough, Dr. Nguyễn ordered a steroid shot and gave him cough medicine.  He called and said he is not feeling any better.  He wanted to know if you could call him in something else, or does he need to come in?   PT ordered    Keep appointment with me in April

## 2022-05-11 ENCOUNTER — APPOINTMENT (OUTPATIENT)
Dept: LAB | Facility: CLINIC | Age: 82
End: 2022-05-11
Payer: MEDICARE

## 2022-05-11 DIAGNOSIS — C61 PROSTATE CANCER (HCC): ICD-10-CM

## 2022-05-11 LAB — PSA SERPL-MCNC: 10.6 NG/ML (ref 0–4)

## 2022-05-11 PROCEDURE — G0103 PSA SCREENING: HCPCS

## 2022-05-11 PROCEDURE — 36415 COLL VENOUS BLD VENIPUNCTURE: CPT

## 2022-05-13 ENCOUNTER — OFFICE VISIT (OUTPATIENT)
Dept: INTERNAL MEDICINE CLINIC | Facility: CLINIC | Age: 82
End: 2022-05-13
Payer: MEDICARE

## 2022-05-13 VITALS
OXYGEN SATURATION: 97 % | WEIGHT: 213 LBS | TEMPERATURE: 97.1 F | SYSTOLIC BLOOD PRESSURE: 116 MMHG | HEART RATE: 81 BPM | DIASTOLIC BLOOD PRESSURE: 80 MMHG | HEIGHT: 69 IN | BODY MASS INDEX: 31.55 KG/M2

## 2022-05-13 DIAGNOSIS — I10 ESSENTIAL HYPERTENSION: ICD-10-CM

## 2022-05-13 DIAGNOSIS — Z00.00 HEALTH MAINTENANCE EXAMINATION: ICD-10-CM

## 2022-05-13 DIAGNOSIS — K59.00 CONSTIPATION, UNSPECIFIED CONSTIPATION TYPE: ICD-10-CM

## 2022-05-13 DIAGNOSIS — R26.81 UNSTEADY GAIT: ICD-10-CM

## 2022-05-13 DIAGNOSIS — G20 PARKINSONISM, UNSPECIFIED PARKINSONISM TYPE (HCC): Primary | ICD-10-CM

## 2022-05-13 DIAGNOSIS — K21.9 GASTROESOPHAGEAL REFLUX DISEASE: ICD-10-CM

## 2022-05-13 DIAGNOSIS — Z12.12 SCREENING FOR COLORECTAL CANCER: ICD-10-CM

## 2022-05-13 DIAGNOSIS — K22.70 BARRETT'S ESOPHAGUS WITHOUT DYSPLASIA: ICD-10-CM

## 2022-05-13 DIAGNOSIS — Z12.11 SCREENING FOR COLORECTAL CANCER: ICD-10-CM

## 2022-05-13 DIAGNOSIS — R97.20 ELEVATED PSA: ICD-10-CM

## 2022-05-13 PROBLEM — G91.9 HYDROCEPHALUS (HCC): Status: RESOLVED | Noted: 2017-10-03 | Resolved: 2022-05-13

## 2022-05-13 PROCEDURE — 99214 OFFICE O/P EST MOD 30 MIN: CPT | Performed by: INTERNAL MEDICINE

## 2022-05-13 PROCEDURE — G0439 PPPS, SUBSEQ VISIT: HCPCS | Performed by: INTERNAL MEDICINE

## 2022-05-13 RX ORDER — PANTOPRAZOLE SODIUM 40 MG/1
40 TABLET, DELAYED RELEASE ORAL DAILY
Qty: 90 TABLET | Refills: 1 | Status: SHIPPED | OUTPATIENT
Start: 2022-05-13 | End: 2022-07-17 | Stop reason: SDUPTHER

## 2022-05-13 RX ORDER — HYDROCHLOROTHIAZIDE 12.5 MG/1
12.5 TABLET ORAL DAILY
Qty: 90 TABLET | Refills: 1 | Status: SHIPPED | OUTPATIENT
Start: 2022-05-13

## 2022-05-13 RX ORDER — ENALAPRIL MALEATE 5 MG/1
TABLET ORAL
Qty: 90 TABLET | Refills: 1 | Status: SHIPPED | OUTPATIENT
Start: 2022-05-13

## 2022-05-13 NOTE — PROGRESS NOTES
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  Taking PPI in AM, famotidine qHS prn  Severe obesity with body mass index (BMI) of 35 0 to 39 9  Weight   229 3 yrs ago    Constipation  Increase daily fluid intake  Discussed fiber in diet  Unsteady gait  Encouraged to use walker at all times  Sleep disturbances  Limit use of electronics late at night  Diagnoses and all orders for this visit:    Parkinsonism, unspecified Parkinsonism type ([facility] )    Gastroesophageal reflux disease  -     pantoprazole (PROTONIX) 40 mg tablet; Take 1 tablet (40 mg total) by mouth in the morning  Essential hypertension  -     hydrochlorothiazide (HYDRODIURIL) 12 5 mg tablet; Take 1 tablet (12 5 mg total) by mouth in the morning   -     enalapril (VASOTEC) 5 mg tablet; Take half tablet by mouth once daily    Elevated PSA    Constipation, unspecified constipation type    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    Screening for colorectal cancer    Unsteady gait    Health maintenance examination  Comments:  Received 2nd COVID booster  Shingrix at the pharmacy  Eye exam due  Follow up in 1 year or as needed  Subjective:      Patient ID: Laura Foote is a [de-identified] y o  male here for a follow up  He is here with his wife  He feels well  Wife reports he fell last fall, no injuries  He does not always use his walker at home  He used to have physical therapy at home 4 days a week, now has it 2 days only  They are planning to do the boxing program in the summer      He remains constipated, admits not drinking a lot of fluids on a regular basis  Good appetite  He takes Pepcid as needed at night  No changes with sleep, uses his iPad, Ajit and other electronics regularly  The following portions of the patient's history were reviewed and updated as appropriate: allergies, current medications, past medical history, past social history and problem list     Review of Systems   Constitutional: Negative for appetite change and fatigue  HENT: Negative for congestion, hearing loss and postnasal drip  Eyes: Negative  Respiratory: Negative for cough, chest tightness and shortness of breath  Cardiovascular: Negative for chest pain, palpitations and leg swelling  Gastrointestinal: Positive for constipation  Negative for abdominal pain  Genitourinary: Negative for dysuria, frequency and urgency  Musculoskeletal: Positive for gait problem  Negative for arthralgias  Skin: Negative for rash and wound  Neurological: Positive for tremors  Negative for dizziness, numbness and headaches  Hematological: Negative for adenopathy  Does not bruise/bleed easily  Psychiatric/Behavioral: Negative for sleep disturbance  The patient is not nervous/anxious  Objective:      /80   Pulse 81   Temp (!) 97 1 °F (36 2 °C)   Ht 5' 9" (1 753 m)   Wt 96 6 kg (213 lb)   SpO2 97%   BMI 31 45 kg/m²          Physical Exam  Vitals and nursing note reviewed  Constitutional:       Appearance: He is well-developed  HENT:      Head: Normocephalic and atraumatic  Eyes:      Conjunctiva/sclera: Conjunctivae normal       Pupils: Pupils are equal, round, and reactive to light  Cardiovascular:      Rate and Rhythm: Normal rate and regular rhythm  Heart sounds: Normal heart sounds  Pulmonary:      Effort: Pulmonary effort is normal       Breath sounds: No wheezing or rhonchi  Abdominal:      General: Bowel sounds are normal       Palpations: Abdomen is soft     Musculoskeletal:         General: No swelling  Cervical back: Neck supple  Right lower leg: No edema  Left lower leg: No edema  Skin:     General: Skin is warm  Findings: No rash  Neurological:      General: No focal deficit present  Mental Status: He is alert and oriented to person, place, and time  Psychiatric:         Mood and Affect: Mood and affect normal          Behavior: Behavior normal            Labs & imaging results reviewed with patient

## 2022-05-13 NOTE — PROGRESS NOTES
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     Taking PPI in AM, famotidine qHS prn  Relevant Medications    pantoprazole (PROTONIX) 40 mg tablet       Cardiovascular and Mediastinum    Essential hypertension     BP stable, taking enalapril 5 mg and HCTZ 12 5 mg            Relevant Medications    hydrochlorothiazide (HYDRODIURIL) 12 5 mg tablet    enalapril (VASOTEC) 5 mg tablet       Nervous and Auditory    Parkinsonism (HCC) - Primary     On Sinemet, per neurology  Other    Constipation     Increase daily fluid intake  Discussed fiber in diet  Elevated PSA     PSA remains elevated  Follow up as scheduled with Urology  Unsteady gait     Encouraged to use walker at all times  Other Visit Diagnoses     Gastroesophageal reflux disease        Relevant Medications    pantoprazole (PROTONIX) 40 mg tablet    Screening for colorectal cancer        Health maintenance examination        Received 2nd COVID booster  Shingrix at the pharmacy  Eye exam due  BMI Counseling: Body mass index is 31 45 kg/m²  The BMI is above normal  Nutrition recommendations include encouraging healthy choices of fruits and vegetables  Exercise recommendations include strength training exercises  Rationale for BMI follow-up plan is due to patient being overweight or obese  Depression Screening and Follow-up Plan: Patient was screened for depression during today's encounter  They screened negative with a PHQ-2 score of 0  Preventive health issues were discussed with patient, and age appropriate screening tests were ordered as noted in patient's After Visit Summary  Personalized health advice and appropriate referrals for health education or preventive services given if needed, as noted in patient's After Visit Summary       History of Present Illness:     Patient presents for Medicare Annual Wellness visit    Patient Care Team:  Gabi Tuttle MD as PCP - MD Claudio Gonzalez MD Salvatore Jakes, MD as Endoscopist     Problem List:     Patient Active Problem List   Diagnosis    INPH (idiopathic normal pressure hydrocephalus) (HCC)    Cognitive impairment    Unsteady gait    Hearing loss, bilateral    Hyperlipidemia    Essential hypertension    Adenomatous polyp of sigmoid colon    Tremor    Severe obesity with body mass index (BMI) of 35 0 to 39 9    Rhinitis    Localized edema    Cervical spondylosis with myelopathy    Elevated PSA    Hiatal hernia    Positional lightheadedness    Sleep disturbances    Urinary incontinence    Arthritis    Constipation    Cataract    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    Venous insufficiency    Parkinsonism [facility])      Past Medical and Surgical History:     Past Medical History:   Diagnosis Date    Arthritis     GERD (gastroesophageal reflux disease)     Hiatal hernia     Hypertension     Infectious viral hepatitis     Obesity     Spinal stenosis      Past Surgical History:   Procedure Laterality Date    APPENDECTOMY  1952    HI COLONOSCOPY FLX DX W/COLLJ SPEC WHEN PFRMD N/A 1/23/2019    Procedure: COLONOSCOPY;  Surgeon: Jim Manning MD;  Location: [facility]; Service: Gastroenterology    HI ESOPHAGOGASTRODUODENOSCOPY TRANSORAL DIAGNOSTIC N/A 1/23/2019    Procedure: ESOPHAGOGASTRODUODENOSCOPY (EGD); Surgeon: Jim Manning MD;  Location: [facility];   Service: Gastroenterology    VENTRICULOATRIAL SHUNT  06/2012    VENTRICULOPERITONEAL SHUNT        Family History:     Family History   Problem Relation Age of Onset    Heart disease Mother         RHEUM    Prostate cancer Father     Cancer Maternal Uncle     Alcohol abuse Neg Hx     Depression Neg Hx     Mental illness Neg Hx     Substance Abuse Neg Hx       Social History:     Social History     Socioeconomic History    Marital status: /Civil [employer]     Spouse name: None    Number of children: 1    Years of education: None    Highest education level: None   Occupational History    Occupation: PHYSICIST, TELE[employer]   Tobacco Use    Smoking status: Never Smoker    Smokeless tobacco: Never Used   Vaping Use    Vaping Use: Never used   Substance and Sexual Activity    Alcohol use: No    Drug use: No    Sexual activity: None   Other Topics Concern    None   Social History Narrative    Lives at home with wife    Retired        [employer]     LIVES [address] Strain: Not on file   Food Insecurity: Not on file   Transportation Needs: Not on file   Physical Activity: Not on file   Stress: Not on file   Social Connections: Not on file   Intimate Partner Violence: Not on file   Housing Stability: Not on file      Medications and Allergies:     Current Outpatient Medications   Medication Sig Dispense Refill    enalapril (VASOTEC) 5 mg tablet Take half tablet by mouth once daily 90 tablet 1    hydrochlorothiazide (HYDRODIURIL) 12 5 mg tablet Take 1 tablet (12 5 mg total) by mouth in the morning  90 tablet 1    pantoprazole (PROTONIX) 40 mg tablet Take 1 tablet (40 mg total) by mouth in the morning  90 tablet 1    Aspirin-Calcium Carbonate  MG TABS Take by mouth daily        atorvastatin (LIPITOR) 20 mg tablet Take 1 tablet (20 mg total) by mouth daily (Patient not taking: Reported on 5/13/2022) 90 tablet 1    carbidopa-levodopa (SINEMET)  mg per tablet Take 1 5tabs 8am, 1p, 6pm, and 1tab before bed 495 tablet 1    Cholecalciferol (VITAMIN D-3) 5000 units TABS Take 1 tablet by mouth daily       ibuprofen (MOTRIN) 200 mg tablet Take 600 mg by mouth every 6 (six) hours as needed for mild pain      Multiple Vitamin (MULTI-VITAMIN DAILY PO) Take 1 tablet by mouth daily       No current facility-administered medications for this visit       No Known Allergies   Immunizations:     Immunization History   Administered Date(s) Administered    COVID-19 MODERNA VACC 0 5 ML IM 01/23/2021, 02/20/2021    INFLUENZA 09/19/2017, 10/01/2018    Influenza Split High Dose Preservative Free IM 10/10/2019    Influenza, high dose seasonal 0 7 mL 08/24/2020, 10/25/2021    Pneumococcal Polysaccharide PPV23 10/16/2013, 10/01/2018    Td (adult), adsorbed 09/07/2006      Health Maintenance:         Topic Date Due    Colorectal Cancer Screening  01/23/2024         Topic Date Due    DTaP,Tdap,and Td Vaccines (1 - Tdap) 09/07/2006    Pneumococcal Vaccine: 65+ Years (2 - PCV) 10/01/2019    COVID-19 Vaccine (3 - Booster for Moderna series) 07/20/2021      Medicare Health Risk Assessment:     /80   Pulse 81   Temp (!) 97 1 °F (36 2 °C)   Ht 5' 9" (1 753 m)   Wt 96 6 kg (213 lb)   SpO2 97%   BMI 31 45 kg/m²      Анна Masters is here for his Subsequent Wellness visit  Last Medicare Wellness visit information reviewed, patient interviewed and updates made to the record today  Health Risk Assessment:   Patient rates overall health as fair  Patient feels that their physical health rating is slightly worse  Patient is satisfied with their life  Eyesight was rated as same  Hearing was rated as slightly worse  Patient feels that their emotional and mental health rating is same  Patients states they are never, rarely angry  Patient states they are never, rarely unusually tired/fatigued  Pain experienced in the last 7 days has been none  Patient states that he has experienced no weight loss or gain in last 6 months  Depression Screening:   PHQ-2 Score: 0      Fall Risk Screening: In the past year, patient has experienced: history of falling in past year    Number of falls: 1  Injured during fall?: No    Feels unsteady when standing or walking?: No    Worried about falling?: No      Home Safety:  Patient has trouble with stairs inside or outside of their home   Patient has working smoke alarms and has working carbon monoxide detector  Home safety hazards include: none  Nutrition:   Current diet is Regular  Medications:   Patient is currently taking over-the-counter supplements  OTC medications include: see medication list  Patient is not able to manage medications  Activities of Daily Living (ADLs)/Instrumental Activities of Daily Living (IADLs):   Walk and transfer into and out of bed and chair?: Yes  Dress and groom yourself?: Yes    Bathe or shower yourself?: Yes    Feed yourself? Yes  Do your laundry/housekeeping?: No  Manage your money, pay your bills and track your expenses?: Yes  Make your own meals?: Yes    Do your own shopping?: No    Previous Hospitalizations:   Any hospitalizations or ED visits within the last 12 months?: No      Advance Care Planning:   Living will: Yes    Durable POA for healthcare: Yes    Advanced directive: Yes    End of Life Decisions reviewed with patient: Yes      Cognitive Screening:   Provider or family/friend/caregiver concerned regarding cognition?: No    PREVENTIVE SCREENINGS      Cardiovascular Screening:    General: History Lipid Disorder    Due for: Lipid Panel      Diabetes Screening:       Due for: Blood Glucose      Colorectal Cancer Screening:     General: Screening Current      Prostate Cancer Screening:    General: History Prostate Cancer      Osteoporosis Screening:    General: Screening Not Indicated      Abdominal Aortic Aneurysm (AAA) Screening:        General: Screening Not Indicated      Lung Cancer Screening:     General: Screening Not Indicated      Hepatitis C Screening:    General: Screening Not Indicated    Screening, Brief Intervention, and Referral to Treatment (SBIRT)    Screening  Typical number of drinks in a day: 0  Typical number of drinks in a week: 0  Interpretation: Low risk drinking behavior      Single Item Drug Screening:  How often have you used an illegal drug (including marijuana) or a prescription medication for non-medical reasons in the past year? never    Single Item Drug Screen Score: 0  Interpretation: Negative screen for possible drug use disorder    Other Counseling Topics:   Regular weightbearing exercise         Milton Wagoner MD

## 2022-06-10 ENCOUNTER — TELEPHONE (OUTPATIENT)
Dept: NEUROLOGY | Facility: CLINIC | Age: 82
End: 2022-06-10

## 2022-06-10 DIAGNOSIS — G20 PARKINSONISM, UNSPECIFIED PARKINSONISM TYPE: Primary | ICD-10-CM

## 2022-06-10 NOTE — TELEPHONE ENCOUNTER
pt's wife called and states that dr Meme Ramirez ordered rock steady boxing but he never used it and they are requesting updated script      Please enter new script if agreeable  can be faxed [phone]  Attn:fe  [phone] to leave detailed message

## 2022-06-13 NOTE — TELEPHONE ENCOUNTER
Pt's wife Shivani Alvarado calling to inform that when she called [facility] to schedule pt with them, they said referral sent was from September 2021  Re-faxed referral from [facility]  Fax [phone]; [facility]  PT phone [phone] and she says fax not yet received but will call back if not received by end of day

## 2022-07-12 ENCOUNTER — TELEPHONE (OUTPATIENT)
Dept: NEUROLOGY | Facility: CLINIC | Age: 82
End: 2022-07-12

## 2022-07-12 NOTE — TELEPHONE ENCOUNTER
[facility] that [facility] evaluated Monday 7/11/22  She states last PT script on file is from 9/2021 so they need an updated script faxed to them at [phone]  Script faxed  Called [facility] to confirm receipt and they did indeed receive  Nothing further at this time

## 2022-07-17 DIAGNOSIS — G20 PARKINSONISM, UNSPECIFIED PARKINSONISM TYPE (HCC): ICD-10-CM

## 2022-07-17 DIAGNOSIS — K21.9 GASTROESOPHAGEAL REFLUX DISEASE: ICD-10-CM

## 2022-07-18 RX ORDER — PANTOPRAZOLE SODIUM 40 MG/1
40 TABLET, DELAYED RELEASE ORAL DAILY
Qty: 90 TABLET | Refills: 0 | Status: SHIPPED | OUTPATIENT
Start: 2022-07-18

## 2022-08-25 ENCOUNTER — TELEPHONE (OUTPATIENT)
Dept: NEUROLOGY | Facility: CLINIC | Age: 82
End: 2022-08-25

## 2022-08-25 NOTE — TELEPHONE ENCOUNTER
Received voicemail from [facility] requesting a script for a speech evaluation  She said they currently have a script for physical therapy and are requesting script  for speech eval as well  Call back [phone], option 2  Patient of Arielle Lobato PA-C    Please enter script if in agreement, thank you

## 2022-08-26 DIAGNOSIS — G20 PARKINSONISM, UNSPECIFIED PARKINSONISM TYPE: Primary | ICD-10-CM

## 2022-08-26 NOTE — TELEPHONE ENCOUNTER
Call back [phone] to let them know that script was entered, but the phone was just ringing for a while and nobody picked up  Faxed script to [phone] as requested by pt's wife on [employer] message from today

## 2022-10-04 ENCOUNTER — OFFICE VISIT (OUTPATIENT)
Dept: NEUROLOGY | Facility: CLINIC | Age: 82
End: 2022-10-04
Payer: MEDICARE

## 2022-10-04 VITALS
SYSTOLIC BLOOD PRESSURE: 136 MMHG | TEMPERATURE: 97.6 F | BODY MASS INDEX: 31.81 KG/M2 | DIASTOLIC BLOOD PRESSURE: 70 MMHG | HEART RATE: 68 BPM | WEIGHT: 215.4 LBS

## 2022-10-04 DIAGNOSIS — G20 PARKINSONISM, UNSPECIFIED PARKINSONISM TYPE (HCC): Primary | ICD-10-CM

## 2022-10-04 PROCEDURE — 99215 OFFICE O/P EST HI 40 MIN: CPT | Performed by: PHYSICIAN ASSISTANT

## 2022-10-04 NOTE — PATIENT INSTRUCTIONS
Patient with Parkinson's disease  Overall he is doing well however he continues to have some issues with his imbalance  For the most part he is actually improved with his walking since using his relating walker  He did recently complete some physical therapy with serena Morris and he did notice some overall improvement with this  Because of this we will trying get him to physical therapy next store as well  Unfortunately he is unable to make any of the [employer] classes given his schedule  At this time he continues to take Sinemet 1 5 tabs 3 times a day and 1 tab before bed  He is no clear on with the medication however his wife can notice when he is due for a dose  We did discuss the option of increasing his medication during the day however we would need to watch for any potential side effects including hallucinations, dyskinesias, or orthostatic hypotension  At this point he feels he is still functioning well and would instead like to focus on more physical activity  We will have him start physical therapy once again and he will also try a continued increase his exercise on his own as well  If with these changes however he continues to have any issues then we could consider increasing his Sinemet to 2 tabs throughout the day

## 2022-10-04 NOTE — PROGRESS NOTES
Patient ID: Tyrese Mora is a [de-identified] y o  male  Assessment/Plan:    Parkinsonism [facility])  Patient with Parkinson's disease  Overall he is doing well however he continues to have some issues with his imbalance  For the most part he is actually improved with his walking since using his relating walker  He did recently complete some physical therapy with good Morris and he did notice some overall improvement with this  Because of this we will trying get him to physical therapy next store as well  Unfortunately he is unable to make any of the [employer] classes given his schedule  At this time he continues to take Sinemet 1 5 tabs 3 times a day and 1 tab before bed  He is no clear on with the medication however his wife can notice when he is due for a dose  We did discuss the option of increasing his medication during the day however we would need to watch for any potential side effects including hallucinations, dyskinesias, or orthostatic hypotension  At this point he feels he is still functioning well and would instead like to focus on more physical activity  We will have him start physical therapy once again and he will also try a continued increase his exercise on his own as well  If with these changes however he continues to have any issues then we could consider increasing his Sinemet to 2 tabs throughout the day  Subjective:    Tyrese Mora is an [de-identified]year old male with a past medical history of HTN, NPH with shunt placement about 13 years ago (follows with neurosurgery), GERD, cervical spinal and parkinsonism who presents for evaluation with the [facility]  To review, gait issues and tremors have been slowly progressive over years, but more recently it has significantly affected his day to day activities  He was started on Sinemet (9/2021) and referred to PT in the past with some overall benefit       At his last visit he had some improvement with Sinemet including less tremors, freezing and improved mobility  He was however having issues with wearing off and his Sinemet dose was changed to 1 5tabs at 8am, 1pm, 6pm, 1tab at 10pm       INTERVAL HISTORY:  He feels a bit worse since the last visit   He had an episode 10 days ago when his cat brought in a chipmunk and he had to get it out alone and this shook him up  He had not felt as good since that time  No significant improvement with the change in the Sinemet dose at the last visit   He will notice if he misses a dose of Sinemet that his tremors are worse   He does still freeze at times when turning   He is scheduled for speech therapy with Jessica Waterman next week   He is almost through the PT for the [employer] class however the day of the week for [facility] has changed and they are not able to make the class now   He feels at his best in the AM  He struggles with falling asleep at times, he does tend to wake early to use the bathroom   He does tend to nap during the day as well   No issues with swallowing for the most part   He does have some occasional drooling   No hallucinations other than perhaps some shadows at times   No clear on with the medication, wife does feel that he is not as mobile when he is due for a dose of medication   He does walk more with the walker   He can shower with a shower seat, he does need help with showers  He can dress on his own   He has an aide twice a week while his wife is at work      Current medications:  Sinemet 1 5tabs 8am, 1pm, 6pm, 1tab at 10pm      Prior medications:  Carbidopa - used for nausea, this improved on own and medication no longer needed         I personally reviewed and updated the ROS  Total time spent today was 45 minutes  Greater than 50% of total time was spent with the patient and / or family counseling and / or coordinating plan of care        Objective:    Blood pressure 136/70, pulse 68, temperature 97 6 °F (36 4 °C), weight 97 7 kg (215 lb 6 4 oz)     Physical Exam  Constitutional:       General: He is awake  Appearance: Normal appearance  HENT:      Right Ear: Hearing normal       Left Ear: Hearing normal    Eyes:      General: Lids are normal       Extraocular Movements: Extraocular movements intact  Pupils: Pupils are equal, round, and reactive to light  Pulmonary:      Effort: Pulmonary effort is normal    Neurological:      Mental Status: He is alert  Deep Tendon Reflexes: Strength normal    Psychiatric:         Speech: Speech normal          Neurological Exam  Mental Status  Awake and alert  Oriented to person, place and time  Speech is normal     Cranial Nerves  CN III, IV, VI: Extraocular movements intact bilaterally  Normal lids and orbits bilaterally  Pupils equal round and reactive to light bilaterally  CN V:  Right: Facial sensation is normal   Left: Facial sensation is normal on the left  CN VII:  Right: There is no facial weakness  Left: There is no facial weakness  CN VIII:  Right: Hearing is normal   Left: Hearing is normal   CN IX, X: Palate elevates symmetrically  CN XI: Shoulder shrug strength is normal   CN XII: Tongue midline without atrophy or fasciculations  Motor   Increased muscle tone  Strength is 5/5 throughout all four extremities  Sensory  Light touch is normal in upper and lower extremities  Reflexes  Glabellar tap present      Coordination  Right: Finger-to-nose abnormality:Left: Finger-to-nose abnormality:    Gait  Casual gait:                                       Date of exam:   4/6/22  10/4/22           Speech  1  1   Facial Expression        Rigidity - Neck        Rigidity - Upper Extremity (Right)  1  1   Rigidity - Upper Extremity (Left)   0  1   Rigidity - Lower Extremity (Right)  0  0   Rigidity - Lower Extremity (Left)   0  0   Finger Taps (Right)   2  2   Finger Taps (Left)   1  2   Hand  (Right)  1  1   Hand  (Left)   0  1   Pronation/Supination (Right)  2  2 Pronation/Supination (Left)   1  1   Heel Taps (Right) 1  1   Heel Taps(Left) 2  1   Arising from Chair   1  1   Gait   2  2   Postural Stability         Posture 1  1   Global spontaneity of movement 1  1   Postural Tremor (Right) 0  0   Postural Tremor (Left) 0  0   Kinetic Tremor (Right)  0  0   Kinetic Tremor (Left)  0  0   Rest tremor  RUE 0  0   Rest tremor  LUE 0  0   Rest tremor  RLE 0  0   Reset tremor  LLE 0  0   Lip/Jaw Tremor        Motor Exam Total:              ROS:    Review of Systems   Constitutional: Positive for fatigue  Negative for appetite change and fever  HENT: Positive for voice change (Has gotten softer)  Negative for hearing loss, tinnitus and trouble swallowing  Eyes: Negative  Negative for photophobia and pain  Respiratory: Negative  Negative for shortness of breath  Cardiovascular: Negative  Negative for palpitations  Gastrointestinal: Negative  Negative for nausea and vomiting  Endocrine: Negative  Negative for cold intolerance  Genitourinary: Negative  Negative for dysuria, frequency and urgency  Musculoskeletal: Positive for gait problem (Freezing and has gotten worse)  Negative for myalgias and neck pain  Balance Issues have gotten worse     Skin: Negative  Negative for rash  Allergic/Immunologic: Negative  Neurological: Positive for dizziness (Occasional), tremors (Hands), speech difficulty (Trouble completing sentences), weakness (Legs) and numbness (Occasional in hands)  Negative for seizures, syncope, facial asymmetry, light-headedness and headaches  Hematological: Negative  Does not bruise/bleed easily  Psychiatric/Behavioral: Positive for sleep disturbance (Does not sleep well)  Negative for confusion and hallucinations  All other systems reviewed and are negative

## 2022-10-06 DIAGNOSIS — G20 PARKINSONISM, UNSPECIFIED PARKINSONISM TYPE: Primary | ICD-10-CM

## 2022-10-08 ENCOUNTER — IMMUNIZATIONS (OUTPATIENT)
Dept: INTERNAL MEDICINE CLINIC | Facility: CLINIC | Age: 82
End: 2022-10-08
Payer: MEDICARE

## 2022-10-08 DIAGNOSIS — Z23 ENCOUNTER FOR IMMUNIZATION: Primary | ICD-10-CM

## 2022-10-08 PROCEDURE — 90662 IIV NO PRSV INCREASED AG IM: CPT

## 2022-10-08 PROCEDURE — G0008 ADMIN INFLUENZA VIRUS VAC: HCPCS

## 2022-10-19 DIAGNOSIS — G20 PARKINSONISM, UNSPECIFIED PARKINSONISM TYPE: Primary | ICD-10-CM

## 2022-11-01 ENCOUNTER — EVALUATION (OUTPATIENT)
Dept: PHYSICAL THERAPY | Facility: CLINIC | Age: 82
End: 2022-11-01

## 2022-11-01 DIAGNOSIS — Z74.09 IMPAIRED FUNCTIONAL MOBILITY, BALANCE, GAIT, AND ENDURANCE: Primary | ICD-10-CM

## 2022-11-01 DIAGNOSIS — G20 PARKINSONISM, UNSPECIFIED PARKINSONISM TYPE (HCC): ICD-10-CM

## 2022-11-01 NOTE — PROGRESS NOTES
PT Evaluation     Today's date: 2022  Patient name: Gabby Clark  : 1940  MRN: [id number]  Referring provider: Araceli Cox PA-C  Dx:   Encounter Diagnosis     ICD-10-CM    1  Impaired functional mobility, balance, gait, and endurance  Z74 [id number]    2  Parkinsonism, unspecified Parkinsonism type ([address] )  [address] Ambulatory Referral to Physical Therapy       Start Time: 1500  Stop Time: 1600  Total time in clinic (min): 60 minutes    Assessment  Assessment details: Patient presents to outpatient physical therapy for Parkinson's Disease and history of NPH with shunt  He recently completed LSVT Big program at [facility]  He presents with impaired balance, impaired gait, freezing of gait, difficulty turning, increased risk of falls, bradykinesia, and impaired functional mobility  Compared to previous episode of care 10/2021, he has had some decline in status and is now utilizing rollator during most ambulatory tasks  He is demonstrating impaired LE strength and endurance from 5xSTS of 17 03 (utilizing UE's, unable to complete without UE's), which is also above cut-off score for increased risk of falls at 15 seconds  TUG time also demonstrates increased risk of falls at 47 10 seconds (with rollator), significantly above cut-off score of 13 5 seconds for increased fall risk, as well as significantly above his prior functional status at 24 2 seconds  Self-selected gait speed is also below cut-off score for increased fall risk at 0 77m/s (with rollator), as well as placing him in a limited community ambulator category  Yoni Ruano would benefit from skilled physical therapy to decrease falls, improve balance, improve functional mobility, decrease freezing episodes and festinating gait pattern, improve LE strength and endurance, improve independence with mobility, and maximize functioning    Impairments: abnormal coordination, abnormal gait, activity intolerance, impaired balance, lacks appropriate home exercise program and safety issue  Understanding of Dx/Px/POC: good   Prognosis: good    Goals  ST  Pt will improve gait speed to at least 0 85 m/s with least restrictive device within 4 weeks needed to improve safety with ambulation  2  Pt will improve TUG time by at least 10 seconds with least restrictive device within 4 weeks to decrease fall risk and improve functional mobility  3  Pt will improve 5xSTS score by at least 3 seconds (with UE's), and be able to perform STS without UE support within 4 weeks needed to reduce fall risk  4  Pt will improve 6MWT to at least 700 feet within 4 weeks to improve functional mobility and endurance  5  Pt will demonstrate independence with HEP within 4 weeks  LT  Pt will improve gait speed to at least 1 0 m/s with least restrictive device within 8 weeks needed to improve safety with ambulation  2  Pt will improve TUG time to at least 25 seconds with least restrictive device within 8 weeks to decrease fall risk and improve functional mobility  3  Pt will improve 5xSTS without UE support to at least 15 seconds within 8 weeks needed to reduce fall risk  4  Pt will improve 6MWT to at least 800 feet within 8 weeks to improve functional mobility and endurance  5  Pt will demonstrate independence with HEP within 8 weeks      Plan  Patient would benefit from: skilled physical therapy  Planned therapy interventions: balance, neuromuscular re-education, balance/weight bearing training, patient education, strengthening, stretching, therapeutic activities, therapeutic exercise, flexibility, functional ROM exercises, gait training, home exercise program and abdominal trunk stabilization  Frequency: 2x week  Duration in weeks: 8  Plan of Care beginning date: 2022  Plan of Care expiration date: 2022  Treatment plan discussed with: patient and family        Subjective Evaluation    History of Present Illness  Mechanism of injury: Patient reports he fell backwards down the stairs last year  He states he stopped coming to therapy because "I was in bad shape"  He states he was going to [facility] for therapy during the summer, stopped in the beginning of October, difficulty with scheduling  Was going 2x/week for therapy, states they were having staffing issues  He states that he has had some setbacks, he states that his cat caught a chipmunk and needed to catch it, needed to go release it, states that he had a hard time walking back inside without AD  He states that he was going to start the [facility] [employer], but did not get to it because of scheduling issues  He states that his wife wants him to continue therapy  He reports that he has trouble getting started, turning (especially sharp turns)  Has been using the rollator since March  States he always uses the rollator, except if he forgets to use it  He reports that he has completed the LSVT Big program     Has a stationary bike at home, 2x/day 10 minutes at a time     Pain  No pain reported    Social Support  Steps to enter house: yes  2  Stairs in house: yes (3 floors)   Lives in: [facility] (and basement)  Lives with: spouse (3 cats)    Treatments  Previous treatment: physical therapy        Objective       Manual Muscle Testing - Hip Left Right   Flexion 5 5   Abduction (seated) 4+ 4+   Adduction (seated) 4+ 4+     Manual Muscle Testing - Knee Left Right   Flexion 4 4+   Extension 5 5     Manual Muscle Testing - Ankle Left Right   Doriflexion 5 5   Plantarflexion NT NT         Coordination Left Right   Heel To Cruz NT NT   Finger To Nose NT NT   Rapid Alternating Supination impaired intact   Alt toe tapping impaired intact         Outcome Measures  Initial Eval      5x Sit to Stand: 17 03 seconds with UE's      TUG:     Manual (holding cup water)     Cog (counting back 2s) 47 10 seconds with rollator      Gait Speed:  0 77m/s with rollator      6 Minute Walk Test: 550 feet with rollator      FGA:  NT        Gait Assessment: ambulates with rollator, decrease gait speed, decreased heel strike bilaterally, difficulty initiating gait and significant shuffling/festination with turning, difficulty with weight shift  Decreased safety awareness with turning to sit in chair, not turning fully before sitting          Precautions: HTN, falls, NPH    Manuals 11/1                                                                Neuro Re-Ed             LSVT Big exercise review             HKM             Clock turns             backwards             hurdles             Sidesteps with big arms                                                                                           There Ex                                                    Ther Activity                                       Gait Training             hallway Hallway x 200 feet, cues for large steps, marching during turning            TM with SOLO             Modalities              Parkinson's specific education, LSVT Big review and HEP and continued exercise, role of exercise with Parkinsons,

## 2022-11-02 ENCOUNTER — OFFICE VISIT (OUTPATIENT)
Dept: DERMATOLOGY | Facility: CLINIC | Age: 82
End: 2022-11-02

## 2022-11-02 VITALS — BODY MASS INDEX: 31.4 KG/M2 | HEIGHT: 69 IN | WEIGHT: 212 LBS

## 2022-11-02 DIAGNOSIS — L21.9 SEBORRHEIC DERMATITIS: Primary | ICD-10-CM

## 2022-11-02 RX ORDER — KETOCONAZOLE 20 MG/G
CREAM TOPICAL
Qty: 60 G | Refills: 2 | Status: SHIPPED | OUTPATIENT
Start: 2022-11-02

## 2022-11-02 RX ORDER — KETOCONAZOLE 20 MG/ML
SHAMPOO TOPICAL
Qty: 120 ML | Refills: 2 | Status: SHIPPED | OUTPATIENT
Start: 2022-11-02

## 2022-11-02 NOTE — PROGRESS NOTES
[facility] Note     Patient Name: Dimas Dickens  Encounter Date: 11/2/2022     Have you been cared for by a [facility] in the last 3 years and, if so, which description applies to you? NO  I am considered a "new" patient and must complete all patient intake questions  I am MALE/not capable of bearing children  REVIEW OF SYSTEMS:  Have you recently had or currently have any of the following? · Recent fever or chills? No  · Any non-healing wound? No   PAST MEDICAL HISTORY:  Have you personally ever had or currently have any of the following? If "YES," then please provide more detail  · Skin cancer (such as Melanoma, Basal Cell Carcinoma, Squamous Cell Carcinoma? No  · Tuberculosis, HIV/AIDS, Hepatitis B or C: No  · Systemic Immunosuppression such as Diabetes, Biologic or Immunotherapy, Chemotherapy, Organ Transplantation, Bone Marrow Transplantation No  · Radiation Treatment No   FAMILY HISTORY:  Any "first degree relatives" (parent, brother, sister, or child) with the following? • Skin Cancer, Pancreatic or Other Cancer? YES, Prostate cancer    PATIENT EXPERIENCE:    • Do you want the Dermatologist to perform a COMPLETE skin exam today including a clinical examination under the "bra and underwear" areas? NO  • If necessary, do we have your permission to call and leave a detailed message on your Preferred Phone number that includes your specific medical information?   Yes      No Known Allergies   Current Outpatient Medications:   •  carbidopa-levodopa (SINEMET)  mg per tablet, Take 1 5tabs 8am, 1p, 6pm, and 1tab before bed, Disp: 495 tablet, Rfl: 3  •  Cholecalciferol (VITAMIN D-3) 5000 units TABS, Take 1 tablet by mouth daily , Disp: , Rfl:   •  enalapril (VASOTEC) 5 mg tablet, Take half tablet by mouth once daily, Disp: 90 tablet, Rfl: 1  •  hydrochlorothiazide (HYDRODIURIL) 12 5 mg tablet, Take 1 tablet (12 5 mg total) by mouth in the morning , Disp: 90 tablet, Rfl: 1  •  ibuprofen (MOTRIN) 200 mg tablet, Take 600 mg by mouth every 6 (six) hours as needed for mild pain, Disp: , Rfl:   •  Multiple Vitamin (MULTI-VITAMIN DAILY PO), Take 1 tablet by mouth daily, Disp: , Rfl:   •  pantoprazole (PROTONIX) 40 mg tablet, Take 1 tablet (40 mg total) by mouth daily (Patient taking differently: Take 40 mg by mouth 2 (two) times a day), Disp: 90 tablet, Rfl: 0  •  Aspirin-Calcium Carbonate  MG TABS, Take by mouth daily   (Patient not taking: No sig reported), Disp: , Rfl:   •  atorvastatin (LIPITOR) 20 mg tablet, Take 1 tablet (20 mg total) by mouth daily (Patient not taking: No sig reported), Disp: 90 tablet, Rfl: 1          • Whom besides the patient is providing clinical information about today's encounter?   o NO ADDITIONAL HISTORIAN (patient alone provided history)    Physical Exam and Assessment/Plan by Diagnosis:      1  SEBORRHEIC DERMATITIS    Physical Exam:  • Anatomic Location Affected:  Eyebrows and chin and side burns   • Morphological Description:  Flaking   • Pertinent Positives:  • Pertinent Negatives: Additional History of Present Condition:  Located on eyebrows, chin, and side burns  Presents for 6 months  Assessment and Plan:  Based on a thorough discussion of this condition and the management approach to it (including a comprehensive discussion of the known risks, side effects and potential benefits of treatment), the patient (family) agrees to implement the following specific plan:  • Reassured that this is not a medication reaction   • Please start Ketoconazole Shampoo 2% - Daily for 2 weeks straight and then on "Mondays, Wednesdays and Fridays":  Lather into scalp and skin on face, neck, chest, and back; leave on for 5 minutes and then rinse off completely  • Please start ketoconazole Cream and Hydrocortisone 2 5% Cream- mix these together apply this topically twice daily to affected areas   Please only use as needed once resolved   •  Please let us know if no improvement with this regimen      Seborrheic Dermatitis   Seborrheic dermatitis is a common, chronic or relapsing form of eczema/dermatitis that mainly affects the sebaceous, gland-rich regions of the scalp, face, and trunk  There are infantile and adult forms of seborrhoeic dermatitis  It is sometimes associated with psoriasis and, in that clinical scenario, may be referred to as "sebo-psoriasis "  Seborrheic dermatitis is also known as "seborrheic eczema "  Dandruff (also called "pityriasis capitis") is an uninflamed form of seborrhoeic dermatitis  Dandruff presents as bran-like scaly patches scattered within hair-bearing areas of the scalp  In an infant, this condition may be referred to as "cradle cap "  The cause of seborrheic dermatitis is not completely understood  It is associated with proliferation of various species of the skin commensal Malassezia, in its yeast (non-pathogenic) form  Its metabolites (such as the fatty acids oleic acid, malssezin, and indole-3-carbaldehyde) may cause an inflammatory reaction  Differences in skin barrier lipid content and function may account for individual presentations  Infantile Seborrheic Dermatitis  Infantile seborrheic dermatitis affects babies under the age of 1 months and usually resolves by 1012 months of age  Infantile seborrheic dermatitis causes "cradle cap" (diffuse, greasy scaling on scalp)  The rash may spread to affect armpit and groin folds (a type of "napkin dermatitis")  There may be associated salmon-pink colored patches that may flake or peel  The rash in this case is usually not especially itchy, so the baby often appears undisturbed by the rash, even when more generalized  Adult Seborrheic Dermatitis  Adult seborrheic dermatitis tends to begin in late adolescence; prevalence is greatest in young adults and in the elderly  It is more common in males than in females      The following factors are sometimes associated with severe adult seborrheic dermatitis:  • Oily skin  • Familial tendency to seborrhoeic dermatitis or a family history of psoriasis  • Immunosuppression: organ transplant recipient, human immunodeficiency virus (HIV) infection and patients with lymphoma  • Neurological and psychiatric diseases: Parkinson disease, tardive dyskinesia, depression, epilepsy, facial nerve palsy, spinal cord injury and congenital disorders such as Down syndrome  • Treatment for psoriasis with psoralen and ultraviolet A (PUVA) therapy  • Lack of sleep  • Stressful events  In adults, seborrheic dermatitis may typically affect the scalp, face (creases around the nose, behind ears, within eyebrows) and upper trunk  Typical clinical features include:  • Winter flares, improving in summer following sun exposure  • Minimal itch most of the time  • Combination oily and dry mid-facial skin  • Ill-defined localized scaly patches or diffuse scale in the scalp  • Blepharitis; scaly red eyelid margins  • [place]-pink, thin, scaly, and ill-defined plaques in skin folds on both sides of the face  • Petal or ring-shaped flaky patches on hair-line and on anterior chest  • Rash in armpits, under the breasts, in the groin folds and genital creases  • Superficial folliculitis (inflamed hair follicles) on cheeks and upper trunk    Seborrheic dermatitis is diagnosed by its clinical appearance and behavior  Skin biopsy may be helpful but is rarely necessary to make this diagnosis          Scribe Attestation    I,:  Wendy Mitchell am acting as a scribe while in the presence of the attending physician :       I,:  Sheba Kong MD personally performed the services described in this documentation    as scribed in my presence :

## 2022-11-02 NOTE — PATIENT INSTRUCTIONS
1  SEBORRHEIC DERMATITIS    Assessment and Plan:  Based on a thorough discussion of this condition and the management approach to it (including a comprehensive discussion of the known risks, side effects and potential benefits of treatment), the patient (family) agrees to implement the following specific plan:  Reassured that this is not a medication reaction   Please start Ketoconazole Shampoo 2% - Daily for 2 weeks straight and then on "Mondays, Wednesdays and Fridays":  Lather into scalp and skin on face, neck, chest, and back; leave on for 5 minutes and then rinse off completely  Please start ketoconazole Cream and Hydrocortisone 2 5% Cream- mix these together apply this topically twice daily to affected areas  Please only use as needed once resolved    Please let us know if no improvement with this regimen      Seborrheic Dermatitis   Seborrheic dermatitis is a common, chronic or relapsing form of eczema/dermatitis that mainly affects the sebaceous, gland-rich regions of the scalp, face, and trunk  There are infantile and adult forms of seborrhoeic dermatitis  It is sometimes associated with psoriasis and, in that clinical scenario, may be referred to as "sebo-psoriasis "  Seborrheic dermatitis is also known as "seborrheic eczema "  Dandruff (also called "pityriasis capitis") is an uninflamed form of seborrhoeic dermatitis  Dandruff presents as bran-like scaly patches scattered within hair-bearing areas of the scalp  In an infant, this condition may be referred to as "cradle cap "  The cause of seborrheic dermatitis is not completely understood  It is associated with proliferation of various species of the skin commensal Malassezia, in its yeast (non-pathogenic) form  Its metabolites (such as the fatty acids oleic acid, malssezin, and indole-3-carbaldehyde) may cause an inflammatory reaction  Differences in skin barrier lipid content and function may account for individual presentations      Infantile Seborrheic Dermatitis  Infantile seborrheic dermatitis affects babies under the age of 1 months and usually resolves by 1012 months of age  Infantile seborrheic dermatitis causes "cradle cap" (diffuse, greasy scaling on scalp)  The rash may spread to affect armpit and groin folds (a type of "napkin dermatitis")  There may be associated salmon-pink colored patches that may flake or peel  The rash in this case is usually not especially itchy, so the baby often appears undisturbed by the rash, even when more generalized  Adult Seborrheic Dermatitis  Adult seborrheic dermatitis tends to begin in late adolescence; prevalence is greatest in young adults and in the elderly  It is more common in males than in females  The following factors are sometimes associated with severe adult seborrheic dermatitis:  Oily skin  Familial tendency to seborrhoeic dermatitis or a family history of psoriasis  Immunosuppression: organ transplant recipient, human immunodeficiency virus (HIV) infection and patients with lymphoma  Neurological and psychiatric diseases: Parkinson disease, tardive dyskinesia, depression, epilepsy, facial nerve palsy, spinal cord injury and congenital disorders such as Down syndrome  Treatment for psoriasis with psoralen and ultraviolet A (PUVA) therapy  Lack of sleep  Stressful events  In adults, seborrheic dermatitis may typically affect the scalp, face (creases around the nose, behind ears, within eyebrows) and upper trunk  Typical clinical features include:   Winter flares, improving in summer following sun exposure  Minimal itch most of the time  Combination oily and dry mid-facial skin  Ill-defined localized scaly patches or diffuse scale in the scalp  Blepharitis; scaly red eyelid margins  [place]-pink, thin, scaly, and ill-defined plaques in skin folds on both sides of the face  Petal or ring-shaped flaky patches on hair-line and on anterior chest  Rash in armpits, under the breasts, in the groin folds and genital creases  Superficial folliculitis (inflamed hair follicles) on cheeks and upper trunk    Seborrheic dermatitis is diagnosed by its clinical appearance and behavior  Skin biopsy may be helpful but is rarely necessary to make this diagnosis

## 2022-11-08 ENCOUNTER — APPOINTMENT (OUTPATIENT)
Dept: PHYSICAL THERAPY | Facility: CLINIC | Age: 82
End: 2022-11-08

## 2022-11-14 ENCOUNTER — OFFICE VISIT (OUTPATIENT)
Dept: PHYSICAL THERAPY | Facility: CLINIC | Age: 82
End: 2022-11-14

## 2022-11-14 DIAGNOSIS — G20 PARKINSONISM, UNSPECIFIED PARKINSONISM TYPE (HCC): Primary | ICD-10-CM

## 2022-11-14 DIAGNOSIS — Z74.09 IMPAIRED FUNCTIONAL MOBILITY, BALANCE, GAIT, AND ENDURANCE: ICD-10-CM

## 2022-11-14 NOTE — PROGRESS NOTES
Daily Note     Today's date: 2022  Patient name: Alysha Mcintyre  : 1940  MRN: [id number]  Referring provider: Ely Orellana PA-C  Dx:   Encounter Diagnosis     ICD-10-CM    1  Parkinsonism, unspecified Parkinsonism type ([facility] [address] )  G20    2  Impaired functional mobility, balance, gait, and endurance  Z74 09                   Subjective: Patient reports that he has not been as well after his root canal, he feels that he gets confused after things like that  He states that he has not been doing the LSVT Big exercises, "they are hard"      Objective: See treatment diary below      Assessment: Tolerated treatment fair  Patient demonstrated fatigue post treatment, exhibited good technique with therapeutic exercises and would benefit from continued PT  Reviewed LSVT Big exercises with single UE support, patient was able to perform seated exercises without much cuing, however significant difficulty with performing standing exercises, maximal cuing for large steps, posture, hand positioning, and coordination  He states that he "doesn't feel comfortable doing them alone", but also stated he didn't want to do them when his wife is there  Educated patient that he should have supervision during exercises due to patient's difficulty with performing large movements and internal feedback of movements, and safety concerns  Patient with significant freezing during initiation of movement and turning, improved with cues to weight shift and to initiate with L foot first  Trialed metronome with patient during side stepping due to quickness of movement with minimal carryover  Continue to progress as tolerated  Plan: Continue per plan of care        Precautions: HTN, falls, NPH    Manuals                                                      Neuro Re-Ed           LSVT Big exercise review  5-8 reps each exercise         HKM           Clock turns  CW/CCW x 10 reps         backwards  // bars x 3 laps         hurdles Sidesteps with big arms  Without arms in // bars, x 3 laps                                                                           There Ex           Sit to stand from mat table no UE's   x 10 reps, minimal UE's                               Ther Activity                                 Gait Training           hallway Hallway x 200 feet, cues for large steps, marching during turning Hallway x 200 feet with rollator, cues for increased step length, clock turns, initiating stepping with L LE         TM with SOLO           Modalities            Parkinson's specific education, LSVT Big review and HEP and continued exercise, role of exercise with Parkinsons,  LSVT education, role of exercise

## 2022-11-15 ENCOUNTER — OFFICE VISIT (OUTPATIENT)
Dept: PHYSICAL THERAPY | Facility: CLINIC | Age: 82
End: 2022-11-15

## 2022-11-15 DIAGNOSIS — Z74.09 IMPAIRED FUNCTIONAL MOBILITY, BALANCE, GAIT, AND ENDURANCE: ICD-10-CM

## 2022-11-15 DIAGNOSIS — G20 PARKINSONISM, UNSPECIFIED PARKINSONISM TYPE (HCC): Primary | ICD-10-CM

## 2022-11-15 NOTE — PROGRESS NOTES
Daily Note     Today's date: 11/15/2022  Patient name: Pastor Anderson  : 1940  MRN: [id number]  Referring provider: Madelaine Collado PA-C  Dx:   Encounter Diagnosis     ICD-10-CM    1  Parkinsonism, unspecified Parkinsonism type ([facility] [address] )  G20    2  Impaired functional mobility, balance, gait, and endurance  Z74 09                   Subjective: Patient reports that he went to the doctor for his hearing this morning, he is feeling tired today  He reports he was doing well after his session yesterday  Objective: See treatment diary below      Assessment: Tolerated treatment fair  Patient demonstrated fatigue post treatment, exhibited good technique with therapeutic exercises and would benefit from continued PT  Added targeted steps with dots on floor with stepping to replicate forward step and sideways stepping (LSVT Big exercises, with improved stride length noted  Significant difficulty with turning to sit in chair, with some improvement after performing clock stepping activity  Continue to progress as tolerated  Very fatigued during session, required multiple seated rest breaks, also retropulsive with sit to stand  Plan: Continue per plan of care  Precautions: HTN, falls, NPH    Manuals 11/1 11/14 11/15                                          Neuro Re-Ed         LSVT Big exercise review  5-8 reps each exercise Seated reach with 10 second hold x 10 each      HKM         Clock turns  CW/CCW x 10 reps CW/CCW x 5 reps each in // bars      backwards  // bars x 3 laps // bars (long) x 3 laps      hurdles         Sidesteps with big arms  Without arms in // bars, x 3 laps Side steps in // bars  (long) x 2 laps      BIG walking   Turning 180 with rollator to sit in chair, x 6 reps      Targeted stepping   Forward x 10 reps each, lateral x 10 each   UE support on chair                                          There Ex         Sit to stand from mat table no UE's   x 10 reps, minimal UE's X 10 reps, with use of UE's on chair      nustep   L6 for 8 minutes               Ther Activity                           Gait Training         hallway Hallway x 200 feet, cues for large steps, marching during turning Hallway x 200 feet with rollator, cues for increased step length, clock turns, initiating stepping with L LE       TM with SOLO         Modalities          Parkinson's specific education, LSVT Big review and HEP and continued exercise, role of exercise with Parkinsons,  LSVT education, role of exercise

## 2022-11-22 ENCOUNTER — OFFICE VISIT (OUTPATIENT)
Dept: PHYSICAL THERAPY | Facility: CLINIC | Age: 82
End: 2022-11-22

## 2022-11-22 DIAGNOSIS — G20 PARKINSONISM, UNSPECIFIED PARKINSONISM TYPE (HCC): Primary | ICD-10-CM

## 2022-11-22 DIAGNOSIS — Z74.09 IMPAIRED FUNCTIONAL MOBILITY, BALANCE, GAIT, AND ENDURANCE: ICD-10-CM

## 2022-11-22 NOTE — PROGRESS NOTES
Daily Note     Today's date: 2022  Patient name: Tram Salazar  : 1940  MRN: [id number]  Referring provider: Lucrecia Adler PA-C  Dx:   Encounter Diagnosis     ICD-10-CM    1  Parkinsonism, unspecified Parkinsonism type ([facility] [address] )  G20       2  Impaired functional mobility, balance, gait, and endurance  Z74 09           Start Time: 1600  Stop Time: 1700  Total time in clinic (min): 60 minutes    Subjective: Patient reports that he did not exercise at all since last session  Objective: See treatment diary below      Assessment: Tolerated treatment well  Yinka Parada participated in skilled PT session focused on balance, gait, and endurance  Targeted steps with dots on floor with stepping to replicate forward step and sideways stepping with 1 UE support  Patient focused on stepping over targets for bigger step length bilaterally with B/L UE support and gentle tactile cues for increasing speed  Increased freezing during ambulation with turns  Patient would continue to benefit from skilled PT interventions to address deficits with balance, gait, and endurance  Patient demonstrated fatigue post treatment      Plan: Continue per plan of care  Precautions: HTN, falls, NPH    Manuals 11/1 11/14 11/15 11/22                                         Neuro Re-Ed         LSVT Big exercise review  5-8 reps each exercise Seated reach with 10 second hold x 10 each      HKM         Clock turns  CW/CCW x 10 reps CW/CCW x 5 reps each in // bars //bars (long)  CW/CCW x 5 ea     backwards  // bars x 3 laps // bars (long) x 3 laps      hurdles         Sidesteps with big arms  Without arms in // bars, x 3 laps Side steps in // bars  (long) x 2 laps  // bars Side steps  (long) x 2 laps     BIG walking   Turning 180 with rollator to sit in chair, x 6 reps      Targeted stepping   Forward x 10 reps each, lateral x 10 each   UE support on chair //bars (long)  Fwd x 10   Lat x 10 UE support on rail    Stepping over targets for bigger steps x 3 laps B/L UE support                                         There Ex         Sit to stand from mat table no UE's   x 10 reps, minimal UE's X 10 reps, with use of UE's on chair 2x5 with use of UE's on chair     nustep   L6 for 8 minutes unavailable              Ther Activity                           Gait Training         hallway Hallway x 200 feet, cues for large steps, marching during turning Hallway x 200 feet with rollator, cues for increased step length, clock turns, initiating stepping with L LE  Gait with Rollator 2 x 100 ft  freezing with turns, v c for bigger steps       TM with SOLO         Modalities          Parkinson's specific education, LSVT Big review and HEP and continued exercise, role of exercise with Parkinsons,  LSVT education, role of exercise

## 2022-11-23 ENCOUNTER — APPOINTMENT (OUTPATIENT)
Dept: PHYSICAL THERAPY | Facility: CLINIC | Age: 82
End: 2022-11-23

## 2022-11-28 ENCOUNTER — EVALUATION (OUTPATIENT)
Dept: PHYSICAL THERAPY | Facility: CLINIC | Age: 82
End: 2022-11-28

## 2022-11-28 DIAGNOSIS — Z74.09 IMPAIRED FUNCTIONAL MOBILITY, BALANCE, GAIT, AND ENDURANCE: ICD-10-CM

## 2022-11-28 DIAGNOSIS — G20 PARKINSONISM, UNSPECIFIED PARKINSONISM TYPE (HCC): Primary | ICD-10-CM

## 2022-11-28 NOTE — PROGRESS NOTES
Mom returned call Progress Note      Today's date: 2022  Patient name: Viviane Garrison  : 1940  MRN: [id number]  Referring provider: Nessa Slaughter PA-C  Dx:   Encounter Diagnosis     ICD-10-CM    1  [facility], [facility]  G20       2  Impaired functional mobility, balance, gait, and endurance  Z74 09                         Assessment  22: Judi Choi has attended 5 sessions of physical therapy since initial evaluation  Attendance has been limited due to scheduling conflicts  During re-testing during session, patient appears to have significant difficulty with turning, freezing, shuffling, increased distraction, and picking feet up from floor  He reported that he was having a bad day today, and testing demonstrated minimal change since start of therapy  Judi Choi demonstrated improvement in 5xSTS from 17 seconds to 14 4 seconds (with use of UE's)  No significant change with 6MWT or gait speed compared to initial evaluation  TUG time with increased time to complete, appears to be due to increased distraction during session in therapy clinic  Limited progress may be due to limited attendance and self-reported bad day  Judi Choi continues to be at high risk of falls due to TUG time and self-selected gait speed, decreased endurance, decreased LE strength and endurance, and decreased mobility  He would benefit from continued physical therapy to improve mobility, improve safety, improve balance, improve endurance, decrease fall risk, and maximize function  Continue per current POC  Assessment details: Patient presents to outpatient physical therapy for Parkinson's Disease and history of NPH with shunt  He recently completed LSVT Big program at [facility]  He presents with impaired balance, impaired gait, freezing of gait, difficulty turning, increased risk of falls, bradykinesia, and impaired functional mobility   Compared to previous episode of care 10/2021, he has had some decline in status and is now utilizing rollator during most ambulatory tasks  He is demonstrating impaired LE strength and endurance from 5xSTS of 17 03 (utilizing UE's, unable to complete without UE's), which is also above cut-off score for increased risk of falls at 15 seconds  TUG time also demonstrates increased risk of falls at 47 10 seconds (with rollator), significantly above cut-off score of 13 5 seconds for increased fall risk, as well as significantly above his prior functional status at 24 2 seconds  Self-selected gait speed is also below cut-off score for increased fall risk at 0 77m/s (with rollator), as well as placing him in a limited community ambulator category  [place] He would benefit from skilled physical therapy to decrease falls, improve balance, improve functional mobility, decrease freezing episodes and festinating gait pattern, improve LE strength and endurance, improve independence with mobility, and maximize functioning  Impairments: abnormal coordination, abnormal gait, activity intolerance, impaired balance, lacks appropriate home exercise program and safety issue  Understanding of Dx/Px/POC: good   Prognosis: good    Goals  ST  Pt will improve gait speed to at least 0 85 m/s with least restrictive device within 4 weeks needed to improve safety with ambulation  NOT MET  2  Pt will improve TUG time by at least 10 seconds with least restrictive device within 4 weeks to decrease fall risk and improve functional mobility NOT MET  3  Pt will improve 5xSTS score by at least 3 seconds (with UE's), and be able to perform STS without UE support within 4 weeks needed to reduce fall risk  MET  4  Pt will improve 6MWT to at least 700 feet within 4 weeks to improve functional mobility and endurance NOT MET  5  Pt will demonstrate independence with HEP within 4 weeks  PROGRESSING    LT   Pt will improve gait speed to at least 1 0 m/s with least restrictive device within 8 weeks needed to improve safety with ambulation  2  Pt will improve TUG time to at least 25 seconds with least restrictive device within 8 weeks to decrease fall risk and improve functional mobility   3  Pt will improve 5xSTS without UE support to at least 15 seconds within 8 weeks needed to reduce fall risk  4  Pt will improve 6MWT to at least 800 feet within 8 weeks to improve functional mobility and endurance  5  Pt will demonstrate independence with HEP within 8 weeks  Plan  Patient would benefit from: skilled physical therapy  Planned therapy interventions: balance, neuromuscular re-education, balance/weight bearing training, patient education, strengthening, stretching, therapeutic activities, therapeutic exercise, flexibility, functional ROM exercises, gait training, home exercise program and abdominal trunk stabilization  Frequency: 2x week  Duration in weeks: 8  Plan of Care beginning date: 11/1/2022  Plan of Care expiration date: 12/27/2022  Treatment plan discussed with: patient and family        Subjective Evaluation    History of Present Illness  11/28/22: Patient reports that he has not been doing much exercise between therapy sessions  He has been performing reach forward and down to floor from LSVT Big exercises, however has not been doing other exercises  Walks around the parking lot and on his bike at home  He states that he is having a bad day today, the last week "hasn't been anything terrible "    Mechanism of injury: Patient reports he fell backwards down the stairs last year  He states he stopped coming to therapy because "I was in bad shape"  He states he was going to [facility] for therapy during the summer, stopped in the beginning of October, difficulty with scheduling  Was going 2x/week for therapy, states they were having staffing issues   He states that he has had some setbacks, he states that his cat caught a chipmunk and needed to catch it, needed to go release it, states that he had a hard time walking back inside without AD  He states that he was going to start the [email or web address], but did not get to it because of scheduling issues  He states that his wife wants him to continue therapy  He reports that he has trouble getting started, turning (especially sharp turns)  Has been using the rollator since March  States he always uses the rollator, except if he forgets to use it  He reports that he has completed the LSVT Big program     Has a stationary bike at home, 2x/day 10 minutes at a time  Pain  No pain reported    Social Support  Steps to enter house: yes  2  Stairs in house: yes (3 floors)   Lives in: [place] house (and basement)  Lives with: spouse (3 cats)    Treatments  Previous treatment: physical therapy        Objective       Manual Muscle Testing - Hip Left Right   Flexion 5 5   Abduction (seated) 4+ 4+   Adduction (seated) 4+ 4+     Manual Muscle Testing - Knee Left Right   Flexion 4 4+   Extension 5 5     Manual Muscle Testing - Ankle Left Right   Doriflexion 5 5   Plantarflexion NT NT         Coordination Left Right   Heel To [place] NT NT   Finger To Nose NT NT   Rapid Alternating Supination impaired intact   Alt toe tapping impaired intact         Outcome Measures  Initial Eval 11/28     5x Sit to Stand: 17 03 seconds with UE's 14 41 seconds with UE's     TUG:     Manual (holding cup water)     Cog (counting back 2s) 47 10 seconds with rollator 1 min 11 seconds with rollator  2nd trial 2min 19 seconds     Gait Speed:  0 77m/s with rollator 0 75 m/s with rollator     6 Minute Walk Test: 550 feet with rollator 500 feet with rollator     FGA:  NT NT       Gait Assessment: ambulates with rollator, decrease gait speed, decreased heel strike bilaterally, difficulty initiating gait and significant shuffling/festination with turning, difficulty with weight shift  Decreased safety awareness with turning to sit in chair, not turning fully before sitting          Precautions: HTN, falls, NPH    Manuals 11/1 11/14 11/15 11/22 11/28                                        Neuro Re-Ed     TUG x 3 trials    LSVT Big exercise review  5-8 reps each exercise Seated reach with 10 second hold x 10 each      HKM         Clock turns  CW/CCW x 10 reps CW/CCW x 5 reps each in // bars //bars (long)  CW/CCW x 5 ea In hallway and throughout session     backwards  // bars x 3 laps // bars (long) x 3 laps      hurdles         Sidesteps with big arms  Without arms in // bars, x 3 laps Side steps in // bars  (long) x 2 laps  // bars Side steps  (long) x 2 laps     BIG walking   Turning 180 with rollator to sit in chair, x 6 reps      Targeted stepping   Forward x 10 reps each, lateral x 10 each   UE support on chair //bars (long)  Fwd x 10   Lat x 10 UE support on rail    Stepping over targets for bigger steps x 3 laps B/L UE support                                         There Ex     6MWT with rollator    Sit to stand from mat table no UE's   x 10 reps, minimal UE's X 10 reps, with use of UE's on chair 2x5 with use of UE's on chair 5xSTS x 3 reps    nustep   L6 for 8 minutes unavailable L5 for 10 minutes, cues for increased amplitude and SPM 55-60             Ther Activity                           Gait Training         hallway Hallway x 200 feet, cues for large steps, marching during turning Hallway x 200 feet with rollator, cues for increased step length, clock turns, initiating stepping with L LE  Gait with Rollator 2 x 100 ft  freezing with turns, v c for bigger steps   Fast walking/large turns in hallway    TM with SOLO         Modalities          Parkinson's specific education, LSVT Big review and HEP and continued exercise, role of exercise with Parkinsons,  LSVT education, role of exercise    Review of LSVT Big exercises, large amplitude movements, Parkinson's specific education, safety education

## 2022-11-29 ENCOUNTER — APPOINTMENT (OUTPATIENT)
Dept: PHYSICAL THERAPY | Facility: CLINIC | Age: 82
End: 2022-11-29

## 2022-11-30 ENCOUNTER — OFFICE VISIT (OUTPATIENT)
Dept: PHYSICAL THERAPY | Facility: CLINIC | Age: 82
End: 2022-11-30

## 2022-11-30 DIAGNOSIS — G20 PARKINSONISM, UNSPECIFIED PARKINSONISM TYPE (HCC): Primary | ICD-10-CM

## 2022-11-30 DIAGNOSIS — Z74.09 IMPAIRED FUNCTIONAL MOBILITY, BALANCE, GAIT, AND ENDURANCE: ICD-10-CM

## 2022-11-30 NOTE — PROGRESS NOTES
Daily Note     Today's date: 2022  Patient name: Alban Cook  : 1940  MRN: [id number]  Referring provider: Lexii Miranda PA-C  Dx:   Encounter Diagnosis     ICD-10-CM    1  Parkinsonism, unspecified Parkinsonism type ([facility] [address] )  G20       2  Impaired functional mobility, balance, gait, and endurance  Z74 09           Start Time: 1100  Stop Time: 1200  Total time in clinic (min): 60 minutes    Subjective: Patient reports that he doesn't feel strong today  Patient states that the PT last session he is confused with which foot to lead with when he starts festering  Objective: See treatment diary below      Assessment: Tolerated treatment shanae  Clovis Villafuerte participated in skilled PT session focused on balance, gait, and endurance  Patient continues with increased freezing during turns, verbal and visual cues  Patient demonstrates improved ambulation with good B/L step length until he needs to turn  Focused on picking up LLE for turns when he start festering  Patient would continue to benefit from skilled PT interventions to address deficits with balance, gait, and endurance   Patient demonstrated fatigue post treatment      Plan: Continue per plan of care        Precautions: HTN, falls, NPH    Manuals 11/1 11/14 11/15 11/22 11/30                                        Neuro Re-Ed         LSVT Big exercise review  5-8 reps each exercise Seated reach with 10 second hold x 10 each      HKM         Clock turns  CW/CCW x 10 reps CW/CCW x 5 reps each in // bars //bars (long)  CW/CCW x 5 ea //bars (long)  CW/CCW x 5 ea    backwards  // bars x 3 laps // bars (long) x 3 laps  //bars (short) X 2 laps    hurdles         Sidesteps with big arms  Without arms in // bars, x 3 laps Side steps in // bars  (long) x 2 laps  // bars Side steps  (long) x 2 laps //bars (short) side stepping x 4 laps    BIG walking   Turning 180 with rollator to sit in chair, x 6 reps      Targeted stepping   Forward x 10 reps each, lateral x 10 each   UE support on chair //bars (long)  Fwd x 10   Lat x 10 UE support on rail    Stepping over targets for bigger steps x 3 laps B/L UE support          //bars (short) HKM  X 4 laps                               There Ex         Sit to stand from mat table no UE's   x 10 reps, minimal UE's X 10 reps, with use of UE's on chair 2x5 with use of UE's on chair X 10 with B/L UE use    nustep   L6 for 8 minutes unavailable L6 x 10 min             Ther Activity                           Gait Training         hallway Hallway x 200 feet, cues for large steps, marching during turning Hallway x 200 feet with rollator, cues for increased step length, clock turns, initiating stepping with L LE  Gait with Rollator 2 x 100 ft  freezing with turns, v c for bigger steps   Gait with Rollator 2 x 100 ft  freezing with turns, v c for bigger steps    TM with SOLO         Modalities          Parkinson's specific education, LSVT Big review and HEP and continued exercise, role of exercise with Parkinsons,  LSVT education, role of exercise

## 2022-12-05 ENCOUNTER — OFFICE VISIT (OUTPATIENT)
Dept: PHYSICAL THERAPY | Facility: CLINIC | Age: 82
End: 2022-12-05

## 2022-12-05 DIAGNOSIS — Z74.09 IMPAIRED FUNCTIONAL MOBILITY, BALANCE, GAIT, AND ENDURANCE: ICD-10-CM

## 2022-12-05 DIAGNOSIS — G20 PARKINSONISM, UNSPECIFIED PARKINSONISM TYPE (HCC): Primary | ICD-10-CM

## 2022-12-05 NOTE — PROGRESS NOTES
Daily Note     Today's date: 2022  Patient name: Darío Elam  : 1940  MRN: [id number]  Referring provider: Era Sun PA-C  Dx:   Encounter Diagnosis     ICD-10-CM    1  Parkinsonism, unspecified Parkinsonism type ([facility] [address] )  G20       2  Impaired functional mobility, balance, gait, and endurance  Z74 09                      Subjective: Patient reports he went for a walk for 1/4 mile yesterday, his legs feel tired today  Objective: See treatment diary below  TUG 42 89 seconds with rollator during sessions    Assessment: Tolerated treatment fair  Compared to previous sessions, patient demonstrated improved power during sit to stand transfers  He continues to have most difficulty with initiating gait and with turning, as well as with changes in rivka;  however demonstrates decreased time of freezing/festinating gait pattern when instructed to initiate gait with R LE and with initiating turning by turning to the L side (to allow R LE to take increased step lengths)  Patient verbalized some improvement in ease of movements, and stated that people have told him his L leg is stronger and he tries to lead with this side  Patient educated that he tends to move his R leg with greater step length and increased amplitude, and to try to initiate movement with R leg march to get out of freezing, verbalized understanding  Serban required min-mod cues for foot positioning with step ups and up and over staircase due to heel hanging off step on step up, and not clearing step on step down  Improved ability to perform 90 degree "clock turns" in // bars with cues to march feet  Continue to progress as tolerated  Patient demonstrated fatigue post treatment      Plan: Continue per plan of care        Precautions: HTN, falls, NPH    Manuals 11/1 11/14 11/15 11/22 11/30 12/5                                       Neuro Re-Ed         LSVT Big exercise review  5-8 reps each exercise Seated reach with 10 second hold x 10 each      HKM         Clock turns  CW/CCW x 10 reps CW/CCW x 5 reps each in // bars //bars (long)  CW/CCW x 5 ea //bars (long)  CW/CCW x 5 ea // bars, CW/CCW x 5 reps each, then 180 degree turns x 4 reps   backwards  // bars x 3 laps // bars (long) x 3 laps  //bars (short) X 2 laps // bars (long) x 2 laps with BUE support, cues for increased step length   hurdles         Sidesteps with big arms  Without arms in // bars, x 3 laps Side steps in // bars  (long) x 2 laps  // bars Side steps  (long) x 2 laps //bars (short) side stepping x 4 laps // bars (long) x 4 laps, BUE support   BIG walking   Turning 180 with rollator to sit in chair, x 6 reps      Targeted stepping   Forward x 10 reps each, lateral x 10 each  UE support on chair //bars (long)  Fwd x 10   Lat x 10 UE support on rail    Stepping over targets for bigger steps x 3 laps B/L UE support     HKM     //bars (short) HKM  X 4 laps    Step up      6-inch step with single UE support x 10 each LE, cues for foot positioning                     Ther Ex         Sit to stand from mat table no UE's   x 10 reps, minimal UE's X 10 reps, with use of UE's on chair 2x5 with use of UE's on chair X 10 with B/L UE use X 10 throughout session, cues for increased effort, UE's on chair   nustep   L6 for 8 minutes unavailable L6 x 10 min             Ther Activity                           Gait Training         hallway Hallway x 200 feet, cues for large steps, marching during turning Hallway x 200 feet with rollator, cues for increased step length, clock turns, initiating stepping with L LE  Gait with Rollator 2 x 100 ft  freezing with turns, v c for bigger steps   Gait with Rollator 2 x 100 ft  freezing with turns, v c for bigger steps Gait with rollator around obstacles in therapy clinic and turning 100' x 2 trials, TUG x 3 trials  Cues for initiating gait with freezing with R LE, turning to L side       Education on techniques to decrease freezing and improve turning staircase      Up and over staircase x 2 laps with BUE support, step-to pattern for 1st lap, step-over step for 2nd lap   TM with SOLO         Modalities          Parkinson's specific education, LSVT Big review and HEP and continued exercise, role of exercise with Parkinsons,  LSVT education, role of exercise

## 2022-12-06 ENCOUNTER — APPOINTMENT (OUTPATIENT)
Dept: PHYSICAL THERAPY | Facility: CLINIC | Age: 82
End: 2022-12-06

## 2022-12-07 ENCOUNTER — OFFICE VISIT (OUTPATIENT)
Dept: PHYSICAL THERAPY | Facility: CLINIC | Age: 82
End: 2022-12-07

## 2022-12-07 DIAGNOSIS — Z74.09 IMPAIRED FUNCTIONAL MOBILITY, BALANCE, GAIT, AND ENDURANCE: ICD-10-CM

## 2022-12-07 DIAGNOSIS — G20 PARKINSONISM, UNSPECIFIED PARKINSONISM TYPE (HCC): Primary | ICD-10-CM

## 2022-12-07 NOTE — PROGRESS NOTES
Daily Note     Today's date: 2022  Patient name: Willem Riojas  : 1940  MRN: [id number]  Referring provider: Manav Rodriguez PA-C  Dx:   Encounter Diagnosis     ICD-10-CM    1  Parkinsonism, unspecified Parkinsonism type ([facility] [address] )  G20       2  Impaired functional mobility, balance, gait, and endurance  Z74 09           Start Time: 1200  Stop Time: 1300  Total time in clinic (min): 60 minutes    Subjective: Patient reports that he had a dentist appt yesterday  He states that he has to walk up a incline and then walk down when leaving  He states that really takes a toll on him and he feels off for a while from that  Objective: See treatment diary below      Assessment: Tolerated treatment well  Hemalatha Elizabeth participated in skilled PT session focused on balance, gait, and endurance  Patient focused on turns this session with and without Rollator  Patient demonstrates improved steps for turns without the rollator and use of SPC  Patient demonstrates increased freezing with use of Rollator  Introduced TM this session, which patient tolerated well for 10 min with no adverse reaction  Patient would continue to benefit from skilled PT interventions to address deficits with balance, gait, and endurance  Patient demonstrated fatigue post treatment      Plan: Continue per plan of care        Precautions: HTN, falls, NPH    Manuals 12/7 11/14 11/15 11/22 11/30 12/5                                       Neuro Re-Ed         LSVT Big exercise review  5-8 reps each exercise Seated reach with 10 second hold x 10 each      HKM         Clock turns SOLO w/SPC  1/4 turns to colored disks x 5 ea direction CW/CCW x 10 reps CW/CCW x 5 reps each in // bars //bars (long)  CW/CCW x 5 ea //bars (long)  CW/CCW x 5 ea // bars, CW/CCW x 5 reps each, then 180 degree turns x 4 reps   backwards  // bars x 3 laps // bars (long) x 3 laps  //bars (short) X 2 laps // bars (long) x 2 laps with BUE support, cues for increased step length   hurdles         Sidesteps with big arms  Without arms in // bars, x 3 laps Side steps in // bars  (long) x 2 laps  // bars Side steps  (long) x 2 laps //bars (short) side stepping x 4 laps // bars (long) x 4 laps, BUE support   BIG walking   Turning 180 with rollator to sit in chair, x 6 reps      Targeted stepping   Forward x 10 reps each, lateral x 10 each  UE support on chair //bars (long)  Fwd x 10   Lat x 10 UE support on rail    Stepping over targets for bigger steps x 3 laps B/L UE support     HKM SOLO No AD  1/4 length x 3 laps freezing with turns    With [facility] and slight push from therapist with turns 1/4 length x 3 laps    //bars (short) HKM  X 4 laps    Step up      6-inch step with single UE support x 10 each LE, cues for foot positioning                     Ther Ex         Sit to stand from mat table no UE's Sit<>Stand from arm chair x 10 and through out session  x 10 reps, minimal UE's X 10 reps, with use of UE's on chair 2x5 with use of UE's on chair X 10 with B/L UE use X 10 throughout session, cues for increased effort, UE's on chair   nustep   L6 for 8 minutes unavailable L6 x 10 min             Ther Activity                           Gait Training         hallway SOLO with Rollator 1/4 length x 4 laps focused on turns  Hallway x 200 feet with rollator, cues for increased step length, clock turns, initiating stepping with L LE  Gait with Rollator 2 x 100 ft  freezing with turns, v c for bigger steps   Gait with Rollator 2 x 100 ft  freezing with turns, v c for bigger steps Gait with rollator around obstacles in therapy clinic and turning 100' x 2 trials, TUG x 3 trials  Cues for initiating gait with freezing with R LE, turning to L side       Education on techniques to decrease freezing and improve turning   staircase      Up and over staircase x 2 laps with BUE support, step-to pattern for 1st lap, step-over step for 2nd lap   TM with SOLO SOLO TM  1 0 mph - 1 3 mph x 10 min Modalities           LSVT education, role of exercise

## 2022-12-12 ENCOUNTER — OFFICE VISIT (OUTPATIENT)
Dept: PHYSICAL THERAPY | Facility: CLINIC | Age: 82
End: 2022-12-12

## 2022-12-12 DIAGNOSIS — G20 PARKINSONISM, UNSPECIFIED PARKINSONISM TYPE (HCC): Primary | ICD-10-CM

## 2022-12-12 DIAGNOSIS — Z74.09 IMPAIRED FUNCTIONAL MOBILITY, BALANCE, GAIT, AND ENDURANCE: ICD-10-CM

## 2022-12-12 NOTE — PROGRESS NOTES
Daily Note     Today's date: 2022  Patient name: Pastor Anderson  : 1940  MRN: [id number]  Referring provider: Madelaine Collado PA-C  Dx:   Encounter Diagnosis     ICD-10-CM    1  Parkinsonism, unspecified Parkinsonism type ([facility] [address] )  G20       2  Impaired functional mobility, balance, gait, and endurance  Z74 09                      Subjective: Patient reports that his walking has been a little better, he has been focusing on lifting his R leg up to initiate turning and standing  He states he feels he is doing a little better today  When he was at the dentist he states he had a hard time going down the big ramp outside  He reports that therapy has been helping him  Objective: See treatment diary below      Assessment: Tolerated treatment well  Continues to require cues to decrease freezing with turning and initiating gait with some carryover  Was able to perform staircase negotiation with BUE support, cues to increase mary on descent with making sure to bring UE's in front of him before stepping down stairs  // bars and SOLO under track unavailable during session, focused on amb short distances with turning to sit in chair, turning, stair negotiation  Ended session on TM with SOLO, with focus on increased step length and decreased scuffing of feet with fatigue, patient demonstrating some self-correction by end of trial  Requires cues to increase anterior trunk lean and keeping toes on the floor with sit to stand and initial standing  Continue to work on stair negotiation, turning, increased amplitude of movements, decrease festinating of gait and techniques to improve movement  Continue to progress as tolerated  Patient demonstrated fatigue post treatment      Plan: Continue per plan of care        Precautions: HTN, falls, NPH    Manuals 11/15 11/22 11/30 12/5 12/7 12/12                                            Neuro Re-Ed          LSVT Big exercise review Seated reach with 10 second hold x 10 each         HKM          Clock turns CW/CCW x 5 reps each in // bars //bars (long)  CW/CCW x 5 ea //bars (long)  CW/CCW x 5 ea // bars, CW/CCW x 5 reps each, then 180 degree turns x 4 reps SOLO w/SPC  1/4 turns to colored disks x 5 ea direction     backwards // bars (long) x 3 laps  //bars (short) X 2 laps // bars (long) x 2 laps with BUE support, cues for increased step length      hurdles          Sidesteps with big arms Side steps in // bars  (long) x 2 laps  // bars Side steps  (long) x 2 laps //bars (short) side stepping x 4 laps // bars (long) x 4 laps, BUE support      BIG walking Turning 180 with rollator to sit in chair, x 6 reps         Targeted stepping Forward x 10 reps each, lateral x 10 each  UE support on chair //bars (long)  Fwd x 10   Lat x 10 UE support on rail    Stepping over targets for bigger steps x 3 laps B/L UE support        HKM   //bars (short) HKM  X 4 laps  SOLO No AD  1/4 length x 3 laps freezing with turns    With SPC and slight push from therapist with turns 1/4 length x 3 laps     Step up    6-inch step with single UE support x 10 each LE, cues for foot positioning                          Ther Ex          Sit to stand from mat table no UE's X 10 reps, with use of UE's on chair 2x5 with use of UE's on chair X 10 with B/L UE use X 10 throughout session, cues for increased effort, UE's on chair Sit<>Stand from arm chair x 10 and through out session  Sit to stand 2 x 10 with B/L UE use, cues for increased anterior trunk lean    nustep L6 for 8 minutes unavailable L6 x 10 min                 Ther Activity                              Gait Training          hallway  Gait with Rollator 2 x 100 ft  freezing with turns, v c for bigger steps   Gait with Rollator 2 x 100 ft  freezing with turns, v c for bigger steps Gait with rollator around obstacles in therapy clinic and turning 100' x 2 trials, TUG x 3 trials  Cues for initiating gait with freezing with R LE, turning to L side  Education on techniques to decrease freezing and improve turning SOLO with Rollator 1/4 length x 4 laps focused on turns  Hallway ambulation with rollator x 4 laps, cues for R leg march with turning and turning to sit in chair    Amb between chair with rollator (12 feet apart) x 6 trials   Timed the last 2 trials 35 seconds, 2nd trial 1min 15 min    staircase    Up and over staircase x 2 laps with BUE support, step-to pattern for 1st lap, step-over step for 2nd lap  Up and over staircase x 6 laps with BUE support, step over step with cues for hand positioning    TM with SOLO     SOLO TM  1 0 mph - 1 3 mph x 10 min SOLO  1 5mph for 8 minutes with BUE support    Modalities          Education      Techniques to decrease freezing, head hips relationship, turning to sit, safety with stairs

## 2022-12-14 ENCOUNTER — OFFICE VISIT (OUTPATIENT)
Dept: PHYSICAL THERAPY | Facility: CLINIC | Age: 82
End: 2022-12-14

## 2022-12-14 DIAGNOSIS — G20 PARKINSONISM, UNSPECIFIED PARKINSONISM TYPE (HCC): Primary | ICD-10-CM

## 2022-12-14 DIAGNOSIS — Z74.09 IMPAIRED FUNCTIONAL MOBILITY, BALANCE, GAIT, AND ENDURANCE: ICD-10-CM

## 2022-12-14 NOTE — PROGRESS NOTES
Daily Note     Today's date: 2022  Patient name: Laney Pritchard  : 1940  MRN: [id number]  Referring provider: Renato Concepcion PA-C  Dx:   Encounter Diagnosis     ICD-10-CM    1  Parkinsonism, unspecified Parkinsonism type ([facility] [address] )  G20       2  Impaired functional mobility, balance, gait, and endurance  Z74 09           Start Time: 1200  Stop Time: 1300  Total time in clinic (min): 60 minutes    Subjective: Patient reports no new complaints  Patient asked not to use the SOLO harness  Objective: See treatment diary below      Assessment: Tolerated treatment well  Reagan Phipps participated in skilled PT session focused on balance, gait, and endurance  Continues to require cues to decrease freezing with turning and initiating gait with some carryover  Patient trial with turning to R demonstrating decreased festering  Continues to need cues for Big step and Big amplitude during ambulation both on floor and TM  Patient would continue to benefit from skilled PT interventions to address deficits with balance, gait, and endurance  Patient demonstrated fatigue post treatment      Plan: Continue per plan of care        Precautions: HTN, falls, NPH    Manuals 11/15 11/22 11/30 12/5 12/7 12/12 12/14                                           Neuro Re-Ed          LSVT Big exercise review Seated reach with 10 second hold x 10 each         HKM          Clock turns CW/CCW x 5 reps each in // bars //bars (long)  CW/CCW x 5 ea //bars (long)  CW/CCW x 5 ea // bars, CW/CCW x 5 reps each, then 180 degree turns x 4 reps SOLO w/SPC  1/4 turns to colored disks x 5 ea direction     backwards // bars (long) x 3 laps  //bars (short) X 2 laps // bars (long) x 2 laps with BUE support, cues for increased step length      hurdles          Sidesteps with big arms Side steps in // bars  (long) x 2 laps  // bars Side steps  (long) x 2 laps //bars (short) side stepping x 4 laps // bars (long) x 4 laps, BUE support   //bars (long) x 2 laps w/B/L UE    X 1 lap with trial of Big arms with Big step   BIG walking Turning 180 with rollator to sit in chair, x 6 reps         Targeted stepping Forward x 10 reps each, lateral x 10 each  UE support on chair //bars (long)  Fwd x 10   Lat x 10 UE support on rail    Stepping over targets for bigger steps x 3 laps B/L UE support        HKM   //bars (short) HKM  X 4 laps  SOLO No AD  1/4 length x 3 laps freezing with turns    With SPC and slight push from therapist with turns 1/4 length x 3 laps     Step up    6-inch step with single UE support x 10 each LE, cues for foot positioning                          Ther Ex          Sit to stand from mat table no UE's X 10 reps, with use of UE's on chair 2x5 with use of UE's on chair X 10 with B/L UE use X 10 throughout session, cues for increased effort, UE's on chair Sit<>Stand from arm chair x 10 and through out session  Sit to stand 2 x 10 with B/L UE use, cues for increased anterior trunk lean Sit <>stand 2 x 10 B/L UE use cues for increased anterior trunk lean  nustep L6 for 8 minutes unavailable L6 x 10 min                 Ther Activity                              Gait Training          hallway  Gait with Rollator 2 x 100 ft  freezing with turns, v c for bigger steps   Gait with Rollator 2 x 100 ft  freezing with turns, v c for bigger steps Gait with rollator around obstacles in therapy clinic and turning 100' x 2 trials, TUG x 3 trials  Cues for initiating gait with freezing with R LE, turning to L side  Education on techniques to decrease freezing and improve turning SOLO with Rollator 1/4 length x 4 laps focused on turns  Hallway ambulation with rollator x 4 laps, cues for R leg march with turning and turning to sit in chair    Amb between chair with rollator (12 feet apart) x 6 trials   Timed the last 2 trials 35 seconds, 2nd trial 1min 15 min Hallway ambulation with rollator x 4 laps, cues for R leg march with turning and turning to sit in chair    Practiced transitioning from floor <> carpet w/Rollator x 5     Amb between chair with rollator (12 feet apart) x 6     staircase    Up and over staircase x 2 laps with BUE support, step-to pattern for 1st lap, step-over step for 2nd lap  Up and over staircase x 6 laps with BUE support, step over step with cues for hand positioning Up and over staircase x 6 laps with BUE support, step over step with cues for hand positioning   TM with SOLO     SOLO TM  1 0 mph - 1 3 mph x 10 min SOLO  1 5mph for 8 minutes with BUE support SOLO TM   1 5 mph x 10 min B/L UE support   Modalities          Education      Techniques to decrease freezing, head hips relationship, turning to sit, safety with stairs

## 2022-12-19 ENCOUNTER — OFFICE VISIT (OUTPATIENT)
Dept: PHYSICAL THERAPY | Facility: CLINIC | Age: 82
End: 2022-12-19

## 2022-12-19 DIAGNOSIS — Z74.09 IMPAIRED FUNCTIONAL MOBILITY, BALANCE, GAIT, AND ENDURANCE: Primary | ICD-10-CM

## 2022-12-19 DIAGNOSIS — G20 PARKINSONISM, UNSPECIFIED PARKINSONISM TYPE (HCC): ICD-10-CM

## 2022-12-19 NOTE — PROGRESS NOTES
Daily Note     Today's date: 2022  Patient name: Go Epperson  : 1940  MRN: [id number]  Referring provider: Anjum Cherry PA-C  Dx:   Encounter Diagnosis     ICD-10-CM    1  Impaired functional mobility, balance, gait, and endurance  Z74 09       2  Parkinsonism, unspecified Parkinsonism type ([facility] [address] )  G20                      Subjective: Patient reports he has had a bad week  He states that he continues to have difficulty with freezing, the techniques learned in therapy have helped a little  Objective: See treatment diary below      Assessment: Tolerated treatment well  Requested slower speed on TM during session, agreeable to increase speed after 2 minutes  Attempted ambulation on TM with increased speed, continues to require cues to increase step height, heel strike, and decreased shuffling  Continues to have significant difficulty with initiating turning, attempted to add turning of head to use head hips relationship  Performed 90 degree turning while standing at side of staircase with cues for maximal effort to turn with significant improvement with shuffling and turning, continue to emphasize principles with exercises and Parkinson's specific education  Patient would continue to benefit from skilled PT interventions to address deficits with balance, gait, and endurance  Patient demonstrated fatigue post treatment      Plan: Continue per plan of care  Precautions: HTN, falls, NPH    Manuals                                        Neuro Re-Ed         LSVT Big exercise review         HKM         Clock turns //bars (long)  CW/CCW x 5 ea // bars, CW/CCW x 5 reps each, then 180 degree turns x 4 reps SOLO w/SPC  1/4 turns to colored disks x 5 ea direction   Standing at side of staircase with BUE support  90 degree turns R to center x 7 reps, L to center x 7 reps   Cues to increase amplitude and march LE's   backwards //bars (short) X 2 laps // bars (long) x 2 laps with BUE support, cues for increased step length       hurdles         Sidesteps with big arms //bars (short) side stepping x 4 laps // bars (long) x 4 laps, BUE support   //bars (long) x 2 laps w/B/L UE    X 1 lap with trial of Big arms with Big step    BIG walking         Targeted stepping         HKM //bars (short) HKM  X 4 laps  SOLO No AD  1/4 length x 3 laps freezing with turns    With SPC and slight push from therapist with turns 1/4 length x 3 laps      Step up  6-inch step with single UE support x 10 each LE, cues for foot positioning                         Ther Ex         Sit to stand from mat table no UE's X 10 with B/L UE use X 10 throughout session, cues for increased effort, UE's on chair Sit<>Stand from arm chair x 10 and through out session  Sit to stand 2 x 10 with B/L UE use, cues for increased anterior trunk lean Sit <>stand 2 x 10 B/L UE use cues for increased anterior trunk lean  Sit to stand x 10 reps cues for increased anterior trunk lean   nustep L6 x 10 min                 Ther Activity                           Gait Training         hallway Gait with Rollator 2 x 100 ft  freezing with turns, v c for bigger steps Gait with rollator around obstacles in therapy clinic and turning 100' x 2 trials, TUG x 3 trials  Cues for initiating gait with freezing with R LE, turning to L side  Education on techniques to decrease freezing and improve turning SOLO with Rollator 1/4 length x 4 laps focused on turns  Hallway ambulation with rollator x 4 laps, cues for R leg march with turning and turning to sit in chair    Amb between chair with rollator (12 feet apart) x 6 trials   Timed the last 2 trials 35 seconds, 2nd trial 1min 15 min Hallway ambulation with rollator x 4 laps, cues for R leg march with turning and turning to sit in chair    Practiced transitioning from floor <> carpet w/Rollator x 5     Amb between chair with rollator (12 feet apart) x 6   Amb between chairs with rollator (12 feet apart) x 6 reps  Cues for turning, R leg march to initiate    staircase  Up and over staircase x 2 laps with BUE support, step-to pattern for 1st lap, step-over step for 2nd lap  Up and over staircase x 6 laps with BUE support, step over step with cues for hand positioning Up and over staircase x 6 laps with BUE support, step over step with cues for hand positioning Up and over staircase x 6 laps with BUE support  Step over step with cues for hand positioning, safety with turning at bottom   TM with SOLO   SOLO TM  1 0 mph - 1 3 mph x 10 min SOLO  1 5mph for 8 minutes with BUE support SOLO TM   1 5 mph x 10 min B/L UE support SOLO, TM  1 2-1  4mph x 10 minutes, cues to increase step height and foot clearance  With B/L UE support    Seated rest break  2  4mph for 3 minutes     Modalities         Education    Techniques to decrease freezing, head hips relationship, turning to sit, safety with stairs

## 2022-12-21 ENCOUNTER — OFFICE VISIT (OUTPATIENT)
Dept: PHYSICAL THERAPY | Facility: CLINIC | Age: 82
End: 2022-12-21

## 2022-12-21 DIAGNOSIS — G20 PARKINSONISM, UNSPECIFIED PARKINSONISM TYPE (HCC): Primary | ICD-10-CM

## 2022-12-21 DIAGNOSIS — Z74.09 IMPAIRED FUNCTIONAL MOBILITY, BALANCE, GAIT, AND ENDURANCE: ICD-10-CM

## 2022-12-21 NOTE — PROGRESS NOTES
Daily Note     Today's date: 2022  Patient name: Radha Rosales  : 1940  MRN: [id number]  Referring provider: Lois Snyder PA-C  Dx:   Encounter Diagnosis     ICD-10-CM    1  Parkinsonism, unspecified Parkinsonism type ([facility] [address] )  G20       2  Impaired functional mobility, balance, gait, and endurance  Z74 09           Start Time: 1530  Stop Time: 1630  Total time in clinic (min): 60 minutes    Subjective: Patient reports no new complaints  Patient states for next week he does not want to come 2 days in a row  Objective: See treatment diary below      Assessment: Tolerated treatment well  Micha Greene participated in skilled PT session focused on balance, gait, and endurance  Patient demonstrates improved ambulation with turns having decreased festering  Patient focused on marching with turns, but continues to fester with gait  Verbal cues on TM for increasing B/L heel strike and increasing amplitude and stride  Patient would continue to benefit from skilled PT interventions to address deficits with balance, gait, and endurance  Patient demonstrated fatigue post treatment      Plan: Continue per plan of care  Precautions: HTN, falls, NPH    Manuals                                        Neuro Re-Ed         LSVT Big exercise review         HKM         Clock turns Standing at side of staircase with BUE support  90 degree turns R to center x 7 reps, L to center x 7 reps  Cues to increase amplitude and march LE's // bars, CW/CCW x 5 reps each, then 180 degree turns x 4 reps SOLO w/SPC  /4 turns to colored disks x 5 ea direction   Standing at side of staircase with BUE support  90 degree turns R to center x 7 reps, L to center x 7 reps   Cues to increase amplitude and march LE's   backwards  // bars (long) x 2 laps with BUE support, cues for increased step length       hurdles         Sidesteps with big arms  // bars (long) x 4 laps, BUE support   //bars (long) x 2 laps w/B/L UE    X 1 lap with trial of Big arms with Big step    BIG walking         Targeted stepping         HKM   SOLO No AD  1/4 length x 3 laps freezing with turns    With SPC and slight push from therapist with turns 1/4 length x 3 laps      Step up  6-inch step with single UE support x 10 each LE, cues for foot positioning                         Ther Ex         Sit to stand from mat table no UE's Sit to stand from arm chair 2 x 10  V c  increase anterior trunk lean  X 10 throughout session, cues for increased effort, UE's on chair Sit<>Stand from arm chair x 10 and through out session  Sit to stand 2 x 10 with B/L UE use, cues for increased anterior trunk lean Sit <>stand 2 x 10 B/L UE use cues for increased anterior trunk lean  Sit to stand x 10 reps cues for increased anterior trunk lean   nustep                  Ther Activity                           Gait Training         hallway Amb between chairs with rollator x 5 reps  Cues for turning, R leg march to initiat Gait with rollator around obstacles in therapy clinic and turning 100' x 2 trials, TUG x 3 trials  Cues for initiating gait with freezing with R LE, turning to L side  Education on techniques to decrease freezing and improve turning SOLO with Rollator 1/4 length x 4 laps focused on turns  Hallway ambulation with rollator x 4 laps, cues for R leg march with turning and turning to sit in chair    Amb between chair with rollator (12 feet apart) x 6 trials  Timed the last 2 trials 35 seconds, 2nd trial 1min 15 min Hallway ambulation with rollator x 4 laps, cues for R leg march with turning and turning to sit in chair    Practiced transitioning from floor <> carpet w/Rollator x 5     Amb between chair with rollator (12 feet apart) x 6   Amb between chairs with rollator (12 feet apart) x 6 reps  Cues for turning, R leg march to initiate    staircase Up and over staircase x 6 laps with BUE support   Step over step with cues for hand positioning, safety with turning at bottom Up and over staircase x 2 laps with BUE support, step-to pattern for 1st lap, step-over step for 2nd lap  Up and over staircase x 6 laps with BUE support, step over step with cues for hand positioning Up and over staircase x 6 laps with BUE support, step over step with cues for hand positioning Up and over staircase x 6 laps with BUE support  Step over step with cues for hand positioning, safety with turning at bottom   TM with SOLO SOLO TM  1 2 mph - 1 6 mph  X 7min than to 2  4mph to 2 0 x 3 min  Total 10 min  cues to increase step height and foot clearance  With B/L UE support      Seated rest break  SOLO TM  1 0 mph - 1 3 mph x 10 min SOLO  1 5mph for 8 minutes with BUE support SOLO TM   1 5 mph x 10 min B/L UE support SOLO, TM  1 2-1  4mph x 10 minutes, cues to increase step height and foot clearance  With B/L UE support    Seated rest break  2  4mph for 3 minutes     Modalities         Education    Techniques to decrease freezing, head hips relationship, turning to sit, safety with stairs

## 2022-12-26 ENCOUNTER — APPOINTMENT (OUTPATIENT)
Dept: PHYSICAL THERAPY | Facility: CLINIC | Age: 82
End: 2022-12-26

## 2022-12-27 ENCOUNTER — EVALUATION (OUTPATIENT)
Dept: PHYSICAL THERAPY | Facility: CLINIC | Age: 82
End: 2022-12-27

## 2022-12-27 DIAGNOSIS — Z74.09 IMPAIRED FUNCTIONAL MOBILITY, BALANCE, GAIT, AND ENDURANCE: Primary | ICD-10-CM

## 2022-12-27 DIAGNOSIS — G20 PARKINSONISM, UNSPECIFIED PARKINSONISM TYPE (HCC): ICD-10-CM

## 2022-12-27 NOTE — PROGRESS NOTES
Re-Evaluation      Today's date: 2022  Patient name: Ni Steward  : 1940  MRN: [id number]  Referring provider: Tracee Alvarado PA-C  Dx:   Encounter Diagnosis     ICD-10-CM    1  Impaired functional mobility, balance, gait, and endurance  Z74 09       2  Parkinsonism, unspecified Parkinsonism type ([facility] [address] )  G20                         Assessment  22: Ludin Tavarez has attended 13 sessions of physical therapy with more consistent attendance since last progress note  Patient continues to have difficulty with freezing, turning, shuffling, and distraction, however is demonstrating some improvement in movement with cuing to initiate movement with R LE and turning to the R side to decrease freezing  Progress has been slow due to limited attendance at start of episode of care, but is making progress toward initial short term goals and has met 2 short term goals at this time  During re-assessment, patient demonstrated significant improvements in TUG time and gait speed, and improved distance with 6 minute walk test  TUG on initial evaluation was 47 1 seconds (at last progress note was 1 min 11 seconds), and during today's session decreased to 36 2 seconds with rollator  Self-selected gait speed improved from 0 75m/s to 0 92m/s with rollator  Ludin Tavarez continues to be at increased risk of falls from TUG time significantly above cut-off score of 13 5 seconds  5xSTS did not demonstrate improvement during session, however may be attributed to increased knee pain  6MWT did demonstrate some improvement in distance to 610 feet, at last progress note was 500 feet and at initial evaluation was at 550 feet  He has not had any falls since resuming therapy  Ludin Tavarez is demonstrating improvements in mobility, decreased freezing and improved strategies to decrease freezing of gait, decreasing fall risk, and improving functional mobility   He would benefit from continued physical therapy to improve mobility, improve safety, improve balance, improve endurance, decrease fall risk, and maximize function  11/28/22: Janelle Chavez has attended 5 sessions of physical therapy since initial evaluation  Attendance has been limited due to scheduling conflicts  During re-testing during session, patient appears to have significant difficulty with turning, freezing, shuffling, increased distraction, and picking feet up from floor  He reported that he was having a bad day today, and testing demonstrated minimal change since start of therapy  Janelle Chavez demonstrated improvement in 5xSTS from 17 seconds to 14 4 seconds (with use of UE's)  No significant change with 6MWT or gait speed compared to initial evaluation  TUG time with increased time to complete, appears to be due to increased distraction during session in therapy clinic  Limited progress may be due to limited attendance and self-reported bad day  Janelle Chavez continues to be at high risk of falls due to TUG time and self-selected gait speed, decreased endurance, decreased LE strength and endurance, and decreased mobility  He would benefit from continued physical therapy to improve mobility, improve safety, improve balance, improve endurance, decrease fall risk, and maximize function  Continue per current POC  Assessment details: Patient presents to outpatient physical therapy for Parkinson's Disease and history of NPH with shunt  He recently completed LSVT Big program at [facility]  He presents with impaired balance, impaired gait, freezing of gait, difficulty turning, increased risk of falls, bradykinesia, and impaired functional mobility  Compared to previous episode of care 10/2021, he has had some decline in status and is now utilizing rollator during most ambulatory tasks  He is demonstrating impaired LE strength and endurance from 5xSTS of 17 03 (utilizing UE's, unable to complete without UE's), which is also above cut-off score for increased risk of falls at 15 seconds   TUG time also demonstrates increased risk of falls at 47 10 seconds (with rollator), significantly above cut-off score of 13 5 seconds for increased fall risk, as well as significantly above his prior functional status at 24 2 seconds  Self-selected gait speed is also below cut-off score for increased fall risk at 0 77m/s (with rollator), as well as placing him in a limited community ambulator category  Enoch Butcher would benefit from skilled physical therapy to decrease falls, improve balance, improve functional mobility, decrease freezing episodes and festinating gait pattern, improve LE strength and endurance, improve independence with mobility, and maximize functioning  Impairments: abnormal coordination, abnormal gait, activity intolerance, impaired balance, lacks appropriate home exercise program and safety issue  Understanding of Dx/Px/POC: good   Prognosis: good    Goals  ST  Pt will improve gait speed to at least 0 85 m/s with least restrictive device within 4 weeks needed to improve safety with ambulation  MET (22)  2  Pt will improve TUG time by at least 10 seconds with least restrictive device within 4 weeks to decrease fall risk and improve functional mobility MET (22)  3  Pt will improve 5xSTS score by at least 3 seconds (with UE's), and be able to perform STS without UE support within 4 weeks needed to reduce fall risk  NOT MET  4  Pt will improve 6MWT to at least 700 feet within 4 weeks to improve functional mobility and endurance PROGRESSING (22)  5  Pt will demonstrate independence with HEP within 4 weeks  PROGRESSING    LT  Pt will improve gait speed to at least 1 0 m/s with least restrictive device within 8 weeks needed to improve safety with ambulation  PROGRESSING  2  Pt will improve TUG time to at least 25 seconds with least restrictive device within 8 weeks to decrease fall risk and improve functional mobility PROGRESSING  3   Pt will improve 5xSTS without UE support to at least 15 seconds within 8 weeks needed to reduce fall risk  NOT MET  4  Pt will improve 6MWT to at least 800 feet within 8 weeks to improve functional mobility and endurance NOT MET  5  Pt will demonstrate independence with HEP within 8 weeks  PROGRESSING    NEW GOALS:  ST  Pt will improve self-selected gait speed to at least 1 0m/s with rollator within 4 weeks to improve safety with ambulation  2  Pt will improve TUG time to at least 30 seconds with rollator within 4 weeks to decrease fall risk  3  Pt will improve 5xSTS to at least 15 seconds (with UE's) in 4 weeks to improve mobility and LE strength  4  Pt will improve 6MWT to at least 675 feet within 4 weeks to improve functional mobility and endurance    LT  Pt will improve TUG time to at least 25 seconds with rollator within 8 weeks to decrease fall risk  2  Pt will improve 5xSTS to be able to complete without UE support in 8 weeks to improve mobility and LE strength  3  Pt will improve 6MWT to at least 750 feet within 8 weeks to improve functional mobility and endurance  4   Pt will be independent in HEP in 8 weeks    Plan  Patient would benefit from: skilled physical therapy  Planned therapy interventions: balance, neuromuscular re-education, balance/weight bearing training, patient education, strengthening, stretching, therapeutic activities, therapeutic exercise, flexibility, functional ROM exercises, gait training, home exercise program and abdominal trunk stabilization  Frequency: 2x week  Duration in weeks: 8  Plan of Care beginning date: 2022  Plan of Care expiration date: 2023  Treatment plan discussed with: patient     Will reassess patient in 4 weeks and determine if patient is continuing to make significant progress toward goals, will then determine if patient will continue with current POC or transition to maintenance program          Subjective Evaluation    History of Present Illness  22: Patient reports he has not been doing as much with the cold weather and snow  He reports that he has not done very much because of the weather  He states that therapy has been helping him some, especially with turning  No falls, but states that he catches himself many times  He reports he sometimes forgets to use his rollator and walks around the house without it  He states he feels more free without using the rollator  11/28/22: Patient reports that he has not been doing much exercise between therapy sessions  He has been performing reach forward and down to floor from LSVT Big exercises, however has not been doing other exercises  Walks around the parking lot and on his bike at home  He states that he is having a bad day today, the last week "hasn't been anything terrible "    Mechanism of injury: Patient reports he fell backwards down the stairs last year  He states he stopped coming to therapy because "I was in bad shape"  He states he was going to [facility] for therapy during the summer, stopped in the beginning of October, difficulty with scheduling  Was going 2x/week for therapy, states they were having staffing issues  He states that he has had some setbacks, he states that his cat caught a chipmunk and needed to catch it, needed to go release it, states that he had a hard time walking back inside without AD  He states that he was going to start the [facility], but did not get to it because of scheduling issues  He states that his wife wants him to continue therapy  He reports that he has trouble getting started, turning (especially sharp turns)  Has been using the rollator since March  States he always uses the rollator, except if he forgets to use it  He reports that he has completed the LSVT Big program     Has a stationary bike at home, 2x/day 10 minutes at a time     Pain  No pain reported    Social Support  Steps to enter house: yes  2  Stairs in house: yes (3 floors)   Lives in: [place] (and basement)  Lives with: spouse (3 cats)    Treatments  Previous treatment: physical therapy        Objective       Manual Muscle Testing - Hip Left Right   Flexion 5 5   Abduction (seated) 4+ 4+   Adduction (seated) 4+ 4+     Manual Muscle Testing - Knee Left Right   Flexion 4 4+   Extension 5 5     Manual Muscle Testing - Ankle Left Right   Doriflexion 5 5   Plantarflexion NT NT         Coordination Left Right   Heel To Cruz NT NT   Finger To Nose NT NT   Rapid Alternating Supination impaired intact   Alt toe tapping impaired intact         Outcome Measures  Initial Eval 11/28 12/27    5x Sit to Stand: 17 03 seconds with UE's 14 41 seconds with UE's 17 8 seconds with UE's, knee pain    TUG:     Manual (holding cup water)     Cog (counting back 2s) 47 10 seconds with rollator 1 min 11 seconds with rollator  2nd trial 2min 19 seconds 36 25 seconds with rollator    Gait Speed:  0 77m/s with rollator 0 75 m/s with rollator 0 92m/s with rollator    6 Minute Walk Test: 550 feet with rollator 500 feet with rollator 610 feet with rollator    FGA:  NT NT NT      Gait Assessment: ambulates with rollator, decrease gait speed, decreased heel strike bilaterally, difficulty initiating gait and significant shuffling/festination with turning, difficulty with weight shift  Decreased safety awareness with turning to sit in chair, not turning fully before sitting  Precautions: HTN, falls, NPH      Manuals 12/21 12/27 12/7 12/12 12/14 12/19                                           Neuro Re-Ed          LSVT Big exercise review          HKM          Clock turns Standing at side of staircase with BUE support  90 degree turns R to center x 7 reps, L to center x 7 reps  Cues to increase amplitude and march LE's   SOLO w/SPC  1/4 turns to colored disks x 5 ea direction   Standing at side of staircase with BUE support  90 degree turns R to center x 7 reps, L to center x 7 reps   Cues to increase amplitude and march LE's   backwards  Backwards walking x 10 feet with CGA and rollator, cues for increased step length        hurdles          Sidesteps with big arms      //bars (long) x 2 laps w/B/L UE    X 1 lap with trial of Big arms with Big step    BIG walking          Targeted stepping          HKM    SOLO No AD  1/4 length x 3 laps freezing with turns    With SPC and slight push from therapist with turns 1/4 length x 3 laps      Step up                              Ther Ex          Sit to stand from mat table no UE's Sit to stand from arm chair 2 x 10  V c  increase anterior trunk lean  5xSTS x 2 repetitions  Sit<>Stand from arm chair x 10 and through out session  Sit to stand 2 x 10 with B/L UE use, cues for increased anterior trunk lean Sit <>stand 2 x 10 B/L UE use cues for increased anterior trunk lean  Sit to stand x 10 reps cues for increased anterior trunk lean   nustep  L5 for 9 minutes emphasis on large steps                  Ther Activity                              Gait Training          hallway Amb between chairs with rollator x 5 reps  Cues for turning, R leg march to initiat 6MWT 610 feet  TUG    amb in hallway with rollator, 5 laps with R turns and cues for increased step length  SOLO with Rollator 1/4 length x 4 laps focused on turns  Hallway ambulation with rollator x 4 laps, cues for R leg march with turning and turning to sit in chair    Amb between chair with rollator (12 feet apart) x 6 trials  Timed the last 2 trials 35 seconds, 2nd trial 1min 15 min Hallway ambulation with rollator x 4 laps, cues for R leg march with turning and turning to sit in chair    Practiced transitioning from floor <> carpet w/Rollator x 5     Amb between chair with rollator (12 feet apart) x 6   Amb between chairs with rollator (12 feet apart) x 6 reps  Cues for turning, R leg march to initiate    staircase Up and over staircase x 6 laps with BUE support   Step over step with cues for hand positioning, safety with turning at bottom    Up and over staircase x 6 laps with BUE support, step over step with cues for hand positioning Up and over staircase x 6 laps with BUE support, step over step with cues for hand positioning Up and over staircase x 6 laps with BUE support  Step over step with cues for hand positioning, safety with turning at bottom   TM with SOLO SOLO TM  1 2 mph - 1 6 mph  X 7min than to 2  4mph to 2 0 x 3 min  Total 10 min  cues to increase step height and foot clearance  With B/L UE support      Seated rest break   SOLO TM  1 0 mph - 1 3 mph x 10 min SOLO  1 5mph for 8 minutes with BUE support SOLO TM   1 5 mph x 10 min B/L UE support SOLO, TM  1 2-1  4mph x 10 minutes, cues to increase step height and foot clearance  With B/L UE support    Seated rest break  2  4mph for 3 minutes     Modalities          Education  Techniques to decrease freezing, turning, safety, rollator management and use   Techniques to decrease freezing, head hips relationship, turning to sit, safety with stairs

## 2022-12-28 ENCOUNTER — OFFICE VISIT (OUTPATIENT)
Dept: PHYSICAL THERAPY | Facility: CLINIC | Age: 82
End: 2022-12-28

## 2022-12-28 DIAGNOSIS — Z74.09 IMPAIRED FUNCTIONAL MOBILITY, BALANCE, GAIT, AND ENDURANCE: ICD-10-CM

## 2022-12-28 DIAGNOSIS — G20 PARKINSONISM, UNSPECIFIED PARKINSONISM TYPE (HCC): Primary | ICD-10-CM

## 2022-12-28 NOTE — PROGRESS NOTES
Daily Note     Today's date: 2022   Patient name: Ene Cisneros  : 1940  MRN: [id number]  Referring provider: Avila Bradely PA-C  Dx:   Encounter Diagnosis     ICD-10-CM    1  Parkinsonism, unspecified Parkinsonism type ([facility] [address] )  G20       2  Impaired functional mobility, balance, gait, and endurance  Z74 09           Start Time: 1330  Stop Time: 1430  Total time in clinic (min): 60 minutes    Subjective: Patient reports no new complaints  Objective: See treatment diary below      Assessment: Tolerated treatment fair  Chacho Navarro participated in skilled PT session focused on balance, gait, and endurance  Patient demonstrates improved turns to R with less festering gait  Patient continues to be challenged with using Bigger steps  Less v c  on TM for picking up B/L feet  Patient would continue to benefit from skilled PT interventions to address deficits with balance, gait, and endurance  Patient demonstrated fatigue post treatment      Plan: Continue per plan of care  Precautions: HTN, falls, NPH  Manuals                                            Neuro Re-Ed          LSVT Big exercise review          HKM   //bars (long)  X 2 laps       Clock turns Standing at side of staircase with BUE support  90 degree turns R to center x 7 reps, L to center x 7 reps  Cues to increase amplitude and march LE's   SOLO w/SPC  1/4 turns to colored disks x 5 ea direction   Standing at side of staircase with BUE support  90 degree turns R to center x 7 reps, L to center x 7 reps   Cues to increase amplitude and march LE's   backwards  Backwards walking x 10 feet with CGA and rollator, cues for increased step length        hurdles          Sidesteps with big arms   //bars (long)   Focused on Big wide steps x 3 laps   //bars (long) x 2 laps w/B/L UE    X 1 lap with trial of Big arms with Big step    BIG walking          Targeted stepping   Walking chair to chair / length with turning to R x 3 laps         HKM    SOLO No AD  1/4 length x 3 laps freezing with turns    With SPC and slight push from therapist with turns 1/4 length x 3 laps      Step up                              Ther Ex          Sit to stand from mat table no UE's Sit to stand from arm chair 2 x 10  V c  increase anterior trunk lean  5xSTS x 2 repetitions 2 x 5 from chair w/arms Sit<>Stand from arm chair x 10 and through out session  Sit to stand 2 x 10 with B/L UE use, cues for increased anterior trunk lean Sit <>stand 2 x 10 B/L UE use cues for increased anterior trunk lean  Sit to stand x 10 reps cues for increased anterior trunk lean   nustep  L5 for 9 minutes emphasis on large steps                  Ther Activity                              Gait Training          hallway Amb between chairs with rollator x 5 reps  Cues for turning, R leg march to initiat 6MWT 610 feet  TUG    amb in hallway with rollator, 5 laps with R turns and cues for increased step length With Rollator 100ft with turns to the R    W/Rollator x 50 ft SOLO with Rollator 1/4 length x 4 laps focused on turns  Hallway ambulation with rollator x 4 laps, cues for R leg march with turning and turning to sit in chair    Amb between chair with rollator (12 feet apart) x 6 trials  Timed the last 2 trials 35 seconds, 2nd trial 1min 15 min Hallway ambulation with rollator x 4 laps, cues for R leg march with turning and turning to sit in chair    Practiced transitioning from floor <> carpet w/Rollator x 5     Amb between chair with rollator (12 feet apart) x 6   Amb between chairs with rollator (12 feet apart) x 6 reps  Cues for turning, R leg march to initiate    staircase Up and over staircase x 6 laps with BUE support  Step over step with cues for hand positioning, safety with turning at bottom  Up and over staircase x 6 laps with BUE support   Step over step with cues for hand positioning, safety with turning at bottom  Up and over staircase x 6 laps with BUE support, step over step with cues for hand positioning Up and over staircase x 6 laps with BUE support, step over step with cues for hand positioning Up and over staircase x 6 laps with BUE support  Step over step with cues for hand positioning, safety with turning at bottom   TM with SOLO SOLO TM  1 2 mph - 1 6 mph  X 7min than to 2  4mph to 2 0 x 3 min  Total 10 min  cues to increase step height and foot clearance  With B/L UE support      Seated rest break  SOLO TM 1 2 - 1  4mph @ 4 min to 1 6 mph x 6 min    Total 10 mins  cues to increase step height and foot clearance  With B/L UE support SOLO TM  1 0 mph - 1 3 mph x 10 min SOLO  1 5mph for 8 minutes with BUE support SOLO TM   1 5 mph x 10 min B/L UE support SOLO, TM  1 2-1  4mph x 10 minutes, cues to increase step height and foot clearance  With B/L UE support    Seated rest break  2  4mph for 3 minutes     Modalities          Education  Techniques to decrease freezing, turning, safety, rollator management and use   Techniques to decrease freezing, head hips relationship, turning to sit, safety with stairs

## 2023-01-02 DIAGNOSIS — K21.9 GASTROESOPHAGEAL REFLUX DISEASE: ICD-10-CM

## 2023-01-03 RX ORDER — PANTOPRAZOLE SODIUM 40 MG/1
40 TABLET, DELAYED RELEASE ORAL 2 TIMES DAILY
Qty: 90 TABLET | Refills: 1 | Status: SHIPPED | OUTPATIENT
Start: 2023-01-03

## 2023-01-09 ENCOUNTER — OFFICE VISIT (OUTPATIENT)
Dept: PHYSICAL THERAPY | Facility: CLINIC | Age: 83
End: 2023-01-09

## 2023-01-09 DIAGNOSIS — Z74.09 IMPAIRED FUNCTIONAL MOBILITY, BALANCE, GAIT, AND ENDURANCE: Primary | ICD-10-CM

## 2023-01-09 DIAGNOSIS — G20 PARKINSONISM, UNSPECIFIED PARKINSONISM TYPE (HCC): ICD-10-CM

## 2023-01-09 NOTE — PROGRESS NOTES
Daily Note     Today's date: 2023   Patient name: Didi Garvey  : 1940  MRN: [id number]  Referring provider: Claudia Waldron PA-C  Dx:   Encounter Diagnosis     ICD-10-CM    1  Impaired functional mobility, balance, gait, and endurance  Z74 09       2  Parkinsonism, unspecified Parkinsonism type ([facility] [address] )  G20                      Subjective: Patient reports no new complaints  He states he walked a 1/2 mile yesterday  Objective: See treatment diary below  Self-directed exercise from [id number]  1:1 with PT from [id number]    Assessment: Tolerated treatment well  Jin Justin participated in skilled PT session focused on balance, gait, and endurance  Patient continues to demonstrate freezing of gait with changes in rivka, turning to sit in chair/transfers, and with increased distraction  Continues to respond well to cuing to initiate gait with R LE first, and responded well to cues to march to increase step height prior to initiating turning  Appeared to have good carryover when walking to waiting room and outside at end of therapy session  Improved hand placement with performing staircase with decreased cuing compared to previous sessions  Patient would continue to benefit from skilled PT interventions to address deficits with balance, gait, and endurance  Patient demonstrated fatigue post treatment      Plan: Continue per plan of care  Precautions: HTN, falls, NPH  Manuals                                Neuro Re-Ed       LSVT Big exercise review       HKM   //bars (long)  X 2 laps Standing in place with rollator x 10 reps prior to walking   Clock turns Standing at side of staircase with BUE support  90 degree turns R to center x 7 reps, L to center x 7 reps   Cues to increase amplitude and march LE's   In // bars with HHA from therapist, 90 degree increments and cues to march feet to increase foot clearance and decrease pivoting with stable UE support, x 5 reps each direction backwards  Backwards walking x 10 feet with CGA and rollator, cues for increased step length  With rollator and CGA, 10 feet, then // bars (long) x 1 lap with UE support, cues for toe-heel sequencing   hurdles       Sidesteps with big arms   //bars (long)   Focused on Big wide steps x 3 laps // bars (long) side steps with BUE support x 4 laps   BIG walking       Targeted stepping/   Walking chair to chair 1/4 length with turning to R x 3 laps      HKM       Step up                     Ther Ex       Sit to stand from mat table no UE's Sit to stand from arm chair 2 x 10  V c  increase anterior trunk lean  5xSTS x 2 repetitions 2 x 5 from chair w/arms 3 x 5 from chair with arms   nustep  L5 for 9 minutes emphasis on large steps  L5 for 10 minutes emphasis on large steps and SPM >60          Ther Activity                     Gait Training       rollator    With rollator, between 2 chairs, stand, walk, sit (chairs 15 feet apart)  Cues for initiation of gait with large R step, marching during freezing during turn  X 8 repetitions   hallway Amb between chairs with rollator x 5 reps  Cues for turning, R leg march to initiat 6MWT 610 feet  TUG    amb in hallway with rollator, 5 laps with R turns and cues for increased step length With Rollator 100ft with turns to the R    W/Rollator x 50 ft In therapy clinic with rollator around obstacles 75' x 2   staircase Up and over staircase x 6 laps with BUE support  Step over step with cues for hand positioning, safety with turning at bottom  Up and over staircase x 6 laps with BUE support  Step over step with cues for hand positioning, safety with turning at bottom Up and over staircase x 5 laps with BUE support, step over step and cues for heel clearance and safety with turning at bottom of staircase   TM with SOLO SOLO TM  1 2 mph - 1 6 mph  X 7min than to 2  4mph to 2 0 x 3 min  Total 10 min  cues to increase step height and foot clearance   With B/L UE support      Seated rest break  SOLO TM 1 2 - 1  4mph @ 4 min to 1 6 mph x 6 min    Total 10 mins  cues to increase step height and foot clearance  With B/L UE support Unavailable, resume next session   Modalities       Education  Techniques to decrease freezing, turning, safety, rollator management and use  Techniques to decrease freezing, turning, weight shifting/march/focus with freezing

## 2023-01-11 ENCOUNTER — OFFICE VISIT (OUTPATIENT)
Dept: PHYSICAL THERAPY | Facility: CLINIC | Age: 83
End: 2023-01-11

## 2023-01-11 DIAGNOSIS — Z74.09 IMPAIRED FUNCTIONAL MOBILITY, BALANCE, GAIT, AND ENDURANCE: ICD-10-CM

## 2023-01-11 DIAGNOSIS — G20 PARKINSONISM, UNSPECIFIED PARKINSONISM TYPE (HCC): Primary | ICD-10-CM

## 2023-01-11 NOTE — PROGRESS NOTES
Daily Note     Today's date: 2023  Patient name: Flor Coelho  : 1940   MRN: [id number]  Referring provider: Mary Lawler PA-C  Dx:   Encounter Diagnosis     ICD-10-CM    1  Parkinsonism, unspecified Parkinsonism type ([facility] [address] )  G20       2  Impaired functional mobility, balance, gait, and endurance  Z74 09           Start Time: 1500  Stop Time: 1600  Total time in clinic (min): 60 minutes    Subjective: Patient reports that he like the Nustep and feels he has a better session when he does it  Advised patient that PT wanted to resume TM this session  Objective: See treatment diary below      Assessment: Tolerated treatment well  Danika Montesinos participated in skilled PT session focused on balance, gait, and endurance  Patient continues with freezing gait during turns needing cues for initiating techniques for freezing with turns  Patient practiced turns to R demonstrating decreased freezing  Continues to respond well to cuing to initiate gait with R LE first   Patient would continue to benefit from skilled PT interventions to address deficits with balance, gait, and endurance  Patient demonstrated fatigue post treatment      Plan: Continue per plan of care  Precautions: HTN, falls, NPH  Manuals                                    Neuro Re-Ed        LSVT Big exercise review        HKM   //bars (long)  X 2 laps Standing in place with rollator x 10 reps prior to walking Standing in place with Rollator 2 x 10    Clock turns Standing at side of staircase with BUE support  90 degree turns R to center x 7 reps, L to center x 7 reps   Cues to increase amplitude and march LE's   In // bars with HHA from therapist, 90 degree increments and cues to march feet to increase foot clearance and decrease pivoting with stable UE support, x 5 reps each direction    backwards  Backwards walking x 10 feet with CGA and rollator, cues for increased step length  With rollator and CGA, 10 feet, then // bars (long) x 1 lap with UE support, cues for toe-heel sequencing With Rollator CGA Fwd/Bwd 5FT x 2     hurdles        Sidesteps with big arms   //bars (long)   Focused on Big wide steps x 3 laps // bars (long) side steps with BUE support x 4 laps //bars (short) side steps with Big arms  X 3 laps   BIG walking        Targeted stepping/   Walking chair to chair 1/4 length with turning to R x 3 laps       HKM        Step up                        Ther Ex        Sit to stand from mat table no UE's Sit to stand from arm chair 2 x 10  V c  increase anterior trunk lean  5xSTS x 2 repetitions 2 x 5 from chair w/arms 3 x 5 from chair with arms 2 x 5 from chair with arms    Patient c/o of pain in B/L knees L>R   nustep  L5 for 9 minutes emphasis on large steps  L5 for 10 minutes emphasis on large steps and SPM >60            Ther Activity                        Gait Training        rollator    With rollator, between 2 chairs, stand, walk, sit (chairs 15 feet apart)  Cues for initiation of gait with large R step, marching during freezing during turn  X 8 repetitions With Rollator weave around cones (4)  X 1 laps  Cues for initiation of gait with large R step, marching during freezing during turn  hallway Amb between chairs with rollator x 5 reps  Cues for turning, R leg march to initiat 6MWT 610 feet  TUG    amb in hallway with rollator, 5 laps with R turns and cues for increased step length With Rollator 100ft with turns to the R    W/Rollator x 50 ft In therapy clinic with rollator around obstacles 75' x 2 With Rollator 100ft x 3 with turns to R v c  for marching   staircase Up and over staircase x 6 laps with BUE support  Step over step with cues for hand positioning, safety with turning at bottom  Up and over staircase x 6 laps with BUE support   Step over step with cues for hand positioning, safety with turning at bottom Up and over staircase x 5 laps with BUE support, step over step and cues for heel clearance and safety with turning at bottom of staircase    TM with SOLO SOLO TM  1 2 mph - 1 6 mph  X 7min than to 2  4mph to 2 0 x 3 min  Total 10 min  cues to increase step height and foot clearance  With B/L UE support      Seated rest break  SOLO TM 1 2 - 1  4mph @ 4 min to 1 6 mph x 6 min    Total 10 mins  cues to increase step height and foot clearance  With B/L UE support Unavailable, resume next session SOLO TM B/L UE support    1 5 mph - 1 8 mph  X 10 min   Modalities        Education  Techniques to decrease freezing, turning, safety, rollator management and use  Techniques to decrease freezing, turning, weight shifting/march/focus with freezing   Continued to education patient on techniques to decrease freezing with turns: wt shifting, marching

## 2023-01-16 ENCOUNTER — OFFICE VISIT (OUTPATIENT)
Dept: PHYSICAL THERAPY | Facility: CLINIC | Age: 83
End: 2023-01-16

## 2023-01-16 DIAGNOSIS — Z74.09 IMPAIRED FUNCTIONAL MOBILITY, BALANCE, GAIT, AND ENDURANCE: ICD-10-CM

## 2023-01-16 DIAGNOSIS — G20 PARKINSONISM, UNSPECIFIED PARKINSONISM TYPE (HCC): Primary | ICD-10-CM

## 2023-01-16 NOTE — PROGRESS NOTES
Daily Note     Today's date: 2023  Patient name: Felisa Pizano  : 1940   MRN: [id number]  Referring provider: Domingo Blackman PA-C  Dx:   Encounter Diagnosis     ICD-10-CM    1  Parkinsonism, unspecified Parkinsonism type ([facility] [address] )  G20       2  Impaired functional mobility, balance, gait, and endurance  Z74 09           Start Time: 1400  Stop Time: 1506  Total time in clinic (min): 66 minutes    Subjective: Patient reports he walked 8/10ths of a mile yesterday and his legs are very tired and weak today  Objective: See treatment diary below  Self-directed exercise from [id number]  1:1 with PT from [id number]    Assessment: Tolerated treatment well  TM unavailable at start of session  Demonstrates increased freezing of gait with fatigue during session, however cues to initiate gait with R LE continue to assist with unfreezing  Sully Sol demonstrates difficulty with dual tasking, with + stops walking while talking during session  End of session patient performed cognitive dual task during ambulation, requiring significant cuing to maintain ambulation and turning while performing cognitive task  Continues to require significant cuing for increased CRIS and increased step length during backwards walking, and increased height during HKM  At end of session patient was accompanied to car, required cues to continue to utilize rollator and not park off to the side and ambulate without AD  Performed car transfer attempting to get L LE into car first while holding onto car handle and door, educated patient and wife that turning to sit and then getting feet into the car would be safer option for car transfer  Patient demonstrated fatigue post treatment      Plan: Continue per plan of care        Precautions: HTN, falls, NPH  Manuals                                    Neuro Re-Ed        LSVT Big exercise review        HKM  //bars (long)  X 2 laps Standing in place with rollator x 10 reps prior to walking Standing in place with Rollator 2 x 10  // bars (long) cues for high knees and SLS x 3 laps with BUE support   Clock turns   In // bars with HHA from therapist, 90 degree increments and cues to march feet to increase foot clearance and decrease pivoting with stable UE support, x 5 reps each direction     backwards Backwards walking x 10 feet with CGA and rollator, cues for increased step length  With rollator and CGA, 10 feet, then // bars (long) x 1 lap with UE support, cues for toe-heel sequencing With Rollator CGA Fwd/Bwd 5FT x 2   // bars (long) x 3 laps cues for increased step length, BUE support   hurdles        Sidesteps with big arms  //bars (long)   Focused on Big wide steps x 3 laps // bars (long) side steps with BUE support x 4 laps //bars (short) side steps with Big arms  X 3 laps // bars (long) side steps x 3 laps   BIG walking        Targeted stepping/  Walking chair to chair 1/4 length with turning to R x 3 laps        HKM        Step up                        Ther Ex        Sit to stand from mat table no UE's 5xSTS x 2 repetitions 2 x 5 from chair w/arms 3 x 5 from chair with arms 2 x 5 from chair with arms    Patient c/o of pain in B/L knees L>R 2 x 5 from chair with arms    nustep L5 for 9 minutes emphasis on large steps  L5 for 10 minutes emphasis on large steps and SPM >60  L6 for 8 minutes with large amplitude of movement           Ther Activity        Car transfer     With rollator and CGA, cues for safety           Gait Training        rollator   With rollator, between 2 chairs, stand, walk, sit (chairs 15 feet apart)  Cues for initiation of gait with large R step, marching during freezing during turn  X 8 repetitions With Rollator weave around cones (4)  X 1 laps  Cues for initiation of gait with large R step, marching during freezing during turn   With rollator, weaving around 4 cones (spaced 4 feet apart) x 4 laps   hallway 6MWT 610 feet  TUG    amb in hallway with rollator, 5 laps with R turns and cues for increased step length With Rollator 100ft with turns to the R    W/Rollator x 50 ft In therapy clinic with rollator around obstacles 75' x 2 With Rollator 100ft x 3 with turns to R v c  for marching With rollator and CS/CGA with dual task of alphabetical listing of countries (400 feet completed and alphabet to R)   staircase  Up and over staircase x 6 laps with BUE support  Step over step with cues for hand positioning, safety with turning at bottom Up and over staircase x 5 laps with BUE support, step over step and cues for heel clearance and safety with turning at bottom of staircase     TM with SOLO  SOLO TM 1 2 - 1  4mph @ 4 min to 1 6 mph x 6 min    Total 10 mins  cues to increase step height and foot clearance  With B/L UE support Unavailable, resume next session SOLO TM B/L UE support    1 5 mph - 1 8 mph  X 10 min unavailable   Modalities        Education Techniques to decrease freezing, turning, safety, rollator management and use  Techniques to decrease freezing, turning, weight shifting/march/focus with freezing   Continued to education patient on techniques to decrease freezing with turns: wt shifting, marching  Techniques to decrease freezing- weight shift, march, clock turns, leading with R LE

## 2023-01-18 ENCOUNTER — OFFICE VISIT (OUTPATIENT)
Dept: PHYSICAL THERAPY | Facility: CLINIC | Age: 83
End: 2023-01-18

## 2023-01-18 ENCOUNTER — APPOINTMENT (OUTPATIENT)
Dept: LAB | Facility: CLINIC | Age: 83
End: 2023-01-18

## 2023-01-18 DIAGNOSIS — Z74.09 IMPAIRED FUNCTIONAL MOBILITY, BALANCE, GAIT, AND ENDURANCE: ICD-10-CM

## 2023-01-18 DIAGNOSIS — G20 PARKINSONISM, UNSPECIFIED PARKINSONISM TYPE (HCC): Primary | ICD-10-CM

## 2023-01-18 DIAGNOSIS — C61 MALIGNANT NEOPLASM OF PROSTATE (HCC): ICD-10-CM

## 2023-01-18 LAB — PSA SERPL-MCNC: 12.4 NG/ML (ref 0–4)

## 2023-01-18 NOTE — PROGRESS NOTES
Daily Note     Today's date: 2023  Patient name: Tatum Ziegler  : 1940  MRN: [id number]  Referring provider: Chantell Petit PA-C  Dx:   Encounter Diagnosis     ICD-10-CM    1  Parkinsonism, unspecified Parkinsonism type ([facility] [address] )  G20       2  Impaired functional mobility, balance, gait, and endurance  Z74 09           Start Time: 1500  Stop Time: 1600  Total time in clinic (min): 60 minutes    Subjective: Patient reports being tired cause he over did it this weekend by walking a mile  Patient states he walked a mile, worked with [facility] on Monday, and today he is tired  Objective: See treatment diary below  3:00 - 3:40 1:1 40 min  3:40 - 3:50 Group 10 min  3:50 - 4:00 self directed 10 min      Assessment: Tolerated treatment well  Severa Rakes participated in skilled PT session focused on balance, gait, and endurance  Patient demonstrates improved gait with turns in the beginning of session having decreased to no freezing  Later in session with ambulation and turns, patient demonstrates increased freezing, need constant v c  for marching while initiating turns  Patient would continue to benefit from skilled PT interventions to address deficits with balance, gait, and endurance  Patient demonstrated fatigue post treatment      Plan: Continue per plan of care        Precautions: HTN, falls, NPH  Manuals                                    Neuro Re-Ed        LSVT Big exercise review        HKM  //bars (long)  X 2 laps Standing in place with rollator x 10 reps prior to walking Standing in place with Rollator 2 x 10  // bars (long) cues for high knees and SLS x 3 laps with BUE support   Clock turns With Rollator to color discs in 1/4 increments x 5 CW/CCW ea  Constant v c  for marching to initial turns  In // bars with HHA from therapist, 90 degree increments and cues to march feet to increase foot clearance and decrease pivoting with stable UE support, x 5 reps each direction backwards   With rollator and CGA, 10 feet, then // bars (long) x 1 lap with UE support, cues for toe-heel sequencing With Rollator CGA Fwd/Bwd 5FT x 2   // bars (long) x 3 laps cues for increased step length, BUE support   hurdles        Sidesteps with big arms  //bars (long)   Focused on Big wide steps x 3 laps // bars (long) side steps with BUE support x 4 laps //bars (short) side steps with Big arms  X 3 laps // bars (long) side steps x 3 laps   BIG walking        Targeted stepping/  Walking chair to chair 1/4 length with turning to R x 3 laps        HKM        Step up                        Ther Ex        Sit to stand from mat table no UE's 5xSTS x 2 repetitions B/L UE use from chair with arms 2 x 5 from chair w/arms 3 x 5 from chair with arms 2 x 5 from chair with arms    Patient c/o of pain in B/L knees L>R 2 x 5 from chair with arms    nustep L6 for 10 minutes with large amplitude of movement  L5 for 10 minutes emphasis on large steps and SPM >60  L6 for 8 minutes with large amplitude of movement           Ther Activity        Car transfer     With rollator and CGA, cues for safety           Gait Training        rollator With Rollator 100ft x 3 with turns to R v c  for marching    Walking chair to chair 1/4 length with turning to R x 3 laps  With rollator, between 2 chairs, stand, walk, sit (chairs 15 feet apart)  Cues for initiation of gait with large R step, marching during freezing during turn  X 8 repetitions With Rollator weave around cones (4)  X 1 laps  Cues for initiation of gait with large R step, marching during freezing during turn   With rollator, weaving around 4 cones (spaced 4 feet apart) x 4 laps   hallway  With Rollator 100ft with turns to the R    W/Rollator x 50 ft In therapy clinic with rollator around obstacles 75' x 2 With Rollator 100ft x 3 with turns to R v c  for marching With rollator and CS/CGA with dual task of alphabetical listing of countries (400 feet completed and alphabet to R)   staircase  Up and over staircase x 6 laps with BUE support  Step over step with cues for hand positioning, safety with turning at bottom Up and over staircase x 5 laps with BUE support, step over step and cues for heel clearance and safety with turning at bottom of staircase     TM with SOLO Unavailable SOLO TM 1 2 - 1  4mph @ 4 min to 1 6 mph x 6 min    Total 10 mins  cues to increase step height and foot clearance  With B/L UE support Unavailable, resume next session SOLO TM B/L UE support    1 5 mph - 1 8 mph  X 10 min unavailable   Modalities        Education   Techniques to decrease freezing, turning, weight shifting/march/focus with freezing   Continued to education patient on techniques to decrease freezing with turns: wt shifting, marching  Techniques to decrease freezing- weight shift, march, clock turns, leading with R LE

## 2023-01-23 ENCOUNTER — EVALUATION (OUTPATIENT)
Dept: PHYSICAL THERAPY | Facility: CLINIC | Age: 83
End: 2023-01-23

## 2023-01-23 DIAGNOSIS — Z74.09 IMPAIRED FUNCTIONAL MOBILITY, BALANCE, GAIT, AND ENDURANCE: Primary | ICD-10-CM

## 2023-01-23 DIAGNOSIS — G20 PARKINSONISM, UNSPECIFIED PARKINSONISM TYPE (HCC): ICD-10-CM

## 2023-01-23 NOTE — PROGRESS NOTES
Progress Note      Today's date: 2023  Patient name: Andi Reardon  : 1940  MRN: [id number]  Referring provider: Stevie Cameron PA-C  Dx:   Encounter Diagnosis     ICD-10-CM    1  Impaired functional mobility, balance, gait, and endurance  Z74 [id number]       2  Parkinsonism, unspecified Parkinsonism type ([address] )  [facility]           Start Time: 1400  Stop Time: 5067  Total time in clinic (min): 65 minutes       Assessment  23: Albaro Duenas has attended 19 sessions of physical therapy for Parkinson's Disease and NPH  He continues to have difficulty with freezing episodes, difficulty turning, and high distractibility  He is demonstrating improvements in functional mobility and balance measures  Since last progress note he has demonstrated significant improvement with 6MWT from 610 feet to 800 feet  He is demonstrating similar self-selected gait speed (0 92m/s and 0 93m/s), which demonstrates improved ability to turn during ambulation with less time freezing  5xSTS time, TUG, and gait speed remained similar, which may be due to fatigue post 6MWT  Despite these outcome measures remaining the same, he has not reached a plateau in improving functional mobility  Albaro Duenas would benefit from continued physical therapy to continue to decrease fall risk, improve balance, improve functional mobility, decrease episodes of freezing, improve safety, improve endurance, and maximize function  Continue per current POC     22: Albaro Duenas has attended 13 sessions of physical therapy with more consistent attendance since last progress note  Patient continues to have difficulty with freezing, turning, shuffling, and distraction, however is demonstrating some improvement in movement with cuing to initiate movement with R LE and turning to the R side to decrease freezing   Progress has been slow due to limited attendance at start of episode of care, but is making progress toward initial short term goals and has met 2 short term goals at this time  During re-assessment, patient demonstrated significant improvements in TUG time and gait speed, and improved distance with 6 minute walk test  TUG on initial evaluation was 47 1 seconds (at last progress note was 1 min 11 seconds), and during today's session decreased to 36 2 seconds with rollator  Self-selected gait speed improved from 0 75m/s to 0 92m/s with rollator  Penny Piedra continues to be at increased risk of falls from TUG time significantly above cut-off score of 13 5 seconds  5xSTS did not demonstrate improvement during session, however may be attributed to increased knee pain  6MWT did demonstrate some improvement in distance to 610 feet, at last progress note was 500 feet and at initial evaluation was at 550 feet  He has not had any falls since resuming therapy  Penny Piedra is demonstrating improvements in mobility, decreased freezing and improved strategies to decrease freezing of gait, decreasing fall risk, and improving functional mobility  He would benefit from continued physical therapy to improve mobility, improve safety, improve balance, improve endurance, decrease fall risk, and maximize function  11/28/22: Penny Piedra has attended 5 sessions of physical therapy since initial evaluation  Attendance has been limited due to scheduling conflicts  During re-testing during session, patient appears to have significant difficulty with turning, freezing, shuffling, increased distraction, and picking feet up from floor  He reported that he was having a bad day today, and testing demonstrated minimal change since start of therapy  Penny Piedra demonstrated improvement in 5xSTS from 17 seconds to 14 4 seconds (with use of UE's)  No significant change with 6MWT or gait speed compared to initial evaluation  TUG time with increased time to complete, appears to be due to increased distraction during session in therapy clinic  Limited progress may be due to limited attendance and self-reported bad day   Penny Piedra continues to be at high risk of falls due to TUG time and self-selected gait speed, decreased endurance, decreased LE strength and endurance, and decreased mobility  He would benefit from continued physical therapy to improve mobility, improve safety, improve balance, improve endurance, decrease fall risk, and maximize function  Continue per current POC  Assessment details: Patient presents to outpatient physical therapy for Parkinson's Disease and history of NPH with shunt  He recently completed LSVT Big program at [facility]  He presents with impaired balance, impaired gait, freezing of gait, difficulty turning, increased risk of falls, bradykinesia, and impaired functional mobility  Compared to previous episode of care 10/2021, he has had some decline in status and is now utilizing rollator during most ambulatory tasks  He is demonstrating impaired LE strength and endurance from 5xSTS of 17 03 (utilizing UE's, unable to complete without UE's), which is also above cut-off score for increased risk of falls at 15 seconds  TUG time also demonstrates increased risk of falls at 47 10 seconds (with rollator), significantly above cut-off score of 13 5 seconds for increased fall risk, as well as significantly above his prior functional status at 24 2 seconds  Self-selected gait speed is also below cut-off score for increased fall risk at 0 77m/s (with rollator), as well as placing him in a limited community ambulator category  Christal Clas would benefit from skilled physical therapy to decrease falls, improve balance, improve functional mobility, decrease freezing episodes and festinating gait pattern, improve LE strength and endurance, improve independence with mobility, and maximize functioning    Impairments: abnormal coordination, abnormal gait, activity intolerance, impaired balance, lacks appropriate home exercise program and safety issue  Understanding of Dx/Px/POC: good   Prognosis: good    Goals    NEW GOALS:  ST  Pt will improve self-selected gait speed to at least 1 0m/s with rollator within 4 weeks to improve safety with ambulation ALMOST MET  2  Pt will improve TUG time to at least 30 seconds with rollator within 4 weeks to decrease fall risk NOT MET  3  Pt will improve 5xSTS to at least 15 seconds (with UE's) in 4 weeks to improve mobility and LE strength NOT MET  4  Pt will improve 6MWT to at least 675 feet within 4 weeks to improve functional mobility and endurance MET    LT  Pt will improve TUG time to at least 25 seconds with rollator within 8 weeks to decrease fall risk  2  Pt will improve 5xSTS to be able to complete without UE support in 8 weeks to improve mobility and LE strength  3  Pt will improve 6MWT to at least 850 feet within 8 weeks to improve functional mobility and endurance REVISED  4  Pt will be independent in HEP in 8 weeks    Plan  Patient would benefit from: skilled physical therapy  Planned therapy interventions: balance, neuromuscular re-education, balance/weight bearing training, patient education, strengthening, stretching, therapeutic activities, therapeutic exercise, flexibility, functional ROM exercises, gait training, home exercise program and abdominal trunk stabilization  Frequency: 2x week  Duration in weeks: 4  Plan of Care beginning date: 2022  Plan of Care expiration date: 2023  Treatment plan discussed with: patient         Subjective Evaluation    History of Present Illness  23: No reports of falls  He states he has good days and bad days  He states that he has been walking outside of therapy, 4/10-8/10 of a mile  He states he can only go outside and walk 1 day a week due to his wife's schedule  He states that the techniques for freezing have been helping him  22: Patient reports he has not been doing as much with the cold weather and snow  He reports that he has not done very much because of the weather   He states that therapy has been helping him some, especially with turning  No falls, but states that he catches himself many times  He reports he sometimes forgets to use his rollator and walks around the house without it  He states he feels more free without using the rollator  11/28/22: Patient reports that he has not been doing much exercise between therapy sessions  He has been performing reach forward and down to floor from LSVT Big exercises, however has not been doing other exercises  Walks around the parking lot and on his bike at home  He states that he is having a bad day today, the last week "hasn't been anything terrible "    Mechanism of injury: Patient reports he fell backwards down the stairs last year  He states he stopped coming to therapy because "I was in bad shape"  He states he was [facility] during the [facility], stopped in the beginning of October, difficulty with scheduling  Was going 2x/week for therapy, states they were having staffing issues  He states that he has had some setbacks, he states that his cat caught a chipmunk and needed to catch it, needed to go release it, states that he had a hard time walking back inside without AD  He states that he was going to start the [facility] [employer], but did not get to it because of scheduling issues  He states that his wife wants him to continue therapy  He reports that he has trouble getting started, turning (especially sharp turns)  Has been using the rollator since March  States he always uses the rollator, except if he forgets to use it  He reports that he has completed the LSVT Big program     Has a stationary bike at home, 2x/day 10 minutes at a time     Pain  No pain reported    Social Support  Steps to enter house: yes  2  Stairs in house: yes (3 floors)   Lives in: [facility] (and basement)  Lives with: spouse (3 cats)    Treatments  Previous treatment: physical therapy        Objective       Manual Muscle Testing - Hip Left Right   Flexion 5 5   Abduction (seated) 4+ 4+   Adduction (seated) 4+ 4+     Manual Muscle Testing - Knee Left Right   Flexion 4 4+   Extension 5 5     Manual Muscle Testing - Ankle Left Right   Doriflexion 5 5   Plantarflexion NT NT         Coordination Left Right   Heel To Cruz NT NT   Finger To Nose NT NT   Rapid Alternating Supination impaired intact   Alt toe tapping impaired intact         Outcome Measures  Initial Eval 11/28 12/27 1/23   5x Sit to Stand: 17 03 seconds with UE's 14 41 seconds with UE's 17 8 seconds with UE's, knee pain 19 06 seconds with UE's   TUG:     Manual (holding cup water)     Cog (counting back 2s) 47 10 seconds with rollator 1 min 11 seconds with rollator  2nd trial 2min 19 seconds 36 25 seconds with rollator 37 5 seconds with rollator   Gait Speed:  0 77m/s with rollator 0 75 m/s with rollator 0 92m/s with rollator 0 93m/s with rollator   6 Minute Walk Test: 550 feet with rollator 500 feet with rollator 610 feet with rollator 800 feet with rollator     Gait Assessment: ambulates with rollator, decrease gait speed, decreased heel strike bilaterally, difficulty initiating gait and significant shuffling/festination with turning, difficulty with weight shift   Decreased safety awareness with turning to sit in chair, not turning fully before sitting      1/23/23:  Self-directed exercise from [id number]  1:1 with PT from [id number]     Precautions: HTN, falls, NPH    Manuals 1/9 1/11 1/16 1/18 1/23                                   Neuro Re-Ed        LSVT Big exercise review        HKM Standing in place with rollator x 10 reps prior to walking Standing in place with Rollator 2 x 10  // bars (long) cues for high knees and SLS x 3 laps with BUE support     Clock turns In // bars with HHA from therapist, 90 degree increments and cues to march feet to increase foot clearance and decrease pivoting with stable UE support, x 5 reps each direction   With Rollator to color discs in 1/4 increments x 5 CW/CCW ea  Constant v c  for marching to initial turns    backwards With rollator and CGA, 10 feet, then // bars (long) x 1 lap with UE support, cues for toe-heel sequencing With Rollator CGA Fwd/Bwd 5FT x 2   // bars (long) x 3 laps cues for increased step length, BUE support     hurdles        Sidesteps with big arms // bars (long) side steps with BUE support x 4 laps //bars (short) side steps with Big arms  X 3 laps // bars (long) side steps x 3 laps     BIG walking        Targeted stepping/     While performing turns with rollator in hallway, therapist shining laser on floor as target to initiate turn with R LE x 3 reps   HKM        Step up                        Ther Ex        Sit to stand from mat table no UE's 3 x 5 from chair with arms 2 x 5 from chair with arms    Patient c/o of pain in B/L knees L>R 2 x 5 from chair with arms  5xSTS x 2 repetitions B/L UE use from chair with arms 5xSTS x 3 repetitions with B/L UE's, emphasis on quick movement   nustep L5 for 10 minutes emphasis on large steps and SPM >60  L6 for 8 minutes with large amplitude of movement L6 for 10 minutes with large amplitude of movement L5 for 13 minutes with large amplitude of movement           Ther Activity        Car transfer   With rollator and CGA, cues for safety             Gait Training     TUG with rollator x 4 repetitions    6MWT with rollator, 800 feet    Gait speed   rollator With rollator, between 2 chairs, stand, walk, sit (chairs 15 feet apart)  Cues for initiation of gait with large R step, marching during freezing during turn  X 8 repetitions With Rollator weave around cones (4)  X 1 laps  Cues for initiation of gait with large R step, marching during freezing during turn   With rollator, weaving around 4 cones (spaced 4 feet apart) x 4 laps With Rollator 100ft x 3 with turns to R v c  for marching    Walking chair to chair 1/4 length with turning to R x 3 laps Walking in hallway forward 15-20 feet, turn 180 degrees, walk backwards 10 feet, turn 180 degrees walk forward  Over 50 feet in hallway x 4 reps with CGA and rollator   hallway In therapy clinic with rollator around obstacles 75' x 2 With Rollator 100ft x 3 with turns to R v c  for marching With rollator and CS/CGA with dual task of alphabetical listing of countries (400 feet completed and alphabet to R)     staircase Up and over staircase x 5 laps with BUE support, step over step and cues for heel clearance and safety with turning at bottom of staircase       TM with SOLO Unavailable, resume next session SOLO TM B/L UE support    1 5 mph - 1 8 mph  X 10 min unavailable Unavailable    Modalities        Education Techniques to decrease freezing, turning, weight shifting/march/focus with freezing  Continued to education patient on techniques to decrease freezing with turns: wt shifting, marching  Techniques to decrease freezing- weight shift, march, clock turns, leading with R LE  Techniques to decrease freezing- initiating with R LE, weight shifting, marching    outcome measures and progress (neuro)

## 2023-01-30 ENCOUNTER — OFFICE VISIT (OUTPATIENT)
Dept: PHYSICAL THERAPY | Facility: CLINIC | Age: 83
End: 2023-01-30

## 2023-01-30 DIAGNOSIS — Z74.09 IMPAIRED FUNCTIONAL MOBILITY, BALANCE, GAIT, AND ENDURANCE: Primary | ICD-10-CM

## 2023-01-30 DIAGNOSIS — G20 PARKINSONISM, UNSPECIFIED PARKINSONISM TYPE (HCC): ICD-10-CM

## 2023-01-30 NOTE — PROGRESS NOTES
Daily Note     Today's date: 2023  Patient name: Nicolas Davis  : 1940  MRN: [id number]  Referring provider: Jose Eduardo Hannah PA-C  Dx:   Encounter Diagnosis     ICD-10-CM    1  Impaired functional mobility, balance, gait, and endurance  Z74 09       2  Parkinsonism, unspecified Parkinsonism type ([address] )  [facility]           Start Time: 1400  Stop Time: 1500  Total time in clinic (min): 60 minutes    Subjective: Patient reports he hasn't been doing well the last few days (since Tuesday)  He also reports he has been getting some lightheadedness and dizziness when standing  Objective: See treatment diary below  Self-directed exercise from [id number]  1:1 with PT from [id number]    Assessment: Tolerated treatment well  Patient demonstrated fatigue post treatment, exhibited good technique with therapeutic exercises and would benefit from continued PT  Discussed with patient that Parkinson's medications can decrease BP, and lightheadedness/dizziness especially with standing may be due to low BP  Educated and encouraged to take BP when he is feeling those symptoms, as well as importance of hydration and BP  Experienced some lightheadedness at end of session while performing stairs, and BP was taken (108/62 in sitting)  Symptoms went away with sitting down and felt ok afterwards  Trialed U-step walker during session with laser beam line engaged as visual cue for freezing episodes and turning  Therapist needed to flip seat part of U-step walker up so that he was able to see the laser due to it being directly under from front basket  Patient required maximal cues to use laser during turning, but appears to have improved step length with initiating gait and with walking over thresholds  Continued to have difficulty with turning, however may be due more to distraction and talking while turning during session  Some difficulty with clearing feet from being near the wheels in tight turns in hallway   Discussed continued practice with laser on walker, for external visual cuing to assist with freezing episodes, would possibly be adequate to get laser to attach to his rollator at this time  Continue to progress as tolerated  Plan: Continue per plan of care  Precautions: HTN, falls, NPH  Manuals 1/18 1/30 1/9 1/11 1/16                                       Neuro Re-Ed         LSVT Big exercise review         HKM    Standing in place with rollator x 10 reps prior to walking Standing in place with Rollator 2 x 10  // bars (long) cues for high knees and SLS x 3 laps with BUE support   Clock turns With Rollator to color discs in 1/4 increments x 5 CW/CCW ea  Constant v c  for marching to initial turns   In // bars with HHA from therapist, 90 degree increments and cues to march feet to increase foot clearance and decrease pivoting with stable UE support, x 5 reps each direction     backwards    With rollator and CGA, 10 feet, then // bars (long) x 1 lap with UE support, cues for toe-heel sequencing With Rollator CGA Fwd/Bwd 5FT x 2   // bars (long) x 3 laps cues for increased step length, BUE support   hurdles         Sidesteps with big arms  // bars (long) side steps x 3 laps   Cues for increased step length especially moving to R side  // bars (long) side steps with BUE support x 4 laps //bars (short) side steps with Big arms  X 3 laps // bars (long) side steps x 3 laps   BIG walking         Targeted stepping/         HKM         Step up                           Ther Ex         Sit to stand from mat table no UE's 5xSTS x 2 repetitions B/L UE use from chair with arms 2 x 5 reps with arms  3 x 5 from chair with arms 2 x 5 from chair with arms    Patient c/o of pain in B/L knees L>R 2 x 5 from chair with arms    nustep L6 for 10 minutes with large amplitude of movement L6 for 9 5 minutes, cues for large amplitude of movement  L5 for 10 minutes emphasis on large steps and SPM >60  L6 for 8 minutes with large amplitude of movement Ther Activity         Car transfer      With rollator and CGA, cues for safety            Gait Training         rollator With Rollator 100ft x 3 with turns to R v c  for marching    Walking chair to chair 1/4 length with turning to R x 3 laps With U-step walker 200' in hallway and laser    U-step walker and laser around therapy clinic x 300 feet and in tight spaces    In/out of therapy clinic (to neurology) with x 3 floor transitions and doorways  u-step walker and laser transitions x 150 feet  With rollator, between 2 chairs, stand, walk, sit (chairs 15 feet apart)  Cues for initiation of gait with large R step, marching during freezing during turn  X 8 repetitions With Rollator weave around cones (4)  X 1 laps  Cues for initiation of gait with large R step, marching during freezing during turn  With rollator, weaving around 4 cones (spaced 4 feet apart) x 4 laps   hallway    In therapy clinic with rollator around obstacles 75' x 2 With Rollator 100ft x 3 with turns to R v c  for marching With rollator and CS/CGA with dual task of alphabetical listing of countries (400 feet completed and alphabet to R)   staircase  Up and over staircase x 2 laps with BUE support, cues for hand positioning    C/o lightheadedness, /62  Up and over staircase x 5 laps with BUE support, step over step and cues for heel clearance and safety with turning at bottom of staircase     TM with SOLO Unavailable Unavailable at start of session  Unavailable, resume next session SOLO TM B/L UE support    1 5 mph - 1 8 mph  X 10 min unavailable   Modalities         Education  Purpose of laser (external cuing) and techniques to decrease freezing  Techniques to decrease freezing, turning, weight shifting/march/focus with freezing   Continued to education patient on techniques to decrease freezing with turns: wt shifting, marching  Techniques to decrease freezing- weight shift, march, clock turns, leading with R LE

## 2023-02-01 ENCOUNTER — OFFICE VISIT (OUTPATIENT)
Dept: PHYSICAL THERAPY | Facility: CLINIC | Age: 83
End: 2023-02-01

## 2023-02-01 DIAGNOSIS — G20 PARKINSONISM, UNSPECIFIED PARKINSONISM TYPE (HCC): Primary | ICD-10-CM

## 2023-02-01 DIAGNOSIS — Z74.09 IMPAIRED FUNCTIONAL MOBILITY, BALANCE, GAIT, AND ENDURANCE: ICD-10-CM

## 2023-02-01 NOTE — PROGRESS NOTES
Daily Note     Today's date: 2023  Patient name: Ni Steward  : 1940  MRN: [id number]  Referring provider: Tracee Alvarado PA-C  Dx:   Encounter Diagnosis     ICD-10-CM    1  Parkinsonism, unspecified Parkinsonism type ([facility] [address] )  G20       2  Impaired functional mobility, balance, gait, and endurance  Z74 09           Start Time: 1504  Stop Time: 1604  Total time in clinic (min): 60 minutes    Subjective: Patient states that he does not like to use the U-step walker because he states he does not need the laser and that the walker feels heavier for him than his Rollator  Patient was agreeable to use for today  Patient also states he would like to trial 8 min on the Nustep in the beginning of session, and the TM for 8 min at the end of the sessio  Objective: See treatment diary below      Assessment: Tolerated treatment well  Ludin Tavarez participated in skilled PT session focused on balance, gait, and endurance  Patient continues to be challenged with moving B/L LE with turns  Patient did demonstrates 2 good turns with use of U-step walker this session  Patient would continue to benefit from skilled PT interventions to address deficits with balance, gait, and endurance  Patient demonstrated fatigue post treatment       Plan: Continue per plan of care        Precautions: HTN, falls, NPH  Manuals                                        Neuro Re-Ed         LSVT Big exercise review         HKM    Standing in place with rollator x 10 reps prior to walking Standing in place with Rollator 2 x 10  // bars (long) cues for high knees and SLS x 3 laps with BUE support   Clock turns With Rollator to color discs in 1/4 increments x 5 CW/CCW ea  Constant v c  for marching to initial turns   In // bars with HHA from therapist, 90 degree increments and cues to march feet to increase foot clearance and decrease pivoting with stable UE support, x 5 reps each direction     backwards With rollator and CGA, 10 feet, then // bars (long) x 1 lap with UE support, cues for toe-heel sequencing With Rollator CGA Fwd/Bwd 5FT x 2   // bars (long) x 3 laps cues for increased step length, BUE support   hurdles         Sidesteps with big arms  // bars (long) side steps x 3 laps  Cues for increased step length especially moving to R side //bars (short) side stepping with B/L Big arms x 4 laps  V c  for increasing B/L step length // bars (long) side steps with BUE support x 4 laps //bars (short) side steps with Big arms  X 3 laps // bars (long) side steps x 3 laps   BIG walking         Targeted stepping/         HKM         Step up                           Ther Ex         Sit to stand from mat table no UE's 5xSTS x 2 repetitions B/L UE use from chair with arms 2 x 5 reps with arms From low mat table 2 x 6 reps with arms 3 x 5 from chair with arms 2 x 5 from chair with arms    Patient c/o of pain in B/L knees L>R 2 x 5 from chair with arms    nustep L6 for 10 minutes with large amplitude of movement L6 for 9 5 minutes, cues for large amplitude of movement L6 x 10 cues of large amplitude of movement  L5 for 10 minutes emphasis on large steps and SPM >60  L6 for 8 minutes with large amplitude of movement            Ther Activity         Car transfer      With rollator and CGA, cues for safety            Gait Training         rollator With Rollator 100ft x 3 with turns to R v c  for marching    Walking chair to chair 1/4 length with turning to R x 3 laps With U-step walker 200' in hallway and laser    U-step walker and laser around therapy clinic x 300 feet and in tight spaces    In/out of therapy clinic (to neurology) with x 3 floor transitions and doorways   u-step walker and laser transitions x 150 feet With U-step walker 200ft in hallway with laser    In/out of therapy clinic to Neurology with x3 floor transitions and doorways and with U-step walker with laser     U-step between 2 chairs 15 ft apart practicing turns Large R step v c  marching during turns x 8 reps  V c  for marching in place to help initiate movement with turns  Patient with 2 good turns without freezing  With rollator, between 2 chairs, stand, walk, sit (chairs 15 feet apart)  Cues for initiation of gait with large R step, marching during freezing during turn  X 8 repetitions With Rollator weave around cones (4)  X 1 laps  Cues for initiation of gait with large R step, marching during freezing during turn  With rollator, weaving around 4 cones (spaced 4 feet apart) x 4 laps   hallway    In therapy clinic with rollator around obstacles 75' x 2 With Rollator 100ft x 3 with turns to R v c  for marching With rollator and CS/CGA with dual task of alphabetical listing of countries (400 feet completed and alphabet to R)   staircase  Up and over staircase x 2 laps with BUE support, cues for hand positioning    C/o lightheadedness, /62  Up and over staircase x 5 laps with BUE support, step over step and cues for heel clearance and safety with turning at bottom of staircase     TM with SOLO Unavailable Unavailable at start of session  Unavailable, resume next session SOLO TM B/L UE support    1 5 mph - 1 8 mph  X 10 min unavailable   Modalities         Education  Purpose of laser (external cuing) and techniques to decrease freezing  Techniques to decrease freezing, turning, weight shifting/march/focus with freezing   Continued to education patient on techniques to decrease freezing with turns: wt shifting, marching  Techniques to decrease freezing- weight shift, march, clock turns, leading with R LE

## 2023-02-06 ENCOUNTER — OFFICE VISIT (OUTPATIENT)
Dept: PHYSICAL THERAPY | Facility: CLINIC | Age: 83
End: 2023-02-06

## 2023-02-06 DIAGNOSIS — G20 PARKINSONISM, UNSPECIFIED PARKINSONISM TYPE (HCC): ICD-10-CM

## 2023-02-06 DIAGNOSIS — Z74.09 IMPAIRED FUNCTIONAL MOBILITY, BALANCE, GAIT, AND ENDURANCE: Primary | ICD-10-CM

## 2023-02-06 NOTE — PROGRESS NOTES
Daily Note     Today's date: 2023  Patient name: Felisa Pizano  : 1940  MRN: [id number]  Referring provider: Domingo Blackman PA-C  Dx:   Encounter Diagnosis     ICD-10-CM    1  Impaired functional mobility, balance, gait, and endurance  Z74 09       2  Parkinsonism, unspecified Parkinsonism type ([facility] [address] )  G20                      Subjective: Patient reports that the laser helps for the day and the next because he can think about taking big steps, but the day after that it does not help  He states that he is not doing well today and has been having a lot of trouble the last couple of days with walking  Objective: See treatment diary below      Assessment: Tolerated treatment well  Cues to keep R toes on floor during session, tending to be retropulsive, significant improvement with cues to keep weight shifted forward and toes on floor  Tends to have R toes come off floor more frequently than L side  Required cues to bring toes up to laser beam during turning, but was able to keep feet moving to decrease length of freezing episodes  Continues to have significant difficulty with turning to sit in chair, especially with tight turns  Continue to progress as tolerated  Patient demonstrated fatigue post treatment       Plan: Continue per plan of care  Precautions: HTN, falls, NPH  Manuals  2/                               Neuro Re-Ed       LSVT Big exercise review       HKM       Clock turns With Rollator to color discs in 1/4 increments x 5 CW/CCW ea  Constant v c  for marching to initial turns      backwards       hurdles       Sidesteps with big arms  // bars (long) side steps x 3 laps   Cues for increased step length especially moving to R side //bars (short) side stepping with B/L Big arms x 4 laps  V c  for increasing B/L step length // bars (short) counting steps (goal to achieve in 10 steps) and emphasis on increased effort of steps x 4 laps   BIG walking       Targeted stepping/ HKM       Step up                     Ther Ex       Sit to stand from mat table no UE's 5xSTS x 2 repetitions B/L UE use from chair with arms 2 x 5 reps with arms From low mat table 2 x 6 reps with arms From chair with arms 2 x 6 reps with arms   nustep L6 for 10 minutes with large amplitude of movement L6 for 9 5 minutes, cues for large amplitude of movement L6 x 10 cues of large amplitude of movement  L6 for 8 minutes, cues for increased amplitude of movement          Ther Activity       Car transfer              Gait Training       rollator With Rollator 100ft x 3 with turns to R v c  for marching    Walking chair to chair 1/4 length with turning to R x 3 laps With U-step walker 200' in hallway and laser    U-step walker and laser around therapy clinic x 300 feet and in tight spaces    In/out of therapy clinic (to neurology) with x 3 floor transitions and doorways  u-step walker and laser transitions x 150 feet With U-step walker 200ft in hallway with laser    In/out of therapy clinic to Neurology with x3 floor transitions and doorways and with U-step walker with laser     U-step between 2 chairs 15 ft apart practicing turns Large R step v c  marching during turns x 8 reps  V c  for marching in place to help initiate movement with turns  Patient with 2 good turns without freezing   With U-step walker with laser, x 600 feet in hallway    U-step walker with turning and sitting in chair (12-feet apart) x 3 reps, cues to step onto laser during turning    U-step walker tight turns to sit in chair x 2 trials, significant freezing     hallway       staircase  Up and over staircase x 2 laps with BUE support, cues for hand positioning    C/o lightheadedness, /62     TM with SOLO Unavailable Unavailable at start of session  Unavailable with SOLO   Modalities       Education  Purpose of laser (external cuing) and techniques to decrease freezing  Increased effort with movement and overriding small steps and movement, turning to feel back of both legs on chair before sitting for safety

## 2023-02-08 ENCOUNTER — OFFICE VISIT (OUTPATIENT)
Dept: PHYSICAL THERAPY | Facility: CLINIC | Age: 83
End: 2023-02-08

## 2023-02-08 DIAGNOSIS — G20 PARKINSONISM, UNSPECIFIED PARKINSONISM TYPE (HCC): ICD-10-CM

## 2023-02-08 DIAGNOSIS — Z74.09 IMPAIRED FUNCTIONAL MOBILITY, BALANCE, GAIT, AND ENDURANCE: Primary | ICD-10-CM

## 2023-02-08 NOTE — PROGRESS NOTES
Daily Note     Today's date: 2023  Patient name: Tobin Rajan  : 1940  MRN: [id number]  Referring provider: Paulino Barrett PA-C  Dx:   Encounter Diagnosis     ICD-10-CM    1  Impaired functional mobility, balance, gait, and endurance  Z74 09       2  Parkinsonism, unspecified Parkinsonism type ([address] )  [place]           Start Time: 12  Stop Time: 1600  Total time in clinic (min): 55 minutes    Subjective: Patient reports not feeling well earlier and almost cancelled  Objective: See treatment diary below      Assessment: Tolerated treatment well  Milton Sena participated in skilled PT session focused on balance, gait, and endurance  Patient demonstrates decreased freezing during figure 8 walking with U-step walker  Patient continues with freezing with small turns, constant v c  for stepping toward red laser  Patient worked on B/L LE bigger stride with Nustep and TM  Patient would continue to benefit from skilled PT interventions to address deficits with balance, gait, and endurance   Patient demonstrated fatigue post treatment      Plan: Continue per plan of care  Precautions: HTN, falls, NPH  Manuals  2                               Neuro Re-Ed       LSVT Big exercise review       HKM       Clock turns       backwards       hurdles       Sidesteps with big arms //bars (long)  Counting step (goal 10 steps) emphasizing increased effort of steps x 4 laps  // bars (long) side steps x 3 laps   Cues for increased step length especially moving to R side //bars (short) side stepping with B/L Big arms x 4 laps  V c  for increasing B/L step length // bars (short) counting steps (goal to achieve in 10 steps) and emphasis on increased effort of steps x 4 laps   BIG walking       Targeted stepping/       HKM       Step up       Figure 8 around cones SOLO 2 cones figure 8 walking with U-step using laser x 4 laps decreased freezing             Ther Ex       Sit to stand from mat table no UE's From chair w/arms  X 7 increased discomfort in B/L knees  2 x 5 reps with arms From low mat table 2 x 6 reps with arms From chair with arms 2 x 6 reps with arms   nustep L6 for 8 minutes, cues for increased amplitude of movement L6 for 9 5 minutes, cues for large amplitude of movement L6 x 10 cues of large amplitude of movement  L6 for 8 minutes, cues for increased amplitude of movement          Ther Activity       Car transfer              Gait Training       rollator With U-step walker 200ft in hallway with laser    U-step walker  To RW about 15ft with turning walking back to chair and sitting in chair x 3 reps, cues to step onto laser during turning    In/out of therapy clinic to Neurology with x3 floor transitions and doorways and with U-step walker with laser    With U-step walker 200' in hallway and laser    U-step walker and laser around therapy clinic x 300 feet and in tight spaces    In/out of therapy clinic (to neurology) with x 3 floor transitions and doorways  u-step walker and laser transitions x 150 feet With U-step walker 200ft in hallway with laser    In/out of therapy clinic to Neurology with x3 floor transitions and doorways and with U-step walker with laser     U-step between 2 chairs 15 ft apart practicing turns Large R step v c  marching during turns x 8 reps  V c  for marching in place to help initiate movement with turns  Patient with 2 good turns without freezing   With U-step walker with laser, x 600 feet in hallway    U-step walker with turning and sitting in chair (12-feet apart) x 3 reps, cues to step onto laser during turning    U-step walker tight turns to sit in chair x 2 trials, significant freezing     hallway       staircase  Up and over staircase x 2 laps with BUE support, cues for hand positioning    C/o lightheadedness, /62     TM with SOLO SOLO TM  1 5 mph x 3 min  2 0 mph x 3 min Unavailable at start of session  Unavailable with [facility] Purpose of laser (external cuing) and techniques to decrease freezing  Increased effort with movement and overriding small steps and movement, turning to feel back of both legs on chair before sitting for safety

## 2023-02-13 ENCOUNTER — OFFICE VISIT (OUTPATIENT)
Dept: PHYSICAL THERAPY | Facility: CLINIC | Age: 83
End: 2023-02-13

## 2023-02-13 DIAGNOSIS — Z74.09 IMPAIRED FUNCTIONAL MOBILITY, BALANCE, GAIT, AND ENDURANCE: Primary | ICD-10-CM

## 2023-02-13 DIAGNOSIS — G20 PARKINSONISM, UNSPECIFIED PARKINSONISM TYPE (HCC): ICD-10-CM

## 2023-02-13 NOTE — PROGRESS NOTES
Daily Note     Today's date: 2023  Patient name: James Mcdaniel  : 1940  MRN: [id number]  Referring provider: Vu Tompkins PA-C  Dx:   Encounter Diagnosis     ICD-10-CM    1  Impaired functional mobility, balance, gait, and endurance  Z74 09       2  Parkinsonism, unspecified Parkinsonism type ([facility] [address] )  G20                      Subjective: Patient has no new reports  States that he didn't do the bike yesterday due to the superbowl, states that the nustep is more difficult today  Objective: See treatment diary below      Assessment: Tolerated treatment well  Performed figure-8 walking without laser and with laser, demonstrated increased freezing episodes without use of laser  Continues to require cues during turning, patient tends to stop walking when talking during turns when freezing starts  Improved gait noted with cues to increase step height and heel strike by using auditory cues on TM  Continue to progress as tolerated  Patient demonstrated fatigue post treatment      Plan: Continue per plan of care  Precautions: HTN, falls, NPH  Manuals                                        Neuro Re-Ed         LSVT Big exercise review         HKM         Clock turns         backwards         hurdles         Sidesteps with big arms //bars (long)  Counting step (goal 10 steps) emphasizing increased effort of steps x 4 laps  // bars (long) side steps x 3 laps   Cues for increased step length especially moving to R side //bars (short) side stepping with B/L Big arms x 4 laps  V c  for increasing B/L step length // bars (short) counting steps (goal to achieve in 10 steps) and emphasis on increased effort of steps x 4 laps   BIG walking         Targeted stepping/         HKM         Step up         Figure 8 around cones SOLO 2 cones figure 8 walking with U-step using laser x 4 laps decreased freezing 5 cones with U-step walker (without laser for 1/2 lap, laser for rest) x 2 laps with seated rest break between                Ther Ex         Sit to stand from mat table no UE's From chair w/arms  X 7 increased discomfort in B/L knees  From chair with arms 2 x 6 reps  2 x 5 reps with arms From low mat table 2 x 6 reps with arms From chair with arms 2 x 6 reps with arms   nustep L6 for 8 minutes, cues for increased amplitude of movement L6 for 8 minutes, cues for increased amplitude of movement  L6 for 9 5 minutes, cues for large amplitude of movement L6 x 10 cues of large amplitude of movement  L6 for 8 minutes, cues for increased amplitude of movement            Ther Activity         Car transfer                  Gait Training         rollator With U-step walker 200ft in hallway with laser    U-step walker  To RW about 15ft with turning walking back to chair and sitting in chair x 3 reps, cues to step onto laser during turning    In/out of therapy clinic to Neurology with x3 floor transitions and doorways and with U-step walker with laser    U-step walker with laser x 5 laps in hallway with cues for increased amplitude of steps with turning    U-step walker around therapy clinic and over thresholds, cues for increased step length with transitions  With U-step walker 200' in hallway and laser    U-step walker and laser around therapy clinic x 300 feet and in tight spaces    In/out of therapy clinic (to neurology) with x 3 floor transitions and doorways  u-step walker and laser transitions x 150 feet With U-step walker 200ft in hallway with laser    In/out of therapy clinic to Neurology with x3 floor transitions and doorways and with U-step walker with laser     U-step between 2 chairs 15 ft apart practicing turns Large R step v c  marching during turns x 8 reps  V c  for marching in place to help initiate movement with turns  Patient with 2 good turns without freezing   With U-step walker with laser, x 600 feet in hallway    U-step walker with turning and sitting in chair (12-feet apart) x 3 reps, cues to step onto laser during turning    U-step walker tight turns to sit in chair x 2 trials, significant freezing     hallway         staircase    Up and over staircase x 2 laps with BUE support, cues for hand positioning    C/o lightheadedness, /62     TM with SOLO SOLO TM  1 5 mph x 3 min  2 0 mph x 3 min SOLO TM  With BUE support  2 0-2 2mph  Cues for decreased scuffing and increased step height, 8 minutes    1  8mph for 2 minutes with BUE support and cues for heel strike, sound on TM, and increased knee flexion  Unavailable at start of session  Unavailable with SOLO   Modalities         Education    Purpose of laser (external cuing) and techniques to decrease freezing  Increased effort with movement and overriding small steps and movement, turning to feel back of both legs on chair before sitting for safety

## 2023-02-15 ENCOUNTER — OFFICE VISIT (OUTPATIENT)
Dept: PHYSICAL THERAPY | Facility: CLINIC | Age: 83
End: 2023-02-15

## 2023-02-15 DIAGNOSIS — G20 PARKINSONISM, UNSPECIFIED PARKINSONISM TYPE (HCC): ICD-10-CM

## 2023-02-15 DIAGNOSIS — Z74.09 IMPAIRED FUNCTIONAL MOBILITY, BALANCE, GAIT, AND ENDURANCE: Primary | ICD-10-CM

## 2023-02-15 NOTE — PROGRESS NOTES
Daily Note     Today's date: 2/15/2023  Patient name: Felisa Pizano   : 1940  MRN: [id number]  Referring provider: Domingo Blackman PA-C  Dx:   Encounter Diagnosis     ICD-10-CM    1  Impaired functional mobility, balance, gait, and endurance  Z74 09       2  Parkinsonism, unspecified Parkinsonism type ([address] )  Liliane Nelson           Start Time: 1500  Stop Time: 1600  Total time in clinic (min): 60 minutes    Subjective: Patient reports no new complaints  Objective: See treatment diary below      Assessment: Tolerated treatment well  Vazquez Alan participated in skilled PT session focused on balance, gait and endurance  Patient demonstrates improved turns this session while walking hallway having decreased freezing, but short, shuffled gait  Patient continues to be challenged with freezing during shorten turns and transitioning from carpet to floor  Patient would continue to benefit from skilled PT interventions to address deficits with balance, gait, and endurance  Patient demonstrated fatigue post treatment      Plan: Continue per plan of care  Precautions: HTN, falls, NPH  Manuals 2/8 2/13 2/15 1/30 2/1 2/6                                       Neuro Re-Ed         LSVT Big exercise review         HKM         Clock turns         backwards         hurdles         Sidesteps with big arms //bars (long)  Counting step (goal 10 steps) emphasizing increased effort of steps x 4 laps  // bars (long) side steps x 3 laps   Cues for increased step length especially moving to R side //bars (short) side stepping with B/L Big arms x 4 laps  V c  for increasing B/L step length // bars (short) counting steps (goal to achieve in 10 steps) and emphasis on increased effort of steps x 4 laps   BIG walking         Targeted stepping/         HKM         Step up         Figure 8 around cones SOLO 2 cones figure 8 walking with U-step using laser x 4 laps decreased freezing 5 cones with U-step walker (without laser for 1/2 lap, laser for rest) x 2 laps with seated rest break between SOLO 2 cones figure 8 walking with U-step using laser x 4 laps v c  for Marching and picking up B/L LE  Increased freezing with smaller turns this session                Ther Ex         Sit to stand from mat table no UE's From chair w/arms  X 7 increased discomfort in B/L knees  From chair with arms 2 x 6 reps  2 x 5 reps with arms From low mat table 2 x 6 reps with arms From chair with arms 2 x 6 reps with arms   nustep L6 for 8 minutes, cues for increased amplitude of movement L6 for 8 minutes, cues for increased amplitude of movement L6 x 8 min cues for increasing amplitude  L6 for 9 5 minutes, cues for large amplitude of movement L6 x 10 cues of large amplitude of movement  L6 for 8 minutes, cues for increased amplitude of movement            Ther Activity         Car transfer                  Gait Training         rollator With U-step walker 200ft in hallway with laser    U-step walker  To RW about 15ft with turning walking back to chair and sitting in chair x 3 reps, cues to step onto laser during turning    In/out of therapy clinic to Neurology with x3 floor transitions and doorways and with U-step walker with laser    U-step walker with laser x 5 laps in hallway with cues for increased amplitude of steps with turning    U-step walker around therapy clinic and over thresholds, cues for increased step length with transitions U-step walker with laser x 5 laps in hallway with cues for increased amplitude of steps with turning  No freezing with turns, but short, shuffled gait pattern    U-step walker around therapy gym over thresholds, increased freezing today over transitions  V c to not slow down over transitions  With U-step walker 200' in hallway and laser    U-step walker and laser around therapy clinic x 300 feet and in tight spaces    In/out of therapy clinic (to neurology) with x 3 floor transitions and doorways   u-step walker and laser transitions x 150 feet With U-step walker 200ft in hallway with laser    In/out of therapy clinic to Neurology with x3 floor transitions and doorways and with U-step walker with laser     U-step between 2 chairs 15 ft apart practicing turns Large R step v c  marching during turns x 8 reps  V c  for marching in place to help initiate movement with turns  Patient with 2 good turns without freezing  With U-step walker with laser, x 600 feet in hallway    U-step walker with turning and sitting in chair (12-feet apart) x 3 reps, cues to step onto laser during turning    U-step walker tight turns to sit in chair x 2 trials, significant freezing     hallway         staircase    Up and over staircase x 2 laps with BUE support, cues for hand positioning    C/o lightheadedness, /62     TM with SOLO SOLO TM  1 5 mph x 3 min  2 0 mph x 3 min SOLO TM  With BUE support  2 0-2 2mph  Cues for decreased scuffing and increased step height, 8 minutes    1  8mph for 2 minutes with BUE support and cues for heel strike, sound on TM, and increased knee flexion SOLO TM with B/L UE support    1 8 mph - 2 0 mph   X 8 min    Cues for picking up feet and heel strike   Unavailable at start of session  Unavailable with SOLO   Modalities         Education    Purpose of laser (external cuing) and techniques to decrease freezing  Increased effort with movement and overriding small steps and movement, turning to feel back of both legs on chair before sitting for safety

## 2023-02-20 ENCOUNTER — OFFICE VISIT (OUTPATIENT)
Dept: PHYSICAL THERAPY | Facility: CLINIC | Age: 83
End: 2023-02-20

## 2023-02-20 DIAGNOSIS — Z74.09 IMPAIRED FUNCTIONAL MOBILITY, BALANCE, GAIT, AND ENDURANCE: Primary | ICD-10-CM

## 2023-02-20 DIAGNOSIS — G20 PARKINSONISM, UNSPECIFIED PARKINSONISM TYPE (HCC): ICD-10-CM

## 2023-02-20 NOTE — PROGRESS NOTES
Re-Evaluation      Today's date: 2023  Patient name: Terry Conner  : 1940  MRN: [id number]  Referring provider: Sherry Pierce PA-C  Dx:   Encounter Diagnosis     ICD-10-CM    1  Impaired functional mobility, balance, gait, and endurance  Z74 [id number]       2  Parkinsonism, unspecified Parkinsonism type ([address] )  Jaylen Cao           Start Time: 1400  Stop Time: 1510  Total time in clinic (min): 70 minutes       Assessment  23: Sung Del Valle has attended 26 sessions of physical therapy for Parkinson's Disease and NPH  He continues to have significant difficulty with freezing, weight shifting, turning, and high distractibility  He has made significant gains since starting therapy with endurance, gait speed, turning, LE strength and endurance, and safety  Patient reports he pulled a muscle in his hip/back a few days ago and was not moving around much  Since last progress note, Sung Del Valle appears to have plateaued with progress, and had slight decline in outcome measures compared to last progress note, most likely due to inactivity in last few days  Based on Ulises's medical history significant for Parkinson's Disease, relatively sedentary lifestyle at home, and decreased supervision during the day with decreased cuing and significant difficulty with self cuing due to distractability and cognitive deficits, maintenance programming would be an important opportunity to provide him with the guidance and equipment necessary to facilitate a safe and effective environment for exercise so that he is able to maintain his current level of function with a reduced risk for falls  He has attended restorative therapy with prior episode and demonstrated significant decline in function without structured maintenance programming   His cognitive deficits continue to progressively limit him from safely using and operating exercise equipment such as stairs and treadmill, and he requires the skilled supervision and facilitation from the physical therapist to effectively exercise with safe and correct form to decrease freezing, decrease shuffling, and increase amplitude of movement  For these reasons, Rey Mcclelland will continue to benefit from skilled physical therapy maintenance programming to continue to maintain his current level of function and reduced risk for falls in light of his developing and progressive Parkinson's Disease  Plan to initiate skilled maintenance program 1x/week with eventual transition into community program, with planning to have wife attend sessions to learn effective cuing techniques  1/23/23: Rey Mcclelland has attended 19 sessions of physical therapy for Parkinson's Disease and NPH  He continues to have difficulty with freezing episodes, difficulty turning, and high distractibility  He is demonstrating improvements in functional mobility and balance measures  Since last progress note he has demonstrated significant improvement with 6MWT from 610 feet to 800 feet  He is demonstrating similar self-selected gait speed (0 92m/s and 0 93m/s), which demonstrates improved ability to turn during ambulation with less time freezing  5xSTS time, TUG, and gait speed remained similar, which may be due to fatigue post 6MWT  Despite these outcome measures remaining the same, he has not reached a plateau in improving functional mobility  Rey Mcclelland would benefit from continued physical therapy to continue to decrease fall risk, improve balance, improve functional mobility, decrease episodes of freezing, improve safety, improve endurance, and maximize function  Continue per current POC     12/27/22: Rey Mcclelland has attended 13 sessions of physical therapy with more consistent attendance since last progress note  Patient continues to have difficulty with freezing, turning, shuffling, and distraction, however is demonstrating some improvement in movement with cuing to initiate movement with R LE and turning to the R side to decrease freezing   Progress has been slow due to limited attendance at start of episode of care, but is making progress toward initial short term goals and has met 2 short term goals at this time  During re-assessment, patient demonstrated significant improvements in TUG time and gait speed, and improved distance with 6 minute walk test  TUG on initial evaluation was 47 1 seconds (at last progress note was 1 min 11 seconds), and during today's session decreased to 36 2 seconds with rollator  Self-selected gait speed improved from 0 75m/s to 0 92m/s with rollator  Adolfo Mendoza continues to be at increased risk of falls from TUG time significantly above cut-off score of 13 5 seconds  5xSTS did not demonstrate improvement during session, however may be attributed to increased knee pain  6MWT did demonstrate some improvement in distance to 610 feet, at last progress note was 500 feet and at initial evaluation was at 550 feet  He has not had any falls since resuming therapy  Adolfo Mendoza is demonstrating improvements in mobility, decreased freezing and improved strategies to decrease freezing of gait, decreasing fall risk, and improving functional mobility  He would benefit from continued physical therapy to improve mobility, improve safety, improve balance, improve endurance, decrease fall risk, and maximize function  11/28/22: Adolfo Mendoza has attended 5 sessions of physical therapy since initial evaluation  Attendance has been limited due to scheduling conflicts  During re-testing during session, patient appears to have significant difficulty with turning, freezing, shuffling, increased distraction, and picking feet up from floor  He reported that he was having a bad day today, and testing demonstrated minimal change since start of therapy  Adolfo Mendoza demonstrated improvement in 5xSTS from 17 seconds to 14 4 seconds (with use of UE's)  No significant change with 6MWT or gait speed compared to initial evaluation   TUG time with increased time to complete, appears to be due to increased distraction during session in therapy clinic  Limited progress may be due to limited attendance and self-reported bad day  Atul Thornton continues to be at high risk of falls due to TUG time and self-selected gait speed, decreased endurance, decreased LE strength and endurance, and decreased mobility  He would benefit from continued physical therapy to improve mobility, improve safety, improve balance, improve endurance, decrease fall risk, and maximize function  Continue per current POC  Assessment details: Patient presents to outpatient physical therapy for Parkinson's Disease and history of NPH with shunt  He recently completed LSVT Big program at [facility]  He presents with impaired balance, impaired gait, freezing of gait, difficulty turning, increased risk of falls, bradykinesia, and impaired functional mobility  Compared to previous episode of care 10/2021, he has had some decline in status and is now utilizing rollator during most ambulatory tasks  He is demonstrating impaired LE strength and endurance from 5xSTS of 17 03 (utilizing UE's, unable to complete without UE's), which is also above cut-off score for increased risk of falls at 15 seconds  TUG time also demonstrates increased risk of falls at 47 10 seconds (with rollator), significantly above cut-off score of 13 5 seconds for increased fall risk, as well as significantly above his prior functional status at 24 2 seconds  Self-selected gait speed is also below cut-off score for increased fall risk at 0 77m/s (with rollator), as well as placing him in a limited community ambulator category  Atul Thornton would benefit from skilled physical therapy to decrease falls, improve balance, improve functional mobility, decrease freezing episodes and festinating gait pattern, improve LE strength and endurance, improve independence with mobility, and maximize functioning    Impairments: abnormal coordination, abnormal gait, activity intolerance, impaired balance, lacks appropriate home exercise program and safety issue  Understanding of Dx/Px/POC: good   Prognosis: good    Goals    NEW GOALS:  ST  Pt will improve self-selected gait speed to at least 1 0m/s with rollator within 4 weeks to improve safety with ambulation ALMOST MET  2  Pt will improve TUG time to at least 30 seconds with rollator within 4 weeks to decrease fall risk NOT MET  3  Pt will improve 5xSTS to at least 15 seconds (with UE's) in 4 weeks to improve mobility and LE strength NOT MET  4  Pt will improve 6MWT to at least 675 feet within 4 weeks to improve functional mobility and endurance MET    LT  Pt will improve TUG time to at least 25 seconds with rollator within 8 weeks to decrease fall risk NOT MET  2  Pt will improve 5xSTS to be able to complete without UE support in 8 weeks to improve mobility and LE strength NOT MET  3  Pt will improve 6MWT to at least 850 feet within 8 weeks to improve functional mobility and endurance REVISED  4  Pt will be independent in HEP in 8 weeks PROGRESSING    NEW MAINTENANCE GOALS:  1  Pt will maintain 5xSTS with UE's to at least 22 seconds in 4 weeks  2  Pt will maintain TUG to at least 50 seconds in 4 weeks with FWW  3  Pt will maintain self-selected gait speed to at least 0 85m/s with FWW in 4 weeks  4   Pt will maintain 6MWT to at least 600 feet with FWW in 4 weeks    Plan  Patient would benefit from: skilled maintenance physical therapy  Planned therapy interventions: balance, neuromuscular re-education, balance/weight bearing training, patient education, strengthening, stretching, therapeutic activities, therapeutic exercise, flexibility, functional ROM exercises, gait training, home exercise program and abdominal trunk stabilization  Frequency: 1x week  Duration in weeks: 8  Plan of Care beginning date: 2023  Plan of Care expiration date: 2023  Treatment plan discussed with: patient         Subjective Evaluation    History of Present Illness  2/20/23: Patient reports that he pulled something on his L side a few days ago  Did not do much exercise lately  States that therapy has been helping him   He reports that he feels scared he could fall backwards, but uses his rollator  Continues to have difficulty with turning and sitting  Patient's wife states that they have a community gym that has an indoor track and bikes, but he does not use it  States that he has caregivers with him, but they are not trained to help him with exercises  1/23/23: No reports of falls  He states he has good days and bad days  He states that he has been walking outside of therapy, 4/10-8/10 of a mile  He states he can only go outside and walk 1 day a week due to his wife's schedule  He states that the techniques for freezing have been helping him  12/27/22: Patient reports he has not been doing as much with the cold weather and snow  He reports that he has not done very much because of the weather  He states that therapy has been helping him some, especially with turning  No falls, but states that he catches himself many times  He reports he sometimes forgets to use his rollator and walks around the house without it  He states he feels more free without using the rollator  11/28/22: Patient reports that he has not been doing much exercise between therapy sessions  He has been performing reach forward and down to floor from LSVT Big exercises, however has not been doing other exercises  Walks around the parking lot and on his bike at home  He states that he is having a bad day today, the last week "hasn't been anything terrible "    Mechanism of injury: Patient reports he fell backwards down the stairs last year  He states he stopped coming to therapy because "I was in bad shape"  He states he was going to [facility] for therapy during the summer, stopped in the beginning of October, difficulty with scheduling   Was going 2x/week for therapy, states they were having staffing issues  He states that he has had some setbacks, he states that his cat caught a chipmunk and needed to catch it, needed to go release it, states that he had a hard time walking back inside without AD  He states that he was going to start the [employer], but did not get to it because of scheduling issues  He states that his wife wants him to continue therapy  He reports that he has trouble getting started, turning (especially sharp turns)  Has been using the rollator since March  States he always uses the rollator, except if he forgets to use it  He reports that he has completed the LSVT Big program     Has a stationary bike at home, 2x/day 10 minutes at a time     Pain  No pain reported    Social Support  Steps to enter house: yes  2  Stairs in house: yes (3 floors)   Lives in: [place] house (and basement)  Lives with: spouse (3 cats)    Treatments  Previous treatment: physical therapy        Objective       Manual Muscle Testing - Hip Left Right   Flexion 5 5   Abduction (seated) 4+ 4+   Adduction (seated) 4+ 4+     Manual Muscle Testing - Knee Left Right   Flexion 4 4+   Extension 5 5     Manual Muscle Testing - Ankle Left Right   Doriflexion 5 5   Plantarflexion NT NT         Coordination Left Right   Heel To Cruz NT NT   Finger To Nose NT NT   Rapid Alternating Supination impaired intact   Alt toe tapping impaired intact         Outcome Measures  Initial Eval 11/28 12/27 1/23 2/20   5x Sit to Stand: 17 03 seconds with UE's 14 41 seconds with UE's 17 8 seconds with UE's, knee pain 19 06 seconds with UE's 19 3 seconds with UE's   TUG:     Manual (holding cup water)     Cog (counting back 2s) 47 10 seconds with rollator 1 min 11 seconds with rollator  2nd trial 2min 19 seconds 36 25 seconds with rollator 37 5 seconds with rollator 47 63 rollator  1min 21 sec  2 minutes   Gait Speed:  0 77m/s with rollator 0 75 m/s with rollator 0 92m/s with rollator 0 93m/s with rollator 0 89m/s with rollator   6 Minute Walk Test: 550 feet with rollator 500 feet with rollator 610 feet with rollator 800 feet with rollator 660 feet with rollator     Gait Assessment: ambulates with rollator, decrease gait speed, decreased heel strike bilaterally, difficulty initiating gait and significant shuffling/festination with turning, difficulty with weight shift  Decreased safety awareness with turning to sit in chair, not turning fully before sitting  Precautions: HTN, falls, NPH  Manuals 2/8 2/13 2/15 2/20  1/30 2/1 2/6                                               Neuro Re-Ed           LSVT Big exercise review           HKM           Clock turns           backwards           hurdles           Sidesteps with big arms //bars (long)  Counting step (goal 10 steps) emphasizing increased effort of steps x 4 laps  // bars (long) side steps x 3 laps   Cues for increased step length especially moving to R side //bars (short) side stepping with B/L Big arms x 4 laps  V c  for increasing B/L step length // bars (short) counting steps (goal to achieve in 10 steps) and emphasis on increased effort of steps x 4 laps   BIG walking           Targeted stepping/           HKM           Step up           Figure 8 around cones SOLO 2 cones figure 8 walking with U-step using laser x 4 laps decreased freezing 5 cones with U-step walker (without laser for 1/2 lap, laser for rest) x 2 laps with seated rest break between SOLO 2 cones figure 8 walking with U-step using laser x 4 laps v c  for Marching and picking up B/L LE  Increased freezing with smaller turns this session                    Ther Ex           Sit to stand from mat table no UE's From chair w/arms  X 7 increased discomfort in B/L knees  From chair with arms 2 x 6 reps    2 x 5 reps with arms From low mat table 2 x 6 reps with arms From chair with arms 2 x 6 reps with arms   nustep L6 for 8 minutes, cues for increased amplitude of movement L6 for 8 minutes, cues for increased amplitude of movement L6 x 8 min cues for increasing amplitude  L4 for 6 minutes with cues for increased amplitude  L6 for 9 5 minutes, cues for large amplitude of movement L6 x 10 cues of large amplitude of movement  L6 for 8 minutes, cues for increased amplitude of movement              Ther Activity           Car transfer                      Gait Training           rollator With U-step walker 200ft in hallway with laser    U-step walker  To RW about 15ft with turning walking back to chair and sitting in chair x 3 reps, cues to step onto laser during turning    In/out of therapy clinic to Neurology with x3 floor transitions and doorways and with U-step walker with laser    U-step walker with laser x 5 laps in hallway with cues for increased amplitude of steps with turning    U-step walker around therapy clinic and over thresholds, cues for increased step length with transitions U-step walker with laser x 5 laps in hallway with cues for increased amplitude of steps with turning  No freezing with turns, but short, shuffled gait pattern    U-step walker around therapy gym over thresholds, increased freezing today over transitions  V c to not slow down over transitions  TUG with FWW x 2 reps  U-step walker x 1 rep     amb in hallway x 2 laps with FWW    6MWT 660 feet, cues for turning  With U-step walker 200' in hallway and laser    U-step walker and laser around therapy clinic x 300 feet and in tight spaces    In/out of therapy clinic (to neurology) with x 3 floor transitions and doorways   u-step walker and laser transitions x 150 feet With U-step walker 200ft in hallway with laser    In/out of therapy clinic to Neurology with x3 floor transitions and doorways and with U-step walker with laser     U-step between 2 chairs 15 ft apart practicing turns Large R step v c  marching during turns x 8 reps  V c  for marching in place to help initiate movement with turns  Patient with 2 good turns without freezing  With U-step walker with laser, x 600 feet in hallway    U-step walker with turning and sitting in chair (12-feet apart) x 3 reps, cues to step onto laser during turning    U-step walker tight turns to sit in chair x 2 trials, significant freezing     hallway           staircase      Up and over staircase x 2 laps with BUE support, cues for hand positioning    C/o lightheadedness, /62     TM with SOLO SOLO TM  1 5 mph x 3 min  2 0 mph x 3 min SOLO TM  With BUE support  2 0-2 2mph  Cues for decreased scuffing and increased step height, 8 minutes    1  8mph for 2 minutes with BUE support and cues for heel strike, sound on TM, and increased knee flexion SOLO TM with B/L UE support    1 8 mph - 2 0 mph   X 8 min    Cues for picking up feet and heel strike     Unavailable at start of session  Unavailable with SOLO   Modalities           Education      Purpose of laser (external cuing) and techniques to decrease freezing  Increased effort with movement and overriding small steps and movement, turning to feel back of both legs on chair before sitting for safety

## 2023-02-22 ENCOUNTER — APPOINTMENT (OUTPATIENT)
Dept: PHYSICAL THERAPY | Facility: CLINIC | Age: 83
End: 2023-02-22

## 2023-02-22 ENCOUNTER — TELEPHONE (OUTPATIENT)
Dept: NEUROLOGY | Facility: CLINIC | Age: 83
End: 2023-02-22

## 2023-02-22 NOTE — TELEPHONE ENCOUNTER
Received vm-I am calling for my  marleny ordonez  birth date [birth year]  I would like to see if it is time for him to have a follow up visit  With a neurologist concerning his parkinson's disease, we have been seeing marianna antonio and reva venegas, but I don't know if it's time for someone to look at his medications  This is his wife, leonel  If someone could call me, I would appreciate it  Thank you   [phone]  -----------------------------------------------------  Called pt's wife, states that pt hasn't been doing well at PT and they want to drop him down form 2 days to 1 day as he is not progressing  Some days he is really good and other days he's not as good  Pt was last seen in oct and was recommended to f/u in 2 months   And     She is asking if carb/levo needs to be increased and asking if someone needs to evaulate him again   Carb/levo 25/100 1 5 tabs 8am, 1pm, 6pm  Carblevo 25/100 1 tab at HS    Having freezing and difficulty walking and making turns  He's able to walk in a straight line  +shuffling  When he wakes up in the morning, he is ok for a while, then about 1-2 hours later is when he starts to have difficulty  States that he wakes up at different times as he does not sleep well  States that if he sleeps well, then his movement is better  She doesn't think that this is related to meds wearing off  Could occur at any time of the day  She is asking if he needs something to help him sleep  He doesn't take anything for sleep and never has  Never took melatonin    I schedule pt to see you on 2/28  Any recommendations at this time?   [phone] to leave detailed message

## 2023-02-24 NOTE — TELEPHONE ENCOUNTER
Spoke with pt's wife Sarah Albarran and advised her of Jocelin's response and recommendation  She verbalizes understanding but requests that Jocelin's recommendation be sent to her via [employer] so that she have dosage documented for her to see  Wife reminded of appt next Tuesday  Nothing further at this time

## 2023-02-24 NOTE — TELEPHONE ENCOUNTER
Thea Peguero PA-C  to Neurology [facility] Clinical Team 6      12:29 PM  Lets have him start Melatonin, 3mg or 5mg about 60 min prior to bedtime  Divya Mendez!

## 2023-02-27 ENCOUNTER — APPOINTMENT (OUTPATIENT)
Dept: PHYSICAL THERAPY | Facility: CLINIC | Age: 83
End: 2023-02-27

## 2023-02-28 ENCOUNTER — OFFICE VISIT (OUTPATIENT)
Dept: NEUROLOGY | Facility: CLINIC | Age: 83
End: 2023-02-28

## 2023-02-28 VITALS
BODY MASS INDEX: 32.4 KG/M2 | WEIGHT: 219.4 LBS | DIASTOLIC BLOOD PRESSURE: 93 MMHG | SYSTOLIC BLOOD PRESSURE: 171 MMHG | HEART RATE: 76 BPM

## 2023-02-28 DIAGNOSIS — G20 PARKINSONISM, UNSPECIFIED PARKINSONISM TYPE (HCC): ICD-10-CM

## 2023-02-28 DIAGNOSIS — G91.2 INPH (IDIOPATHIC NORMAL PRESSURE HYDROCEPHALUS) (HCC): ICD-10-CM

## 2023-02-28 NOTE — PATIENT INSTRUCTIONS
Patient with Parkinson's disease  He is having increased freezing and imbalance which is making his gait harder at this time  No clear correlation between when he is due for dose of medication and when the freezing occurs  He finds that some days are better than others  Because of this we did discuss the option of trying to increase his Sinemet dose further to see if there is any benefit  We will start slowly to try and avoid any negative side effects  He will start by taking 2 tabs at 8 AM for the next week and then he will increase to 2 tabs at 8 and 1 for the next week and then further increase to 2 tabs at 8, 1, 6 along with 1 tab before bed  He will watch for any side effects with this increase including hallucinations or dizziness when standing  He will take a break from physical therapy while making this change however once he is on the higher dosing we can have him reevaluated to see if he has had any improvement  In the meantime he will continue to use his rollator walker as this has helped with reducing falls  It does sound like he tried the U step walker at therapy however this was not beneficial     He has been having some improvement of sleep with the Melatonin and discussed continuing to take this every night  Could consider increasing the dose in the future if needed

## 2023-02-28 NOTE — PROGRESS NOTES
Patient ID: Ene Cisneros is a 82 y o  male  Assessment/Plan:    Parkinsonism [facility])  Patient with Parkinson's disease  He is having increased freezing and imbalance which is making his gait harder at this time  No clear correlation between when he is due for dose of medication and when the freezing occurs  He finds that some days are better than others  Because of this we did discuss the option of trying to increase his Sinemet dose further to see if there is any benefit  We will start slowly to try and avoid any negative side effects  He will start by taking 2 tabs at 8 AM for the next week and then he will increase to 2 tabs at 8 and 1 for the next week and then further increase to 2 tabs at 8, 1, 6 along with 1 tab before bed  He will watch for any side effects with this increase including hallucinations or dizziness when standing  He will take a break from physical therapy while making this change however once he is on the higher dosing we can have him reevaluated to see if he has had any improvement  In the meantime he will continue to use his rollator walker as this has helped with reducing falls  It does sound like he tried the U step walker at therapy however this was not beneficial     He has been having some improvement of sleep with the Melatonin and discussed continuing to take this every night  Could consider increasing the dose in the future if needed  Subjective:    Ene Cisneros is an 82year old male with a past medical history of HTN, NPH with shunt placement about 13 years ago (follows with neurosurgery), GERD, cervical spinal and parkinsonism who presents for evaluation with the Movements [facility] clinic  To review, gait issues and tremors have been slowly progressive over years, but more recently it has significantly affected his day to day activities  He was started on Sinemet (9/2021) and referred to PT in the past with some overall benefit        At his last visit he had some improvement with the Sinemet and therapy  No changes made to his Sinemet dose and he was referred to outpatient PT  INTERVAL HISTORY:  Wife called with concerns for more freezing issues and imbalance with turns   Sleep was an issues and he was started in Melatonin   He feels that he is falling asleep easier with the Melatonin, he does still wake in the middle of the night to use the bathroom and he will struggle to fall back to sleep given he starts to think about things   He will watch TV for a while and then fall back to sleep   Some days he struggles with freezing and shuffling of gait   It does not appear to be related to wearing off   No clear hallucination however he may see some movement at times thinking that it is is wife walking around   He is currently in PT, he states that he has plateaued and he was discharged   No falls since using the Rolator walker   He did try the UStep walker with no benefit   He goes for walk with his wife       Current medications:  Sinemet 1 5tabs 8am, 1pm, 6pm, 1tab at 10pm   Melatonin 5mg      Prior medications:  Carbidopa - used for nausea, this improved on own and medication no longer needed      I personally reviewed and updated the ROS  Objective:    Blood pressure (!) 171/93, pulse 76, weight 99 5 kg (219 lb 6 4 oz)  Physical Exam  Constitutional:       General: He is awake  HENT:      Right Ear: Hearing normal       Left Ear: Hearing normal    Eyes:      General: Lids are normal       Extraocular Movements: Extraocular movements intact  Pupils: Pupils are equal, round, and reactive to light  Pulmonary:      Effort: Pulmonary effort is normal    Neurological:      Mental Status: He is alert  Motor: Motor strength is normal    Psychiatric:         Speech: Speech normal          Neurological Exam  Mental Status  Awake and alert  Oriented to person, place and time   Speech is normal     Cranial Nerves  CN III, IV, VI: Extraocular movements intact bilaterally  Normal lids and orbits bilaterally  Pupils equal round and reactive to light bilaterally  CN V:  Right: Facial sensation is normal   Left: Facial sensation is normal on the left  CN VII:  Right: There is no facial weakness  Left: There is no facial weakness  CN VIII:  Right: Hearing is normal   Left: Hearing is normal   CN IX, X: Palate elevates symmetrically  CN XI: Shoulder shrug strength is normal   CN XII: Tongue midline without atrophy or fasciculations  Motor   Increased muscle tone  Strength is 5/5 throughout all four extremities  Sensory  Light touch is normal in upper and lower extremities  Reflexes  Glabellar tap present  Coordination  Right: Finger-to-nose abnormality:Left: Finger-to-nose abnormality:    Gait  Casual gait:                                 Date of exam:   2/28/23  10/4/22           Speech  1  1   Facial Expression        Rigidity - Neck        Rigidity - Upper Extremity (Right)  1  1   Rigidity - Upper Extremity (Left)   0  1   Rigidity - Lower Extremity (Right)  0  0   Rigidity - Lower Extremity (Left)   0  0   Finger Taps (Right)   2  2   Finger Taps (Left)   1  2   Hand  (Right)  1  1   Hand  (Left)   0  1   Pronation/Supination (Right)  2  2   Pronation/Supination (Left)   1  1   Heel Taps (Right) 1  1   Heel Taps(Left) 1  1   Arising from Chair   1  1   Gait   2  2   Postural Stability         Posture 1  1   Global spontaneity of movement 1  1   Postural Tremor (Right) 0  0   Postural Tremor (Left) 0  0   Kinetic Tremor (Right)  0  0   Kinetic Tremor (Left)  0  0   Rest tremor  RUE 0  0   Rest tremor  LUE 0  0   Rest tremor  RLE 0  0   Reset tremor  LLE 0  0   Lip/Jaw Tremor        Motor Exam Total:            ROS:    Review of Systems   Constitutional: Negative  Negative for appetite change and fever  HENT: Negative  Negative for hearing loss, tinnitus, trouble swallowing and voice change  Eyes: Negative    Negative for photophobia, pain and visual disturbance  Respiratory: Negative  Negative for shortness of breath  Cardiovascular: Negative  Negative for palpitations  Gastrointestinal: Negative  Negative for nausea and vomiting  Endocrine: Negative  Negative for cold intolerance  Genitourinary: Negative  Negative for dysuria, frequency and urgency  Musculoskeletal: Positive for gait problem (shuffling/ walking )  Negative for myalgias and neck pain         "freezzing episodes"    Skin: Negative  Negative for rash  Allergic/Immunologic: Negative  Neurological: Positive for dizziness  Negative for tremors, seizures, syncope, facial asymmetry, speech difficulty, weakness, light-headedness, numbness and headaches  Hematological: Negative  Does not bruise/bleed easily  Psychiatric/Behavioral: Positive for sleep disturbance  Negative for confusion and hallucinations  The patient is nervous/anxious  All other systems reviewed and are negative

## 2023-03-01 ENCOUNTER — TELEPHONE (OUTPATIENT)
Dept: NEUROSURGERY | Facility: CLINIC | Age: 83
End: 2023-03-01

## 2023-03-01 NOTE — TELEPHONE ENCOUNTER
YONATAN for patient's wife to call back at her convenience to schedule his one year follow up through the NPH clinic  Awaiting call back

## 2023-03-01 NOTE — TELEPHONE ENCOUNTER
----- Message from Pau Jiménez RN sent at 4/4/2022 11:01 AM EDT -----  Regarding: NPH-1 yr f/u (due in APRIL 2023 )  Please contact patient/wife  for 1 year f/u through NPH clinic due April 2023

## 2023-03-02 NOTE — TELEPHONE ENCOUNTER
Patient's wife called back and stated that she would like a tues or Wednesday appt in the afternoon  I was able to give Serban 4/5 @ 145pm at [facility]  He will be getting PT @ [address] already, so I will reach out to them to see if they can complete the gait assessment form with him at his appt on 4/3

## 2023-03-24 NOTE — TELEPHONE ENCOUNTER
Spoke to Henny over at [address] and she discussed with the PT Paulina Russell who said she can complete the PT gait assessment with the patient at his 4/3 appt

## 2023-04-03 ENCOUNTER — OFFICE VISIT (OUTPATIENT)
Dept: PHYSICAL THERAPY | Facility: CLINIC | Age: 83
End: 2023-04-03

## 2023-04-03 DIAGNOSIS — Z74.09 IMPAIRED FUNCTIONAL MOBILITY, BALANCE, GAIT, AND ENDURANCE: Primary | ICD-10-CM

## 2023-04-03 DIAGNOSIS — G20 PARKINSON'S DISEASE (HCC): ICD-10-CM

## 2023-04-03 NOTE — PROGRESS NOTES
Re-Evaluation      Today's date: 2023  Patient name: Oleg Decker  : 1940  MRN: [id number]  Referring provider: Damien Galeazzi, PA-C  Dx:   Encounter Diagnosis     ICD-10-CM    1  Impaired functional mobility, balance, gait, and endurance  Z74 [id number]       2  Parkinson's disease ([address] )  Tacos Fuelgeovanny           Start Time: 1400  Stop Time: 1500  Total time in clinic (min): 60 minutes       Assessment  4/3/23: Mariela Ji returns to physical therapy after being placed on hold due to increase in Parkinson's medication and dizziness/ low blood pressure  He returns to therapy after 6 weeks  At last progress note in 2023, he had reached a plateau and had discussed transitioning to maintenance program due to high risk of falls, difficulty with self-cuing, and cognitive deficits  He also presents to physical therapy for evaluation prior to appointment at [facility] on 23  He has had significant decline since last progress note, with significant changes to 5xSTS, TUG time, 6MWT, and gait speed  He is demonstrating significant freezing and difficulty with turning  He is at increased risk of falls from all outcome measures and would benefit from initiating restorative therapy program 2x/week due to decline in the last 6 weeks  Discussed resuming formal therapy 2x/week for 8 weeks at this time to improve functional mobility, decrease episodes of freezing, decrease risk of falls, improve balance, improve LE strength and endurance, decrease caregiver burden, slow disease process, and maximize function  23: Mariela Ji has attended 26 sessions of physical therapy for Parkinson's Disease and NPH  He continues to have significant difficulty with freezing, weight shifting, turning, and high distractibility  He has made significant gains since starting therapy with endurance, gait speed, turning, LE strength and endurance, and safety   Patient reports he pulled a muscle in his hip/back a few days ago and was not moving around much  Since last progress note, Raz Byrd appears to have plateaued with progress, and had slight decline in outcome measures compared to last progress note, most likely due to inactivity in last few days  Based on Ulises's medical history significant for Parkinson's Disease, relatively sedentary lifestyle at home, and decreased supervision during the day with decreased cuing and significant difficulty with self cuing due to distractability and cognitive deficits, maintenance programming would be an important opportunity to provide him with the guidance and equipment necessary to facilitate a safe and effective environment for exercise so that he is able to maintain his current level of function with a reduced risk for falls  He has attended restorative therapy with prior episode and demonstrated significant decline in function without structured maintenance programming  His cognitive deficits continue to progressively limit him from safely using and operating exercise equipment such as stairs and treadmill, and he requires the skilled supervision and facilitation from the physical therapist to effectively exercise with safe and correct form to decrease freezing, decrease shuffling, and increase amplitude of movement  For these reasons, Raz Byrd will continue to benefit from skilled physical therapy maintenance programming to continue to maintain his current level of function and reduced risk for falls in light of his developing and progressive Parkinson's Disease  Plan to initiate skilled maintenance program 1x/week with eventual transition into community program, with planning to have wife attend sessions to learn effective cuing techniques  Assessment details: Patient presents to outpatient physical therapy for Parkinson's Disease and history of NPH with shunt  He recently completed LSVT Big program at [facility]   He presents with impaired balance, impaired gait, freezing of gait, difficulty turning, increased risk of falls, bradykinesia, and impaired functional mobility  Compared to previous episode of care 10/2021, he has had some decline in status and is now utilizing rollator during most ambulatory tasks  He is demonstrating impaired LE strength and endurance from 5xSTS of 17 03 (utilizing UE's, unable to complete without UE's), which is also above cut-off score for increased risk of falls at 15 seconds  TUG time also demonstrates increased risk of falls at 47 10 seconds (with rollator), significantly above cut-off score of 13 5 seconds for increased fall risk, as well as significantly above his prior functional status at 24 2 seconds  Self-selected gait speed is also below cut-off score for increased fall risk at 0 77m/s (with rollator), as well as placing him in a limited community ambulator category  Guicho Coon would benefit from skilled physical therapy to decrease falls, improve balance, improve functional mobility, decrease freezing episodes and festinating gait pattern, improve LE strength and endurance, improve independence with mobility, and maximize functioning  Impairments: abnormal coordination, abnormal gait, activity intolerance, impaired balance, lacks appropriate home exercise program and safety issue  Understanding of Dx/Px/POC: good   Prognosis: good    Goals    MAINTENANCE GOALS:  1  Pt will maintain 5xSTS with UE's to at least 22 seconds in 4 weeks NOT MET  2  Pt will maintain TUG to at least 50 seconds in 4 weeks with FWW NOT MET  3  Pt will maintain self-selected gait speed to at least 0 85m/s with FWW in 4 weeks NOT MET  4  Pt will maintain 6MWT to at least 600 feet with FWW in 4 weeks NOT MET    NEW GOALS  ST  Pt will improve gait speed to at least 0 75m/s with rollator in 4 weeks to decrease fall risk and improve mobility  2  Pt will improve TUG time to at least under 2 minutes with rollator in 4 weeks to decrease fall risk, decrease freezing, and improve mobility  3   Pt will improve 5xSTS to at least 25 seconds in 4 weeks to improve mobility and transfers and improve independence  4  Pt will improve 6MWT to at least 350 feet in 4 weeks to improve functional mobility and ability to ambulate in community    LT  Pt will improve gait speed to at least 0 85m/s with rollator in 8 weeks to decrease fall risk and improve mobility  2  Pt will improve TUG time to at least 1 min 30 seconds with rollator in 8 weeks to decrease fall risk, decrease freezing, and improve mobility  3  Pt will improve 5xSTS to at least 20 seconds in 8 weeks to improve mobility and transfers and improve independence  4  Pt will improve 6MWT to at least 500 feet in 8 weeks to improve functional mobility       Plan  Patient would benefit from: skilled maintenance physical therapy  Planned therapy interventions: balance, neuromuscular re-education, balance/weight bearing training, patient education, strengthening, stretching, therapeutic activities, therapeutic exercise, flexibility, functional ROM exercises, gait training, home exercise program and abdominal trunk stabilization  Frequency: 2x week  Duration in weeks: 8  Plan of Care beginning date: 4/3/2023  Plan of Care expiration date: 2023  Treatment plan discussed with: patient         Subjective Evaluation    History of Present Illness  4/3/23: Patient and wife states that he has been freezing more the last 2 weeks  Slipped 2 weeks ago getting out of bed, no falls  HE states he is only walking 1/8th of a mile  Increased sinemet in February but only in the morning and has not noticed much of a difference  23: Patient reports that he pulled something on his L side a few days ago  Did not do much exercise lately  States that therapy has been helping him   He reports that he feels scared he could fall backwards, but uses his rollator  Continues to have difficulty with turning and sitting   Patient's wife states that they have a community gym that has an "indoor track and bikes, but he does not use it  States that he has caregivers with him, but they are not trained to help him with exercises  Mechanism of injury: Patient reports he fell backwards down the stairs last year  He states he stopped coming to therapy because \"I was in bad shape\"  He states he was going to [facility] for [facility] during the summer, stopped in the beginning of October, difficulty with scheduling  Was going 2x/week for therapy, states they were having staffing issues  He states that he has had some setbacks, he states that his cat caught a chipmunk and needed to catch it, needed to go release it, states that he had a hard time walking back inside without AD  He states that he was going to start the [employer], but did not get to it because of scheduling issues  He states that his wife wants him to continue therapy  He reports that he has trouble getting started, turning (especially sharp turns)  Has been using the rollator since March  States he always uses the rollator, except if he forgets to use it  He reports that he has completed the LSVT Big program     Has a stationary bike at home, 2x/day 10 minutes at a time     Pain  No pain reported    Social Support  Steps to enter house: yes  2  Stairs in house: yes (3 floors)   Lives in: [place] house (and basement)  Lives with: spouse (3 cats)    Treatments  Previous treatment: physical therapy        Objective       Manual Muscle Testing - Hip Left Right   Flexion 5 5   Abduction (seated) 4+ 4+   Adduction (seated) 4+ 4+     Manual Muscle Testing - Knee Left Right   Flexion 4 4+   Extension 5 5     Manual Muscle Testing - Ankle Left Right   Doriflexion 5 5   Plantarflexion NT NT         Coordination Left Right   Heel To Cruz NT NT   Finger To Nose NT NT   Rapid Alternating Supination impaired intact   Alt toe tapping impaired intact         Outcome Measures  Initial Eval 11/28 12/27 1/23 2/20 4/3   5x Sit to Stand: " 17 03 seconds with UE's 14 41 seconds with UE's 17 8 seconds with UE's, knee pain 19 06 seconds with UE's 19 3 seconds with UE's 32 87 seconds with UE's   TUG:     Manual (holding cup water)     Cog (counting back 2s) 47 10 seconds with rollator 1 min 11 seconds with rollator  2nd trial 2min 19 seconds 36 25 seconds with rollator 37 5 seconds with rollator 47 63 rollator  1min 21 sec  2 minutes 2 minutes 40 seconds with rollator   Gait Speed:  0 77m/s with rollator 0 75 m/s with rollator 0 92m/s with rollator 0 93m/s with rollator 0 89m/s with rollator 0 64m/s with rollator   6 Minute Walk Test: 550 feet with rollator 500 feet with rollator 610 feet with rollator 800 feet with rollator 660 feet with rollator 200 feet with rollator     Gait Assessment: ambulates with rollator, decrease gait speed, decreased heel strike bilaterally, difficulty initiating gait and significant shuffling/festination with turning, difficulty with weight shift  Decreased safety awareness with turning to sit in chair, not turning fully before sitting           [facility] Assessment:    Gait Quality :   Wide Base of Support:    Present or WNL  Outwardly rotated feet:  Present or WNL  Step Height Diminished:  Present or WNL   Gait Speed:  - 10 meter walk test: ____0 64_____m/s  (<1m/s predictive of falls)  Comments:  Fall Risk: (yes/no)  • TUG  __2 minutes 40 seconds___  sec   (<12 sec predictive of falls)  • FGA ___________/30 (< 24 predictive of falls) not appropriate- not tested, requires AD for all mobility tasks  • Number of falls in the last month: __1________  Cognition:   MOCA:  ___21____ /30 (<26 cognitive deficit present)  Comments:    Urinary incontinence: No (does not always get there in time)           Precautions: HTN, falls, NPH      Manuals 2/15 2/20  4/3                               Neuro Re-Ed    TUG   LSVT Big exercise review       HKM       Clock turns       backwards       hurdles       Sidesteps with big arms       BIG walking       Targeted stepping/       HKM       Step up       Figure 8 around cones SOLO 2 cones figure 8 walking with U-step using laser x 4 laps v c  for Marching and picking up B/L LE  Increased freezing with smaller turns this session              Ther Ex    5xSTS 32 8 seconds, cues for decreased retropulsion    6MWT with rollator, 200 feet   Sit to stand from mat table no UE's       nustep L6 x 8 min cues for increasing amplitude  L4 for 6 minutes with cues for increased amplitude            Ther Activity       Car transfer              Gait Training       rollator U-step walker with laser x 5 laps in hallway with cues for increased amplitude of steps with turning  No freezing with turns, but short, shuffled gait pattern    U-step walker around therapy gym over thresholds, increased freezing today over transitions  V c to not slow down over transitions  TUG with FWW x 2 reps  U-step walker x 1 rep     amb in hallway x 2 laps with FWW    6MWT 660 feet, cues for turning  amb with rollator to/from waiting room with CGA and cues for decreasing freezing episodes   hallway       staircase       TM with SOLO SOLO TM with B/L UE support    1 8 mph - 2 0 mph   X 8 min    Cues for picking up feet and heel strike        Modalities       Education    Outcome measures, techniques for freezing, safety with turning and home safety, fall risk, decline in functional outcomes, resuming therapy and importance of activity, Parkinson's and NPH symptoms

## 2023-04-05 ENCOUNTER — TELEPHONE (OUTPATIENT)
Dept: NEUROSURGERY | Facility: CLINIC | Age: 83
End: 2023-04-05

## 2023-04-05 ENCOUNTER — OFFICE VISIT (OUTPATIENT)
Dept: NEUROSURGERY | Facility: CLINIC | Age: 83
End: 2023-04-05

## 2023-04-05 VITALS
HEART RATE: 72 BPM | BODY MASS INDEX: 32.34 KG/M2 | SYSTOLIC BLOOD PRESSURE: 146 MMHG | OXYGEN SATURATION: 97 % | TEMPERATURE: 97.5 F | DIASTOLIC BLOOD PRESSURE: 86 MMHG | HEIGHT: 69 IN

## 2023-04-05 DIAGNOSIS — G20 PARKINSONISM, UNSPECIFIED PARKINSONISM TYPE (HCC): ICD-10-CM

## 2023-04-05 DIAGNOSIS — E66.01 SEVERE OBESITY WITH BODY MASS INDEX (BMI) OF 35.0 TO 39.9 WITH SERIOUS COMORBIDITY (HCC): ICD-10-CM

## 2023-04-05 DIAGNOSIS — G91.2 INPH (IDIOPATHIC NORMAL PRESSURE HYDROCEPHALUS) (HCC): Primary | ICD-10-CM

## 2023-04-05 DIAGNOSIS — R26.81 UNSTEADY GAIT: ICD-10-CM

## 2023-04-05 NOTE — PROGRESS NOTES
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  He is also been diagnosed with Parkinson's disease and has an element of cervical myelopathy  He has noticed some decline in his gait which is likely multifactorial   He is in the process of having his Parkinson's medications adjusted  Would not recommend any adjustment to his shunt at present under the circumstances  I did check his  shunt today which seems to be at a setting of 2  We will obtain a plain film of the skull for more definitive determination of the setting  Could consider lowering to a setting of 1 depending on his progress, though overall it would be very difficult to predict the extent to which this would help any of his symptoms given competing etiologies in his overall age  We also did discuss the risks of shunt adjustment at present  They are agreeable to following up in 1 years time to check on his progress but will contact the office sooner should they wish to proceed with the shunt adjustment  History, physical examination and diagnostic tests were reviewed and questions answered  Diagnosis, care plan and treatment options were discussed  The patient and spouse understand instructions and will follow up as directed  Plan:    Follow-up: 1 year    Problem List Items Addressed This Visit        Nervous and Auditory    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    Relevant Orders    XR skull < 4 vw    Parkinsonism (HCC)       Other    Unsteady gait    Severe obesity with body mass index (BMI) of 35 0 to 39 9       Subjective/Objective     Chief Complaint    Follow-up for NPH  HPI    Pleasant 81-year-old gentleman being seen in follow-up for NPH  He is accompanied by his wife  They have noticed some decline in his gait over the past year or so    He is now walking with a walker and this seems to improve his symptoms overall  He has difficulties initiating movement and turning but seems to have a reasonably normal gait when walking in a straight line  He does describe some increased incoordination in his hands  He denies any headaches, nausea or vomiting or change in vision  He denies any difficulties with the shunt itself  ROS  KATIE personally reviewed and updated  Review of Systems   HENT: Negative for tinnitus  Eyes: Negative for visual disturbance  Respiratory: Negative for shortness of breath  Cardiovascular: Negative for chest pain  Gastrointestinal: Positive for constipation  Genitourinary: Negative  Musculoskeletal: Positive for gait problem (uses a walker, worse than what it has been , shuffles his feet )  Negative for back pain and neck pain  Neurological: Positive for tremors (parkinsons ), weakness (weaker than he used to be ) and numbness (a little bit in his left arm/hand )  Negative for dizziness, seizures, light-headedness and headaches         Family History    Family History   Problem Relation Age of Onset   • Heart disease Mother         RHEUM   • Prostate cancer Father    • Cancer Maternal Uncle    • Alcohol abuse Neg Hx    • Depression Neg Hx    • Mental illness Neg Hx    • Substance Abuse Neg Hx        Social History    Social History     Socioeconomic History   • Marital status: /Civil Union     Spouse name: Not on file   • Number of children: 1   • Years of education: Not on file   • Highest education level: Not on file   Occupational History   • Occupation: PHYSICIST, [employer]   Tobacco Use   • Smoking status: Never   • Smokeless tobacco: Never   Vaping Use   • Vaping Use: Never used   Substance and Sexual Activity   • Alcohol use: No   • Drug use: No   • Sexual activity: Not Currently     Partners: Female   Other Topics Concern   • Not on file   Social History Narrative    Lives at home with wife    Retired [employer]     LIVES WITH SPOUSE     POST GRADUATE      Social Determinants of Health     Financial Resource Strain: Not on file   Food Insecurity: Not on file   Transportation Needs: Not on file   Physical Activity: Not on file   Stress: Not on file   Social Connections: Not on file   Intimate Partner Violence: Not on file   Housing Stability: Not on file       Past Medical History    Past Medical History:   Diagnosis Date   • Arthritis    • GERD (gastroesophageal reflux disease)    • Hiatal hernia    • Hypertension    • Infectious viral hepatitis    • Obesity    • Skin tag    • Spinal stenosis        Surgical History    Past Surgical History:   Procedure Laterality Date   • APPENDECTOMY  1952   • AL COLONOSCOPY FLX DX W/COLLJ SPEC WHEN PFRMD N/A 1/23/2019    Procedure: COLONOSCOPY;  Surgeon: Werner Montero MD;  Location: [facility]; Service: Gastroenterology   • AL ESOPHAGOGASTRODUODENOSCOPY TRANSORAL DIAGNOSTIC N/A 1/23/2019    Procedure: ESOPHAGOGASTRODUODENOSCOPY (EGD); Surgeon: Werner Montero MD;  Location: [facility];   Service: Gastroenterology   • VENTRICULOATRIAL SHUNT  06/2012   • VENTRICULOPERITONEAL SHUNT         Medications      Current Outpatient Medications:   •  Aspirin-Calcium Carbonate  MG TABS, Take by mouth daily, Disp: , Rfl:   •  carbidopa-levodopa (SINEMET)  mg per tablet, Take 2tabs 8am, 1p, 6pm, and 1tab before bed, Disp: 495 tablet, Rfl: 3  •  Cholecalciferol (VITAMIN D-3) 5000 units TABS, Take 1 tablet by mouth daily , Disp: , Rfl:   •  enalapril (VASOTEC) 5 mg tablet, Take half tablet by mouth once daily, Disp: 90 tablet, Rfl: 1  •  hydrochlorothiazide (HYDRODIURIL) 12 5 mg tablet, Take 1 tablet (12 5 mg total) by mouth in the morning , Disp: 90 tablet, Rfl: 1  •  ibuprofen (MOTRIN) 200 mg tablet, Take 600 mg by mouth every 6 (six) hours as needed for mild pain, Disp: , Rfl:   •  ketoconazole (NIZORAL) 2 % cream, Apply topically twice daily, Disp: 60 g, Rfl: "2  •  ketoconazole (NIZORAL) 2 % shampoo, Daily for 2 weeks straight and then on \"Mondays, Wednesdays and Fridays\":  Lather into scalp and skin on face, neck, chest, and back; leave on for 5 minutes and then rinse off completely  , Disp: 120 mL, Rfl: 2  •  Multiple Vitamin (MULTI-VITAMIN DAILY PO), Take 1 tablet by mouth daily, Disp: , Rfl:   •  pantoprazole (PROTONIX) 40 mg tablet, Take 1 tablet (40 mg total) by mouth 2 (two) times a day, Disp: 90 tablet, Rfl: 1  •  atorvastatin (LIPITOR) 20 mg tablet, Take 1 tablet (20 mg total) by mouth daily (Patient not taking: Reported on 4/5/2023), Disp: 90 tablet, Rfl: 1  •  hydrocortisone 2 5 % cream, Apply topically 2 (two) times a day (Patient not taking: Reported on 2/28/2023), Disp: 453 6 g, Rfl: 1    Allergies    No Known Allergies    The following portions of the patient's history were reviewed and updated as appropriate: allergies, current medications, past family history, past medical history, past social history, past surgical history and problem list     Investigations    I personally reviewed the NPH PT and NPH Cognitive results with the patient:     NPH scale dated 4/5/23, 13/15  PT gait assessment dated 4/3/23  10 meter walking test 0 64 m/s (<1m/s predictive of falls), TUG 2 40sec (<12 sec predictive of falls), FGA na/30 (< 24 predictive of falls)  not appropriate- not tested, requires AD for all mobility tasks     MoCA dated 4/3/23, 21/30 (<26 cognitive deficit present)  Physical Exam    Vitals:  Blood pressure 146/86, pulse 72, temperature 97 5 °F (36 4 °C), temperature source Temporal, height 5' 9\" (1 753 m), SpO2 97 %  ,Body mass index is 32 34 kg/m²  Physical Exam  Vitals reviewed  Constitutional:       General: He is not in acute distress  Eyes:      Extraocular Movements: Extraocular movements intact  Pulmonary:      Effort: Pulmonary effort is normal  No respiratory distress  Abdominal:      General: There is no distension        " Tenderness: There is no abdominal tenderness  Skin:     General: Skin is warm and dry  Comments: Right parietal shunt depresses and refills easily  Neurological:      Mental Status: He is alert and oriented to person, place, and time  Motor: No weakness  Gait: Gait abnormal       Comments: Stands from seated position slowly  Grossly full power in upper and lower extremities  Difficulties initiating walking  Walks with short shuffling steps with gradually improves as he ambulates with a walker  En bloc turn and numerous small shuffling steps  Psychiatric:         Mood and Affect: Mood normal          Behavior: Behavior normal        Neurologic Exam     Mental Status   Oriented to person, place, and time

## 2023-04-24 ENCOUNTER — OFFICE VISIT (OUTPATIENT)
Dept: PHYSICAL THERAPY | Facility: CLINIC | Age: 83
End: 2023-04-24

## 2023-04-24 DIAGNOSIS — Z74.09 IMPAIRED FUNCTIONAL MOBILITY, BALANCE, GAIT, AND ENDURANCE: Primary | ICD-10-CM

## 2023-04-24 DIAGNOSIS — G20 PARKINSON'S DISEASE (HCC): ICD-10-CM

## 2023-04-24 NOTE — PROGRESS NOTES
Daily Note     Today's date: 2023  Patient name: Augusto Ortiz  : 1940  MRN: [id number]  Referring provider: Shanta Tabor PA-C  Dx:   Encounter Diagnosis     ICD-10-CM    1  Impaired functional mobility, balance, gait, and endurance  Z74 09       2  Parkinson's disease ([facility] [address] )  G20                      Subjective: Laurel Man states he hasn't been feeling well, states he has been having some nausea  He reports that he did not sleep well last night  Objective: See treatment diary below      Assessment: Tolerated treatment well  Patient demonstrated fatigue post treatment, exhibited good technique with therapeutic exercises and would benefit from continued PT  Able to resume TM ambulation during session, with some improvement with cues to decrease scuffing of feet  Difficulty with external cue of physioball on TM, with Serban able to kick ball, however then stepping foot next to other foot instead of stepping forward  Continues to require cues for weight shifting and cues to decrease freezing  Soccer ball activity added to facilitate weight shifting, single limb support, and decrease freezing  Continue to progress as tolerated  Plan: Continue per POC       Precautions: HTN, falls, NPH      Manuals 4/3 4/10 4/17 4/24                               Neuro Re-Ed TUG      HKM  // bars (long) x 3 laps, marching cues to turn around    Marching with BUE support in // bars with pool noodle at knees for external cuing 2 x 10 reps // bars (long) 4 laps with pool noodle for external cues     Cues for increased march with turning around at end of bars // bars (long) x 3 laps with pool noodle for external cues for increased step height    Cues for marching with turning around at end of bars   Clock turns       backwards       hurdles       Sidesteps with big arms   Sidesteps in // bars (long) x 2 laps with therapist holding sparring pad with external cue to kick to side  Sidesteps in // bars (long) x 2 laps with stepping over pool noodle with leading leg for increased step length   BIG walking       Targeted stepping/       HKM       Step up       Figure 8 around cones       Standing balance    SOLO  With poles at bilateral UE's, soccer ball stop and kick, x 20 reps    Standing soccer ball toe taps x 10 with bilateral poles, x 10 reps without UE support/L HHA   Ther Ex 5xSTS 32 8 seconds, cues for decreased retropulsion    6MWT with rollator, 200 feet      Sit to stand from mat table no UE's  Sit to stand with UE support x 10 reps Sit to stand with UE support x 10 reps    nustep  L5 for 10 minutes L6 for 8 minutes           Ther Activity       Car transfer              Gait Training       rollator amb with rollator to/from waiting room with CGA and cues for decreasing freezing episodes amb with rollator in hallway x 3 laps with CGA amb with rollator in hallway x 3 laps with CGA for turns, cues for weight shifting    Ambulate x 1/2 lap in hallway with cues for march to increase step height turning turning and freezing    TB tied across rollator handle for external cue to lift knees up and larger step on turning and freezing, x 1 5 laps    hallway       staircase       TM with SOLO  SOLO TM with BUE support, 5% incline 1  4mph x 5 minutes  Cues for increased step length and step height  SOLO  TM with BUE support, no incline  1 4mph for 4 minutes, increased to 1  6mph for 10 minutes total    TM with gray PB in front for external cue for 2 5 minutes, added 5% incline for 1 5 minutes     cues for increased step length and height   Modalities       Education Outcome measures, techniques for freezing, safety with turning and home safety, fall risk, decline in functional outcomes, resuming therapy and importance of activity, Parkinson's and NPH symptoms Techniques for decreasing freezing, Parkinson's specific education and increased amplitude of movement  Technique for decreasing freezing, weight shifting

## 2023-04-25 ENCOUNTER — TELEPHONE (OUTPATIENT)
Dept: INTERNAL MEDICINE CLINIC | Facility: CLINIC | Age: 83
End: 2023-04-25

## 2023-04-25 NOTE — TELEPHONE ENCOUNTER
PT suggested continued use of treadmill would benefit patient  They would like to purchase a small treadmill for home use  Asking if this could be ordered through a medical supply company? They've used [employer] on [facility] in the past for his rollator

## 2023-04-25 NOTE — TELEPHONE ENCOUNTER
We can order this but I can only do it after his office visit  Insurance will need documentation for medical necessity  Please keep scheduled appointment with me in May  If you want something sooner, can check Marline's schedule

## 2023-05-01 ENCOUNTER — OFFICE VISIT (OUTPATIENT)
Dept: PHYSICAL THERAPY | Facility: CLINIC | Age: 83
End: 2023-05-01

## 2023-05-01 DIAGNOSIS — Z74.09 IMPAIRED FUNCTIONAL MOBILITY, BALANCE, GAIT, AND ENDURANCE: Primary | ICD-10-CM

## 2023-05-01 DIAGNOSIS — G20 PARKINSON'S DISEASE (HCC): ICD-10-CM

## 2023-05-01 NOTE — PROGRESS NOTES
Progress Note     Today's date: 2023  Patient name: Poncho Kessler  : 1940  MRN: [id number]  Referring provider: Marzena Polk PA-C  Dx:   Encounter Diagnosis     ICD-10-CM    1  Impaired functional mobility, balance, gait, and endurance  Z74 [id number]       2  Parkinson's disease ([facility] [address] )  G20                    Assessment  23: Tamika See has attended 4 sessions of physical therapy since resuming therapy in April  He has been limited to attending therapy 1x/week due to scheduling and availability issues  He is making good progress with therapy at this time and has met 4/4 short term goals at this time  He has demonstrated significant improvements in all outcome measures  5xSTS (with UE's) improved from 32 8 seconds to 24 6 seconds, TUG improved from 2 min 40 seconds to 1 min 44 seconds (best time was 58 4 seconds) with rollator  Self-selected gait speed improved significantly from 0 64m/s to 0 83m/s with rollator  Tamika See continues to have significant difficulty with turning and freezing, improving slightly with cues for techniques, however continues to have difficulty with self-cuing for amplitude of movements  6MWT improved from 200 feet to 430 feet since resuming therapy  Discussed increasing therapy to 2x/week for POC  Tamika Even would benefit from continued physical therapy to decrease fall risk, improve functional mobility, improve endurance, decrease episodes of freezing, improve balance, improve LE strength and endurance, decrease caregiver burden, slow disease process, and maximize function  4/3/23: Tamika See returns to physical therapy after being placed on hold due to increase in Parkinson's medication and dizziness/ low blood pressure  He returns to therapy after 6 weeks  At last progress note in 2023, he had reached a plateau and had discussed transitioning to maintenance program due to high risk of falls, difficulty with self-cuing, and cognitive deficits   He also presents to physical therapy for evaluation prior to appointment at [facility] clinic on 4/5/23  He has had significant decline since last progress note, with significant changes to 5xSTS, TUG time, 6MWT, and gait speed  He is demonstrating significant freezing and difficulty with turning  He is at increased risk of falls from all outcome measures and would benefit from initiating restorative therapy program 2x/week due to decline in the last 6 weeks  Discussed resuming formal therapy 2x/week for 8 weeks at this time to improve functional mobility, decrease episodes of freezing, decrease risk of falls, improve balance, improve LE strength and endurance, decrease caregiver burden, slow disease process, and maximize function  2/20/23: Ju Talbot has attended 26 sessions of physical therapy for Parkinson's Disease and NPH  He continues to have significant difficulty with freezing, weight shifting, turning, and high distractibility  He has made significant gains since starting therapy with endurance, gait speed, turning, LE strength and endurance, and safety  Patient reports he pulled a muscle in his hip/back a few days ago and was not moving around much  Since last progress note, Ju Talbot appears to have plateaued with progress, and had slight decline in outcome measures compared to last progress note, most likely due to inactivity in last few days  Based on Ulises's medical history significant for Parkinson's Disease, relatively sedentary lifestyle at home, and decreased supervision during the day with decreased cuing and significant difficulty with self cuing due to distractability and cognitive deficits, maintenance programming would be an important opportunity to provide him with the guidance and equipment necessary to facilitate a safe and effective environment for exercise so that he is able to maintain his current level of function with a reduced risk for falls   He has attended restorative therapy with prior episode and demonstrated significant decline in function without structured maintenance programming  His cognitive deficits continue to progressively limit him from safely using and operating exercise equipment such as stairs and treadmill, and he requires the skilled supervision and facilitation from the physical therapist to effectively exercise with safe and correct form to decrease freezing, decrease shuffling, and increase amplitude of movement  For these reasons, Estephania Bonilla will continue to benefit from skilled physical therapy maintenance programming to continue to maintain his current level of function and reduced risk for falls in light of his developing and progressive Parkinson's Disease  Plan to initiate skilled maintenance program 1x/week with eventual transition into community program, with planning to have wife attend sessions to learn effective cuing techniques  Assessment details: Patient presents to outpatient physical therapy for Parkinson's Disease and history of NPH with shunt  He recently completed [facility] program at [facility]  He presents with impaired balance, impaired gait, freezing of gait, difficulty turning, increased risk of falls, bradykinesia, and impaired functional mobility  Compared to previous episode of care 10/2021, he has had some decline in status and is now utilizing rollator during most ambulatory tasks  He is demonstrating impaired LE strength and endurance from 5xSTS of 17 03 (utilizing UE's, unable to complete without UE's), which is also above cut-off score for increased risk of falls at 15 seconds  TUG time also demonstrates increased risk of falls at 47 10 seconds (with rollator), significantly above cut-off score of 13 5 seconds for increased fall risk, as well as significantly above his prior functional status at 24 2 seconds  Self-selected gait speed is also below cut-off score for increased fall risk at 0 77m/s (with rollator), as well as placing him in a limited community ambulator category  Shelly Gregory would benefit from skilled physical therapy to decrease falls, improve balance, improve functional mobility, decrease freezing episodes and festinating gait pattern, improve LE strength and endurance, improve independence with mobility, and maximize functioning  Impairments: abnormal coordination, abnormal gait, activity intolerance, impaired balance, lacks appropriate home exercise program and safety issue  Understanding of Dx/Px/POC: good   Prognosis: good    Goals      NEW GOALS  ST  Pt will improve gait speed to at least 0 75m/s with rollator in 4 weeks to decrease fall risk and improve mobility MET  2  Pt will improve TUG time to at least under 2 minutes with rollator in 4 weeks to decrease fall risk, decrease freezing, and improve mobility MET  3  Pt will improve 5xSTS to at least 25 seconds in 4 weeks to improve mobility and transfers and improve independence MET  4  Pt will improve 6MWT to at least 350 feet in 4 weeks to improve functional mobility and ability to ambulate in community MET    LT  Pt will improve gait speed to at least 0 85m/s with rollator in 8 weeks to decrease fall risk and improve mobility  2  Pt will improve TUG time to at least 1 min 30 seconds with rollator in 8 weeks to decrease fall risk, decrease freezing, and improve mobility  3  Pt will improve 5xSTS to at least 20 seconds in 8 weeks to improve mobility and transfers and improve independence  4   Pt will improve 6MWT to at least 500 feet in 8 weeks to improve functional mobility       Plan  Patient would benefit from: skilled maintenance physical therapy  Planned therapy interventions: balance, neuromuscular re-education, balance/weight bearing training, patient education, strengthening, stretching, therapeutic activities, therapeutic exercise, flexibility, functional ROM exercises, gait training, home exercise program and abdominal trunk stabilization  Frequency: 2x week  Duration in weeks: 8  Plan of Care "beginning date: 4/3/2023  Plan of Care expiration date: 5/29/2023  Treatment plan discussed with: patient         Subjective Evaluation    History of Present Illness  5/1/23: Nemo Morse states he has been doing a little better today  He reports he almost fell but was able to catch himself at the end  He reports that he feels he does better with therapy the next day  Ulises's wife Leidy Galvin states that he is now taking double the amount of sinemet during the day  4/3/23: Patient and wife states that he has been freezing more the last 2 weeks  Slipped 2 weeks ago getting out of bed, no falls  HE states he is only walking 1/8th of a mile  Increased sinemet in February but only in the morning and has not noticed much of a difference  2/20/23: Patient reports that he pulled something on his L side a few days ago  Did not do much exercise lately  States that therapy has been helping him   He reports that he feels scared he could fall backwards, but uses his rollator  Continues to have difficulty with turning and sitting  Patient's wife states that they have a community gym that has an indoor track and bikes, but he does not use it  States that he has caregivers with him, but they are not trained to help him with exercises  Mechanism of injury: Patient reports he fell backwards down the stairs last year  He states he stopped coming to therapy because \"I was in bad shape\"  He states he was going to [facility] for therapy during the summer, stopped in the beginning of October, difficulty with scheduling  Was going 2x/week for therapy, states they were having staffing issues  He states that he has had some setbacks, he states that his cat caught a chipmunk and needed to catch it, needed to go release it, states that he had a hard time walking back inside without AD  He states that he was going to start the [employer], but did not get to it because of scheduling issues       He states that his wife " wants him to continue therapy  He reports that he has trouble getting started, turning (especially sharp turns)  Has been using the rollator since March  States he always uses the rollator, except if he forgets to use it  He reports that he has completed the [facility] Big program     Has a stationary bike at home, 2x/day 10 minutes at a time  Pain  No pain reported    Social Support  Steps to enter house: yes  2  Stairs in house: yes (3 floors)   Lives in: [place] house (and basement)  Lives with: spouse (3 cats)    Treatments  Previous treatment: physical therapy        Objective       Manual Muscle Testing - Hip Left Right   Flexion 5 5   Abduction (seated) 4+ 4+   Adduction (seated) 4+ 4+     Manual Muscle Testing - Knee Left Right   Flexion 4 4+   Extension 5 5     Manual Muscle Testing - Ankle Left Right   Doriflexion 5 5   Plantarflexion NT NT         Coordination Left Right   Heel To Cruz NT NT   Finger To Nose NT NT   Rapid Alternating Supination impaired intact   Alt toe tapping impaired intact         Outcome Measures  4/3 5/1   5x Sit to Stand: 32 87 seconds with UE's 24 6 seconds with UE's   TUG:     Manual (holding cup water)     Cog (counting back 2s) 2 minutes 40 seconds with rollator 58 4 seconds with rollator, 2nd trial 1:44 with rollator   Gait Speed:  0 64m/s with rollator 0 83m/s with rollator   6 Minute Walk Test: 200 feet with rollator 430 feet with rollator     Gait Assessment: ambulates with rollator, decrease gait speed, decreased heel strike bilaterally, difficulty initiating gait and significant shuffling/festination with turning, difficulty with weight shift  Decreased safety awareness with turning to sit in chair, not turning fully before sitting               Precautions: HTN, falls, NPH      Manuals 4/10 4/17 4/24 5/1                               Neuro Re-Ed       HKM // bars (long) x 3 laps, marching cues to turn around    Marching with BUE support in // bars with pool noodle at knees for external cuing 2 x 10 reps // bars (long) 4 laps with pool noodle for external cues     Cues for increased march with turning around at end of bars // bars (long) x 3 laps with pool noodle for external cues for increased step height    Cues for marching with turning around at end of bars    Clock turns       backwards       hurdles       Sidesteps with big arms  Sidesteps in // bars (long) x 2 laps with therapist holding sparring pad with external cue to kick to side  Sidesteps in // bars (long) x 2 laps with stepping over pool noodle with leading leg for increased step length    BIG walking       Targeted stepping/       HKM       Step up       Figure 8 around cones       Standing balance   SOLO  With poles at bilateral UE's, soccer ball stop and kick, x 20 reps    Standing soccer ball toe taps x 10 with bilateral poles, x 10 reps without UE support/L HHA    Ther Ex       Sit to stand from mat table no UE's Sit to stand with UE support x 10 reps Sit to stand with UE support x 10 reps  5xSTS x 2 reps   nustep L5 for 10 minutes L6 for 8 minutes            Ther Activity       Car transfer              Gait Training       rollator amb with rollator in hallway x 3 laps with CGA amb with rollator in hallway x 3 laps with CGA for turns, cues for weight shifting    Ambulate x 1/2 lap in hallway with cues for march to increase step height turning turning and freezing    TB tied across rollator handle for external cue to lift knees up and larger step on turning and freezing, x 1 5 laps     hallway    6MWT with rollator 430 feet, cues for increased step height with turning    TUG with rollator x 2 reps    amb with rollator x 2 laps with cues for increased march with turning   staircase       TM with SOLO SOLO TM with BUE support, 5% incline 1  4mph x 5 minutes  Cues for increased step length and step height  SOLO  TM with BUE support, no incline  1 4mph for 4 minutes, increased to 1  6mph for 10 minutes total    TM with gray PB in front for external cue for 2 5 minutes, added 5% incline for 1 5 minutes     cues for increased step length and height SOLO  TM with BUE support, no incline  1 5mph for 5 minutes, up to 1  8mph for 3 5 minutes  1  5mph for 1 5 minutes  10 minutes total   Modalities       Education Techniques for decreasing freezing, Parkinson's specific education and increased amplitude of movement  Technique for decreasing freezing, weight shifting Safety with turning and transfers, Parkinson's specific education, increase amplitude of movement, techniques for decreasing freezing

## 2023-05-02 ENCOUNTER — APPOINTMENT (EMERGENCY)
Dept: CT IMAGING | Facility: HOSPITAL | Age: 83
End: 2023-05-02

## 2023-05-02 ENCOUNTER — HOSPITAL ENCOUNTER (INPATIENT)
Facility: HOSPITAL | Age: 83
LOS: 8 days | Discharge: NURSING, REHAB OR LONG-TERM CARE | End: 2023-05-10
Attending: EMERGENCY MEDICINE | Admitting: SURGERY
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
## 2023-05-02 DIAGNOSIS — N50.1 PELVIC HEMATOMA IN MALE: ICD-10-CM

## 2023-05-02 DIAGNOSIS — G20 PARKINSONISM, UNSPECIFIED PARKINSONISM TYPE (HCC): ICD-10-CM

## 2023-05-02 DIAGNOSIS — S22.009A CLOSED FRACTURE OF TRANSVERSE PROCESS OF THORACIC VERTEBRA, INITIAL ENCOUNTER (HCC): ICD-10-CM

## 2023-05-02 DIAGNOSIS — S22.41XA CLOSED FRACTURE OF MULTIPLE RIBS OF RIGHT SIDE, INITIAL ENCOUNTER: Primary | ICD-10-CM

## 2023-05-02 PROBLEM — S22.41XD CLOSED FRACTURE OF MULTIPLE RIBS OF RIGHT SIDE WITH ROUTINE HEALING: Status: ACTIVE | Noted: 2023-05-02

## 2023-05-02 LAB
ABO GROUP BLD: NORMAL
ALBUMIN SERPL BCP-MCNC: 4.2 G/DL (ref 3.5–5)
ALP SERPL-CCNC: 75 U/L (ref 34–104)
ALT SERPL W P-5'-P-CCNC: 41 U/L (ref 7–52)
ANION GAP SERPL CALCULATED.3IONS-SCNC: 10 MMOL/L (ref 4–13)
AST SERPL W P-5'-P-CCNC: 39 U/L (ref 13–39)
BACTERIA UR QL AUTO: ABNORMAL /HPF
BASOPHILS # BLD AUTO: 0.02 THOUSANDS/ÂΜL (ref 0–0.1)
BASOPHILS NFR BLD AUTO: 0 % (ref 0–1)
BILIRUB SERPL-MCNC: 1.07 MG/DL (ref 0.2–1)
BILIRUB UR QL STRIP: NEGATIVE
BLD GP AB SCN SERPL QL: NEGATIVE
BUN SERPL-MCNC: 21 MG/DL (ref 5–25)
CALCIUM SERPL-MCNC: 9.8 MG/DL (ref 8.4–10.2)
CHLORIDE SERPL-SCNC: 103 MMOL/L (ref 96–108)
CLARITY UR: CLEAR
CO2 SERPL-SCNC: 26 MMOL/L (ref 21–32)
COLOR UR: ABNORMAL
CREAT SERPL-MCNC: 1.05 MG/DL (ref 0.6–1.3)
EOSINOPHIL # BLD AUTO: 0.01 THOUSAND/ÂΜL (ref 0–0.61)
EOSINOPHIL NFR BLD AUTO: 0 % (ref 0–6)
ERYTHROCYTE [DISTWIDTH] IN BLOOD BY AUTOMATED COUNT: 12.7 % (ref 11.6–15.1)
GFR SERPL CREATININE-BSD FRML MDRD: 66 ML/MIN/1.73SQ M
GLUCOSE SERPL-MCNC: 115 MG/DL (ref 65–140)
GLUCOSE UR STRIP-MCNC: NEGATIVE MG/DL
HCT VFR BLD AUTO: 39.5 % (ref 36.5–49.3)
HCT VFR BLD AUTO: 43.6 % (ref 36.5–49.3)
HGB BLD-MCNC: 13.2 G/DL (ref 12–17)
HGB BLD-MCNC: 13.3 G/DL (ref 12–17)
HGB BLD-MCNC: 14.1 G/DL (ref 12–17)
HGB UR QL STRIP.AUTO: ABNORMAL
IMM GRANULOCYTES # BLD AUTO: 0.03 THOUSAND/UL (ref 0–0.2)
IMM GRANULOCYTES NFR BLD AUTO: 0 % (ref 0–2)
KETONES UR STRIP-MCNC: ABNORMAL MG/DL
LEUKOCYTE ESTERASE UR QL STRIP: NEGATIVE
LYMPHOCYTES # BLD AUTO: 0.86 THOUSANDS/ÂΜL (ref 0.6–4.47)
LYMPHOCYTES NFR BLD AUTO: 9 % (ref 14–44)
MCH RBC QN AUTO: 29 PG (ref 26.8–34.3)
MCHC RBC AUTO-ENTMCNC: 32.3 G/DL (ref 31.4–37.4)
MCV RBC AUTO: 90 FL (ref 82–98)
MONOCYTES # BLD AUTO: 0.83 THOUSAND/ÂΜL (ref 0.17–1.22)
MONOCYTES NFR BLD AUTO: 9 % (ref 4–12)
MUCOUS THREADS UR QL AUTO: ABNORMAL
NEUTROPHILS # BLD AUTO: 7.83 THOUSANDS/ÂΜL (ref 1.85–7.62)
NEUTS SEG NFR BLD AUTO: 82 % (ref 43–75)
NITRITE UR QL STRIP: NEGATIVE
NON-SQ EPI CELLS URNS QL MICRO: ABNORMAL /HPF
NRBC BLD AUTO-RTO: 0 /100 WBCS
PH UR STRIP.AUTO: 6.5 [PH]
PLATELET # BLD AUTO: 258 THOUSANDS/UL (ref 149–390)
PMV BLD AUTO: 10.2 FL (ref 8.9–12.7)
POTASSIUM SERPL-SCNC: 3.8 MMOL/L (ref 3.5–5.3)
PROT SERPL-MCNC: 7.3 G/DL (ref 6.4–8.4)
PROT UR STRIP-MCNC: ABNORMAL MG/DL
RBC # BLD AUTO: 4.87 MILLION/UL (ref 3.88–5.62)
RBC #/AREA URNS AUTO: ABNORMAL /HPF
RH BLD: NEGATIVE
SODIUM SERPL-SCNC: 139 MMOL/L (ref 135–147)
SP GR UR STRIP.AUTO: >=1.05 (ref 1–1.03)
SPECIMEN EXPIRATION DATE: NORMAL
UROBILINOGEN UR STRIP-ACNC: <2 MG/DL
WBC # BLD AUTO: 9.58 THOUSAND/UL (ref 4.31–10.16)
WBC #/AREA URNS AUTO: ABNORMAL /HPF

## 2023-05-02 RX ORDER — ACETAMINOPHEN 325 MG/1
975 TABLET ORAL EVERY 8 HOURS SCHEDULED
Status: DISCONTINUED | OUTPATIENT
Start: 2023-05-02 | End: 2023-05-05

## 2023-05-02 RX ORDER — OXYCODONE HYDROCHLORIDE 5 MG/1
5 TABLET ORAL EVERY 4 HOURS PRN
Status: DISCONTINUED | OUTPATIENT
Start: 2023-05-02 | End: 2023-05-03

## 2023-05-02 RX ORDER — SENNOSIDES 8.6 MG
2 TABLET ORAL DAILY
Status: DISCONTINUED | OUTPATIENT
Start: 2023-05-02 | End: 2023-05-05

## 2023-05-02 RX ORDER — ACETAMINOPHEN 325 MG/1
650 TABLET ORAL EVERY 4 HOURS PRN
Status: DISCONTINUED | OUTPATIENT
Start: 2023-05-02 | End: 2023-05-02

## 2023-05-02 RX ORDER — METHOCARBAMOL 500 MG/1
500 TABLET, FILM COATED ORAL EVERY 6 HOURS SCHEDULED
Status: DISCONTINUED | OUTPATIENT
Start: 2023-05-02 | End: 2023-05-07

## 2023-05-02 RX ORDER — ONDANSETRON 2 MG/ML
4 INJECTION INTRAMUSCULAR; INTRAVENOUS EVERY 6 HOURS PRN
Status: DISCONTINUED | OUTPATIENT
Start: 2023-05-02 | End: 2023-05-10 | Stop reason: HOSPADM

## 2023-05-02 RX ORDER — LISINOPRIL 5 MG/1
5 TABLET ORAL DAILY
Status: DISCONTINUED | OUTPATIENT
Start: 2023-05-02 | End: 2023-05-02

## 2023-05-02 RX ORDER — PANTOPRAZOLE SODIUM 40 MG/1
40 TABLET, DELAYED RELEASE ORAL 2 TIMES DAILY
Status: DISCONTINUED | OUTPATIENT
Start: 2023-05-02 | End: 2023-05-10 | Stop reason: HOSPADM

## 2023-05-02 RX ORDER — POLYETHYLENE GLYCOL 3350 17 G/17G
17 POWDER, FOR SOLUTION ORAL DAILY PRN
Status: DISCONTINUED | OUTPATIENT
Start: 2023-05-02 | End: 2023-05-03

## 2023-05-02 RX ORDER — ENOXAPARIN SODIUM 100 MG/ML
30 INJECTION SUBCUTANEOUS EVERY 12 HOURS
Status: DISCONTINUED | OUTPATIENT
Start: 2023-05-02 | End: 2023-05-09

## 2023-05-02 RX ORDER — MELATONIN
1000 DAILY
Status: DISCONTINUED | OUTPATIENT
Start: 2023-05-02 | End: 2023-05-10 | Stop reason: HOSPADM

## 2023-05-02 RX ORDER — MORPHINE SULFATE 4 MG/ML
4 INJECTION, SOLUTION INTRAMUSCULAR; INTRAVENOUS ONCE
Status: COMPLETED | OUTPATIENT
Start: 2023-05-02 | End: 2023-05-02

## 2023-05-02 RX ORDER — MAGNESIUM HYDROXIDE/ALUMINUM HYDROXICE/SIMETHICONE 120; 1200; 1200 MG/30ML; MG/30ML; MG/30ML
30 SUSPENSION ORAL ONCE
Status: COMPLETED | OUTPATIENT
Start: 2023-05-02 | End: 2023-05-02

## 2023-05-02 RX ORDER — HYDROCHLOROTHIAZIDE 12.5 MG/1
12.5 TABLET ORAL DAILY
Status: DISCONTINUED | OUTPATIENT
Start: 2023-05-02 | End: 2023-05-10 | Stop reason: HOSPADM

## 2023-05-02 RX ORDER — HYDROMORPHONE HCL IN WATER/PF 6 MG/30 ML
0.2 PATIENT CONTROLLED ANALGESIA SYRINGE INTRAVENOUS EVERY 4 HOURS PRN
Status: DISCONTINUED | OUTPATIENT
Start: 2023-05-02 | End: 2023-05-03

## 2023-05-02 RX ADMIN — ACETAMINOPHEN 975 MG: 325 TABLET ORAL at 21:03

## 2023-05-02 RX ADMIN — MORPHINE SULFATE 4 MG: 4 INJECTION INTRAVENOUS at 09:08

## 2023-05-02 RX ADMIN — CARBIDOPA AND LEVODOPA 1 TABLET: 25; 100 TABLET ORAL at 21:03

## 2023-05-02 RX ADMIN — ACETAMINOPHEN 975 MG: 325 TABLET ORAL at 14:27

## 2023-05-02 RX ADMIN — ALUMINUM HYDROXIDE, MAGNESIUM HYDROXIDE, AND DIMETHICONE 30 ML: 200; 20; 200 SUSPENSION ORAL at 17:41

## 2023-05-02 RX ADMIN — Medication 1000 UNITS: at 14:28

## 2023-05-02 RX ADMIN — PANTOPRAZOLE SODIUM 40 MG: 40 TABLET, DELAYED RELEASE ORAL at 21:03

## 2023-05-02 RX ADMIN — SENNOSIDES 17.2 MG: 8.6 TABLET, FILM COATED ORAL at 14:27

## 2023-05-02 RX ADMIN — HYDROCHLOROTHIAZIDE 12.5 MG: 12.5 TABLET ORAL at 14:28

## 2023-05-02 RX ADMIN — CARBIDOPA AND LEVODOPA 2 TABLET: 25; 100 TABLET ORAL at 18:05

## 2023-05-02 RX ADMIN — PANTOPRAZOLE SODIUM 40 MG: 40 TABLET, DELAYED RELEASE ORAL at 14:28

## 2023-05-02 RX ADMIN — CARBIDOPA AND LEVODOPA 2 TABLET: 25; 100 TABLET ORAL at 09:23

## 2023-05-02 RX ADMIN — METHOCARBAMOL TABLETS 500 MG: 500 TABLET, COATED ORAL at 14:28

## 2023-05-02 RX ADMIN — METHOCARBAMOL TABLETS 500 MG: 500 TABLET, COATED ORAL at 18:05

## 2023-05-02 RX ADMIN — CARBIDOPA AND LEVODOPA 2 TABLET: 25; 100 TABLET ORAL at 14:27

## 2023-05-02 RX ADMIN — IOHEXOL 100 ML: 350 INJECTION, SOLUTION INTRAVENOUS at 09:58

## 2023-05-02 NOTE — H&P
Luis  H&P  Name: Moiz Villar 82 y o  male I MRN: [id number]  Unit/Bed#: S -01 I Date of Admission: 5/2/2023   Date of Service: 5/2/2023 I Hospital Day: 0      Assessment/Plan   Fall (on) (from) other stairs and steps, initial encounter  Assessment & Plan  · Patient was reportedly using rollator walker after physical therapy and fell down several stairs  · Patient did not strike his head, did not lose consciousness, and does not take blood thinners  · Injuries noted below  · He reports pain in his right anterior thoracic wall and right upper back  · Injuries noted below  · Patient with Parkinson's disease and difficulty with ambulation at baseline  · PT/OT eval and treat  · Case management for disposition planning  · Fall precautions    Male pelvic hematoma  Assessment & Plan  · S/p fall down stairs  · Right lateral pelvic deep subcutaneous hematoma overlying the tensor fascia rodolfo measures approximately 4 6 x 2 4 x 9 5 cm  · Minimal tenderness on exam  · Initial hemoglobin 14 1, repeat hemoglobin 6 hours post initial draw  · Consider binder if hemoglobin drops or hematoma becomes more symptomatic  · Pain control    Fracture of transverse process of thoracic vertebra, closed, initial encounter ([facility] )  Assessment & Plan  · S/p fall down stairs  · Acute fracture of the right T7-T10 transverse processes with minimal displacement at T9 and T10  · Multimodal pain control, consult acute pain services as above    Closed fracture of multiple ribs of right side  Assessment & Plan  · s/p fall down stairs   · Acute segmental fractures of the right 5th-10th ribs with minimal displacement of the posterior medial component of the right 7th through 10th ribs  · Trace pleural effusion also noted  · Admit SD2 for HOT protocol  · Multimodal pain control, consult acute pain service, appreciate recs  · IS    Parkinsonism ([facility] )  Assessment & Plan  · Continue home regimen of carvidopa-levadopa  · PT/OT eval and treat    Essential hypertension  Assessment & Plan  · Monitor vital signs  · Continue home meds, hydrochlorothiazide and enalapril      Trauma Alert: Evaluation; trauma team notified at [phone] via text ; patient eval at 1200  Model of Arrival: Ambulance    Trauma Team: Attending No Sánchez, Fellow Dom and ASHLEY Lugo  Consultants:     None     History of Present Illness     Chief Complaint: Chest wall pain  Mechanism:Fall     HPI:    Renetta Gaston is a 82 y o  male with Parkinson's disease who presents to the ED after fall downstairs this morning  Patient was reportedly using his rollator walker when he had a mechanical fall down several stairs  Patient did not strike his head, did not lose consciousness, and is not on blood thinners  He complains of anterior chest wall pain and back pain both on the right side of his upper chest and lower back  Patient was reportedly at physical therapy this morning which she attends frequently due to difficulty with ambulation associated with Parkinson's disease  Patient has otherwise been well and denies any symptoms of illness prior to injury  Review of Systems   Constitutional: Negative for activity change, fatigue and fever  Eyes: Negative for discharge and visual disturbance  Respiratory: Negative for chest tightness, shortness of breath and wheezing  Cardiovascular: Positive for chest pain  Negative for palpitations  Gastrointestinal: Positive for constipation (Chronic)  Negative for abdominal pain, nausea and vomiting  Genitourinary: Negative for difficulty urinating and dysuria  Musculoskeletal: Positive for back pain and gait problem (Chronic)  Negative for neck stiffness  Skin: Negative for rash and wound  Neurological: Negative for dizziness, seizures, syncope, light-headedness, numbness and headaches  12-point, complete review of systems was reviewed and negative except as stated above  Historical Information     Past Medical History:   Diagnosis Date   • Arthritis    • GERD (gastroesophageal reflux disease)    • Hiatal hernia    • Hypertension    • Infectious viral hepatitis    • Obesity    • Skin tag    • Spinal stenosis      Past Surgical History:   Procedure Laterality Date   • APPENDECTOMY  1952   • NM COLONOSCOPY FLX DX W/COLLJ SPEC WHEN PFRMD N/A 1/23/2019    Procedure: COLONOSCOPY;  Surgeon: Saeid Bangura MD;  Location: [facility]; Service: Gastroenterology   • NM ESOPHAGOGASTRODUODENOSCOPY TRANSORAL DIAGNOSTIC N/A 1/23/2019    Procedure: ESOPHAGOGASTRODUODENOSCOPY (EGD); Surgeon: Saeid Bangura MD;  Location: [facility]; Service: Gastroenterology   • VENTRICULOATRIAL SHUNT  06/2012   • VENTRICULOPERITONEAL SHUNT          Social History     Tobacco Use   • Smoking status: Never   • Smokeless tobacco: Never   Vaping Use   • Vaping Use: Never used   Substance Use Topics   • Alcohol use: No   • Drug use: No     Immunization History   Administered Date(s) Administered   • COVID-19 MODERNA VACC 0 5 ML IM 01/23/2021, 02/20/2021   • INFLUENZA 09/19/2017, 10/01/2018   • Influenza Split High Dose Preservative Free IM 10/10/2019   • Influenza, high dose seasonal 0 7 mL 08/24/2020, 10/25/2021, 10/08/2022   • Pneumococcal Polysaccharide PPV23 10/16/2013, 10/01/2018   • Td (adult), adsorbed 09/07/2006     Last Tetanus: September 2006  Family History: Non-contributory    1  Before the illness or injury that brought you to the Emergency, did you need someone to help you on a regular basis? 1=Yes   2  Since the illness or injury that brought you to the Emergency, have you needed more help than usual to take care of yourself? 1=Yes   3  Have you been hospitalized for one or more nights during the past 6 months (excluding a stay in the Emergency Department)? 0=No   4  In general, do you see well? 0=Yes   5  In general, do you have serious problems with your memory? 0=No   6   Do you take more "than three different medications everyday? 1=Yes   TOTAL   3     Did you order a geriatric consult if the score was 2 or greater?: yes     Meds/Allergies   all current active meds have been reviewed and PTA meds:   Prior to Admission Medications   Prescriptions Last Dose Informant Patient Reported? Taking? Aspirin-Calcium Carbonate  MG TABS Not Taking  Yes No   Sig: Take by mouth daily   Patient not taking: Reported on 5/2/2023   Cholecalciferol (VITAMIN D-3) 5000 units TABS 5/1/2023  Yes Yes   Sig: Take 1 tablet by mouth daily    Multiple Vitamin (MULTI-VITAMIN DAILY PO) 5/1/2023  Yes Yes   Sig: Take 1 tablet by mouth daily   atorvastatin (LIPITOR) 20 mg tablet Not Taking  No No   Sig: Take 1 tablet (20 mg total) by mouth daily   Patient not taking: Reported on 4/5/2023   carbidopa-levodopa (SINEMET)  mg per tablet 5/2/2023 at 1000  No Yes   Sig: Take 2tabs 8am, 1p, 6pm, and 1tab before bed   enalapril (VASOTEC) 5 mg tablet 5/1/2023  No Yes   Sig: Take half tablet by mouth once daily   hydrochlorothiazide (HYDRODIURIL) 12 5 mg tablet 5/1/2023  No Yes   Sig: TAKE ONE TABLET BY MOUTH EVERY MORNING   hydrocortisone 2 5 % cream Not Taking  No No   Sig: Apply topically 2 (two) times a day   Patient not taking: Reported on 2/28/2023   ibuprofen (MOTRIN) 200 mg tablet 5/2/2023 at 0800  Yes Yes   Sig: Take 600 mg by mouth every 6 (six) hours as needed for mild pain   ketoconazole (NIZORAL) 2 % cream Not Taking  No No   Sig: Apply topically twice daily   Patient not taking: Reported on 5/2/2023   ketoconazole (NIZORAL) 2 % shampoo Past Week  No Yes   Sig: Daily for 2 weeks straight and then on \"Mondays, Wednesdays and Fridays\":  Lather into scalp and skin on face, neck, chest, and back; leave on for 5 minutes and then rinse off completely     pantoprazole (PROTONIX) 40 mg tablet 5/1/2023  No Yes   Sig: Take 1 tablet (40 mg total) by mouth 2 (two) times a day      Facility-Administered Medications: None      No " Known Allergies    Objective   Initial Vitals:   Temperature: 98 1 °F (36 7 °C) (05/02/23 0821)  Pulse: 90 (05/02/23 0821)  Respirations: 18 (05/02/23 0821)  Blood Pressure: 164/77 (05/02/23 0821)    Primary Survey:   Airway:        Status: patent;        Pre-hospital Interventions: none        Hospital Interventions: none  Breathing:        Pre-hospital Interventions: none       Effort: normal       Right breath sounds: normal       Left breath sounds: normal  Circulation:        Rhythm: regular       Rate: regular   Right Pulses Left Pulses    R radial: 2+    R pedal: 2+     L radial: 2+    L pedal: 2+       Disability:        GCS: Eye: 4; Verbal: 5 Motor: 6 Total: 15       Right Pupil:       Left Pupil:     R Motor Strength L Motor Strength    R : 5/5  R dorsiflex: 5/5  R plantarflex: 5/5 L : 5/5  L dorsiflex: 5/5  L plantarflex: 5/5        Sensory:  No sensory deficit  Exposure:           Secondary Survey:  Physical Exam  Vitals reviewed  Constitutional:       General: He is not in acute distress  Appearance: Normal appearance  He is not ill-appearing or toxic-appearing  HENT:      Head: Normocephalic and atraumatic  Right Ear: External ear normal       Left Ear: External ear normal       Nose: Nose normal       Mouth/Throat:      Mouth: Mucous membranes are moist       Pharynx: Oropharynx is clear  Eyes:      General:         Right eye: No discharge  Left eye: No discharge  Extraocular Movements: Extraocular movements intact  Conjunctiva/sclera: Conjunctivae normal       Pupils: Pupils are equal, round, and reactive to light  Cardiovascular:      Rate and Rhythm: Normal rate and regular rhythm  Pulses: Normal pulses  Heart sounds: Normal heart sounds  No murmur heard  Pulmonary:      Effort: Pulmonary effort is normal  No respiratory distress  Breath sounds: Normal breath sounds  No wheezing, rhonchi or rales  Abdominal:      General: Abdomen is flat  Tenderness: There is no abdominal tenderness  There is no guarding or rebound  Musculoskeletal:         General: Tenderness (Right anterior chest wall, right paraspinal thorax, r poterior hip) present  No swelling or deformity  Normal range of motion  Cervical back: Normal range of motion  No rigidity or tenderness  Right lower leg: No edema  Left lower leg: No edema  Skin:     General: Skin is warm and dry  Capillary Refill: Capillary refill takes less than 2 seconds  Findings: No bruising or erythema  Neurological:      Mental Status: He is alert and oriented to person, place, and time  Cranial Nerves: No cranial nerve deficit  Sensory: No sensory deficit  Motor: No weakness  Invasive Devices     Peripheral Intravenous Line  Duration           Peripheral IV 05/02/23 Left Antecubital <1 day              Lab Results: Results: I have personally reviewed all pertinent laboratory/tests results  Results Reviewed     Procedure Component Value Units Date/Time    Hemoglobin [[id number]]     Lab Status: No result Specimen: Blood     Urine Microscopic [[id number]]  (Abnormal) Collected: 05/02/23 1112    Lab Status: Final result Specimen: Urine, Clean Catch Updated: 05/02/23 1132     RBC, UA Innumerable /hpf      WBC, UA 10-20 /hpf      Epithelial Cells Occasional /hpf      Bacteria, UA Occasional /hpf      MUCUS THREADS Moderate    Urine culture [[id number]] Collected: 05/02/23 1112    Lab Status:  In process Specimen: Urine, Clean Catch Updated: 05/02/23 1131    UA w Reflex to Microscopic w Reflex to Culture [[id number]]  (Abnormal) Collected: 05/02/23 1112    Lab Status: Final result Specimen: Urine, Clean Catch Updated: 05/02/23 1127     Color, UA Light Yellow     Clarity, UA Clear     Specific Gravity, UA >=1 050     pH, UA 6 5     Leukocytes, UA Negative     Nitrite, UA Negative     Protein, UA 50 (1+) mg/dl      Glucose, UA Negative mg/dl      Ketones, UA 10 (1+) mg/dl      Urobilinogen, UA <2 0 mg/dl      Bilirubin, UA Negative     Occult Blood, UA Moderate    Comprehensive metabolic panel [[id number]]  (Abnormal) Collected: 05/02/23 0907    Lab Status: Final result Specimen: Blood from Arm, Left Updated: 05/02/23 0941     Sodium 139 mmol/L      Potassium 3 8 mmol/L      Chloride 103 mmol/L      CO2 26 mmol/L      ANION GAP 10 mmol/L      BUN 21 mg/dL      Creatinine 1 05 mg/dL      Glucose 115 mg/dL      Calcium 9 8 mg/dL      AST 39 U/L      ALT 41 U/L      Alkaline Phosphatase 75 U/L      Total Protein 7 3 g/dL      Albumin 4 2 g/dL      Total Bilirubin 1 07 mg/dL      eGFR 66 ml/min/1 73sq m     Narrative:      [place] guidelines for Chronic Kidney Disease (CKD):   •  Stage 1 with normal or high GFR (GFR > 90 mL/min/1 73 square meters)  •  Stage 2 Mild CKD (GFR = 60-89 mL/min/1 73 square meters)  •  Stage 3A Moderate CKD (GFR = 45-59 mL/min/1 73 square meters)  •  Stage 3B Moderate CKD (GFR = 30-44 mL/min/1 73 square meters)  •  Stage 4 Severe CKD (GFR = 15-29 mL/min/1 73 square meters)  •  Stage 5 End Stage CKD (GFR <15 mL/min/1 73 square meters)  Note: GFR calculation is accurate only with a steady state creatinine    CBC and differential [[id number]]  (Abnormal) Collected: 05/02/23 0907    Lab Status: Final result Specimen: Blood from Arm, Left Updated: 05/02/23 0927     WBC 9 58 Thousand/uL      RBC 4 87 Million/uL      Hemoglobin 14 1 g/dL      Hematocrit 43 6 %      MCV 90 fL      MCH 29 0 pg      MCHC 32 3 g/dL      RDW 12 7 %      MPV 10 2 fL      Platelets 699 Thousands/uL      nRBC 0 /100 WBCs      Neutrophils Relative 82 %      Immat GRANS % 0 %      Lymphocytes Relative 9 %      Monocytes Relative 9 %      Eosinophils Relative 0 %      Basophils Relative 0 %      Neutrophils Absolute 7 83 Thousands/µL      Immature Grans Absolute 0 03 Thousand/uL      Lymphocytes Absolute 0 86 Thousands/µL      Monocytes Absolute 0 83 Thousand/µL Eosinophils Absolute 0 01 Thousand/µL      Basophils Absolute 0 02 Thousands/µL           Imaging Results: I have personally reviewed pertinent reports  CT chest abdomen pelvis w contrast   Final Result by Harsh Mccoy MD (05/02 1126)      CT chest:      Acute segmental fractures of the right 5th through 10th ribs with minimal displacement of the posterior medial component of the right 7th through 10th ribs  Acute fracture of the right T7-T10 transverse processes with minimal displacement at T9 and T10  Trace right pleural effusion  Minimal multilobar subsegmental atelectasis, right greater than left  No pulmonary contusion  No pneumothorax  Chronic findings and negatives as above  CT abdomen and pelvis:      Right lateral pelvic deep subcutaneous hematoma overlying the tensor fascia rodolfo measures approximately 4 6 x 2 4 x 9 5 cm  No acute fracture  No acute intraabdominopelvic process  Punctate bilateral nonobstructing calculi  Additional chronic findings and negatives as above  The study was marked in [facility] for immediate notification  Workstation performed: [id number]             Other Studies: n/a    Code Status: Level 1 - Full Code  Advance Directive and Living Will:      Power of :    POLST:    I have spent 45 minutes with Patient and family today in which greater than 50% of this time was spent in counseling/coordination of care regarding Diagnostic results, Prognosis, Impressions, Counseling / Coordination of care, Documenting in the medical record, Reviewing / ordering tests, medicine, procedures  , Obtaining or reviewing history   and Communicating with other healthcare professionals

## 2023-05-02 NOTE — ASSESSMENT & PLAN NOTE
· s/p fall down stairs   · Acute segmental fractures of the right 5th-10th ribs with minimal displacement of the posterior medial component of the right 7th through 10th ribs  · Trace pleural effusion also noted  · Admit SD2 for HOT protocol  · Multimodal pain control, consult acute pain service, appreciate recs  · IS

## 2023-05-02 NOTE — ASSESSMENT & PLAN NOTE
-s/p shunt placement approx 13 years ago  -follows outpatient with neurosurgery, last seen in office 4/5/23: XR skull ordered at that visit to determine need for adjustment of shunt settings, XR not yet completed per chart review  Plan for follow up with neurosurgery in 1 year    -most recent imaging: head CT June 2021: PARENCHYMA: Right transverse frontal shunt catheter traverses the frontal lobe terminating in the left lateral ventricle in the paramedian location which is unchanged from the prior study  There is low density along the shunt tract likely encephalomalacia  No mass, mass effect, midline shift identified  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

## 2023-05-02 NOTE — ASSESSMENT & PLAN NOTE
-sustained in a mechanical fall down home stairs on 5/2  -CT chest: Acute segmental fractures of the right 5th through 10th ribs with minimal displacement of the posterior medial component of the right 7th through 10th ribs  Acute fracture of the right T7-T10 transverse processes with minimal displacement at T9 and T10  Trace right pleural effusion  Minimal multilobar subsegmental atelectasis, right greater than left  No pulmonary contusion      Plan:   · Pain management per primary team  · Acute pain service consulted   · Incentive spirometry

## 2023-05-02 NOTE — ASSESSMENT & PLAN NOTE
· S/p fall down stairs  · Right lateral pelvic deep subcutaneous hematoma overlying the tensor fascia rodolfo measures approximately 4 6 x 2 4 x 9 5 cm  · Minimal tenderness on exam  · Initial hemoglobin 14 1, repeat hemoglobin 6 hours post initial draw  · Consider binder if hemoglobin drops or hematoma becomes more symptomatic  · Pain control

## 2023-05-02 NOTE — ASSESSMENT & PLAN NOTE
-pt w/ hx of parkinson's, [facility] s/p shunt placement ~13 years ago  -follows with neurology, neurosurgery, PT as an outpatient, attends outpatient pt weekly, was last seen on 5/1/23  -ambulates with a rollator at home, per PT note from 5/1/23    Plan:  · PT/OT eval while in patient   · Discussed ambulation assistive device with wife, recommend pt uses a 2 wheeled walker over the rollator to reduce falls risk

## 2023-05-02 NOTE — PLAN OF CARE
Problem: Potential for Falls  Goal: Patient will remain free of falls  Description: INTERVENTIONS:  - Educate patient/family on patient safety including physical limitations  - Instruct patient to call for assistance with activity   - Consult OT/PT to assist with strengthening/mobility   - Keep Call bell within reach  - Keep bed low and locked with side rails adjusted as appropriate  - Keep care items and personal belongings within reach  - Initiate and maintain comfort rounds  - Make Fall Risk Sign visible to staff    - Apply yellow socks and bracelet for high fall risk patients  - Consider moving patient to room near nurses station  Outcome: Progressing     Problem: RESPIRATORY - ADULT  Goal: Achieves optimal ventilation and oxygenation  Description: INTERVENTIONS:  - Assess for changes in respiratory status  - Assess for changes in mentation and behavior  - Position to facilitate oxygenation and minimize respiratory effort  - Oxygen administered by appropriate delivery if ordered  - Initiate smoking cessation education as indicated  - Encourage broncho-pulmonary hygiene including cough, deep breathe, Incentive Spirometry  - Assess the need for suctioning and aspirate as needed  - Assess and instruct to report SOB or any respiratory difficulty  - Respiratory Therapy support as indicated  Outcome: Progressing     Problem: PAIN - ADULT  Goal: Verbalizes/displays adequate comfort level or baseline comfort level  Description: Interventions:  - Encourage patient to monitor pain and request assistance  - Assess pain using appropriate pain scale  - Administer analgesics based on type and severity of pain and evaluate response  - Implement non-pharmacological measures as appropriate and evaluate response  - Consider cultural and social influences on pain and pain management  - Notify physician/advanced practitioner if interventions unsuccessful or patient reports new pain  Outcome: Progressing     Problem: MOBILITY - ADULT  Goal: Maintain or return to baseline ADL function  Description: INTERVENTIONS:  -  Assess patient's ability to carry out ADLs; assess patient's baseline for ADL function and identify physical deficits which impact ability to perform ADLs (bathing, care of mouth/teeth, toileting, grooming, dressing, etc )  - Assess/evaluate cause of self-care deficits   - Assess range of motion  - Assess patient's mobility; develop plan if impaired  - Assess patient's need for assistive devices and provide as appropriate  - Encourage maximum independence but intervene and supervise when necessary  - Involve family in performance of ADLs  - Assess for home care needs following discharge   - Consider OT consult to assist with ADL evaluation and planning for discharge  - Provide patient education as appropriate  Outcome: Not Progressing  Goal: Maintains/Returns to pre admission functional level  Description: INTERVENTIONS:  - Perform BMAT or MOVE assessment daily    - Set and communicate daily mobility goal to care team and patient/family/caregiver     - Collaborate with rehabilitation services on mobility goals if consulted  - Out of bed for toileting  - Record patient progress and toleration of activity level   Outcome: Not Progressing

## 2023-05-02 NOTE — CONSULTS
Consultation - Geriatric Medicine   Lupe Rosen 82 y o  male MRN: [id number]  Unit/Bed#: S -01 Encounter: [id number]              Inpatient consult to Gerontology     Performed by  Lay Barnes DO     Authorized by Nicole Patel MD                Assessment/Plan   Male pelvic hematoma  Assessment & Plan  -sustained in mechanical fall  -CT chest/Abd/pelv: Right lateral pelvic deep subcutaneous hematoma overlying the tensor fascia rodolfo measures approximately 4 6 x 2 4 x 9 5 cm    -per chart review, pt is not on blood thinners    Plan:  · Management per primary team    Fracture of transverse process of thoracic vertebra, closed, initial encounter [facility])  Assessment & Plan  -sustained during mechanical fall   -CT Chest/Abd/Pelv:Acute fracture of the right T7-T10 transverse processes with minimal displacement at T9 and T10  Plan:  · Pain management per primary team  · Acute pain services consulted     Closed fracture of multiple ribs of right side  Assessment & Plan  -sustained in a mechanical fall down home stairs on 5/2  -CT chest: Acute segmental fractures of the right 5th through 10th ribs with minimal displacement of the posterior medial component of the right 7th through 10th ribs  Acute fracture of the right T7-T10 transverse processes with minimal displacement at T9 and T10  Trace right pleural effusion  Minimal multilobar subsegmental atelectasis, right greater than left  No pulmonary contusion      Plan:   · Pain management per primary team  · Acute pain service consulted   · Incentive spirometry     Fall (on) (from) other stairs and steps, initial encounter  Assessment & Plan  -sustained mechanical fall on 5/1, fell backwards down 6 stairs  -this is his 2nd fall, the first fall was 1-2 years ago, fell forward going down stairs   -pt w/ known hx of parkinsons, NPH, following with neurology, neurosurgery, and PT as an outpatient    Plan:  · PT/OT consulted   · Fall precautions    Parkinsonism [facility])  Assessment & Plan  -follows with neurology as an out pt, last seen in office on 2/28/23  Sinemet increased to current dose at that time  Also following with neurosurgery for a history of NPH s/p shunt placement ~13 years ago    -current medication regimen: sinemet , 2 tablets TID @ 8am, 1pm, 6pm, and 1 tablet before bed   -goes to out patient PT for ambulatory dysfunction regularly    Plan:  · Resume home sinemet  · PT/OT consulted          Constipation  Assessment & Plan  -chronic in setting of Parkinson's, likely to be exacerbated by opioids for pain magement  -baseline: 1 BM every 3-4 days per wife    Plan:  · Recommend bowel regimen: daily Miralax, Senna    Essential hypertension  Assessment & Plan  -last Blood Pressure: 138/77  -home regimen: HCTZ 12 5mg QD, enalapril 2 5mg QD    Plan:  · Resume home HCTZ  · Continue to monitor vitals    Unsteady gait  Assessment & Plan  -pt w/ hx of parkinson's, NPH s/p shunt placement ~13 years ago  -follows with neurology, neurosurgery, PT as an outpatient, attends outpatient pt weekly, was last seen on 5/1/23  -ambulates with a rollator at home, per PT note from 5/1/23    Plan:  · PT/OT eval while in patient   · Discussed ambulation assistive device with wife, recommend pt uses a 2 wheeled walker over the rollator to reduce falls risk    NPH (normal pressure hydrocephalus) (HCC)  Assessment & Plan  -s/p shunt placement approx 13 years ago  -follows outpatient with neurosurgery, last seen in office 4/5/23: XR skull ordered at that visit to determine need for adjustment of shunt settings, XR not yet completed per chart review  Plan for follow up with neurosurgery in 1 year    -most recent imaging: head CT June 2021: PARENCHYMA: Right transverse frontal shunt catheter traverses the frontal lobe terminating in the left lateral ventricle in the paramedian location which is unchanged from the prior study   There is low density along the shunt tract likely encephalomalacia  No mass, mass effect, midline shift identified  Suspect chronic lacunar infarct right lentiform nucleus and right external capsule  Low-density in periventricular white matter compatible with mild chronic white radiographic disease  VENTRICLES AND EXTRA-AXIAL SPACES:  Ventricles are prominent similar to prior study  Plan:  · Continue outpatient follow up with neurosurgery                    History of Present Illness   Physician Requesting Consult: Debora Weathers MD  Reason for Consult / Principal Problem: closed fracture of multiple ribs right side, parkinsonism  Additional history obtained from: pt's wife, Fredi Moon       HPI: Sissy Vaughn is a 82y o  year old male who presents with multiple right side rib fractures after a mechanical fall down the stairs in his home  Pt explains he lives in a split-level home and was coming up a flight of stairs, and went to reach for his rollator, and fell backwards down 6 stairs  Reports this is the second time he has had a fall  The first time was about a year and a half ago, and that time he also fell  down the same flight of stairs, but fell forward that time  That was around the time he was diagnosed with Parkinson's  Reports he lives in a split level home with his wife and ambulates throughout the home with his rollator without much difficulty  He performs all of his own activities of kuamri living including bathing, dressing himself, and feeding himself  Manages his finances with his wife, notes his handwriting has gotten much worse over the past few years and he needs her help with writing  Pt endorses problems with his short term memory, but no problems with his long-term memory  CT chest/abdomen/pelvis shows: Acute segmental fractures of the right 5th through 10th ribs with minimal displacement of the posterior medial component of the right 7th through 10th ribs   Acute fracture of the right T7-T10 transverse processes with minimal displacement at T9 and T10  Trace right pleural effusion  Minimal multilobar subsegmental atelectasis, right greater than left  No pulmonary contusion  Right lateral pelvic deep subcutaneous hematoma overlying the tensor fascia rodolfo measures approximately 4 6 x 2 4 x 9 5 cm  Punctate bilateral nonobstructing calculi  CMP shows CKD stage 2 with GFR of 66, stable from previous labs  Lab work including CBC, CMP otherwise WNL  UA shows 1+ protein, 1+ ketones, and moderate blood         Review of Systems   Constitutional: Negative for chills and fever  HENT: Negative for ear pain and sore throat  Eyes: Negative for pain and visual disturbance  Respiratory: Negative for cough and shortness of breath  Cardiovascular: Negative for chest pain and palpitations  Gastrointestinal: Negative for abdominal pain and vomiting  Genitourinary: Negative for dysuria and hematuria  Musculoskeletal: Negative for arthralgias and back pain  Skin: Negative for color change and rash  Neurological: Negative for seizures and syncope  All other systems reviewed and are negative  Memory/Cognitive screening: scored 21/30 on MoCA on 4/3/23, pt is A&O x3 on exam today  Mobility:  Pt ambulates with a rollator at home, goes to PT  Falls:  2 falls; first fall forward down stairs 1 5-2 years ago, most recent fall backwards down same stairs yesterday  Assistive Devices: uses a rollator at home  Aeropuerto: pt denies   Nutrition/weight loss/grocery shopping/meal preparation: wife cooks, pt denies any changes in appetite  Vision impairment: wears glasses  Hearing impairment: does have hearing difficulties, plans to get hearing aids in near future  Incontinence: no incontinence, wife endorses constipation, pt takes miralax daily, has 1 bm every 3-4 days   Delirium: pt denies  Polypharmacy: reviewed/confirmed with pt's wife    No current facility-administered medications on file prior to encounter       Current Outpatient Medications "on File Prior to Encounter   Medication Sig   • carbidopa-levodopa (SINEMET)  mg per tablet Take 2tabs 8am, 1p, 6pm, and 1tab before bed   • Cholecalciferol (VITAMIN D-3) 5000 units TABS Take 1 tablet by mouth daily    • enalapril (VASOTEC) 5 mg tablet Take half tablet by mouth once daily   • hydrochlorothiazide (HYDRODIURIL) 12 5 mg tablet TAKE ONE TABLET BY MOUTH EVERY MORNING   • ibuprofen (MOTRIN) 200 mg tablet Take 600 mg by mouth every 6 (six) hours as needed for mild pain   • ketoconazole (NIZORAL) 2 % shampoo Daily for 2 weeks straight and then on \"Mondays, Wednesdays and Fridays\":  Lather into scalp and skin on face, neck, chest, and back; leave on for 5 minutes and then rinse off completely  • Multiple Vitamin (MULTI-VITAMIN DAILY PO) Take 1 tablet by mouth daily   • pantoprazole (PROTONIX) 40 mg tablet Take 1 tablet (40 mg total) by mouth 2 (two) times a day   • Aspirin-Calcium Carbonate  MG TABS Take by mouth daily (Patient not taking: Reported on 5/2/2023)   • atorvastatin (LIPITOR) 20 mg tablet Take 1 tablet (20 mg total) by mouth daily (Patient not taking: Reported on 4/5/2023)   • hydrocortisone 2 5 % cream Apply topically 2 (two) times a day (Patient not taking: Reported on 2/28/2023)   • ketoconazole (NIZORAL) 2 % cream Apply topically twice daily (Patient not taking: Reported on 5/2/2023)         Patients primary residence: split-level home Lives with:wife    Historical Information   Past medical history:  Past Medical History:   Diagnosis Date   • Arthritis    • GERD (gastroesophageal reflux disease)    • Hiatal hernia    • Hypertension    • Infectious viral hepatitis    • Obesity    • Skin tag    • Spinal stenosis        Past surgical history:   Past Surgical History:   Procedure Laterality Date   • APPENDECTOMY  1952   • MT COLONOSCOPY FLX DX W/COLLJ SPEC WHEN PFRMD N/A 1/23/2019    Procedure: COLONOSCOPY;  Surgeon: Sylvia Balderas MD;  Location: [facility];   Service: " Gastroenterology   • TX ESOPHAGOGASTRODUODENOSCOPY TRANSORAL DIAGNOSTIC N/A 1/23/2019    Procedure: ESOPHAGOGASTRODUODENOSCOPY (EGD); Surgeon: Jeff Capone MD;  Location: [facility] GI LAB; Service: Gastroenterology   • VENTRICULOATRIAL SHUNT  06/2012   • VENTRICULOPERITONEAL SHUNT         Social history:  Social History     Socioeconomic History   • Marital status: /Civil Union     Spouse name: Not on file   • Number of children: 1   • Years of education: Not on file   • Highest education level: Not on file   Occupational History   • Occupation: PHYSICIST, [employer]   Tobacco Use   • Smoking status: Never   • Smokeless tobacco: Never   Vaping Use   • Vaping Use: Never used   Substance and Sexual Activity   • Alcohol use: No   • Drug use: No   • Sexual activity: Not Currently     Partners: Female   Other Topics Concern   • Not on file   Social History Narrative    Lives at home with wife    Retired        [employer]     LIVES WITH SPOUSE     POST GRADUATE      Social Determinants of Health     Financial Resource Strain: Not on file   Food Insecurity: Not on file   Transportation Needs: Not on file   Physical Activity: Not on file   Stress: Not on file   Social Connections: Not on file   Intimate Partner Violence: Not on file   Housing Stability: Not on file       Family history:   Family History   Problem Relation Age of Onset   • Heart disease Mother         RHEUM   • Prostate cancer Father    • Cancer Maternal Uncle    • Alcohol abuse Neg Hx    • Depression Neg Hx    • Mental illness Neg Hx    • Substance Abuse Neg Hx          Meds/Allergies   All current active meds have been reviewed       No Known Allergies    Objective   Vitals:    05/02/23 1325   BP: 138/77   Pulse: 80   Resp: 16   Temp: 97 8 °F (36 6 °C)   SpO2: 93%     No intake or output data in the 24 hours ending 05/02/23 1538  Invasive Devices     Peripheral Intravenous Line  Duration           Peripheral IV 05/02/23 Left Antecubital <1 day Physical Exam  Constitutional:       General: He is not in acute distress  Appearance: Normal appearance  He is normal weight  He is not ill-appearing or toxic-appearing  HENT:      Head: Normocephalic and atraumatic  Right Ear: External ear normal       Left Ear: External ear normal       Nose: Nose normal       Mouth/Throat:      Mouth: Mucous membranes are moist       Pharynx: Oropharynx is clear  Eyes:      Conjunctiva/sclera: Conjunctivae normal    Cardiovascular:      Rate and Rhythm: Normal rate and regular rhythm  Heart sounds: Normal heart sounds  No murmur heard  Pulmonary:      Effort: Pulmonary effort is normal  No respiratory distress  Breath sounds: Normal breath sounds  No wheezing, rhonchi or rales  Abdominal:      General: Bowel sounds are normal  There is no distension  Palpations: Abdomen is soft  There is no mass  Tenderness: There is abdominal tenderness (with deep palpation of left lower quadrant)  There is no guarding or rebound  Musculoskeletal:         General: Normal range of motion  Skin:     General: Skin is warm and dry  Neurological:      General: No focal deficit present  Mental Status: He is alert and oriented to person, place, and time  GCS: GCS eye subscore is 4  GCS verbal subscore is 5  GCS motor subscore is 6  Motor: No weakness or tremor  Psychiatric:         Attention and Perception: Attention normal          Mood and Affect: Mood and affect normal          Speech: Speech normal          Behavior: Behavior normal  Behavior is cooperative  Lab Results:   I have personally reviewed pertinent lab and imaging results       VTE Prophylaxis: Reason for no pharmacologic prophylaxis holding in setting of recent fall, pelvic hematoma    Code Status: Level 1 - Full Code  Advance Directive and Living Will:      Power of :  wife  POLST:      Family and Social Support:   Living Arrangements: Lives w/ Spouse/significant other  Support Systems: Self; Spouse/significant other  Assistance Needed: n/a  Type of Current Residence: Private residence  Current Home Care Services: No    Wife, daughter living in [place]

## 2023-05-02 NOTE — ASSESSMENT & PLAN NOTE
-last Blood Pressure: 138/77  -home regimen: HCTZ 12 5mg QD, enalapril 2 5mg QD    Plan:  · Resume home HCTZ  · Continue to monitor vitals

## 2023-05-02 NOTE — ASSESSMENT & PLAN NOTE
· S/p fall down stairs  · Acute fracture of the right T7-T10 transverse processes with minimal displacement at T9 and T10  · Multimodal pain control, consult acute pain services as above

## 2023-05-02 NOTE — ASSESSMENT & PLAN NOTE
· Patient was reportedly using rollator walker after physical therapy and fell down several stairs  · Patient did not strike his head, did not lose consciousness, and does not take blood thinners  · Injuries noted below  · He reports pain in his right anterior thoracic wall and right upper back  · Injuries noted below  · Patient with Parkinson's disease and difficulty with ambulation at baseline  · PT/OT eval and treat  · Case management for disposition planning  · Fall precautions

## 2023-05-02 NOTE — ASSESSMENT & PLAN NOTE
-chronic in setting of Parkinson's, likely to be exacerbated by opioids for pain magement  -baseline: 1 BM every 3-4 days per wife    Plan:  · Recommend bowel regimen: daily Miralax, Senna

## 2023-05-02 NOTE — ED PROVIDER NOTES
History  Chief Complaint   Patient presents with   • Back Injury     Pt arrives via ems from home, fall down 6 steps yesterday c/o upper/lower right back pain, denies headstrike/thinners     82year-old male presents to the emergency department after a fall  States that yesterday he was climbing the carpeted steps in his socks when he slipped at the top of the staircase when reaching for his walker and fell backward down 6 steps to the floor  States that he was unable to get up over the first 5 minutes but was eventually able to crawl back up the stairs and into bed  States that he took both Tylenol and Motrin yesterday as well as another dose this morning without alleviation of symptoms  Presently complaining of pain in the right side of the back and chest wall which is worse with movement  He denies shortness of breath or difficulty breathing  Denies head injury  History provided by:  Patient   used: No        Prior to Admission Medications   Prescriptions Last Dose Informant Patient Reported? Taking?    Aspirin-Calcium Carbonate  MG TABS Not Taking  Yes No   Sig: Take by mouth daily   Patient not taking: Reported on 5/2/2023   Cholecalciferol (VITAMIN D-3) 5000 units TABS 5/1/2023  Yes Yes   Sig: Take 1 tablet by mouth daily    Multiple Vitamin (MULTI-VITAMIN DAILY PO) 5/1/2023  Yes Yes   Sig: Take 1 tablet by mouth daily   atorvastatin (LIPITOR) 20 mg tablet Not Taking  No No   Sig: Take 1 tablet (20 mg total) by mouth daily   Patient not taking: Reported on 4/5/2023   carbidopa-levodopa (SINEMET)  mg per tablet 5/2/2023 at 1000  No Yes   Sig: Take 2tabs 8am, 1p, 6pm, and 1tab before bed   enalapril (VASOTEC) 5 mg tablet 5/1/2023  No Yes   Sig: Take half tablet by mouth once daily   hydrochlorothiazide (HYDRODIURIL) 12 5 mg tablet 5/1/2023  No Yes   Sig: TAKE ONE TABLET BY MOUTH EVERY MORNING   hydrocortisone 2 5 % cream Not Taking  No No   Sig: Apply topically 2 (two) "times a day   Patient not taking: Reported on 2/28/2023   ibuprofen (MOTRIN) 200 mg tablet 5/2/2023 at 0800  Yes Yes   Sig: Take 600 mg by mouth every 6 (six) hours as needed for mild pain   ketoconazole (NIZORAL) 2 % cream Not Taking  No No   Sig: Apply topically twice daily   Patient not taking: Reported on 5/2/2023   ketoconazole (NIZORAL) 2 % shampoo Past Week  No Yes   Sig: Daily for 2 weeks straight and then on \"Mondays, Wednesdays and Fridays\":  Lather into scalp and skin on face, neck, chest, and back; leave on for 5 minutes and then rinse off completely  pantoprazole (PROTONIX) 40 mg tablet 5/1/2023  No Yes   Sig: Take 1 tablet (40 mg total) by mouth 2 (two) times a day      Facility-Administered Medications: None       Past Medical History:   Diagnosis Date   • Arthritis    • GERD (gastroesophageal reflux disease)    • Hiatal hernia    • Hypertension    • Infectious viral hepatitis    • Obesity    • Skin tag    • Spinal stenosis        Past Surgical History:   Procedure Laterality Date   • APPENDECTOMY  1952   • AL COLONOSCOPY FLX DX W/COLLJ SPEC WHEN PFRMD N/A 1/23/2019    Procedure: COLONOSCOPY;  Surgeon: Saeid Bangura MD;  Location: [facility]; Service: Gastroenterology   • AL ESOPHAGOGASTRODUODENOSCOPY TRANSORAL DIAGNOSTIC N/A 1/23/2019    Procedure: ESOPHAGOGASTRODUODENOSCOPY (EGD); Surgeon: Saeid Bangura MD;  Location: [facility]; Service: Gastroenterology   • VENTRICULOATRIAL SHUNT  06/2012   • VENTRICULOPERITONEAL SHUNT         Family History   Problem Relation Age of Onset   • Heart disease Mother         RHEUM   • Prostate cancer Father    • Cancer Maternal Uncle    • Alcohol abuse Neg Hx    • Depression Neg Hx    • Mental illness Neg Hx    • Substance Abuse Neg Hx      I have reviewed and agree with the history as documented      E-Cigarette/Vaping   • E-Cigarette Use Never User      E-Cigarette/Vaping Substances   • Nicotine No    • THC No    • CBD No    • Flavoring No    • Other No " • Unknown No      Social History     Tobacco Use   • Smoking status: Never   • Smokeless tobacco: Never   Vaping Use   • Vaping Use: Never used   Substance Use Topics   • Alcohol use: No   • Drug use: No       Review of Systems   Constitutional: Negative for activity change, appetite change, chills and fever  HENT: Negative for congestion, dental problem, drooling, ear discharge, ear pain, mouth sores, nosebleeds, rhinorrhea, sore throat and trouble swallowing  Eyes: Negative for pain, discharge and itching  Respiratory: Negative for cough, chest tightness, shortness of breath and wheezing  Cardiovascular: Positive for chest pain  Negative for palpitations  Gastrointestinal: Negative for abdominal pain, blood in stool, constipation, diarrhea, nausea and vomiting  Endocrine: Negative for cold intolerance and heat intolerance  Genitourinary: Negative for difficulty urinating, dysuria, flank pain, frequency and urgency  Musculoskeletal: Positive for back pain  Skin: Negative for rash and wound  Allergic/Immunologic: Negative for food allergies and immunocompromised state  Neurological: Negative for dizziness, seizures, syncope, weakness, numbness and headaches  Psychiatric/Behavioral: Negative for agitation, behavioral problems and confusion  Physical Exam  Physical Exam  Vitals and nursing note reviewed  Constitutional:       General: He is not in acute distress  Appearance: He is not diaphoretic  HENT:      Head: Normocephalic and atraumatic  Right Ear: External ear normal       Left Ear: External ear normal       Mouth/Throat:      Mouth: Mucous membranes are moist       Pharynx: Oropharyngeal exudate present  Eyes:      Conjunctiva/sclera: Conjunctivae normal       Pupils: Pupils are equal, round, and reactive to light  Neck:      Vascular: No JVD  Trachea: No tracheal deviation  Cardiovascular:      Rate and Rhythm: Normal rate and regular rhythm  Heart sounds: Normal heart sounds  No murmur heard  No friction rub  No gallop  Pulmonary:      Effort: Pulmonary effort is normal  No respiratory distress  Breath sounds: Normal breath sounds  No wheezing or rales  Chest:      Chest wall: Tenderness present  Comments: Tenderness of the right lateral chest wall  No crepitus  No overlying ecchymosis  Area of tenderness most prominent over the fifth rib  Abdominal:      General: Bowel sounds are normal  There is no distension  Palpations: Abdomen is soft  Tenderness: There is no abdominal tenderness  There is no guarding  Musculoskeletal:         General: No tenderness or deformity  Normal range of motion  Back:    Lymphadenopathy:      Cervical: No cervical adenopathy  Skin:     General: Skin is warm and dry  Findings: No erythema or rash  Neurological:      General: No focal deficit present  Mental Status: He is alert and oriented to person, place, and time     Psychiatric:         Mood and Affect: Mood normal          Behavior: Behavior normal          Vital Signs  ED Triage Vitals [05/02/23 0821]   Temperature Pulse Respirations Blood Pressure SpO2   98 1 °F (36 7 °C) 90 18 164/77 92 %      Temp Source Heart Rate Source Patient Position - Orthostatic VS BP Location FiO2 (%)   Oral Monitor Sitting Right arm --      Pain Score       6           Vitals:    05/02/23 0821 05/02/23 1045 05/02/23 1325   BP: 164/77 138/72 138/77   Pulse: 90 77 80   Patient Position - Orthostatic VS: Sitting Lying          Visual Acuity      ED Medications  Medications   senna (SENOKOT) tablet 17 2 mg (17 2 mg Oral Given 5/2/23 1427)   polyethylene glycol (MIRALAX) packet 17 g (has no administration in time range)   ondansetron (ZOFRAN) injection 4 mg (has no administration in time range)   oxyCODONE (ROXICODONE) split tablet 2 5 mg (has no administration in time range)   oxyCODONE (ROXICODONE) IR tablet 5 mg (has no administration in time range)   HYDROmorphone HCl (DILAUDID) injection 0 2 mg (has no administration in time range)   methocarbamol (ROBAXIN) tablet 500 mg (500 mg Oral Given 5/2/23 1428)   carbidopa-levodopa (SINEMET)  mg per tablet 2 tablet (2 tablets Oral Given 5/2/23 1427)   carbidopa-levodopa (SINEMET)  mg per tablet 1 tablet (has no administration in time range)   cholecalciferol (VITAMIN D3) tablet 1,000 Units (1,000 Units Oral Given 5/2/23 1428)   hydrochlorothiazide (HYDRODIURIL) tablet 12 5 mg (12 5 mg Oral Given 5/2/23 1428)   pantoprazole (PROTONIX) EC tablet 40 mg (40 mg Oral Given 5/2/23 1428)   acetaminophen (TYLENOL) tablet 975 mg (975 mg Oral Given 5/2/23 1427)   morphine injection 4 mg (4 mg Intravenous Given 5/2/23 0908)   carbidopa-levodopa (SINEMET)  mg per tablet 2 tablet (2 tablets Oral Given 5/2/23 0923)   iohexol (OMNIPAQUE) 350 MG/ML injection (SINGLE-DOSE) 100 mL (100 mL Intravenous Given 5/2/23 0958)       Diagnostic Studies  Results Reviewed     Procedure Component Value Units Date/Time    Hemoglobin [[id number]]     Lab Status: No result Specimen: Blood     Urine Microscopic [[id number]]  (Abnormal) Collected: 05/02/23 1112    Lab Status: Final result Specimen: Urine, Clean Catch Updated: 05/02/23 1132     RBC, UA Innumerable /hpf      WBC, UA 10-20 /hpf      Epithelial Cells Occasional /hpf      Bacteria, UA Occasional /hpf      MUCUS THREADS Moderate    Urine culture [[id number]] Collected: 05/02/23 1112    Lab Status:  In process Specimen: Urine, Clean Catch Updated: 05/02/23 1131    UA w Reflex to Microscopic w Reflex to Culture [[id number]]  (Abnormal) Collected: 05/02/23 1112    Lab Status: Final result Specimen: Urine, Clean Catch Updated: 05/02/23 1127     Color, UA Light Yellow     Clarity, UA Clear     Specific Gravity, UA >=1 050     pH, UA 6 5     Leukocytes, UA Negative     Nitrite, UA Negative     Protein, UA 50 (1+) mg/dl      Glucose, UA Negative mg/dl      Ketones, UA 10 (1+) mg/dl      Urobilinogen, UA <2 0 mg/dl      Bilirubin, UA Negative     Occult Blood, UA Moderate    Comprehensive metabolic panel [[id number]]  (Abnormal) Collected: 05/02/23 0907    Lab Status: Final result Specimen: Blood from Arm, Left Updated: 05/02/23 0941     Sodium 139 mmol/L      Potassium 3 8 mmol/L      Chloride 103 mmol/L      CO2 26 mmol/L      ANION GAP 10 mmol/L      BUN 21 mg/dL      Creatinine 1 05 mg/dL      Glucose 115 mg/dL      Calcium 9 8 mg/dL      AST 39 U/L      ALT 41 U/L      Alkaline Phosphatase 75 U/L      Total Protein 7 3 g/dL      Albumin 4 2 g/dL      Total Bilirubin 1 07 mg/dL      eGFR 66 ml/min/1 73sq m     Narrative:      [place] guidelines for Chronic Kidney Disease (CKD):   •  Stage 1 with normal or high GFR (GFR > 90 mL/min/1 73 square meters)  •  Stage 2 Mild CKD (GFR = 60-89 mL/min/1 73 square meters)  •  Stage 3A Moderate CKD (GFR = 45-59 mL/min/1 73 square meters)  •  Stage 3B Moderate CKD (GFR = 30-44 mL/min/1 73 square meters)  •  Stage 4 Severe CKD (GFR = 15-29 mL/min/1 73 square meters)  •  Stage 5 End Stage CKD (GFR <15 mL/min/1 73 square meters)  Note: GFR calculation is accurate only with a steady state creatinine    CBC and differential [[id number]]  (Abnormal) Collected: 05/02/23 0907    Lab Status: Final result Specimen: Blood from Arm, Left Updated: 05/02/23 0927     WBC 9 58 Thousand/uL      RBC 4 87 Million/uL      Hemoglobin 14 1 g/dL      Hematocrit 43 6 %      MCV 90 fL      MCH 29 0 pg      MCHC 32 3 g/dL      RDW 12 7 %      MPV 10 2 fL      Platelets 495 Thousands/uL      nRBC 0 /100 WBCs      Neutrophils Relative 82 %      Immat GRANS % 0 %      Lymphocytes Relative 9 %      Monocytes Relative 9 %      Eosinophils Relative 0 %      Basophils Relative 0 %      Neutrophils Absolute 7 83 Thousands/µL      Immature Grans Absolute 0 03 Thousand/uL      Lymphocytes Absolute 0 86 Thousands/µL      Monocytes Absolute 0 83 Thousand/µL      Eosinophils Absolute 0 01 Thousand/µL      Basophils Absolute 0 02 Thousands/µL                  CT chest abdomen pelvis w contrast   Final Result by Nabeel Laura MD (05/02 1126)      CT chest:      Acute segmental fractures of the right 5th through 10th ribs with minimal displacement of the posterior medial component of the right 7th through 10th ribs  Acute fracture of the right T7-T10 transverse processes with minimal displacement at T9 and T10  Trace right pleural effusion  Minimal multilobar subsegmental atelectasis, right greater than left  No pulmonary contusion  No pneumothorax  Chronic findings and negatives as above  CT abdomen and pelvis:      Right lateral pelvic deep subcutaneous hematoma overlying the tensor fascia rodolfo measures approximately 4 6 x 2 4 x 9 5 cm  No acute fracture  No acute intraabdominopelvic process  Punctate bilateral nonobstructing calculi  Additional chronic findings and negatives as above  The study was marked in [facility] for immediate notification  Workstation performed: [id number]                    Procedures  Procedures         ED Course  ED Course as of 05/02/23 1505   Tue May 02, 2023   1149 Patient updated regarding CT results  Call placed to trauma  Medical Decision Making  Differential diagnosis includes but not limited to: Rib fracture, rib contusion, vertebral fracture, intra-abdominal injury    Closed fracture of multiple ribs of right side, initial encounter: acute illness or injury  Closed fracture of transverse process of thoracic vertebra, initial encounter [facility]): acute illness or injury  Pelvic hematoma in male: acute illness or injury  Amount and/or Complexity of Data Reviewed  Labs: ordered  Radiology: ordered  Risk  Prescription drug management  Decision regarding hospitalization            Disposition  Final diagnoses:   Closed fracture of multiple ribs of right side, initial encounter   Closed fracture of transverse process of thoracic vertebra, initial encounter ([facility] )   Pelvic hematoma in male     Time reflects when diagnosis was documented in both MDM as applicable and the Disposition within this note     Time User Action Codes Description Comment    5/2/2023 12:17 PM Gen Doyle Closed fracture of multiple ribs of right side, initial encounter     5/2/2023 12:17 PM Lacy Doylee Rings Add [S22 009A] Closed fracture of transverse process of thoracic vertebra, initial encounter ([facility] )     5/2/2023 12:18 PM Mary Doylette Rings Add [N50 1] Pelvic hematoma in male     5/2/2023 12:51 PM Rose Moras Add [G20] Parkinsonism, unspecified Parkinsonism type ([facility] )     5/2/2023 12:56 PM Weiss, [address] [place] [address],[address] Fall (on) (from) other stairs and steps, initial encounter       ED Disposition     ED Disposition   Admit    Condition   Stable    Date/Time   Tue May 2, 2023 12:16 PM    Comment   Case was discussed with trauma and the patient's admission status was agreed to be Admission Status: inpatient status to the service of Dr Mariya Romero              Follow-up Information    None         Current Discharge Medication List      CONTINUE these medications which have NOT CHANGED    Details   carbidopa-levodopa (SINEMET)  mg per tablet Take 2tabs 8am, 1p, 6pm, and 1tab before bed  Qty: 495 tablet, Refills: 3    Associated Diagnoses: Parkinsonism, unspecified Parkinsonism type ([facility])      Cholecalciferol (VITAMIN D-3) 5000 units TABS Take 1 tablet by mouth daily       enalapril (VASOTEC) 5 mg tablet Take half tablet by mouth once daily  Qty: 90 tablet, Refills: 1    Associated Diagnoses: Essential hypertension      hydrochlorothiazide (HYDRODIURIL) 12 5 mg tablet TAKE ONE TABLET BY MOUTH EVERY MORNING  Qty: 90 tablet, Refills: 0    Associated Diagnoses: Essential hypertension      ibuprofen (MOTRIN) 200 mg tablet Take "600 mg by mouth every 6 (six) hours as needed for mild pain      ketoconazole (NIZORAL) 2 % shampoo Daily for 2 weeks straight and then on \"Mondays, Wednesdays and Fridays\":  Lather into scalp and skin on face, neck, chest, and back; leave on for 5 minutes and then rinse off completely  Qty: 120 mL, Refills: 2    Associated Diagnoses: Seborrheic dermatitis      Multiple Vitamin (MULTI-VITAMIN DAILY PO) Take 1 tablet by mouth daily      pantoprazole (PROTONIX) 40 mg tablet Take 1 tablet (40 mg total) by mouth 2 (two) times a day  Qty: 90 tablet, Refills: 1    Associated Diagnoses: Gastroesophageal reflux disease      Aspirin-Calcium Carbonate  MG TABS Take by mouth daily      atorvastatin (LIPITOR) 20 mg tablet Take 1 tablet (20 mg total) by mouth daily  Qty: 90 tablet, Refills: 1    Associated Diagnoses: Other hyperlipidemia      hydrocortisone 2 5 % cream Apply topically 2 (two) times a day  Qty: 453 6 g, Refills: 1    Associated Diagnoses: Seborrheic dermatitis      ketoconazole (NIZORAL) 2 % cream Apply topically twice daily  Qty: 60 g, Refills: 2    Associated Diagnoses: Seborrheic dermatitis             No discharge procedures on file      PDMP Review     None          ED Provider  Electronically Signed by           Jaxson Evangelista PA-C  05/02/23 0437    "

## 2023-05-02 NOTE — ASSESSMENT & PLAN NOTE
-sustained during mechanical fall   -CT Chest/Abd/Pelv:Acute fracture of the right T7-T10 transverse processes with minimal displacement at T9 and T10      Plan:  · Pain management per primary team  · Acute pain services consulted

## 2023-05-02 NOTE — ASSESSMENT & PLAN NOTE
-follows with neurology as an out pt, last seen in office on 2/28/23  Sinemet increased to current dose at that time   Also following with neurosurgery for a history of NPH s/p shunt placement ~13 years ago    -current medication regimen: sinemet , 2 tablets TID @ 8am, 1pm, 6pm, and 1 tablet before bed   -goes to out patient PT for ambulatory dysfunction regularly    Plan:  · Resume home sinemet  · PT/OT consulted

## 2023-05-02 NOTE — ASSESSMENT & PLAN NOTE
-sustained mechanical fall on 5/1, fell backwards down 6 stairs  -this is his 2nd fall, the first fall was 1-2 years ago, fell forward going down stairs   -pt w/ known hx of parkinsons, NPH, following with neurology, neurosurgery, and PT as an outpatient    Plan:  · PT/OT consulted   · Fall precautions

## 2023-05-02 NOTE — ASSESSMENT & PLAN NOTE
-sustained in mechanical fall  -CT chest/Abd/pelv: Right lateral pelvic deep subcutaneous hematoma overlying the tensor fascia rodolfo measures approximately 4 6 x 2 4 x 9 5 cm    -per chart review, pt is not on blood thinners    Plan:  · Management per primary team

## 2023-05-03 ENCOUNTER — APPOINTMENT (INPATIENT)
Dept: RADIOLOGY | Facility: HOSPITAL | Age: 83
End: 2023-05-03

## 2023-05-03 LAB
ANION GAP SERPL CALCULATED.3IONS-SCNC: 9 MMOL/L (ref 4–13)
APTT PPP: 28 SECONDS (ref 23–37)
BASOPHILS # BLD AUTO: 0.03 THOUSANDS/ÂΜL (ref 0–0.1)
BASOPHILS NFR BLD AUTO: 0 % (ref 0–1)
BUN SERPL-MCNC: 19 MG/DL (ref 5–25)
CALCIUM SERPL-MCNC: 9.7 MG/DL (ref 8.4–10.2)
CHLORIDE SERPL-SCNC: 101 MMOL/L (ref 96–108)
CO2 SERPL-SCNC: 27 MMOL/L (ref 21–32)
CREAT SERPL-MCNC: 0.92 MG/DL (ref 0.6–1.3)
EOSINOPHIL # BLD AUTO: 0.03 THOUSAND/ÂΜL (ref 0–0.61)
EOSINOPHIL NFR BLD AUTO: 0 % (ref 0–6)
ERYTHROCYTE [DISTWIDTH] IN BLOOD BY AUTOMATED COUNT: 13.1 % (ref 11.6–15.1)
GFR SERPL CREATININE-BSD FRML MDRD: 77 ML/MIN/1.73SQ M
GLUCOSE SERPL-MCNC: 100 MG/DL (ref 65–140)
HCT VFR BLD AUTO: 42.7 % (ref 36.5–49.3)
HGB BLD-MCNC: 13.6 G/DL (ref 12–17)
IMM GRANULOCYTES # BLD AUTO: 0.02 THOUSAND/UL (ref 0–0.2)
IMM GRANULOCYTES NFR BLD AUTO: 0 % (ref 0–2)
INR PPP: 1.08 (ref 0.84–1.19)
LYMPHOCYTES # BLD AUTO: 1.59 THOUSANDS/ÂΜL (ref 0.6–4.47)
LYMPHOCYTES NFR BLD AUTO: 18 % (ref 14–44)
MCH RBC QN AUTO: 29 PG (ref 26.8–34.3)
MCHC RBC AUTO-ENTMCNC: 31.9 G/DL (ref 31.4–37.4)
MCV RBC AUTO: 91 FL (ref 82–98)
MONOCYTES # BLD AUTO: 0.81 THOUSAND/ÂΜL (ref 0.17–1.22)
MONOCYTES NFR BLD AUTO: 9 % (ref 4–12)
NEUTROPHILS # BLD AUTO: 6.4 THOUSANDS/ÂΜL (ref 1.85–7.62)
NEUTS SEG NFR BLD AUTO: 73 % (ref 43–75)
NRBC BLD AUTO-RTO: 0 /100 WBCS
PLATELET # BLD AUTO: 250 THOUSANDS/UL (ref 149–390)
PMV BLD AUTO: 10.6 FL (ref 8.9–12.7)
POTASSIUM SERPL-SCNC: 3.8 MMOL/L (ref 3.5–5.3)
PROTHROMBIN TIME: 14.2 SECONDS (ref 11.6–14.5)
RBC # BLD AUTO: 4.69 MILLION/UL (ref 3.88–5.62)
SODIUM SERPL-SCNC: 137 MMOL/L (ref 135–147)
WBC # BLD AUTO: 8.88 THOUSAND/UL (ref 4.31–10.16)

## 2023-05-03 RX ORDER — LIDOCAINE 50 MG/G
1 PATCH TOPICAL DAILY
Status: DISCONTINUED | OUTPATIENT
Start: 2023-05-03 | End: 2023-05-08

## 2023-05-03 RX ORDER — CALCIUM CARBONATE 500 MG/1
1000 TABLET, CHEWABLE ORAL 2 TIMES DAILY PRN
Status: DISCONTINUED | OUTPATIENT
Start: 2023-05-03 | End: 2023-05-10 | Stop reason: HOSPADM

## 2023-05-03 RX ORDER — POLYETHYLENE GLYCOL 3350 17 G/17G
17 POWDER, FOR SOLUTION ORAL DAILY
Status: DISCONTINUED | OUTPATIENT
Start: 2023-05-03 | End: 2023-05-10 | Stop reason: HOSPADM

## 2023-05-03 RX ORDER — HYDROMORPHONE HCL IN WATER/PF 6 MG/30 ML
0.2 PATIENT CONTROLLED ANALGESIA SYRINGE INTRAVENOUS EVERY 4 HOURS PRN
Status: DISCONTINUED | OUTPATIENT
Start: 2023-05-03 | End: 2023-05-10

## 2023-05-03 RX ORDER — DOCUSATE SODIUM 100 MG/1
100 CAPSULE, LIQUID FILLED ORAL 2 TIMES DAILY
Status: DISCONTINUED | OUTPATIENT
Start: 2023-05-03 | End: 2023-05-10 | Stop reason: HOSPADM

## 2023-05-03 RX ADMIN — POLYETHYLENE GLYCOL 3350 17 G: 17 POWDER, FOR SOLUTION ORAL at 08:40

## 2023-05-03 RX ADMIN — METHOCARBAMOL TABLETS 500 MG: 500 TABLET, COATED ORAL at 11:08

## 2023-05-03 RX ADMIN — HYDROCHLOROTHIAZIDE 12.5 MG: 12.5 TABLET ORAL at 08:41

## 2023-05-03 RX ADMIN — Medication 2.5 MG: at 13:15

## 2023-05-03 RX ADMIN — METHOCARBAMOL TABLETS 500 MG: 500 TABLET, COATED ORAL at 05:45

## 2023-05-03 RX ADMIN — SENNOSIDES 17.2 MG: 8.6 TABLET, FILM COATED ORAL at 08:41

## 2023-05-03 RX ADMIN — HYDROMORPHONE HYDROCHLORIDE 0.2 MG: 0.2 INJECTION, SOLUTION INTRAMUSCULAR; INTRAVENOUS; SUBCUTANEOUS at 14:54

## 2023-05-03 RX ADMIN — METHOCARBAMOL TABLETS 500 MG: 500 TABLET, COATED ORAL at 18:16

## 2023-05-03 RX ADMIN — CARBIDOPA AND LEVODOPA 1 TABLET: 25; 100 TABLET ORAL at 22:29

## 2023-05-03 RX ADMIN — Medication 1000 UNITS: at 08:41

## 2023-05-03 RX ADMIN — METHOCARBAMOL TABLETS 500 MG: 500 TABLET, COATED ORAL at 23:44

## 2023-05-03 RX ADMIN — PANTOPRAZOLE SODIUM 40 MG: 40 TABLET, DELAYED RELEASE ORAL at 18:16

## 2023-05-03 RX ADMIN — ACETAMINOPHEN 650 MG: 325 TABLET ORAL at 23:44

## 2023-05-03 RX ADMIN — METHOCARBAMOL TABLETS 500 MG: 500 TABLET, COATED ORAL at 00:15

## 2023-05-03 RX ADMIN — ACETAMINOPHEN 975 MG: 325 TABLET ORAL at 05:45

## 2023-05-03 RX ADMIN — Medication 2.5 MG: at 08:48

## 2023-05-03 RX ADMIN — ENOXAPARIN SODIUM 30 MG: 30 INJECTION SUBCUTANEOUS at 23:44

## 2023-05-03 RX ADMIN — CARBIDOPA AND LEVODOPA 2 TABLET: 25; 100 TABLET ORAL at 13:16

## 2023-05-03 RX ADMIN — DOCUSATE SODIUM 100 MG: 100 CAPSULE, LIQUID FILLED ORAL at 08:41

## 2023-05-03 RX ADMIN — CARBIDOPA AND LEVODOPA 2 TABLET: 25; 100 TABLET ORAL at 18:16

## 2023-05-03 RX ADMIN — DOCUSATE SODIUM 100 MG: 100 CAPSULE, LIQUID FILLED ORAL at 18:16

## 2023-05-03 RX ADMIN — ACETAMINOPHEN 975 MG: 325 TABLET ORAL at 13:15

## 2023-05-03 RX ADMIN — CARBIDOPA AND LEVODOPA 2 TABLET: 25; 100 TABLET ORAL at 08:41

## 2023-05-03 RX ADMIN — ENOXAPARIN SODIUM 30 MG: 30 INJECTION SUBCUTANEOUS at 00:14

## 2023-05-03 RX ADMIN — ONDANSETRON 4 MG: 2 INJECTION INTRAMUSCULAR; INTRAVENOUS at 00:14

## 2023-05-03 RX ADMIN — Medication 2.5 MG: at 18:16

## 2023-05-03 RX ADMIN — LIDOCAINE 5% 1 PATCH: 700 PATCH TOPICAL at 08:41

## 2023-05-03 RX ADMIN — ENOXAPARIN SODIUM 30 MG: 30 INJECTION SUBCUTANEOUS at 11:08

## 2023-05-03 RX ADMIN — PANTOPRAZOLE SODIUM 40 MG: 40 TABLET, DELAYED RELEASE ORAL at 08:41

## 2023-05-03 NOTE — ASSESSMENT & PLAN NOTE
· Patient with Parkinson's and ambulatory dysfunction at baseline, which he uses rollator walker--suffered a mechanical fall down several steps   · Patient did not strike his head, did not lose consciousness, and does not take blood thinners  · Injuries noted below  · PT/OT eval and treat  · Geriatrics consulted and note appreciated    · Case management for disposition planning  · Fall precautions

## 2023-05-03 NOTE — PLAN OF CARE
Problem: OCCUPATIONAL THERAPY ADULT  Goal: Performs self-care activities at highest level of function for planned discharge setting  See evaluation for individualized goals  Description: Treatment Interventions: ADL retraining, Endurance training, Cognitive reorientation, Patient/family training, Equipment evaluation/education, Compensatory technique education, Energy conservation, Activityengagement          See flowsheet documentation for full assessment, interventions and recommendations  Note: Limitation: Decreased ADL status, Decreased Safe judgement during ADL, Decreased cognition, Decreased endurance, Decreased self-care trans, Decreased high-level ADLs  Prognosis: Good  Assessment: Pt is a 82 y o  male seen for OT evaluation s/p admission [facility] on 5/2/2023 due to fall  Diagnosed with <principal problem not specified>  See medical history above for extensive list of comorbidities and significant PMHx affecting pt's functional performance at time of eval  Personal and env factors supporting pt at time of IE include social support and attitude towards recovery  Personal and env factors inhibiting engagement in occupations include advanced age, inaccessible home environment, difficulty completing ADLs and difficulty completing IADLs  During evaluation pt performed as is outlined above in flowsheet  Performance deficits that affect the pt’s occupational performance include impaired pain, balance, functional mobility, endurance, activity tolerance, forward functional reach, trunk control, functional standing tolerance and overall strength, attention to task, safety awareness, insight into deficits and problem solving  Based on pt’s functional performance and deficits the following occupations will be addressed in OT treatments in order to maximize pt’s independence and overall occupational performance: grooming, bathing/showering, toileting and toilet hygiene, dressing and functional mobility       OT Discharge Recommendation: Post acute rehabilitation services

## 2023-05-03 NOTE — PHYSICAL THERAPY NOTE
Physical Therapy Evaluation    Patient's Name: Renetta Gaston    Admitting Diagnosis  Back injury [S39 92XA]  Pelvic hematoma in male [N50 1]  Closed fracture of transverse process of thoracic vertebra, initial encounter ([facility] ) [S22 009A]  Closed fracture of multiple ribs of right side, initial encounter [S22 41XA]  Parkinsonism, unspecified Parkinsonism type ([facility] ) [G20]    Problem List  Patient Active Problem List   Diagnosis    NPH (normal pressure hydrocephalus) (HCC)    Cognitive impairment    Unsteady gait    Hearing loss, bilateral    Hyperlipidemia    Essential hypertension    Adenomatous polyp of sigmoid colon    Tremor    Severe obesity with body mass index (BMI) of 35 0 to 39 9    Rhinitis    Localized edema    Cervical spondylosis with myelopathy    Elevated PSA    Hiatal hernia    Positional lightheadedness    Sleep disturbances    Urinary incontinence    Arthritis    Constipation    Cataract    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)    Male pelvic hematoma       Past Medical History  Past Medical History:   Diagnosis Date    Arthritis     GERD (gastroesophageal reflux disease)     Hiatal hernia     Hypertension     Infectious viral hepatitis     Obesity     Skin tag     Spinal stenosis        Past Surgical History  Past Surgical History:   Procedure Laterality Date    APPENDECTOMY  1952    IN COLONOSCOPY FLX DX W/COLLJ SPEC WHEN PFRMD N/A 1/23/2019    Procedure: COLONOSCOPY;  Surgeon: Jeff Capone MD;  Location: [facility]; Service: Gastroenterology    IN ESOPHAGOGASTRODUODENOSCOPY TRANSORAL DIAGNOSTIC N/A 1/23/2019    Procedure: ESOPHAGOGASTRODUODENOSCOPY (EGD); Surgeon: Jeff Capone MD;  Location: [facility];   Service: Gastroenterology VENTRICULOATRIAL SHUNT  2012    VENTRICULOPERITONEAL SHUNT        23 0929   PT Last Visit   PT Visit Date 23   Note Type   Note type Evaluation   Pain Assessment   Pain Assessment Tool 0-10   Pain Score 2   Pain Location/Orientation Orientation: Right;Location: Rib Cage   Home Living   Type of [place] to enter with rails  (2 YANETH? w/ rails, however pt primarily uses garage entrance, 2+6 stairs within)   Home Equipment   (Rollator)   Additional Comments pt questionable historian, difficulty describing home set up   Prior Function   Lives With [address] in the last 6 months 1 to 4  (1 per this admission, at least 2 per CR)   Vocational Retired  ()   General   Family/Caregiver Present No   Cognition   Following  Ave one step commands inconsistently   Comments pt ID by wristband, name and    Subjective   Subjective pt supine in bed, stating need to go to the bathroom   RLE Assessment   RLE Assessment WFL   LLE Assessment   LLE Assessment WFL   Coordination   Movements are Fluid and Coordinated 0   Coordination and Movement Description rigidity   Bed Mobility   Supine to Sit 2  Maximal assistance   Additional items Assist x 1; Increased time required;LE management; Bedrails   Sit to Supine Unable to assess   Transfers   Sit to Stand 3  Moderate assistance   Additional items Assist x 1; Increased time required;Armrests; Verbal cues  (vc for hand placement)   Stand to Sit 3  Moderate assistance   Additional items Assist x 1; Increased time required;Verbal cues  (vc for positoing, sequencing)   Stand pivot 3  Moderate assistance   Additional items Assist x 2; Increased time required;Verbal cues;Armrests  (vc for hand placement and sequencing)   Toilet transfer 3  Moderate assistance   Additional items Assist x 2; Increased time required;Commode;Armrests; Verbal cues  (vc for sequencing)   Ambulation/Elevation   Gait pattern Ataxia; Short stride;Redundant gait at times; Shuffling;Decreased foot clearance;Decreased heel strike; Excessively slow   Gait Assistance 3  Moderate assist   Additional items Assist x 2;Verbal cues; Tactile cues  (cues for RW management, vc for sequencing)   Assistive Device Rolling walker   Distance 4' x 1   Stair Management Assistance Not tested   Ambulation/Elevation Additional Comments attempted BIG cues for patient, with no carryover   Balance   Static Sitting Fair +   Dynamic Sitting Fair   Static Standing Fair -   Ambulatory Zero  (assist x 2)   Activity Tolerance   Activity Tolerance Patient limited by fatigue;Patient limited by pain   Medical Staff Made Aware care coordinated with OT debi  Co-evaluation with OT as patient's participation in therapy services was limited by decreased activity tolerance and current medical status  Co-evaluation was performed in order to facilitate optimal performance in therapy services and maximize their rehabilitation  PT and OT disciplines addressed separate goals of patient's individualized care plan     Nurse Made Aware HEAVEN marks pre, PCA post   Assessment   Prognosis Fair   Problem List Decreased strength;Decreased endurance; Impaired balance;Decreased mobility; Decreased coordination;Decreased cognition;Decreased skin integrity;Pain   Assessment Pt is a 82 y o  male seen for PT evaluation s/p admit to [facility] on 5/2/2023  Pt was admitted with a primary dx of: back injury, pelvic hematoma, closed fracture of transverse process of thoracic vertebra, closed fracture of multiple ribs of right side, parkinsonism  PT now consulted for assessment of mobility and d/c needs  Pt with Up in chair orders  Pts current comorbidities and personal factors effecting treatment include: parkinsonism, HTN, GERD, BMI, venous insufficency   Pts current clinical presentation is Unstable/Unpredictable (high complexity) due to Ongoing medical management for primary dx, Increased reliance on more restrictive AD compared to baseline, Decreased activity tolerance compared to baseline, Fall risk, Increased assistance needed from caregiver at current time  Prior to admission, pt was independent with use of RW  Upon evaluation, pt currently is requiring MaxA for bed mobility; [address] x2 for transfers and [address] x2 for ambulation 4 ft w/ RW  Pt presents at PT eval functioning below baseline and currently w/ overall mobility deficits 2* to: BLE weakness, impaired balance, decreased endurance, impaired coordination, gait deviations, pain, decreased activity tolerance compared to baseline, decreased functional mobility tolerance compared to baseline, decreased safety awareness, fall risk, decreased cognition  Pt currently at a fall risk 2* to impairments listed above  Pt will continue to benefit from skilled acute PT interventions to address stated impairments; to maximize functional mobility; for ongoing pt/ family training; and DME needs  At conclusion of PT session all needs in reach, pt returned back in recliner chair and pt left with OT at conclusion of session with phone and call bell within reach  Pt denies any further questions at this time  Recommend IP rehab upon hospital D/C  Barriers to Discharge Inaccessible home environment   Goals   Patient Goals to get to the bathroom   STG Expiration Date 05/13/23   Short Term Goal #1 In 10 days pt will be able to: 1  Demonstrate ability to perform all aspects of bed mobility with supervision to improve functional safety  2  Perform functional transfers with supervision to facilitate safe return to previous living environment  3   Ambulate 150 ft with LRAD and supervision with stable vitals to improve safety with household distances and reduce fall risk  4  Improve LE strength grades by 1 to increase ease of functional mobility with transfers and gait  5  Pt will demonstrate improved balance by one grade in order to decrease risk of falls   6  Climb 6 steps with 1 HR with LRAD and supervision to simulate entrance to home  PT Treatment Day 0   Plan   Treatment/Interventions Functional transfer training;LE strengthening/ROM; Therapeutic exercise;Cognitive reorientation;Patient/family training;Equipment eval/education; Bed mobility;Gait training;Spoke to nursing;Spoke to case management;OT   PT Frequency 3-5x/wk   Recommendation   PT Discharge Recommendation Post acute rehabilitation services   Equipment Recommended [facility] Recommended Wheeled walker   AM-PAC Basic Mobility Inpatient   Turning in Flat Bed Without Bedrails 2   Lying on Back to Sitting on Edge of Flat Bed Without Bedrails 2   Moving Bed to Chair 1   Standing Up From Chair Using Arms 1   Walk in Room 1   Climb 3-5 Stairs With Railing 1   Basic Mobility Inpatient Raw Score 8   Turning Head Towards Sound 3   Follow Simple Instructions 3   Low Function Basic Mobility Raw Score  14   Low Function Basic Mobility Standardized Score  22 01   Highest Level Of Mobility   JH-HLM Goal 3: Sit at edge of bed   JH-HLM Achieved 4: Move to chair/commode   The patient's AM-PAC Basic Mobility Inpatient Short Form Raw Score is 8  A Raw score of less than or equal to 16 suggests the patient may benefit from discharge to post-acute rehabilitation services  Please also refer to the recommendation of the Physical Therapist for safe discharge planning      Avila Childers, PT

## 2023-05-03 NOTE — OCCUPATIONAL THERAPY NOTE
Occupational Therapy Evaluation     Patient Name: Valentín IVEYUYOANDY Date: 5/3/2023  Problem List  Active Problems:    NPH (normal pressure hydrocephalus) ([facility] )    Unsteady gait    Essential hypertension    Constipation    Parkinsonism ([facility] )    Fall (on) (from) other stairs and steps, initial encounter    Closed fracture of multiple ribs of right side    Fracture of transverse process of thoracic vertebra, closed, initial encounter [facility])    Male pelvic hematoma    Past Medical History  Past Medical History:   Diagnosis Date    Arthritis     GERD (gastroesophageal reflux disease)     Hiatal hernia     Hypertension     Infectious viral hepatitis     Obesity     Skin tag     Spinal stenosis      Past Surgical History  Past Surgical History:   Procedure Laterality Date    APPENDECTOMY  1952    KS COLONOSCOPY FLX DX W/COLLJ SPEC WHEN PFRMD N/A 1/23/2019    Procedure: COLONOSCOPY;  Surgeon: Saeid Bangura MD;  Location: [facility]; Service: Gastroenterology    KS ESOPHAGOGASTRODUODENOSCOPY TRANSORAL DIAGNOSTIC N/A 1/23/2019    Procedure: ESOPHAGOGASTRODUODENOSCOPY (EGD); Surgeon: Saeid Bangura MD;  Location: [facility]; Service: Gastroenterology    VENTRICULOATRIAL SHUNT  06/2012    VENTRICULOPERITONEAL SHUNT               05/03/23 0902   OT Last Visit   OT Visit Date 05/03/23   Note Type   Note type Evaluation   Pain Assessment   Pain Assessment Tool 0-10   Pain Score 2   Pain Location/Orientation Orientation: Right;Location: Rib Cage   Restrictions/Precautions   Weight Bearing Precautions Per Order No   Home Living   Type of Home House  (split level home)   Home Layout Two level;Stairs to enter with rails;Bed/bath upstairs  (2 YANETH?? garage vs front door, 6+6)   Bathroom Shower/Tub Tub/shower unit   Bathroom Toilet Standard   Bathroom Equipment Grab bars in shower; Tub transfer bench;Grab bars around toilet  (TTB with sliding seat)   [address]  (rollator: "use of rollator at baseline)   Additional Comments Pt a questionable historian, providing conflicting home set up information at times   Prior Function   Level of [place] Independent with ADLs   Lives With Spouse   Receives Help From Family   IADLs Family/Friend/Other provides transportation; Family/Friend/Other provides meals  (Pt reports (I) with medications, however per chart review pt has been doubling the dose of his sinemet)   Falls in the last 6 months 1 to 4  (2)   Vocational Retired  ()   Comments Pt continually states \"my wife doesn't want me to come home\"   Lifestyle   Autonomy PTA pt living with wife in [facility], pt (I) with ADLs and shares IADLs, (+)falls, (-)drives, use of RW at baseline   Reciprocal Relationships supportive wife   Service to Others retired    Vick Lowry enjoys going to [address]   Additional Pertinent History Pt with acute fx of T7-T10 transverse processes with displacement at T9 and T10  Per conversation with trauma team no plan for neurosurgery consult or bracing at this time   Family/Caregiver Present No   Subjective   Subjective \"I don't have pain in my back, its all right here (pointing to R rib cage)\"   ADL   Eating Assistance 5  Supervision/Setup   Grooming Assistance 5  Supervision/Setup   UB Bathing Assistance 4  Minimal Assistance   LB Bathing Assistance 2  Maximal [address] 2  Maximal Assistance   LB Dressing Deficit Don/doff R sock; Don/doff L sock; Increased time to complete;Supervision/safety;Verbal cueing   Toileting Assistance  2  Maximal Assistance   Toileting Deficit Clothing management down;Perineal hygiene;Clothing management up   Bed Mobility   Supine to Sit 2  Maximal assistance   Additional items Assist x 1; Increased time required;Verbal cues;LE management   Transfers   Sit to Stand 3  Moderate assistance   Additional items Assist x " "1;Increased time required;Verbal cues   Stand to Sit 3  Moderate assistance   Additional items Assist x 1; Increased time required;Verbal cues   Toilet transfer 3  Moderate assistance   Additional items Assist x 2; Increased time required;Verbal cues; Commode   Additional Comments Pt retropulsive upon standing, requiring A to remain upright   Functional Mobility   Functional Mobility 3  Moderate assistance   Additional Comments Ax2, A for advancing RW and VC to take big steps  Pt stating \"I don't need you to tell me to do that, I just can't because I am hurting\"  Pt then proceeding to take a bigger step at therapist's prompting  Pt with difficulty turning/changing direction, able to take 3-4 steps forward requiring chair and BSC to be brought up behind pt  Additional items Rolling walker   Balance   Static Sitting Fair +   Dynamic Sitting Fair   Static Standing Fair -   Ambulatory Zero   Activity Tolerance   Activity Tolerance Patient limited by fatigue;Patient limited by pain   Medical Staff Made Aware PT HEAVEN Rivera   RUE Assessment   RUE Assessment WFL   LUE Assessment   LUE Assessment WFL   Hand Function   Gross Motor Coordination Functional   Fine Motor Coordination Functional   Cognition   Overall Cognitive Status Impaired   Arousal/Participation Alert; Cooperative   Attention Attends with cues to redirect   Orientation Level Oriented X4   Memory Decreased short term memory;Decreased recall of recent events   Following Commands Follows one step commands with increased time or repetition   Comments Pt is a poor historian, providing conflicting home set up information  Limited insight into deficits and limited motivatio, would benefit from formal cognitive evaluation   Cognition Assessment Tools   (mini cog: see below)   Assessment   Limitation Decreased ADL status; Decreased Safe judgement during ADL;Decreased cognition;Decreased endurance;Decreased self-care trans;Decreased high-level ADLs   Prognosis Good " Assessment Pt is a 82 y o  male seen for OT evaluation s/p [facility]2023 due to fall  Diagnosed with <principal problem not specified>  See medical history above for extensive list of comorbidities and significant PMHx affecting pt's functional performance at time of eval  Personal and env factors supporting pt at time of IE include social support and attitude towards recovery  Personal and env factors inhibiting engagement in occupations include advanced age, inaccessible home environment, difficulty completing ADLs and difficulty completing IADLs  During evaluation pt performed as is outlined above in flowsheet  Performance deficits that affect the pt’s occupational performance include impaired pain, balance, functional mobility, endurance, activity tolerance, forward functional reach, trunk control, functional standing tolerance and overall strength, attention to task, safety awareness, insight into deficits and problem solving  Based on pt’s functional performance and deficits the following occupations will be addressed in OT treatments in order to maximize pt’s independence and overall occupational performance: grooming, bathing/showering, toileting and toilet hygiene, dressing and functional mobility  Goals   Patient Goals to go to the bathroom   LTG Time Frame 10-14   Long Term Goal see goals listed below   Plan   Treatment Interventions ADL retraining; Endurance training;Cognitive reorientation;Patient/family training;Equipment evaluation/education; Compensatory technique education; Energy conservation; Activityengagement   Goal Expiration Date 05/13/23   OT Treatment Day 0   OT Frequency 3-5x/wk   Recommendation   OT Discharge Recommendation Post acute rehabilitation services   AM-PAC Daily Activity Inpatient   Lower Body Dressing 2   Bathing 2   Toileting 2   Upper Body Dressing 3   Grooming 3   Eating 3   Daily Activity Raw Score 15   Daily Activity Standardized Score (Calc for Raw Score >=11) 34 69   AM-PAC Applied Cognition Inpatient   Following a Speech/Presentation 3   Understanding Ordinary Conversation 4   Taking Medications 2   Remembering Where Things Are Placed or Put Away 1   Remembering List of 4-5 Errands 2   Taking Care of Complicated Tasks 1   Applied Cognition Raw Score 13   Applied Cognition Standardized Score 30 46   Mini-Cog Assessment   Word Version Version 1: Banana, Sunrise, Chair   Clock Draw (CDT) 0   Word Recall 2   Mini-Cog Score 2   Mini-Cog Results Positive screen for dementia   Mini-Cog Assessment Comment Would benefit from further cognitive evaluation   Barthel Index   Feeding 5   Bathing 0   Grooming Score 5   Dressing Score 5   Bladder Score 5   Bowels Score 10   Toilet Use Score 5   Transfers (Bed/Chair) Score 5   Mobility (Level Surface) Score 0   Stairs Score 0   Barthel Index Score 40   End of Consult   Patient Position at End of Consult Bedside chair;Bed/Chair alarm activated; All needs within reach     GOALS:      -Patient will perform grooming tasks standing at sink with overall Supervision in order to increase overall independence    -Patient will be Mod I with UB ADLs using AE and AD as needed in order to increase (I) with ADLs    -Patient will be Mod A  with LB ADLs with use of AE and AD as needed in order to increase (I) with ADLs    -Patient will complete toileting w/ Mod A  w/ G hygiene/thoroughness in order to reduce caregiver burden    -Patient will demonstrate Min A x 1 with bed mobility for ability to manage own comfort and initiate OOB tasks      -Patient will perform functional transfers with Min A x 1 to/from all surfaces using DME as needed in order to increase (I) with functional tasks    -Patient will be Min A x 1 with functional mobility to/from bathroom for increased independence with toileting tasks    -Patient will tolerate therapeutic activities for greater than 30 min, in order to increase tolerance for functional activities      -Patient will increase OOB/sitting tolerance to 2-4 hours per day to increase participation in self-care and leisure tasks with no s/s of exertion      -Patient will engage in ongoing cognitive assessment in order to assist with safe discharge planning/recommendations  The patient's raw score on the AM-PAC Daily Activity Inpatient Short Form is 15  A raw score of less than 19 suggests the patient may benefit from discharge to post-acute rehabilitation services  Please refer to the recommendation of the Occupational Therapist for safe discharge planning  This session, pt required and most appropriately benefited from skilled OT/PT co-eval due to extensive physical assistance of SKILLED therapists, significant regression from functional baseline, and decreased activity tolerance  OT and PT goals were addressed separately as seen in documentation       Patricia Boyle MS, OTR/L

## 2023-05-03 NOTE — ASSESSMENT & PLAN NOTE
· S/p fall down stairs  · Acute fracture of the right T7-T10 transverse processes with minimal displacement at T9 and T10  · Multimodal pain control, APS consulted  · QuickDraw brace if needed for comfort  · Follow-up with trauma as an outpatient

## 2023-05-03 NOTE — PROGRESS NOTES
[facility]  Progress Note  Name: Maday Bond  MRN: [id number]  Unit/Bed#: S -01 I Date of Admission: 5/2/2023   Date of Service: 5/3/2023 I Hospital Day: 1    Assessment/Plan   Fall (on) (from) other stairs and steps, initial encounter  Assessment & Plan  · Patient with Parkinson's and ambulatory dysfunction at baseline, which he uses rollator walker--suffered a mechanical fall down several steps   · Patient did not strike his head, did not lose consciousness, and does not take blood thinners  · Injuries noted below  · PT/OT eval and treat  · Geriatrics consulted and note appreciated  · Case management for disposition planning  · Fall precautions    Male pelvic hematoma  Assessment & Plan  · S/p fall down stairs  · Right lateral pelvic deep subcutaneous hematoma overlying the tensor fascia rodolfo measures approximately 4 6 x 2 4 x 9 5 cm  · Minimal tenderness on exam  · Hemoglobin has remained stable over the past 24 hours  Most recently 15  6  Hemodynamically stable  · Pain control  · DVT prophylaxis: Lovenox    Fracture of transverse process of thoracic vertebra, closed, initial encounter ([facility] [address] )  Assessment & Plan  · S/p fall down stairs  · Acute fracture of the right T7-T10 transverse processes with minimal displacement at T9 and T10  · Multimodal pain control, APS consulted  · QuickDraw brace if needed for comfort  · Follow-up with trauma as an outpatient  Closed fracture of multiple ribs of right side  Assessment & Plan  · s/p fall down stairs   · CT imaging showed acute segmental fractures of the right 5th-10th ribs with minimal displacement of the posterior medial component of the right 7th through 10th ribs  Trace pleural effusion also noted  Rib fracture protocol  Pulmonary toilette/IS  Pulling 1300 mls on IS today  Multi modal analgesia  APS consulted    F/u CXR: Pending  · PT/OT     Parkinsonism [facility])  Assessment & Plan  · Continue home regimen of "carvidopa-levadopa  · PT/OT eval and treat    Constipation  Assessment & Plan  · Patient states has been constipated for 3 days now  · No abdominal pain  Abdominal exam benign  · Increase bowel regimen to include MiraLAX, Colace, senna  · Continue to monitor  Essential hypertension  Assessment & Plan  · Monitor vital signs  · Continue home meds, hydrochlorothiazide and enalapril    Unsteady gait  Assessment & Plan  · Ambulatory dysfunction at baseline  Uses rollator  · PT/OT    NPH (normal pressure hydrocephalus) (HCC)  Assessment & Plan  · S/p shunt placement  · Follow-up with neurosurgery as an outpatient as scheduled             Bowel Regimen: MiraLAX, Colace, senna  VTE Prophylaxis:Sequential compression device (Venodyne)  and Enoxaparin (Lovenox)     Disposition: Pending PT/OT eval    Subjective   Chief Complaint: \"I am having pain in my back\"    Subjective: Patient describes having pain in his back particularly on the right side  He denies any feelings of shortness of breath or difficulty breathing  He continues to use his incentive spirometer pulling over 1 L  He denies any new or worsening pain this morning  He states that his pain has been controlled with current pain regimen  He confirms that he is been tolerating his diet, but does note not being able to have a bowel movement for the past 3 days  He confirms he can move all of his extremities, though has had more difficulty moving due to the pain in his back  No other complaints offered  Objective   Vitals:   Temp:  [97 8 °F (36 6 °C)-98 4 °F (36 9 °C)] 98 4 °F (36 9 °C)  HR:  [70-99] 99  Resp:  [15-20] 16  BP: (126-157)/(72-89) 133/79    I/O       05/01 0701  05/02 0700 05/02 0701  05/03 0700 05/03 0701  05/04 0700    Urine (mL/kg/hr)  285 175 (0 6)    Stool  0 0    Total Output  285 175    Net  -285 -175           Unmeasured Stool Occurrence  0 x 0 x           Physical Exam:   GENERAL APPEARANCE: No acute distress  NEURO: GCS 15    " Light touch sensation intact throughout  No lateralizing weakness  HEENT: Normocephalic, atraumatic  Neck supple  CV: Regular rate and rhythm   +2 radial and dorsalis pedis pulses, bilaterally  LUNGS: Clear to auscultation, bilaterally  No orthopnea  No tachypnea  No flail segment  Chest is tender on the posterior right side  GI: Abdomen is soft and nontender  : Pelvis stable  MSK: Moving all extremities  No deformities  SKIN: Warm, dry  Invasive Devices     Peripheral Intravenous Line  Duration           Peripheral IV 05/02/23 Left Antecubital 1 day                 PIC Score  PIC Pain Score: 3 (5/3/2023  9:02 AM)  PIC Incentive Spirometry Score: 3 (5/3/2023  8:48 AM)  PIC Cough Description: 3 (5/3/2023  8:48 AM)  PIC Total Score: 8 (5/3/2023  8:48 AM)       If the Total PIC Score </=5, did you consult APS and evaluate patient for further intervention?: yes      Pain:    Incentive Spirometry  Cough  3 = Controlled  4 = Above goal volume 3 = Strong  2 = Moderate  3 = Goal to alert volume 2 = Weak  1 = Severe  2 = Below alert volume 1 = Absent     1 = Unable to perform IS         Lab Results:   Results: I have personally reviewed all pertinent laboratory/tests results, BMP/CMP:   Lab Results   Component Value Date    SODIUM 137 05/03/2023    K 3 8 05/03/2023     05/03/2023    CO2 27 05/03/2023    BUN 19 05/03/2023    CREATININE 0 92 05/03/2023    CALCIUM 9 7 05/03/2023    EGFR 77 05/03/2023    and CBC:   Lab Results   Component Value Date    WBC 8 88 05/03/2023    HGB 13 6 05/03/2023    HCT 42 7 05/03/2023    MCV 91 05/03/2023     05/03/2023    MCH 29 0 05/03/2023    MCHC 31 9 05/03/2023    RDW 13 1 05/03/2023    MPV 10 6 05/03/2023    NRBC 0 05/03/2023     Imaging: I have personally reviewed pertinent reports       Other Studies: none

## 2023-05-03 NOTE — ASSESSMENT & PLAN NOTE
· s/p fall down stairs   · CT imaging showed acute segmental fractures of the right 5th-10th ribs with minimal displacement of the posterior medial component of the right 7th through 10th ribs  Trace pleural effusion also noted  Rib fracture protocol  Pulmonary toilette/IS  Pulling 1300 mls on IS today  Multi modal analgesia  APS consulted    F/u CXR: Pending  · PT/OT

## 2023-05-03 NOTE — ASSESSMENT & PLAN NOTE
· S/p fall down stairs  · Right lateral pelvic deep subcutaneous hematoma overlying the tensor fascia rodolfo measures approximately 4 6 x 2 4 x 9 5 cm  · Minimal tenderness on exam  · Hemoglobin has remained stable over the past 24 hours  Most recently 15  6  Hemodynamically stable    · Pain control  · DVT prophylaxis: Lovenox

## 2023-05-03 NOTE — PLAN OF CARE
Problem: PHYSICAL THERAPY ADULT  Goal: Performs mobility at highest level of function for planned discharge setting  See evaluation for individualized goals  Description: Treatment/Interventions: Functional transfer training, LE strengthening/ROM, Therapeutic exercise, Cognitive reorientation, Patient/family training, Equipment eval/education, Bed mobility, Gait training, Spoke to nursing, Spoke to case management, OT  Equipment Recommended: Seun Serrato       See flowsheet documentation for full assessment, interventions and recommendations  Outcome: Progressing  Note: Prognosis: Fair  Problem List: Decreased strength, Decreased endurance, Impaired balance, Decreased mobility, Decreased coordination, Decreased cognition, Decreased skin integrity, Pain  Assessment: Pt is a 82 y o  male seen for PT evaluation s/p admit to [address] [place] [address] on 5/2/2023  Pt was admitted with a primary dx of: back injury, pelvic hematoma, closed fracture of transverse process of thoracic vertebra, closed fracture of multiple ribs of right side, parkinsonism  PT now consulted for assessment of mobility and d/c needs  Pt with Up in chair orders  Pts current comorbidities and personal factors effecting treatment include: parkinsonism, HTN, GERD, BMI, venous insufficency  Pts current clinical presentation is Unstable/Unpredictable (high complexity) due to Ongoing medical management for primary dx, Increased reliance on more restrictive AD compared to baseline, Decreased activity tolerance compared to baseline, Fall risk, Increased assistance needed from caregiver at current time  Prior to admission, pt was independent with use of RW  Upon evaluation, pt currently is requiring MaxA for bed mobility; [address] x2 for transfers and [address] x2 for ambulation 4 ft w/ RW   Pt presents at PT eval functioning below baseline and currently w/ overall mobility deficits 2* to: BLE weakness, impaired balance, decreased endurance, impaired coordination, gait deviations, pain, decreased activity tolerance compared to baseline, decreased functional mobility tolerance compared to baseline, decreased safety awareness, fall risk, decreased cognition  Pt currently at a fall risk 2* to impairments listed above  Pt will continue to benefit from skilled acute PT interventions to address stated impairments; to maximize functional mobility; for ongoing pt/ family training; and DME needs  At conclusion of PT session all needs in reach, pt returned back in recliner chair and pt left with OT at conclusion of session with phone and call bell within reach  Pt denies any further questions at this time  Recommend IP rehab upon hospital D/C  Barriers to Discharge: Inaccessible home environment     PT Discharge Recommendation: Post acute rehabilitation services    See flowsheet documentation for full assessment

## 2023-05-03 NOTE — PLAN OF CARE
Problem: PAIN - ADULT  Goal: Verbalizes/displays adequate comfort level or baseline comfort level  Description: Interventions:  - Encourage patient to monitor pain and request assistance  - Assess pain using appropriate pain scale  - Administer analgesics based on type and severity of pain and evaluate response  - Implement non-pharmacological measures as appropriate and evaluate response  - Consider cultural and social influences on pain and pain management  - Notify physician/advanced practitioner if interventions unsuccessful or patient reports new pain  Outcome: Progressing     Problem: RESPIRATORY - ADULT  Goal: Achieves optimal ventilation and oxygenation  Description: INTERVENTIONS:  - Assess for changes in respiratory status  - Assess for changes in mentation and behavior  - Position to facilitate oxygenation and minimize respiratory effort  - Oxygen administered by appropriate delivery if ordered  - Initiate smoking cessation education as indicated  - Encourage broncho-pulmonary hygiene including cough, deep breathe, Incentive Spirometry  - Assess the need for suctioning and aspirate as needed  - Assess and instruct to report SOB or any respiratory difficulty  - Respiratory Therapy support as indicated  Outcome: Progressing     Problem: MOBILITY - ADULT  Goal: Maintain or return to baseline ADL function  Description: INTERVENTIONS:  -  Assess patient's ability to carry out ADLs; assess patient's baseline for ADL function and identify physical deficits which impact ability to perform ADLs (bathing, care of mouth/teeth, toileting, grooming, dressing, etc )  - Assess/evaluate cause of self-care deficits   - Assess range of motion  - Assess patient's mobility; develop plan if impaired  - Assess patient's need for assistive devices and provide as appropriate  - Encourage maximum independence but intervene and supervise when necessary  - Involve family in performance of ADLs  - Assess for home care needs following discharge   - Consider OT consult to assist with ADL evaluation and planning for discharge  - Provide patient education as appropriate  Outcome: Progressing     Problem: Potential for Falls  Goal: Patient will remain free of falls  Description: INTERVENTIONS:  - Educate patient/family on patient safety including physical limitations  - Instruct patient to call for assistance with activity   - Consult OT/PT to assist with strengthening/mobility   - Keep Call bell within reach  - Keep bed low and locked with side rails adjusted as appropriate  - Keep care items and personal belongings within reach  - Initiate and maintain comfort rounds  - Make Fall Risk Sign visible to staff  - Apply yellow socks and bracelet for high fall risk patients  - Consider moving patient to room near nurses station  Outcome: Progressing

## 2023-05-03 NOTE — ASSESSMENT & PLAN NOTE
· Patient states has been constipated for 3 days now  · No abdominal pain  Abdominal exam benign  · Increase bowel regimen to include MiraLAX, Colace, senna  · Continue to monitor

## 2023-05-03 NOTE — CASE MANAGEMENT
Case Management Assessment & Discharge Planning Note    Patient name Mami Haro  Location S /S -01 MRN [id number]  : 1940 Date 5/3/2023       Current Admission Date: 2023  Current Admission Diagnosis:Parkinsonism [facility])   Patient Active Problem List    Diagnosis Date Noted   • Fall (on) (from) other stairs and steps, initial encounter 2023   • Closed fracture of multiple ribs of right side 2023   • Fracture of transverse process of thoracic vertebra, closed, initial encounter [facility]) 2023   • Male pelvic hematoma 2023   • Parkinsonism ([facility] [address] ) 2021   • Venous insufficiency 2020   • 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 2020   • Cataract 2019   • Constipation 2018   • Adenomatous polyp of sigmoid colon 2018   • Tremor 2018   • Localized edema 2018   • NPH (normal pressure hydrocephalus) ([facility] [address] ) 2018   • Cognitive impairment 2018   • Unsteady gait 2018   • Hearing loss, bilateral 2018   • Hyperlipidemia 2018   • Essential hypertension 2018   • Positional lightheadedness 2018   • Hiatal hernia 2017   • Severe obesity with body mass index (BMI) of 35 0 to 39 9 2017   • Rhinitis 2017   • Elevated PSA 2017   • Cervical spondylosis with myelopathy 10/03/2017   • Sleep disturbances 10/03/2017   • Urinary incontinence 10/03/2017   • Arthritis 10/03/2017      LOS (days): 1  Geometric Mean LOS (GMLOS) (days): 3 30  Days to GMLOS:2 2     OBJECTIVE:    Risk of Unplanned Readmission Score: 6 05         Current admission status: Inpatient       Preferred Pharmacy:   [address] [place] [address]  Phone: [phone] Fax: [phone]    Primary Care Provider: Quentin Young MD    Primary Insurance: MEDICARE  Secondary Insurance: Jermain Tavia    ASSESSMENT:  Violet Antonio Proxies    There are no active Health Care Proxies on file  Readmission Root Cause  30 Day Readmission: No    Patient Information  Admitted from[de-identified] Home  Mental Status: Alert  During Assessment patient was accompanied by: Spouse  Assessment information provided by[de-identified] Spouse  Primary Caregiver: Self  Support Systems: Spouse/significant other  [place] of Residence: [address] [place] [address] do you live in?: [email or web address] entry access options  Select all that apply : Stairs  Number of steps to enter home  : 1  Do the steps have railings?: Yes  Type of Current Residence: Other (Comment) ([place])  In the last 12 months, was there a time when you were not able to pay the mortgage or rent on time?: No  In the last 12 months, how many places have you lived?: 1  In the last 12 months, was there a time when you did not have a steady place to sleep or slept in a shelter (including now)?: No  Homeless/housing insecurity resource given?: N/A  Living Arrangements: Lives w/ Spouse/significant other  Is patient a ?: No    Activities of Daily Living Prior to Admission  Functional Status: Assistance  Completes ADLs independently?: No  Level of ADL dependence: Assistance  Ambulates independently?: Yes  Does patient use assisted devices?: Yes  Assisted Devices (DME) used: Walker, Shower Chair, Rollator  Does patient currently own DME?: Yes  What DME does the patient currently own?: Kailash Herndon Shower Chair  Does patient have a history of Outpatient Therapy (PT/OT)?: No  Does the patient have a history of Short-Term Rehab?: No  Does patient have a history of HHC?: Yes ([address] and [facility])  Does patient currently have [address]?: No         Patient Information Continued  Income Source: Pension/longterm  Does patient have prescription coverage?: Yes  Within the past 12 months, you worried that your food would run out before you got the money to buy more : Never true  Within the past 12 months, the food you bought just didn't last and you didn't have money to get more : Never true  Food insecurity resource given?: N/A  Does patient receive dialysis treatments?: No  Does patient have a history of substance abuse?: No  Does patient have a history of Mental Health Diagnosis?: No         Means of Transportation  Means of Transport to Appts[de-identified] Family transport  In the past 12 months, has lack of transportation kept you from medical appointments or from getting medications?: No  In the past 12 months, has lack of transportation kept you from meetings, work, or from getting things needed for daily living?: No  Was application for public transport provided?: N/A        DISCHARGE DETAILS:    Discharge planning discussed with[de-identified] Patient and daughter, Angus Gaitan of Choice: Yes  Comments - Freedom of Choice: CM discussed discharge plans per care team recommendations  Spouse requesting blanket SNF referrals  CM will follow up with SNF choice list   CM contacted family/caregiver?: Yes  Were Treatment Team discharge recommendations reviewed with patient/caregiver?: Yes  Did patient/caregiver verbalize understanding of patient care needs?: N/A- going to facility  Were patient/caregiver advised of the risks associated with not following Treatment Team discharge recommendations?: Yes    Contacts  Patient Contacts: Courtney Jamila - spouse  Relationship to Patient[de-identified] Family  Contact Method: In Person  Reason/Outcome: Discharge Planning, Referral    Requested [address]         Is the patient interested in [facility] at discharge?: No    DME Referral Provided  Referral made for DME?: No    Other Referral/Resources/Interventions Provided:  Interventions: Short Term Rehab  Referral Comments: CM met with patient's spouse and patient at bedside  As per wife, she's concerned of patient's falls at home and is in agreement with blanket SNF referrals   CM will folllow up with SNF choice list     Would you like to participate in our [address] service program?  : No - Declined    Treatment Team Recommendation: Short Term Rehab  Discharge Destination Plan[de-identified] Short Term Rehab

## 2023-05-03 NOTE — PROGRESS NOTES
Progress Note - Geriatric Medicine   So Rosen 82 y o  male MRN: [id number]  Unit/Bed#: S -01 Encounter: [id number]      Assessment/Plan:  1 -Fracture of transverse process of thoracic vertebra  -Patient sustained during mechanical fall he does have evidence of fracture of the right T7-T10 transverse processes with minimal displacement at T9 and T10  Pain management also following he appears to have more discomfort today than he did yesterday  2 -  Closed fracture of multiple ribs on the right side -Patient has fractures of seventh through 10th ribs on the right side he is able to do his incentive spirometry without any difficulty  Trace right pleural effusion noted  3 -  Parkinsonism -Patient is to continue with current medications    4 -Fall -  This time he fell backwards people with parkinsonism have a tendency to have retropulsion  Also discussed the use of a 4 wheeled walker given the fact that he has Parkinson's and he freezes at times this could prompt further falls  5 -Pelvic hematoma -Patient is a right lateral pelvic deep subcutaneous hematoma overlying the tensor fascia rodolfo measures 4 6 x 2 4 x 9 5 cm     6 -  Constipation -Patient opioids he was given MiraLAX, Senokot, Colace  7 -Essential hypertension    8 -  Desaturation -Patient's pulse ox when taken by nursing staff at 89% we will need to monitor closely like to keep him over 93% if possible  At present he is 96% according to nursing staff  Subjective:   Patient states that he had increased pain when he began moving that it classified as an 8 out of 10 currently his pain is about a 3 out of 10 he is laying in bed and resting  Patient has not moved his bowels  He denies any shortness of breath at present  Review of Systems   Constitutional: Negative  Eyes: Negative  Respiratory: Negative  Cardiovascular: Negative  Gastrointestinal: Positive for constipation  Endocrine: Negative      Genitourinary: "Negative  Musculoskeletal: Positive for arthralgias and gait problem  Skin: Negative  Allergic/Immunologic: Negative  Hematological: Negative  Psychiatric/Behavioral: Negative  Objective:     Vitals: Blood pressure 133/79, pulse 99, temperature 98 4 °F (36 9 °C), temperature source Oral, resp  rate 16, height 5' 9\" (1 753 m), weight 99 2 kg (218 lb 11 1 oz), SpO2 (!) 89 %  ,Body mass index is 32 3 kg/m²  Intake/Output Summary (Last 24 hours) at 5/3/2023 1105  Last data filed at 5/3/2023 0801  Gross per 24 hour   Intake --   Output 460 ml   Net -460 ml       Current Medications: Reviewed    Physical Exam:   Physical Exam  HENT:      Head: Atraumatic  Right Ear: Ear canal and external ear normal       Left Ear: Ear canal and external ear normal       Nose: Nose normal       Mouth/Throat:      Mouth: Mucous membranes are moist    Eyes:      Pupils: Pupils are equal, round, and reactive to light  Cardiovascular:      Rate and Rhythm: Regular rhythm  Pulses: Normal pulses  Heart sounds: Normal heart sounds  Pulmonary:      Effort: Pulmonary effort is normal       Breath sounds: Normal breath sounds  Abdominal:      General: Bowel sounds are normal       Palpations: Abdomen is soft  Musculoskeletal:         General: Normal range of motion  Cervical back: Neck supple  Skin:     General: Skin is warm  Neurological:      General: No focal deficit present  Mental Status: He is alert and oriented to person, place, and time  Invasive Devices     Peripheral Intravenous Line  Duration           Peripheral IV 05/02/23 Left Antecubital 1 day                Lab, Imaging and other studies: I have personally reviewed pertinent reports      "

## 2023-05-03 NOTE — CONSULTS
Rib Fracture Consultation - Acute Pain Service   Natanael Rose 82 y o  male MRN: [id number]  Unit/Bed#: S -01 Encounter: [id number]               Assessment/Plan     Assessment:   Patient Active Problem List   Diagnosis   • NPH (normal pressure hydrocephalus) (HCC)   • Cognitive impairment   • Unsteady gait   • Hearing loss, bilateral   • Hyperlipidemia   • Essential hypertension   • Adenomatous polyp of sigmoid colon   • Tremor   • Severe obesity with body mass index (BMI) of 35 0 to 39 9   • Rhinitis   • Localized edema   • Cervical spondylosis with myelopathy   • Elevated PSA   • Hiatal hernia   • Positional lightheadedness   • Sleep disturbances   • Urinary incontinence   • Arthritis   • Constipation   • Cataract   • 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   • Venous insufficiency   • Parkinsonism ([facility] [address] )   • Fall (on) (from) other stairs and steps, initial encounter   • Closed fracture of multiple ribs of right side   • Fracture of transverse process of thoracic vertebra, closed, initial encounter [facility])   • Male pelvic hematoma      Natanael Rose is a 82y o  year old male who presents status post fall downstairs with subsequent right lateral pelvic deep subcutaneous hematoma, acute fracture of the right T7-T10 transverse processes with mild displacement at T9 and T10, and multiple right-sided rib fractures, right 5-10 with minimal displacement of the posterior medial component of the right seventh through 10th ribs      Plan:    Multimodal analgesia with:  · Tylenol 975 mg every 8 hours scheduled  · Lidocaine patch daily, on for 12 hours and off for 12 hours  · Robaxin 500 mg every 6 hours scheduled  · Hold for sedation  · Recommend utilization of Robaxin during hospitalization however would discontinue muscle relaxants at time of discharge given age and history of falls  · Discontinue IV Dilaudid  · Discontinue oxycodone 5 mg dose  · Change oxycodone to 2 5 mg every 4 hours as needed for moderate or severe pain  Recommended interventions: We will hold off on epidural/peripheral blocks at this time as pain is overall mild, minimal use of as needed pain medications, stable respiratory status, adequate use of incentive spirometer > 1250ml and no acute respiratory distress  Bowel management/constipation  · Colace 100 mg twice a day  · MiraLAX daily  · Senokot daily    APS will continue to follow  Please contact Acute Pain Service - B via [employer] from [phone] with additional questions or concerns  See [employer] or Anayeli for additional contacts and after hours information  Plan discussed with primary team, anesthesiology and bedside nursing staff  History of Present Illness    Admit Date:  5/2/2023  Hospital Day:  1 day  Primary Service:  Trauma  Attending Provider:  Tacho Gonzales MD  Reason for Consult / Principal Problem: Acute pain due to trauma/rib fractures  HPI: Dartha Dakin is a 82 y o  male who presents status post fall downstairs with subsequent right lateral pelvic deep subcutaneous hematoma, acute fracture of the right T7-T10 transverse processes with mild displacement at T9 and T10, and multiple right-sided rib fractures, right 5-10 with minimal displacement of the posterior medial component of the right seventh through 10th ribs      Rib Fracture Evaluation:  Injuries: Multiple right-sided rib fractures  Chest tube: None  Respiratory Co-morbidities: none  SpO2:   SPO2 RA Rest    Flowsheet Row ED to Hosp-Admission (Current) from 5/2/2023 in [facility] 2nd Floor Med Surg Unit   SpO2 89 %   SpO2 Activity At Rest   O2 Device None (Room air)   O2 Flow Rate --        Incentive Spirometer: >1250 mL  Platelet Count:   Results from last 7 days   Lab Units 05/03/23  0508   PLATELETS Thousands/uL 250     Coags:   Results from last 7 days   Lab Units 05/03/23  0508   INR  1 08   PROTIME seconds 14 2     Home anticoagulants: None  DVT prophylaxis: Lovenox (enoxaparin) prophylactic BID last dose 5/3/2023 at 0014    Current pain location(s): Back, right posterior chest wall  Pain Scale: 0-3  Quality: Aching  Current Analgesic regimen: Patient sitting in chair, reporting pain in his right posterior chest wall and back which mildly increased when he got out of bed  Overall at rest pain is mild  Tolerating current medication regimen, unsure if dose of muscle relaxant overnight was effective  Reports constipation, he thinks his last bowel movement was on Monday  Pain History: No history of chronic pain  Takes ibuprofen products at home as needed for mild aches or pains  I have reviewed the patient's controlled substance dispensing history in the Prescription Drug Monitoring Program in compliance with the [facility] regulations before prescribing any controlled substances  Inpatient consult to Acute Pain Service (see Comments)  Consult performed by: ANDREA Masterson  Consult ordered by: Guy Phillips DO          Review of Systems   Respiratory: Positive for shortness of breath  Cardiovascular: Negative for chest pain  Gastrointestinal: Positive for constipation and nausea  Negative for diarrhea and vomiting  Genitourinary: Negative for difficulty urinating  Musculoskeletal: Positive for back pain  Neurological: Negative for dizziness and headaches  All other systems reviewed and are negative  Historical Information   Past Medical History:   Diagnosis Date   • Arthritis    • GERD (gastroesophageal reflux disease)    • Hiatal hernia    • Hypertension    • Infectious viral hepatitis    • Obesity    • Skin tag    • Spinal stenosis      Past Surgical History:   Procedure Laterality Date   • APPENDECTOMY  1952   • CA COLONOSCOPY FLX DX W/COLLJ SPEC WHEN PFRMD N/A 1/23/2019    Procedure: COLONOSCOPY;  Surgeon: Jorge L Leija MD;  Location: [facility] GI LAB;   Service: Gastroenterology   • CA ESOPHAGOGASTRODUODENOSCOPY TRANSORAL DIAGNOSTIC N/A 1/23/2019 Procedure: ESOPHAGOGASTRODUODENOSCOPY (EGD); Surgeon: Dawna Quiroga MD;  Location: [facility] GI LAB; Service: Gastroenterology   • VENTRICULOATRIAL SHUNT  06/2012   • VENTRICULOPERITONEAL SHUNT       Social History   Social History     Substance and Sexual Activity   Alcohol Use No     Social History     Substance and Sexual Activity   Drug Use No     Social History     Tobacco Use   Smoking Status Never   Smokeless Tobacco Never     Family History: non-contributory    Meds/Allergies   all current active meds have been reviewed, current meds:   Current Facility-Administered Medications   Medication Dose Route Frequency   • acetaminophen (TYLENOL) tablet 975 mg  975 mg Oral Q8H [address]   • carbidopa-levodopa (SINEMET)  mg per tablet 1 tablet  1 tablet Oral HS   • carbidopa-levodopa (SINEMET)  mg per tablet 2 tablet  2 tablet Oral TID   • cholecalciferol (VITAMIN D3) tablet 1,000 Units  1,000 Units Oral Daily   • docusate sodium (COLACE) capsule 100 mg  100 mg Oral BID   • enoxaparin (LOVENOX) subcutaneous injection 30 mg  30 mg Subcutaneous Q12H   • hydrochlorothiazide (HYDRODIURIL) tablet 12 5 mg  12 5 mg Oral Daily   • HYDROmorphone HCl (DILAUDID) injection 0 2 mg  0 2 mg Intravenous Q4H PRN   • lidocaine (LIDODERM) 5 % patch 1 patch  1 patch Topical Daily   • methocarbamol (ROBAXIN) tablet 500 mg  500 mg Oral Q6H [address]   • ondansetron (ZOFRAN) injection 4 mg  4 mg Intravenous Q6H PRN   • oxyCODONE (ROXICODONE) IR tablet 5 mg  5 mg Oral Q4H PRN   • oxyCODONE (ROXICODONE) split tablet 2 5 mg  2 5 mg Oral Q4H PRN   • pantoprazole (PROTONIX) EC tablet 40 mg  40 mg Oral BID   • polyethylene glycol (MIRALAX) packet 17 g  17 g Oral Daily   • senna (SENOKOT) tablet 17 2 mg  2 tablet Oral Daily    and PTA meds:   Prior to Admission Medications   Prescriptions Last Dose Informant Patient Reported? Taking?    Aspirin-Calcium Carbonate  MG TABS Not Taking  Yes No   Sig: Take by mouth daily   Patient not taking: "Reported on 5/2/2023   Cholecalciferol (VITAMIN D-3) 5000 units TABS 5/1/2023  Yes Yes   Sig: Take 1 tablet by mouth daily    Multiple Vitamin (MULTI-VITAMIN DAILY PO) 5/1/2023  Yes Yes   Sig: Take 1 tablet by mouth daily   atorvastatin (LIPITOR) 20 mg tablet Not Taking  No No   Sig: Take 1 tablet (20 mg total) by mouth daily   Patient not taking: Reported on 4/5/2023   carbidopa-levodopa (SINEMET)  mg per tablet 5/2/2023 at 1000  No Yes   Sig: Take 2tabs 8am, 1p, 6pm, and 1tab before bed   enalapril (VASOTEC) 5 mg tablet 5/1/2023  No Yes   Sig: Take half tablet by mouth once daily   hydrochlorothiazide (HYDRODIURIL) 12 5 mg tablet 5/1/2023  No Yes   Sig: TAKE ONE TABLET BY MOUTH EVERY MORNING   hydrocortisone 2 5 % cream Not Taking  No No   Sig: Apply topically 2 (two) times a day   Patient not taking: Reported on 2/28/2023   ibuprofen (MOTRIN) 200 mg tablet 5/2/2023 at 0800  Yes Yes   Sig: Take 600 mg by mouth every 6 (six) hours as needed for mild pain   ketoconazole (NIZORAL) 2 % cream Not Taking  No No   Sig: Apply topically twice daily   Patient not taking: Reported on 5/2/2023   ketoconazole (NIZORAL) 2 % shampoo Past Week  No Yes   Sig: Daily for 2 weeks straight and then on \"Mondays, Wednesdays and Fridays\":  Lather into scalp and skin on face, neck, chest, and back; leave on for 5 minutes and then rinse off completely  pantoprazole (PROTONIX) 40 mg tablet 5/1/2023  No Yes   Sig: Take 1 tablet (40 mg total) by mouth 2 (two) times a day      Facility-Administered Medications: None       No Known Allergies    Objective   Temp:  [97 8 °F (36 6 °C)-98 4 °F (36 9 °C)] 98 4 °F (36 9 °C)  HR:  [70-99] 99  Resp:  [15-20] 16  BP: (126-157)/(72-89) 133/79    Intake/Output Summary (Last 24 hours) at 5/3/2023 1014  Last data filed at 5/3/2023 0801  Gross per 24 hour   Intake --   Output 460 ml   Net -460 ml       Physical Exam  Vitals reviewed  Constitutional:       General: He is awake   He is not in acute " distress  Appearance: He is not ill-appearing, toxic-appearing or diaphoretic  HENT:      Head: Normocephalic and atraumatic  Nose: Nose normal  No congestion or rhinorrhea  Mouth/Throat:      Mouth: Mucous membranes are dry  Eyes:      Extraocular Movements: Extraocular movements intact  Cardiovascular:      Rate and Rhythm: Normal rate  Pulmonary:      Effort: Pulmonary effort is normal  No tachypnea, bradypnea or respiratory distress  Breath sounds: Decreased breath sounds present  No wheezing, rhonchi or rales  Abdominal:      General: Bowel sounds are decreased  There is distension  Tenderness: There is no abdominal tenderness  Skin:     General: Skin is warm and dry  Coloration: Skin is not pale  Neurological:      Mental Status: He is alert and oriented to person, place, and time  Mental status is at baseline  Psychiatric:         Attention and Perception: Attention normal          Mood and Affect: Mood normal  Mood is not anxious  Speech: Speech normal          Behavior: Behavior normal  Behavior is not agitated  Behavior is cooperative  Lab Results:   I have personally reviewed pertinent labs  , CBC:   Lab Results   Component Value Date    WBC 8 88 05/03/2023    HGB 13 6 05/03/2023    HCT 42 7 05/03/2023    MCV 91 05/03/2023     05/03/2023    MCH 29 0 05/03/2023    MCHC 31 9 05/03/2023    RDW 13 1 05/03/2023    MPV 10 6 05/03/2023    NRBC 0 05/03/2023   , CMP:   Lab Results   Component Value Date    SODIUM 137 05/03/2023    K 3 8 05/03/2023     05/03/2023    CO2 27 05/03/2023    BUN 19 05/03/2023    CREATININE 0 92 05/03/2023    CALCIUM 9 7 05/03/2023    EGFR 77 05/03/2023   , BMP:  Lab Results   Component Value Date    SODIUM 137 05/03/2023    K 3 8 05/03/2023     05/03/2023    CO2 27 05/03/2023    BUN 19 05/03/2023    CREATININE 0 92 05/03/2023    GLUC 100 05/03/2023    CALCIUM 9 7 05/03/2023    AGAP 9 05/03/2023    EGFR 77 05/03/2023   , PT/PTT:  Lab Results   Component Value Date    PTT 28 05/03/2023       Imaging Studies: I have personally reviewed pertinent reports  Counseling / Coordination of Care  Total floor / unit time spent today Level 3 = 55 minutes  Greater than 50% of total time was spent with the patient and / or family counseling and / or coordination of care  A description of the counseling / coordination of care: Chart reviewed, imaging studies and laboratory results reviewed, vital signs, current and home medications and PDMP also reviewed  Reviewed pain management plan of care, opioid pain medications, the use of nonopioids and muscle relaxants with patient  Discussed the possibility of interventional pain modalities if pain would become severe  Please note that the APS provides consultative services regarding pain management only  With the exception of ketamine, peripheral nerve catheters, and epidural infusions (and except when indicated), final decisions regarding starting or changing doses of analgesic medications are at the discretion of the consulting service  Off hours consultation and/or medication management is generally not available      ANDREA Newman  Acute Pain Service

## 2023-05-04 ENCOUNTER — ANESTHESIA EVENT (INPATIENT)
Dept: SURGERY | Facility: HOSPITAL | Age: 83
End: 2023-05-04

## 2023-05-04 ENCOUNTER — ANESTHESIA (INPATIENT)
Dept: SURGERY | Facility: HOSPITAL | Age: 83
End: 2023-05-04

## 2023-05-04 ENCOUNTER — APPOINTMENT (OUTPATIENT)
Dept: SURGERY | Facility: HOSPITAL | Age: 83
End: 2023-05-04

## 2023-05-04 LAB
ANION GAP SERPL CALCULATED.3IONS-SCNC: 9 MMOL/L (ref 4–13)
BACTERIA UR CULT: NORMAL
BASOPHILS # BLD AUTO: 0.03 THOUSANDS/ÂΜL (ref 0–0.1)
BASOPHILS NFR BLD AUTO: 0 % (ref 0–1)
BUN SERPL-MCNC: 18 MG/DL (ref 5–25)
CALCIUM SERPL-MCNC: 9.4 MG/DL (ref 8.4–10.2)
CHLORIDE SERPL-SCNC: 101 MMOL/L (ref 96–108)
CO2 SERPL-SCNC: 23 MMOL/L (ref 21–32)
CREAT SERPL-MCNC: 0.93 MG/DL (ref 0.6–1.3)
EOSINOPHIL # BLD AUTO: 0.08 THOUSAND/ÂΜL (ref 0–0.61)
EOSINOPHIL NFR BLD AUTO: 1 % (ref 0–6)
ERYTHROCYTE [DISTWIDTH] IN BLOOD BY AUTOMATED COUNT: 12.9 % (ref 11.6–15.1)
GFR SERPL CREATININE-BSD FRML MDRD: 76 ML/MIN/1.73SQ M
GLUCOSE SERPL-MCNC: 114 MG/DL (ref 65–140)
HCT VFR BLD AUTO: 41.9 % (ref 36.5–49.3)
HGB BLD-MCNC: 13.5 G/DL (ref 12–17)
IMM GRANULOCYTES # BLD AUTO: 0.01 THOUSAND/UL (ref 0–0.2)
IMM GRANULOCYTES NFR BLD AUTO: 0 % (ref 0–2)
LYMPHOCYTES # BLD AUTO: 1.43 THOUSANDS/ÂΜL (ref 0.6–4.47)
LYMPHOCYTES NFR BLD AUTO: 18 % (ref 14–44)
MCH RBC QN AUTO: 29.2 PG (ref 26.8–34.3)
MCHC RBC AUTO-ENTMCNC: 32.2 G/DL (ref 31.4–37.4)
MCV RBC AUTO: 91 FL (ref 82–98)
MONOCYTES # BLD AUTO: 0.82 THOUSAND/ÂΜL (ref 0.17–1.22)
MONOCYTES NFR BLD AUTO: 10 % (ref 4–12)
NEUTROPHILS # BLD AUTO: 5.69 THOUSANDS/ÂΜL (ref 1.85–7.62)
NEUTS SEG NFR BLD AUTO: 71 % (ref 43–75)
NRBC BLD AUTO-RTO: 0 /100 WBCS
PLATELET # BLD AUTO: 255 THOUSANDS/UL (ref 149–390)
PMV BLD AUTO: 10 FL (ref 8.9–12.7)
POTASSIUM SERPL-SCNC: 3.6 MMOL/L (ref 3.5–5.3)
RBC # BLD AUTO: 4.63 MILLION/UL (ref 3.88–5.62)
SODIUM SERPL-SCNC: 133 MMOL/L (ref 135–147)
WBC # BLD AUTO: 8.06 THOUSAND/UL (ref 4.31–10.16)

## 2023-05-04 PROCEDURE — 3E0T3BZ INTRODUCTION OF ANESTHETIC AGENT INTO PERIPHERAL NERVES AND PLEXI, PERCUTANEOUS APPROACH: ICD-10-PCS | Performed by: ANESTHESIOLOGY

## 2023-05-04 RX ORDER — OXYCODONE HYDROCHLORIDE 5 MG/1
5 TABLET ORAL EVERY 4 HOURS PRN
Status: DISCONTINUED | OUTPATIENT
Start: 2023-05-04 | End: 2023-05-05

## 2023-05-04 RX ORDER — LISINOPRIL 2.5 MG/1
2.5 TABLET ORAL DAILY
Status: DISCONTINUED | OUTPATIENT
Start: 2023-05-04 | End: 2023-05-10 | Stop reason: HOSPADM

## 2023-05-04 RX ORDER — MIDAZOLAM HYDROCHLORIDE 2 MG/2ML
INJECTION, SOLUTION INTRAMUSCULAR; INTRAVENOUS
Status: COMPLETED
Start: 2023-05-04 | End: 2023-05-04

## 2023-05-04 RX ORDER — MIDAZOLAM HYDROCHLORIDE 2 MG/2ML
INJECTION, SOLUTION INTRAMUSCULAR; INTRAVENOUS AS NEEDED
Status: DISCONTINUED | OUTPATIENT
Start: 2023-05-04 | End: 2023-05-04

## 2023-05-04 RX ORDER — FENTANYL CITRATE 50 UG/ML
INJECTION, SOLUTION INTRAMUSCULAR; INTRAVENOUS
Status: COMPLETED
Start: 2023-05-04 | End: 2023-05-04

## 2023-05-04 RX ORDER — BISACODYL 10 MG
10 SUPPOSITORY, RECTAL RECTAL ONCE
Status: COMPLETED | OUTPATIENT
Start: 2023-05-04 | End: 2023-05-04

## 2023-05-04 RX ORDER — FENTANYL CITRATE 50 UG/ML
INJECTION, SOLUTION INTRAMUSCULAR; INTRAVENOUS AS NEEDED
Status: DISCONTINUED | OUTPATIENT
Start: 2023-05-04 | End: 2023-05-04

## 2023-05-04 RX ADMIN — MIDAZOLAM HYDROCHLORIDE 0.5 MG: 1 INJECTION, SOLUTION INTRAMUSCULAR; INTRAVENOUS at 12:07

## 2023-05-04 RX ADMIN — CARBIDOPA AND LEVODOPA 1 TABLET: 25; 100 TABLET ORAL at 21:45

## 2023-05-04 RX ADMIN — METHOCARBAMOL TABLETS 500 MG: 500 TABLET, COATED ORAL at 07:37

## 2023-05-04 RX ADMIN — PANTOPRAZOLE SODIUM 40 MG: 40 TABLET, DELAYED RELEASE ORAL at 17:40

## 2023-05-04 RX ADMIN — PANTOPRAZOLE SODIUM 40 MG: 40 TABLET, DELAYED RELEASE ORAL at 08:01

## 2023-05-04 RX ADMIN — ACETAMINOPHEN 975 MG: 325 TABLET ORAL at 21:45

## 2023-05-04 RX ADMIN — DOCUSATE SODIUM 100 MG: 100 CAPSULE, LIQUID FILLED ORAL at 17:40

## 2023-05-04 RX ADMIN — CARBIDOPA AND LEVODOPA 2 TABLET: 25; 100 TABLET ORAL at 17:40

## 2023-05-04 RX ADMIN — DOCUSATE SODIUM 100 MG: 100 CAPSULE, LIQUID FILLED ORAL at 08:02

## 2023-05-04 RX ADMIN — Medication 2.5 MG: at 04:24

## 2023-05-04 RX ADMIN — SENNOSIDES 17.2 MG: 8.6 TABLET, FILM COATED ORAL at 08:01

## 2023-05-04 RX ADMIN — METHOCARBAMOL TABLETS 500 MG: 500 TABLET, COATED ORAL at 13:08

## 2023-05-04 RX ADMIN — ACETAMINOPHEN 975 MG: 325 TABLET ORAL at 07:35

## 2023-05-04 RX ADMIN — CARBIDOPA AND LEVODOPA 2 TABLET: 25; 100 TABLET ORAL at 13:06

## 2023-05-04 RX ADMIN — OXYCODONE HYDROCHLORIDE 5 MG: 5 TABLET ORAL at 15:40

## 2023-05-04 RX ADMIN — ACETAMINOPHEN 975 MG: 325 TABLET ORAL at 13:06

## 2023-05-04 RX ADMIN — METHOCARBAMOL TABLETS 500 MG: 500 TABLET, COATED ORAL at 17:40

## 2023-05-04 RX ADMIN — LIDOCAINE 5% 1 PATCH: 700 PATCH TOPICAL at 08:02

## 2023-05-04 RX ADMIN — HYDROMORPHONE HYDROCHLORIDE 0.2 MG: 0.2 INJECTION, SOLUTION INTRAMUSCULAR; INTRAVENOUS; SUBCUTANEOUS at 07:37

## 2023-05-04 RX ADMIN — CARBIDOPA AND LEVODOPA 2 TABLET: 25; 100 TABLET ORAL at 08:02

## 2023-05-04 RX ADMIN — BISACODYL 10 MG: 10 SUPPOSITORY RECTAL at 13:05

## 2023-05-04 RX ADMIN — FENTANYL CITRATE 50 MCG: 50 INJECTION, SOLUTION INTRAMUSCULAR; INTRAVENOUS at 12:07

## 2023-05-04 RX ADMIN — Medication 1000 UNITS: at 08:01

## 2023-05-04 RX ADMIN — FENTANYL CITRATE 8 ML: 50 INJECTION INTRAMUSCULAR; INTRAVENOUS at 14:30

## 2023-05-04 NOTE — ASSESSMENT & PLAN NOTE
· s/p fall down stairs   · CT imaging showed acute segmental fractures of the right 5th-10th ribs with minimal displacement of the posterior medial component of the right 7th through 10th ribs  Trace pleural effusion also noted  Rib fracture protocol  Pulmonary toilette/IS  I-S is downtrending today to 800-1000 mL  Multi modal analgesia  APS consulted    F/u CXR: Unremarkable  · PT/OT

## 2023-05-04 NOTE — ANESTHESIA PROCEDURE NOTES
Peripheral Block    Patient location during procedure: holding area  Start time: 5/4/2023 12:24 PM  Reason for block: at surgeon's request and post-op pain management  Staffing  Performed: Anesthesiologist   Anesthesiologist: Lazarus Barcelona, MD  Preanesthetic Checklist  Completed: patient identified, IV checked, site marked, risks and benefits discussed, surgical consent, monitors and equipment checked, pre-op evaluation and timeout performed  Peripheral Block  Patient position: left lateral decubitus  Prep: ChloraPrep  Patient monitoring: continuous pulse ox and frequent blood pressure checks  Block type: paravertebral  Laterality: right  Injection technique: catheter  Procedures: ultrasound guided, Ultrasound guidance required for the procedure to increase accuracy and safety of medication placement and decrease risk of complications    Needle  Needle type: Tuohy   Needle gauge: 18 G  Needle localization: ultrasound guidance and anatomical landmarks  Test dose: negative  Assessment  Injection assessment: incremental injection, local visualized surrounding nerve on ultrasound, negative aspiration for heme and no paresthesia on injection  Paresthesia pain: none  Heart rate change: no  Slow fractionated injection: yes  Post-procedure:  site cleaned, sterile dressing applied and secured with tape  patient tolerated the procedure well with no immediate complications

## 2023-05-04 NOTE — PHYSICAL THERAPY NOTE
PHYSICAL THERAPY NOTE    Patient Name: Rocio Wells  RCUVU'V Date: 23 1342   PT Last Visit   PT Visit Date 23   Note Type   Note Type Treatment   Pain Assessment   Pain Assessment Tool 0-10   Pain Score 8  (with moblity, 4/10 at rest)   Pain Location/Orientation Orientation: Right;Location: Rib Cage   General   Family/Caregiver Present Yes  (spouse)   Subjective   Subjective Patient supine in bed and is agreeable to participate in therapy session  Patient identifers obtained from name &   Bed Mobility   Supine to Sit 3  Moderate assistance   Additional items Assist x 1;HOB elevated; Bedrails; Increased time required;Verbal cues;LE management   Sit to Supine Unable to assess   Additional Comments Patient seated OOB in recliner post session with chair alarm engaged, call bell and belongings in reach  Transfers   Sit to Stand 3  Moderate assistance   Additional items Assist x 1; Armrests; Increased time required;Verbal cues   Stand to Sit 3  Moderate assistance   Additional items Assist x 1; Armrests; Increased time required;Verbal cues   Stand pivot 3  Moderate assistance   Additional items Assist x 1; Armrests; Verbal cues; Increased time required  (close stand by of other)   Ambulation/Elevation   Gait pattern Ataxia; Step to;Excessively slow; Short stride; Shuffling; Festenating;Decreased heel strike;Decreased foot clearance   Gait Assistance 3  Moderate assist   Additional items Assist x 1;Verbal cues   Assistive Device Rolling walker   Distance 3' x1   Balance   Static Sitting Fair +   Dynamic Sitting Fair   Static Standing Fair -   Ambulatory Poor -   Activity Tolerance   Activity Tolerance Patient limited by fatigue;Patient limited by pain   Nurse Made Aware Spoke to Dez Red RN & LUDMILA Arcos   Assessment   Prognosis Fair   Problem List Decreased strength;Decreased endurance; Impaired balance;Decreased mobility; Decreased coordination;Decreased cognition;Decreased skin integrity;Pain   Assessment Patient supine in bed and is agreeable to participate in therapy session  Patient demonstrated progression with functional moblity requiring less person assist  Supine to sit and sit<>stand transfers with mod ax1 and close stand by of other  Requires significant amount of time for mobility with verbal instruction for technique and frequent rest breaks  Pt able to ambulate few steps with roller walker and mod ax1 requiring steadying balance assist and verbal instruction for step by step cuing for LE advancement and body positioning  Pt remains limited by pain and requires reassurance throughout session  Continue to focus on OOB mobility with progression of transfers and ambulation as appropriate and able  Barriers to Discharge Inaccessible home environment   Goals   Patient Goals to have less pain   STG Expiration Date 05/13/23   PT Treatment Day 1   Plan   Treatment/Interventions Functional transfer training;LE strengthening/ROM; Endurance training;Cognitive reorientation;Patient/family training;Equipment eval/education; Bed mobility;Gait training;Spoke to nursing   PT Frequency 3-5x/wk   Recommendation   PT Discharge Recommendation Post acute rehabilitation services   Equipment Recommended [facility] Recommended Wheeled walker   AM-PAC Basic Mobility Inpatient   Turning in Flat Bed Without Bedrails 2   Lying on Back to Sitting on Edge of Flat Bed Without Bedrails 2   Moving Bed to Chair 2   Standing Up From Chair Using Arms 2   Walk in Room 2   Climb 3-5 Stairs With Railing 1   Basic Mobility Inpatient Raw Score 11   Basic Mobility Standardized Score 30 25   Highest Level Of Mobility   -[facility] Goal 4: Move to chair/commode   -HL Achieved 4: Move to chair/commode     The patient's AM-PAC Basic Mobility Inpatient Short Form Raw Score is 11   A Raw score of less than or equal to 16 suggests the patient may benefit from discharge to post-acute rehabilitation services  Please also refer to the recommendation of the Physical Therapist for safe discharge planning        Ney Hale PTA

## 2023-05-04 NOTE — ANESTHESIA PREPROCEDURE EVALUATION
Procedure:  ANESTHESIA PERIPHERAL BLOCK    See acute pain service progress note  Relevant Problems   CARDIO   (+) Essential hypertension   (+) Hyperlipidemia      GI/HEPATIC   (+) Hiatal hernia      MUSCULOSKELETAL   (+) Arthritis   (+) Cervical spondylosis with myelopathy   (+) Hiatal hernia        Physical Exam    Airway    Mallampati score: II         Dental       Cardiovascular  Cardiovascular exam normal    Pulmonary  Pulmonary exam normal     Other Findings        Anesthesia Plan  ASA Score- 3     Anesthesia Type- regional with ASA Monitors  Additional Monitors:   Airway Plan:           Plan Factors-    Chart reviewed  EKG reviewed  Existing labs reviewed  Patient summary reviewed  Induction-     Postoperative Plan-     Informed Consent- Anesthetic plan and risks discussed with patient

## 2023-05-04 NOTE — ASSESSMENT & PLAN NOTE
· Patient states has been constipated for 3 days now  · No abdominal pain  Abdominal exam benign  · Increase bowel regimen to include MiraLAX, Colace, senna---will add Suppository today  · Continue to monitor

## 2023-05-04 NOTE — PROGRESS NOTES
"Progress Note - Geriatric Medicine   Shante Rosen 82 y o  male MRN: [id number]  Unit/Bed#: S -01 Encounter: [id number]      Assessment/Plan:  1 -Closed fracture of multiple ribs on the right side  -  Patient has fractures from seventh through 10th rib on the right side he began to have increased discomfort after movement yesterday which she classified about an 8 out of 10  He was reevaluated by pain management they will be placing an epidural to help alleviate his discomfort  2 -  Parkinsonism -  Patient with shuffling gait takes small steps  Will need physical and Occupational Therapy to help improve his ambulation  -    3 -History of pelvic hematoma -Patient with a right pelvic deep subcutaneous hematoma overlying the tensor fascia rodolfo which measures 4 6 x 2 4 x 9 5 cm     4 -  Constipation -Continue with MiraLAX, Senokot and Colace if no BM tomorrow we will give fleets enema  5 -  Essential hypertension -Patient's blood pressure today was 113/64    6 -  Chest x-ray -This was performed on May 3, 2023 no evidence of pneumothorax  Subjective:   Patient was evaluated by pain management they have just placed on epidural   Patient was able to get out of bed with the assistance of 2 and a walker and transfer to a recliner  Epidural will be started in order to alleviate patient's discomfort  Patient states that he did not have a bowel movement yet  Review of Systems   Constitutional: Negative  HENT: Negative  Eyes: Negative  Respiratory: Negative  Cardiovascular: Negative  Gastrointestinal: Positive for constipation  Endocrine: Negative  Genitourinary: Negative  Musculoskeletal: Positive for arthralgias  Skin: Negative  Neurological: Negative  Objective:     Vitals: Blood pressure 113/64, pulse 79, temperature 99 2 °F (37 3 °C), resp  rate 20, height 5' 9\" (1 753 m), weight 99 2 kg (218 lb 11 1 oz), SpO2 92 %  ,Body mass index is 32 3 kg/m²        Intake/Output " Summary (Last 24 hours) at 5/4/2023 1320  Last data filed at 5/3/2023 2153  Gross per 24 hour   Intake 1000 ml   Output 425 ml   Net 575 ml       Current Medications: Reviewed    Physical Exam:   Physical Exam  HENT:      Mouth/Throat:      Mouth: Mucous membranes are moist    Eyes:      Pupils: Pupils are equal, round, and reactive to light  Cardiovascular:      Rate and Rhythm: Normal rate and regular rhythm  Pulses: Normal pulses  Heart sounds: Normal heart sounds  Pulmonary:      Comments: Clear in apices and midlung field decreased breath sounds in bases  Abdominal:      Palpations: Abdomen is soft  Musculoskeletal:      Cervical back: Neck supple  Skin:     General: Skin is dry  Neurological:      General: No focal deficit present  Mental Status: He is alert and oriented to person, place, and time  Psychiatric:         Mood and Affect: Mood normal          Behavior: Behavior normal           Invasive Devices     Peripheral Intravenous Line  Duration           Peripheral IV 05/02/23 Left Antecubital 2 days          Epidural Line  Duration           Nerve Block Catheter 05/04/23 <1 day                Lab, Imaging and other studies: I have personally reviewed pertinent reports

## 2023-05-04 NOTE — PROGRESS NOTES
[facility]  Progress Note  Name: Vanda Osullivan  MRN: [id number]  Unit/Bed#: S -01 I Date of Admission: 5/2/2023   Date of Service: 5/4/2023 I Hospital Day: 2    Assessment/Plan   Fall (on) (from) other stairs and steps, initial encounter  Assessment & Plan  · Patient with Parkinson's and ambulatory dysfunction at baseline, which he uses rollator walker--suffered a mechanical fall down several steps   · Patient did not strike his head, did not lose consciousness, and does not take blood thinners  · Injuries noted below  · PT/OT eval and treat  · Geriatrics consulted and note appreciated  · Case management for disposition planning  · Fall precautions    Male pelvic hematoma  Assessment & Plan  · S/p fall down stairs  · Right lateral pelvic deep subcutaneous hematoma overlying the tensor fascia rodolfo measures approximately 4 6 x 2 4 x 9 5 cm  · Minimal tenderness on exam  · Hemoglobin has remained stable  Most recently 15  6  Hemodynamically stable  · Pain control  · DVT prophylaxis: Lovenox    Fracture of transverse process of thoracic vertebra, closed, initial encounter ([facility] [address] )  Assessment & Plan  · S/p fall down stairs  · Acute fracture of the right T7-T10 transverse processes with minimal displacement at T9 and T10  · Multimodal pain control, APS consulted  · QuickDraw brace if needed for comfort  · Follow-up with trauma as an outpatient  Closed fracture of multiple ribs of right side  Assessment & Plan  · s/p fall down stairs   · CT imaging showed acute segmental fractures of the right 5th-10th ribs with minimal displacement of the posterior medial component of the right 7th through 10th ribs  Trace pleural effusion also noted  Rib fracture protocol  Pulmonary toilette/IS  I-S is downtrending today to 800-1000 mL  Multi modal analgesia  APS consulted    F/u CXR: Unremarkable  · PT/OT     Parkinsonism [facility])  Assessment & Plan  · Continue home regimen of "carvidopa-levadopa  · PT/OT eval and treat    Constipation  Assessment & Plan  · Patient states has been constipated for 3 days now  · No abdominal pain  Abdominal exam benign  · Increase bowel regimen to include MiraLAX, Colace, senna---will add Suppository today  · Continue to monitor  Essential hypertension  Assessment & Plan  · Monitor vital signs  · Continue home meds, hydrochlorothiazide and enalapril    Unsteady gait  Assessment & Plan  · Ambulatory dysfunction at baseline  Uses rollator  · PT/OT    NPH (normal pressure hydrocephalus) (HCC)  Assessment & Plan  · S/p shunt placement  · Follow-up with neurosurgery as an outpatient as scheduled             Bowel Regimen: Colace, senna, MiraLAX, suppository  VTE Prophylaxis:Sequential compression device (Venodyne)  and Enoxaparin (Lovenox)     Disposition: Pending pain control    Subjective   Chief Complaint: \"My back hurts\"    Subjective: Patient complains of having back pain, on the right side  He notes that the pain is worse today than yesterday  He denies any shortness of breath but does have difficulty taking a deep breath  He has been using his incentive spirometer  He is unsure how high he has been getting it  He denies any numbness, tingling, weakness  He still has not had a bowel movement  He confirms that he is passing flatus  He denies any abdominal pain or GI intolerance  No other complaints offered  Objective   Vitals:   Temp:  [97 3 °F (36 3 °C)-98 7 °F (37 1 °C)] 98 5 °F (36 9 °C)  HR:  [68-74] 73  Resp:  [16-19] 16  BP: (108-138)/(65-76) 108/65    I/O       05/02 0701  05/03 0700 05/03 0701  05/04 0700 05/04 0701  05/05 0700    P  O   1680     Total Intake(mL/kg)  1680 (16 9)     Urine (mL/kg/hr) 285 600 (0 3)     Stool 0 0     Total Output 285 600     Net -285 +1080            Unmeasured Urine Occurrence  1 x     Unmeasured Stool Occurrence 0 x 0 x            Physical Exam:   GENERAL APPEARANCE: No acute distress    NEURO: " GCS is 15  Light touch sensation intact throughout  No lateralizing weakness  HEENT: Normocephalic, atraumatic  Neck supple  CV: Regular rate and rhythm   +2 radial and dorsalis pedis pulses, bilaterally  LUNGS: Clear to auscultation, bilaterally  No orthopnea  No tachypnea  Patient looks like he is in discomfort when he takes a deep breath  He is able to inspire 800-1000ml  No flail segment  Tenderness on posterior right side  GI: Abdomen is soft and distended  Nontender  Bowel sounds are present  : Pelvis is stable  MSK: Moving all extremities  No deformities  SKIN: Warm, dry  Invasive Devices     Peripheral Intravenous Line  Duration           Peripheral IV 05/02/23 Left Antecubital 2 days                 PIC Score  PIC Pain Score: 1 (5/4/2023  7:35 AM)  PIC Incentive Spirometry Score: 4 (5/3/2023  4:00 PM)  PIC Cough Description: 3 (5/3/2023  4:00 PM)  PIC Total Score: 9 (5/3/2023  4:00 PM)       If the Total PIC Score </=5, did you consult APS and evaluate patient for further intervention?: yes      Pain:    Incentive Spirometry  Cough  3 = Controlled  4 = Above goal volume 3 = Strong  2 = Moderate  3 = Goal to alert volume 2 = Weak  1 = Severe  2 = Below alert volume 1 = Absent     1 = Unable to perform IS         Lab Results: Results: I have personally reviewed all pertinent laboratory/tests results, BMP/CMP: No results found for: SODIUM, K, CL, CO2, ANIONGAP, BUN, CREATININE, GLUCOSE, CALCIUM, AST, ALT, ALKPHOS, PROT, BILITOT, EGFR and CBC: No results found for: WBC, HGB, HCT, MCV, PLT, ADJUSTEDWBC, MCH, MCHC, RDW, MPV, NRBC  Imaging: I have personally reviewed pertinent reports       Other Studies: none

## 2023-05-04 NOTE — ANESTHESIA POSTPROCEDURE EVALUATION
Post-Op Assessment Note    CV Status:  Stable    Pain management: adequate     Mental Status:  Alert and awake   Hydration Status:  Euvolemic   PONV Controlled:  Controlled   Airway Patency:  Patent      Post Op Vitals Reviewed: Yes            No notable events documented      BP   117/59   Temp      Pulse  77   Resp   10   SpO2   95

## 2023-05-04 NOTE — PLAN OF CARE
Problem: Potential for Falls  Goal: Patient will remain free of falls  Description: INTERVENTIONS:  - Educate patient/family on patient safety including physical limitations  - Instruct patient to call for assistance with activity   - Consult OT/PT to assist with strengthening/mobility   - Keep Call bell within reach  - Keep bed low and locked with side rails adjusted as appropriate  - Keep care items and personal belongings within reach  - Initiate and maintain comfort rounds  - Make Fall Risk Sign visible to staff  - Offer Toileting every  Hours, in advance of need  - Initiate/Maintainalarm  - Obtain necessary fall risk management equipment  - Apply yellow socks and bracelet for high fall risk patients  - Consider moving patient to room near nurses station  Outcome: Progressing     Problem: MOBILITY - ADULT  Goal: Maintain or return to baseline ADL function  Description: INTERVENTIONS:  -  Assess patient's ability to carry out ADLs; assess patient's baseline for ADL function and identify physical deficits which impact ability to perform ADLs (bathing, care of mouth/teeth, toileting, grooming, dressing, etc )  - Assess/evaluate cause of self-care deficits   - Assess range of motion  - Assess patient's mobility; develop plan if impaired  - Assess patient's need for assistive devices and provide as appropriate  - Encourage maximum independence but intervene and supervise when necessary  - Involve family in performance of ADLs  - Assess for home care needs following discharge   - Consider OT consult to assist with ADL evaluation and planning for discharge  - Provide patient education as appropriate  Outcome: Progressing  Goal: Maintains/Returns to pre admission functional level  Description: INTERVENTIONS:  - Perform BMAT or MOVE assessment daily    - Set and communicate daily mobility goal to care team and patient/family/caregiver     - Collaborate with rehabilitation services on mobility goals if consulted  - Perform Range of Motion times a day  - Reposition patient every  hours    - Dangle patient  times a day  - Stand patient  times a day  - Ambulate patient  times a day  - Out of bed to chair  times a day   - Out of bed for meals times a day  - Out of bed for toileting  - Record patient progress and toleration of activity level   Outcome: Progressing     Problem: RESPIRATORY - ADULT  Goal: Achieves optimal ventilation and oxygenation  Description: INTERVENTIONS:  - Assess for changes in respiratory status  - Assess for changes in mentation and behavior  - Position to facilitate oxygenation and minimize respiratory effort  - Oxygen administered by appropriate delivery if ordered  - Initiate smoking cessation education as indicated  - Encourage broncho-pulmonary hygiene including cough, deep breathe, Incentive Spirometry  - Assess the need for suctioning and aspirate as needed  - Assess and instruct to report SOB or any respiratory difficulty  - Respiratory Therapy support as indicated  Outcome: Progressing     Problem: PAIN - ADULT  Goal: Verbalizes/displays adequate comfort level or baseline comfort level  Description: Interventions:  - Encourage patient to monitor pain and request assistance  - Assess pain using appropriate pain scale  - Administer analgesics based on type and severity of pain and evaluate response  - Implement non-pharmacological measures as appropriate and evaluate response  - Consider cultural and social influences on pain and pain management  - Notify physician/advanced practitioner if interventions unsuccessful or patient reports new pain  Outcome: Progressing     Problem: Prexisting or High Potential for Compromised Skin Integrity  Goal: Skin integrity is maintained or improved  Description: INTERVENTIONS:  - Identify patients at risk for skin breakdown  - Assess and monitor skin integrity  - Assess and monitor nutrition and hydration status  - Monitor labs   - Assess for incontinence   - Turn and reposition patient  - Assist with mobility/ambulation  - Relieve pressure over bony prominences  - Avoid friction and shearing  - Provide appropriate hygiene as needed including keeping skin clean and dry  - Evaluate need for skin moisturizer/barrier cream  - Collaborate with interdisciplinary team   - Patient/family teaching  - Consider wound care consult   Outcome: Progressing

## 2023-05-04 NOTE — PLAN OF CARE
Problem: PHYSICAL THERAPY ADULT  Goal: Performs mobility at highest level of function for planned discharge setting  See evaluation for individualized goals  Description: Treatment/Interventions: Functional transfer training, LE strengthening/ROM, Therapeutic exercise, Cognitive reorientation, Patient/family training, Equipment eval/education, Bed mobility, Gait training, Spoke to nursing, Spoke to case management, OT  Equipment Recommended: Stevie Kimble       See flowsheet documentation for full assessment, interventions and recommendations  Outcome: Progressing  Note: Prognosis: Fair  Problem List: Decreased strength, Decreased endurance, Impaired balance, Decreased mobility, Decreased coordination, Decreased cognition, Decreased skin integrity, Pain  Assessment: Patient supine in bed and is agreeable to participate in therapy session  Patient demonstrated progression with functional moblity requiring less person assist  Supine to sit and sit<>stand transfers with mod ax1 and close stand by of other  Requires significant amount of time for mobility with verbal instruction for technique and frequent rest breaks  Pt able to ambulate few steps with roller walker and mod ax1 requiring steadying balance assist and verbal instruction for step by step cuing for LE advancement and body positioning  Pt remains limited by pain and requires reassurance throughout session  Continue to focus on OOB mobility with progression of transfers and ambulation as appropriate and able  Barriers to Discharge: Inaccessible home environment     PT Discharge Recommendation: Post acute rehabilitation services    See flowsheet documentation for full assessment

## 2023-05-04 NOTE — PROGRESS NOTES
Progress Note - Acute Pain Service    Angela Larios 82 y o  male MRN: [id number]  Unit/Bed#: S -01 Encounter: [id number]      Assessment:   Active Problems:    NPH (normal pressure hydrocephalus) ([facility])    Unsteady gait    Essential hypertension    Constipation    Parkinsonism ([address] )    Fall (on) (from) other stairs and steps, initial encounter    Closed fracture of multiple ribs of right side    Fracture of transverse process of thoracic vertebra, closed, initial encounter [facility])    Male pelvic hematoma    Angela Larios is a 82 y o  male who presents with fall downstairs with subsequent right lateral pelvic deep subcutaneous hematoma, acute fracture of the right T7-T10 transverse processes with mild displacement at T9 and T10,- and multiple right-sided rib fractures, right 5-10 with minimal displacement of the posterior medial component of the right seventh through 10th ribs  Patient seen and examined at bedside this morning  He reports minimal pain while at rest, however does report severe exacerbation pain to his right chest wall with deep inspiration, movement  He is maintaining oxygen saturations on room air, however has been unable to cough secondary to pain  Currently inspiring less than 1000 mL with spirometry  Plan:   Discussed potential for epidural/peripheral blocks for increased pain control and respiratory mechanics  Patient is in agreement, will plan for epidural/block placement this afternoon    · Last dose of subcutaneous Lovenox given 5/3/2023 at 2344  · Plt count: 250  · INR: 1 08    Multimodal analgesia:  · Tylenol 975 mg every 8 hours scheduled  · Lidocaine patch, 12 hours on/12 hours off, to right rib cage  · Methocarbamol 500 mg every 6 hours scheduled, hold for sedation  · Recommend discontinuation of Robaxin at time of discharge given age and history of falls    · Continue oxycodone 2 5 mg every 4 hours as needed, for moderate pain  · Continue oxycodone 5 mg every 4 hours as needed, for severe pain  · Continue IV Dilaudid 0 2 mg every 4 hours as needed, for breakthrough pain  · Recommend discontinuation of oxycodone s/p epidural placement    Bowel Regimen:  · MiraLAX daily  · Colace 100 mg twice daily  · Senna 2 tablets daily    APS will continue to follow  Please contact Acute Pain Service - [facility] via [employer] from [phone] with additional questions or concerns  See Rowena or Anayeli for additional contacts and after hours information  Pain History  Current pain location(s): Right rib cage  Pain Scale:   7-9/10  Quality: Sharp, aching  24 hour history: No acute events overnight, patient hemodynamically stable this morning  Patient was out of bed to chair yesterday, but states this was limited secondary to increasing pain  He reports minimal relief of right chest wall pain with use of as needed oxycodone  Tolerating current medication regimen, denies any opioid-induced side effects including nausea/vomiting/itching no reported bowel movement since admission  Denies shortness of breath currently      Opioid requirement previous 24 hours:   7 5 mg oxycodone  IV Dilaudid 0 2 mg    Meds/Allergies   all current active meds have been reviewed and current meds:   Current Facility-Administered Medications   Medication Dose Route Frequency   • acetaminophen (TYLENOL) tablet 975 mg  975 mg Oral Q8H [address]   • calcium carbonate (TUMS) chewable tablet 1,000 mg  1,000 mg Oral BID PRN   • carbidopa-levodopa (SINEMET)  mg per tablet 1 tablet  1 tablet Oral HS   • carbidopa-levodopa (SINEMET)  mg per tablet 2 tablet  2 tablet Oral TID   • cholecalciferol (VITAMIN D3) tablet 1,000 Units  1,000 Units Oral Daily   • docusate sodium (COLACE) capsule 100 mg  100 mg Oral BID   • enoxaparin (LOVENOX) subcutaneous injection 30 mg  30 mg Subcutaneous Q12H   • hydrochlorothiazide (HYDRODIURIL) tablet 12 5 mg  12 5 mg Oral Daily   • HYDROmorphone HCl (DILAUDID) injection 0 2 mg  0 2 mg Intravenous Q4H PRN   • lidocaine (LIDODERM) 5 % patch 1 patch  1 patch Topical Daily   • methocarbamol (ROBAXIN) tablet 500 mg  500 mg Oral Q6H [facility]   • ondansetron (ZOFRAN) injection 4 mg  4 mg Intravenous Q6H PRN   • oxyCODONE (ROXICODONE) IR tablet 5 mg  5 mg Oral Q4H PRN   • oxyCODONE (ROXICODONE) split tablet 2 5 mg  2 5 mg Oral Q4H PRN   • pantoprazole (PROTONIX) EC tablet 40 mg  40 mg Oral BID   • polyethylene glycol (MIRALAX) packet 17 g  17 g Oral Daily   • senna (SENOKOT) tablet 17 2 mg  2 tablet Oral Daily       No Known Allergies    Objective     Temp:  [97 3 °F (36 3 °C)-98 7 °F (37 1 °C)] 98 5 °F (36 9 °C)  HR:  [68-99] 73  Resp:  [16-19] 16  BP: (108-138)/(65-79) 108/65    Physical Exam  Vitals reviewed  Constitutional:       General: He is not in acute distress  Appearance: He is not ill-appearing or toxic-appearing  Cardiovascular:      Rate and Rhythm: Normal rate  Pulmonary:      Effort: Pulmonary effort is normal  No respiratory distress  Chest:      Chest wall: Tenderness present  Abdominal:      General: There is no distension  Palpations: Abdomen is soft  Tenderness: There is no abdominal tenderness  Musculoskeletal:         General: Normal range of motion  Cervical back: Normal range of motion  Skin:     General: Skin is warm and dry  Coloration: Skin is not pale  Findings: No rash  Neurological:      Mental Status: He is alert and oriented to person, place, and time  Mental status is at baseline  Sensory: No sensory deficit  Motor: No weakness           Lab Results:   Results from last 7 days   Lab Units 05/03/23  0508   WBC Thousand/uL 8 88   HEMOGLOBIN g/dL 13 6   HEMATOCRIT % 42 7   PLATELETS Thousands/uL 250      Results from last 7 days   Lab Units 05/03/23  0508 05/02/23  0907   POTASSIUM mmol/L 3 8 3 8   CHLORIDE mmol/L 101 103   CO2 mmol/L 27 26   BUN mg/dL 19 21   CREATININE mg/dL 0 92 1 05   CALCIUM mg/dL 9 7 9 8   ALK PHOS U/L  --  75 ALT U/L  --  41   AST U/L  --  39       Imaging Studies: I have personally reviewed pertinent reports  Please note that the APS provides consultative services regarding pain management only  With the exception of ketamine and epidural infusions and except when indicated, final decisions regarding starting or changing doses of analgesic medications are at the discretion of the consulting service  Off hours consultation and/or medication management is generally not available      Severo Jasmine, CRNP  Acute Pain Service

## 2023-05-04 NOTE — ASSESSMENT & PLAN NOTE
· S/p fall down stairs  · Right lateral pelvic deep subcutaneous hematoma overlying the tensor fascia rodolfo measures approximately 4 6 x 2 4 x 9 5 cm  · Minimal tenderness on exam  · Hemoglobin has remained stable  Most recently 15  6  Hemodynamically stable    · Pain control  · DVT prophylaxis: Lovenox

## 2023-05-05 LAB
ANION GAP SERPL CALCULATED.3IONS-SCNC: 8 MMOL/L (ref 4–13)
BASOPHILS # BLD AUTO: 0.03 THOUSANDS/ÂΜL (ref 0–0.1)
BASOPHILS NFR BLD AUTO: 0 % (ref 0–1)
BUN SERPL-MCNC: 19 MG/DL (ref 5–25)
CALCIUM SERPL-MCNC: 9.5 MG/DL (ref 8.4–10.2)
CHLORIDE SERPL-SCNC: 101 MMOL/L (ref 96–108)
CO2 SERPL-SCNC: 26 MMOL/L (ref 21–32)
CREAT SERPL-MCNC: 0.92 MG/DL (ref 0.6–1.3)
EOSINOPHIL # BLD AUTO: 0.1 THOUSAND/ÂΜL (ref 0–0.61)
EOSINOPHIL NFR BLD AUTO: 1 % (ref 0–6)
ERYTHROCYTE [DISTWIDTH] IN BLOOD BY AUTOMATED COUNT: 13 % (ref 11.6–15.1)
GFR SERPL CREATININE-BSD FRML MDRD: 77 ML/MIN/1.73SQ M
GLUCOSE SERPL-MCNC: 107 MG/DL (ref 65–140)
HCT VFR BLD AUTO: 40.6 % (ref 36.5–49.3)
HGB BLD-MCNC: 13.3 G/DL (ref 12–17)
IMM GRANULOCYTES # BLD AUTO: 0.02 THOUSAND/UL (ref 0–0.2)
IMM GRANULOCYTES NFR BLD AUTO: 0 % (ref 0–2)
LYMPHOCYTES # BLD AUTO: 1.58 THOUSANDS/ÂΜL (ref 0.6–4.47)
LYMPHOCYTES NFR BLD AUTO: 19 % (ref 14–44)
MCH RBC QN AUTO: 29.4 PG (ref 26.8–34.3)
MCHC RBC AUTO-ENTMCNC: 32.8 G/DL (ref 31.4–37.4)
MCV RBC AUTO: 90 FL (ref 82–98)
MONOCYTES # BLD AUTO: 0.8 THOUSAND/ÂΜL (ref 0.17–1.22)
MONOCYTES NFR BLD AUTO: 10 % (ref 4–12)
NEUTROPHILS # BLD AUTO: 5.9 THOUSANDS/ÂΜL (ref 1.85–7.62)
NEUTS SEG NFR BLD AUTO: 70 % (ref 43–75)
NRBC BLD AUTO-RTO: 0 /100 WBCS
PLATELET # BLD AUTO: 250 THOUSANDS/UL (ref 149–390)
PMV BLD AUTO: 10.1 FL (ref 8.9–12.7)
POTASSIUM SERPL-SCNC: 3.9 MMOL/L (ref 3.5–5.3)
RBC # BLD AUTO: 4.52 MILLION/UL (ref 3.88–5.62)
SODIUM SERPL-SCNC: 135 MMOL/L (ref 135–147)
WBC # BLD AUTO: 8.43 THOUSAND/UL (ref 4.31–10.16)

## 2023-05-05 RX ORDER — ACETAMINOPHEN 325 MG/1
650 TABLET ORAL EVERY 6 HOURS PRN
Status: DISCONTINUED | OUTPATIENT
Start: 2023-05-05 | End: 2023-05-10 | Stop reason: HOSPADM

## 2023-05-05 RX ORDER — BISACODYL 10 MG
10 SUPPOSITORY, RECTAL RECTAL DAILY PRN
Status: DISCONTINUED | OUTPATIENT
Start: 2023-05-05 | End: 2023-05-10 | Stop reason: HOSPADM

## 2023-05-05 RX ORDER — ENEMA 19; 7 G/133ML; G/133ML
1 ENEMA RECTAL ONCE
Status: COMPLETED | OUTPATIENT
Start: 2023-05-05 | End: 2023-05-09

## 2023-05-05 RX ORDER — SENNOSIDES 8.6 MG
2 TABLET ORAL 2 TIMES DAILY
Status: DISCONTINUED | OUTPATIENT
Start: 2023-05-05 | End: 2023-05-10 | Stop reason: HOSPADM

## 2023-05-05 RX ADMIN — CARBIDOPA AND LEVODOPA 2 TABLET: 25; 100 TABLET ORAL at 12:28

## 2023-05-05 RX ADMIN — ENOXAPARIN SODIUM 30 MG: 30 INJECTION SUBCUTANEOUS at 01:15

## 2023-05-05 RX ADMIN — ENOXAPARIN SODIUM 30 MG: 30 INJECTION SUBCUTANEOUS at 12:28

## 2023-05-05 RX ADMIN — METHOCARBAMOL TABLETS 500 MG: 500 TABLET, COATED ORAL at 01:13

## 2023-05-05 RX ADMIN — METHOCARBAMOL TABLETS 500 MG: 500 TABLET, COATED ORAL at 05:26

## 2023-05-05 RX ADMIN — CARBIDOPA AND LEVODOPA 1 TABLET: 25; 100 TABLET ORAL at 22:04

## 2023-05-05 RX ADMIN — LISINOPRIL 2.5 MG: 2.5 TABLET ORAL at 08:45

## 2023-05-05 RX ADMIN — SENNOSIDES 17.2 MG: 8.6 TABLET, FILM COATED ORAL at 17:46

## 2023-05-05 RX ADMIN — LIDOCAINE 5% 1 PATCH: 700 PATCH TOPICAL at 08:46

## 2023-05-05 RX ADMIN — POLYETHYLENE GLYCOL 3350 17 G: 17 POWDER, FOR SOLUTION ORAL at 08:46

## 2023-05-05 RX ADMIN — BISACODYL 10 MG: 10 SUPPOSITORY RECTAL at 13:48

## 2023-05-05 RX ADMIN — HYDROCHLOROTHIAZIDE 12.5 MG: 12.5 TABLET ORAL at 08:46

## 2023-05-05 RX ADMIN — METHOCARBAMOL TABLETS 500 MG: 500 TABLET, COATED ORAL at 17:46

## 2023-05-05 RX ADMIN — PANTOPRAZOLE SODIUM 40 MG: 40 TABLET, DELAYED RELEASE ORAL at 08:46

## 2023-05-05 RX ADMIN — CARBIDOPA AND LEVODOPA 2 TABLET: 25; 100 TABLET ORAL at 17:46

## 2023-05-05 RX ADMIN — DOCUSATE SODIUM 100 MG: 100 CAPSULE, LIQUID FILLED ORAL at 17:46

## 2023-05-05 RX ADMIN — Medication 1000 UNITS: at 08:46

## 2023-05-05 RX ADMIN — FENTANYL CITRATE: 50 INJECTION INTRAMUSCULAR; INTRAVENOUS at 22:06

## 2023-05-05 RX ADMIN — METHOCARBAMOL TABLETS 500 MG: 500 TABLET, COATED ORAL at 12:28

## 2023-05-05 RX ADMIN — PANTOPRAZOLE SODIUM 40 MG: 40 TABLET, DELAYED RELEASE ORAL at 17:46

## 2023-05-05 RX ADMIN — CARBIDOPA AND LEVODOPA 2 TABLET: 25; 100 TABLET ORAL at 08:46

## 2023-05-05 RX ADMIN — DOCUSATE SODIUM 100 MG: 100 CAPSULE, LIQUID FILLED ORAL at 08:46

## 2023-05-05 RX ADMIN — SENNOSIDES 17.2 MG: 8.6 TABLET, FILM COATED ORAL at 08:46

## 2023-05-05 NOTE — PROGRESS NOTES
Progress Note - Acute Pain Service    Yancy Alan 82 y o  male MRN: [id number]  Unit/Bed#: S -01 Encounter: [id number]      Assessment:   Active Problems:    NPH (normal pressure hydrocephalus) ([facility])    Unsteady gait    Essential hypertension    Constipation    Parkinsonism ([address] )    Fall (on) (from) other stairs and steps, initial encounter    Closed fracture of multiple ribs of right side    Fracture of transverse process of thoracic vertebra, closed, initial encounter [facility])    Male pelvic hematoma    Yancy Alan is a 82 y o  male who presents status post fall downstairs with subsequent right lateral pelvic deep subcutaneous hematoma, acute fracture of the right T7-T10 transverse processes with mild displacement at T9 and T10, and multiple right-sided rib fractures, right 5-10 with minimal displacement of the posterior medial component of the right seventh through 10th ribs  Plan:   Multimodal analgesia:  · Tylenol changed to 650 mg every 6 hours as needed for mild pain or headaches per patient request  · Lidocaine patch daily, on for 12 hours and off for 12 hours  · Robaxin 500 mg every 6 hours scheduled  · Hold for sedation  · Recommend utilization of Robaxin during hospitalization however would discontinue muscle relaxants at time of discharge given age and history of falls  · Discontinue oxycodone 5 mg tablets  · Patient states he becomes drowsy with oxycodone administration, he did receive oxycodone 5 mg tablet yesterday  · Continue oxycodone 2 5 mg every 4 hours as needed for moderate or severe pain  · Dilaudid 0 2 mg IV every 4 hours as needed for breakthrough pain    Interventional pain plan:  Respiratory status stable, no acute respiratory distress  Incentive spirometer 1250-1500ml this morning  SPO2 94% on room air  Pain control has overall improved since placement of the paravertebral catheter yesterday however patient endorses significant increases pain with any movement    Will "increase paravertebral catheter rate today and continue to monitor for effectiveness/decrease in pain  · Right paravertebral catheter placed on 5/4/2023  · Increase ropivacaine 0 1% infusion to 10 mL/h    Bowel Regimen:  Last reported bowel movement Monday  · Colace 100 mg twice a day  · MiraLAX daily  · Increase Senokot to twice a day  · Add bisacodyl suppository daily as needed for constipation    Treatment recommendations and plan of care discussed with bedside nursing staff, anesthesiologist and primary care service  APS will continue to follow  Please contact Acute Pain Service - [facility] via [employer] from [phone] with additional questions or concerns  See Rowena or Anayeli for additional contacts and after hours information  Pain History  Current pain location(s): Right posterior chest wall  Pain Scale:   0-9  Quality: Sharp with movement  24 hour history: Patient resting in bed, no acute distress  Reports that his pain is minimal at rest   Felt drowsy after oxycodone administration yesterday afternoon  Requesting to hold acetaminophen products as he feels that it \"hurts his liver  \"  Pain has improved since paravertebral catheter was placed, pain did significantly worsen as we repositioned him in bed to check catheter  Last bowel movement was reported on Monday      Opioid requirement previous 24 hours: Oxycodone 5 mg    Meds/Allergies   all current active meds have been reviewed and current meds:   Current Facility-Administered Medications   Medication Dose Route Frequency   • acetaminophen (TYLENOL) tablet 650 mg  650 mg Oral Q6H PRN   • calcium carbonate (TUMS) chewable tablet 1,000 mg  1,000 mg Oral BID PRN   • carbidopa-levodopa (SINEMET)  mg per tablet 1 tablet  1 tablet Oral HS   • carbidopa-levodopa (SINEMET)  mg per tablet 2 tablet  2 tablet Oral TID   • cholecalciferol (VITAMIN D3) tablet 1,000 Units  1,000 Units Oral Daily   • docusate sodium (COLACE) capsule 100 mg  100 mg Oral " BID   • enoxaparin (LOVENOX) subcutaneous injection 30 mg  30 mg Subcutaneous Q12H   • hydrochlorothiazide (HYDRODIURIL) tablet 12 5 mg  12 5 mg Oral Daily   • HYDROmorphone HCl (DILAUDID) injection 0 2 mg  0 2 mg Intravenous Q4H PRN   • lidocaine (LIDODERM) 5 % patch 1 patch  1 patch Topical Daily   • lisinopril (ZESTRIL) tablet 2 5 mg  2 5 mg Oral Daily   • methocarbamol (ROBAXIN) tablet 500 mg  500 mg Oral Q6H [facility]   • ondansetron (ZOFRAN) injection 4 mg  4 mg Intravenous Q6H PRN   • oxyCODONE (ROXICODONE) IR tablet 5 mg  5 mg Oral Q4H PRN   • oxyCODONE (ROXICODONE) split tablet 2 5 mg  2 5 mg Oral Q4H PRN   • pantoprazole (PROTONIX) EC tablet 40 mg  40 mg Oral BID   • polyethylene glycol (MIRALAX) packet 17 g  17 g Oral Daily   • ropivacaine 0 1% and fentaNYL 2 mcg/mL epidural infusion   Epidural Continuous   • senna (SENOKOT) tablet 17 2 mg  2 tablet Oral Daily       No Known Allergies    Objective     Temp:  [97 8 °F (36 6 °C)-99 2 °F (37 3 °C)] 98 8 °F (37 1 °C)  HR:  [73-90] 74  Resp:  [16-20] 16  BP: (111-159)/(58-82) 132/75    Physical Exam  Vitals reviewed  Constitutional:       General: He is awake  He is not in acute distress  Appearance: Normal appearance  He is not ill-appearing, toxic-appearing or diaphoretic  HENT:      Head: Normocephalic and atraumatic  Nose: Nose normal  No congestion or rhinorrhea  Mouth/Throat:      Mouth: Mucous membranes are moist    Eyes:      Extraocular Movements: Extraocular movements intact  Cardiovascular:      Rate and Rhythm: Normal rate  Pulmonary:      Effort: Pulmonary effort is normal  No tachypnea, bradypnea or respiratory distress  Breath sounds: Decreased breath sounds present  No wheezing or rhonchi  Abdominal:      General: Bowel sounds are decreased  There is no distension  Palpations: Abdomen is soft  Tenderness: There is no abdominal tenderness  Skin:     General: Skin is warm and dry        Coloration: Skin is not pale       Findings: No rash  Neurological:      Mental Status: He is alert and oriented to person, place, and time  Mental status is at baseline  Sensory: Sensation is intact  Motor: No weakness or tremor  Psychiatric:         Attention and Perception: Attention normal          Mood and Affect: Mood normal  Mood is not anxious  Speech: Speech normal          Behavior: Behavior normal  Behavior is cooperative  Right paravertebral catheter dressing intact and well secured to right shoulder  Insertion site without redness, edema or any drainage  Paravertebral catheter infusing without any complications  Patient is tolerating paravertebral catheter/medication without any reported adverse effects  Lab Results:   Results from last 7 days   Lab Units 05/05/23  0539   WBC Thousand/uL 8 43   HEMOGLOBIN g/dL 13 3   HEMATOCRIT % 40 6   PLATELETS Thousands/uL 250      Results from last 7 days   Lab Units 05/05/23  0539 05/03/23  0508 05/02/23  0907   POTASSIUM mmol/L 3 9   < > 3 8   CHLORIDE mmol/L 101   < > 103   CO2 mmol/L 26   < > 26   BUN mg/dL 19   < > 21   CREATININE mg/dL 0 92   < > 1 05   CALCIUM mg/dL 9 5   < > 9 8   ALK PHOS U/L  --   --  75   ALT U/L  --   --  41   AST U/L  --   --  39    < > = values in this interval not displayed  Counseling / Coordination of Care  Total floor / unit time spent today 20 minutes  Greater than 50% of total time was spent with the patient and / or family counseling and / or coordination of care  A description of the counseling / coordination of care: Medications, laboratory values and chart reviewed  Vitals reviewed  Medications which included paravertebral catheter were discussed with patient and anesthesiologist   Treatment plan and overall analgesic regimen reviewed with patient, RN staff and primary care service  Please note that the APS provides consultative services regarding pain management only    With the exception of ketamine and epidural infusions and except when indicated, final decisions regarding starting or changing doses of analgesic medications are at the discretion of the consulting service  Off hours consultation and/or medication management is generally not available      ANDREA Reardon  Acute Pain Service

## 2023-05-05 NOTE — ASSESSMENT & PLAN NOTE
· S/p fall down stairs  · Right lateral pelvic deep subcutaneous hematoma overlying the tensor fascia rodolfo measures approximately 4 6 x 2 4 x 9 5 cm  · Minimal tenderness on exam  · Hemoglobin has remained stable     · Pain control  · DVT prophylaxis: Lovenox

## 2023-05-05 NOTE — ASSESSMENT & PLAN NOTE
· S/p fall down stairs  · Acute fracture of the right T7-T10 transverse processes with minimal displacement at T9 and T10  · Multimodal pain control, APS consulted  · Follow-up with trauma as an outpatient

## 2023-05-05 NOTE — ASSESSMENT & PLAN NOTE
· Patient states has been constipated for 3 days now  · No abdominal pain  Abdominal exam benign  · Continue bowel regimen  Fleets enema 5/5  · Continue to monitor

## 2023-05-05 NOTE — PHYSICAL THERAPY NOTE
PHYSICAL THERAPY NOTE          Patient Name: Rupert Coleman  BBMDO'I Date: 23 1025   PT Last Visit   PT Visit Date 23   Note Type   Note Type Treatment   Pain Assessment   Pain Assessment Tool 0-10   Pain Score 9  (back pain)   Effect of Pain on Daily Activities limited bed mobility, functional transfers and activity tolerance   Patient's Stated Pain Goal No pain   Hospital Pain Intervention(s) Repositioned; Ambulation/increased activity; Emotional support; Rest   Multiple Pain Sites No   General   Chart Reviewed Yes   Response to Previous Treatment Patient with no complaints from previous session  Family/Caregiver Present No   Cognition   Overall Cognitive Status Impaired   Arousal/Participation Alert; Responsive; Cooperative   Attention Attends with cues to redirect   Memory Decreased short term memory;Decreased recall of recent events   Following Commands Follows one step commands with increased time or repetition   Comments pt identified by name and    Subjective   Subjective pt was agreeable to participate in PT intervention   Bed Mobility   Supine to Sit 3  Moderate assistance   Additional items Assist x 1;HOB elevated; Bedrails; Increased time required;Verbal cues;LE management; Other  (HHA/ trunk management)   Sit to Supine 3  Moderate assistance   Additional items Assist x 2;HOB elevated; Bedrails; Increased time required;Verbal cues;LE management; Other  (trunk maangerement)   Transfers   Sit to Stand 3  Moderate assistance   Additional items Assist x 1; Increased time required;Verbal cues  (w/ RW)   Stand to Sit 3  Moderate assistance   Additional items Assist x 1; Armrests; Increased time required;Verbal cues  (w/ RW)   Stand pivot (S)    (pt was unable to complete SPT from EOB to commode to pt L side   pt had difficulty following directions on foot placement and RW management)   Ambulation/Elevation Gait pattern Not tested   Ambulation/Elevation Additional Comments pt had difficulty with functional transfers and standing tolerance  Ambulation in todays tx session not appropriate   Balance   Static Sitting Fair +   Dynamic Sitting Fair   Static Standing Fair -   Ambulatory Poor -   Endurance Deficit   Endurance Deficit Yes   Endurance Deficit Description limited bed mobility, functional transfers and activity tolerance   Activity Tolerance   Activity Tolerance Patient limited by fatigue;Patient limited by pain   Nurse Made Aware Spoke to RN   Exercises   Heelslides Supine;10 reps;AAROM; Bilateral   Hip Abduction Sitting;10 reps;AROM; Bilateral   Hip Adduction Sitting;10 reps;AROM; Bilateral  (pillow squeezes)   Knee AROM Long Arc Quad Sitting;10 reps;AROM; Bilateral   Ankle Pumps Supine;15 reps;AROM; Bilateral   Assessment   Prognosis Fair   Problem List Decreased strength;Decreased endurance; Impaired balance;Decreased mobility; Decreased coordination;Decreased cognition;Decreased skin integrity;Pain   Assessment Continue to recommend post acute rehab services at the time of D/C in order to maximize pt functional independencec and safety with all OOB Mobility  Post tx pt supine in aric bed with call bell and all py needs met   Barriers to Discharge Inaccessible home environment   Goals   Patient Goals to have less pain   STG Expiration Date 05/13/23   PT Treatment Day 2   Plan   Treatment/Interventions Functional transfer training;LE strengthening/ROM; Therapeutic exercise; Endurance training;Cognitive reorientation;Patient/family training;Equipment eval/education; Bed mobility;Gait training;Spoke to nursing   Progress No functional improvements   PT Frequency 3-5x/wk   Recommendation   PT Discharge Recommendation Post acute rehabilitation services   Equipment Recommended [facility] Recommended Wheeled walker   AM-PAC Basic Mobility Inpatient   Turning in Flat Bed Without Bedrails 2   Lying on Back to Sitting on Edge of Flat Bed Without Bedrails 2   Moving Bed to Chair 2   Standing Up From Chair Using Arms 2   Walk in Room 2   Climb 3-5 Stairs With Railing 1   Basic Mobility Inpatient Raw Score 11   Basic Mobility Standardized Score 30 25   Highest Level Of Mobility   -HLM Goal 4: Move to chair/commode   JH-HLM Achieved 3: Sit at edge of bed   Education   Education Provided Mobility training;Assistive device; Other  (bed mobility and functional transfers)   Patient Reinforcement needed   The patient's AM-PAC Basic Mobility Inpatient Short Form Raw Score is 11  A Raw score of less than or equal to 16 suggests the patient may benefit from discharge to post-acute rehabilitation services  Please also refer to the recommendation of the Physical Therapist for safe discharge planning       Chloe Deluca

## 2023-05-05 NOTE — PLAN OF CARE
Problem: PHYSICAL THERAPY ADULT  Goal: Performs mobility at highest level of function for planned discharge setting  See evaluation for individualized goals  Description: Treatment/Interventions: Functional transfer training, LE strengthening/ROM, Therapeutic exercise, Cognitive reorientation, Patient/family training, Equipment eval/education, Bed mobility, Gait training, Spoke to nursing, Spoke to case management, OT  Equipment Recommended: Padma Nolasco       See flowsheet documentation for full assessment, interventions and recommendations  Outcome: Not Progressing  Note: Prognosis: Fair  Problem List: Decreased strength, Decreased endurance, Impaired balance, Decreased mobility, Decreased coordination, Decreased cognition, Decreased skin integrity, Pain  Assessment: Continue to recommend post acute rehab services at the time of D/C in order to maximize pt functional independencec and safety with all OOB Mobility  Post tx pt supine in aric bed with call bell and all py needs met  Barriers to Discharge: Inaccessible home environment     PT Discharge Recommendation: Post acute rehabilitation services    See flowsheet documentation for full assessment

## 2023-05-05 NOTE — CASE MANAGEMENT
Case Management Progress Note    Patient name Moi Marin  Location S /S -01 MRN [id number]  : 1940 Date 2023       LOS (days): 3  Geometric Mean LOS (GMLOS) (days): 3 30  Days to GMLOS:0 1        OBJECTIVE:        Current admission status: Inpatient  Preferred Pharmacy:   [address] [place] [address]  Phone: [phone] Fax: [phone]    Primary Care Provider: Allison Mann MD    Primary Insurance: MEDICARE  Secondary Insurance: Bonnie Dailey    PROGRESS NOTE:    CM s/w patient's wife to inquire on SNF choice  She's still touring facilities but will inform CM of SNF choice this weekend  CM to follow up

## 2023-05-05 NOTE — ASSESSMENT & PLAN NOTE
· s/p fall down stairs   · CT imaging showed acute segmental fractures of the right 5th-10th ribs with minimal displacement of the posterior medial component of the right 7th through 10th ribs  Trace pleural effusion also noted  Rib fracture protocol  Pulmonary toilette/IS  I-S is downtrending today to 1250 mL  Multi modal analgesia  5/4 R paravertebral catheter placed     APS consulted and following, appreciate recommendations  F/u CXR: Unremarkable  · PT/OT   · F/u with trauma in 2 weeks

## 2023-05-05 NOTE — ASSESSMENT & PLAN NOTE
· Patient with Parkinson's and ambulatory dysfunction at baseline, which he uses rollator walker--suffered a mechanical fall down several steps   · Injuries noted below  · Geriatrics evaluation appreciated  · PT/OT eval recommending inpatient rehab  · CM for disposition planning  Rehab referrals placed

## 2023-05-05 NOTE — PROGRESS NOTES
[facility]  Progress Note  Name: Lilia Manley  MRN: [id number]  Unit/Bed#: S -01 I Date of Admission: 5/2/2023   Date of Service: 5/5/2023 I Hospital Day: 3    Assessment/Plan   Fall (on) (from) other stairs and steps, initial encounter  Assessment & Plan  · Patient with Parkinson's and ambulatory dysfunction at baseline, which he uses rollator walker--suffered a mechanical fall down several steps   · Injuries noted below  · Geriatrics evaluation appreciated  · PT/OT eval recommending inpatient rehab  · CM for disposition planning  Rehab referrals placed  Closed fracture of multiple ribs of right side  Assessment & Plan  · s/p fall down stairs   · CT imaging showed acute segmental fractures of the right 5th-10th ribs with minimal displacement of the posterior medial component of the right 7th through 10th ribs  Trace pleural effusion also noted  Rib fracture protocol  Pulmonary toilette/IS  I-S is downtrending today to 1250 mL  Multi modal analgesia  5/4 R paravertebral catheter placed  APS consulted and following, appreciate recommendations  F/u CXR: Unremarkable  · PT/OT   · F/u with trauma in 2 weeks    Fracture of transverse process of thoracic vertebra, closed, initial encounter [facility])  Assessment & Plan  · S/p fall down stairs  · Acute fracture of the right T7-T10 transverse processes with minimal displacement at T9 and T10  · Multimodal pain control, APS consulted  · Follow-up with trauma as an outpatient  Male pelvic hematoma  Assessment & Plan  · S/p fall down stairs  · Right lateral pelvic deep subcutaneous hematoma overlying the tensor fascia rodolfo measures approximately 4 6 x 2 4 x 9 5 cm  · Minimal tenderness on exam  · Hemoglobin has remained stable  · Pain control  · DVT prophylaxis: Lovenox    Parkinsonism ([facility] [address] )  Assessment & Plan  · Continue home regimen of carvidopa-levadopa  · PT/OT recommending inpatient rehab       Constipation  Assessment & "Plan  · Patient states has been constipated for 3 days now  · No abdominal pain  Abdominal exam benign  · Continue bowel regimen  Fleets enema 5/5  · Continue to monitor  Essential hypertension  Assessment & Plan  · Monitor vital signs  · Continue home meds, hydrochlorothiazide and enalapril    Unsteady gait  Assessment & Plan  · Ambulatory dysfunction at baseline  Uses rollator  · PT/OT    NPH (normal pressure hydrocephalus) ([facility])  Assessment & Plan  · S/p shunt placement  · Follow-up with neurosurgery as an outpatient as scheduled             Bowel Regimen: colace, senna, miralax, dulcolax supp, fleets enema 5/5  VTE Prophylaxis:Sequential compression device (Venodyne)  and Enoxaparin (Lovenox)     Disposition: continue med-surg status, rehab placement pending  Paravertebral catheter in place  Subjective   Chief Complaint: \"I'm doing better\"    Subjective: Pain control is reportedly improved with the paravertebral catheter in place  He has been using his incentive spirometer  He denies SOB  He has a difficult time moving and is agreeable to rehab  Wife is touring facilities today and tomorrow  He has not had a BM in 4 days  Objective   Vitals:   Temp:  [97 8 °F (36 6 °C)-99 1 °F (37 3 °C)] 98 2 °F (36 8 °C)  HR:  [73-85] 81  Resp:  [16-18] 16  BP: (115-159)/(61-82) 115/70    I/O       05/03 0701  05/04 0700 05/04 0701  05/05 0700 05/05 0701  05/06 0700    P  O  1680 720 360    Total Intake(mL/kg) 1680 (16 9) 720 (7 3) 360 (3 6)    Urine (mL/kg/hr) 600 (0 3) 1275 (0 5) 675 (0 9)    Stool 0      Total Output 600 1275 675    Net +1080 -555 -315           Unmeasured Urine Occurrence 1 x      Unmeasured Stool Occurrence 0 x             Physical Exam:   GENERAL APPEARANCE: NAD  NEURO: GCS 15,non-focal  HEENT: NCAT  CV: RRR, no MGR  LUNGS: CTA bilaterally  GI: soft,non-tender,non-distended  : voiding  MSK: no edema, contusion or deformity  SKIN: pink, warm, dry    Invasive Devices     Peripheral " Intravenous Line  Duration           Peripheral IV 05/02/23 Left Antecubital 3 days          Epidural Line  Duration           Nerve Block Catheter 05/04/23 1 day                 PIC Score  PIC Pain Score: 2 (5/5/2023 11:00 AM)       If the Total PIC Score </=5, did you consult APS and evaluate patient for further intervention?: n/a      Pain:    Incentive Spirometry  Cough  3 = Controlled  4 = Above goal volume 3 = Strong  2 = Moderate  3 = Goal to alert volume 2 = Weak  1 = Severe  2 = Below alert volume 1 = Absent     1 = Unable to perform IS         Lab Results:   Results: I have personally reviewed all pertinent laboratory/tests results, BMP/CMP:   Lab Results   Component Value Date    SODIUM 135 05/05/2023    K 3 9 05/05/2023     05/05/2023    CO2 26 05/05/2023    BUN 19 05/05/2023    CREATININE 0 92 05/05/2023    CALCIUM 9 5 05/05/2023    EGFR 77 05/05/2023    and CBC:   Lab Results   Component Value Date    WBC 8 43 05/05/2023    HGB 13 3 05/05/2023    HCT 40 6 05/05/2023    MCV 90 05/05/2023     05/05/2023    MCH 29 4 05/05/2023    MCHC 32 8 05/05/2023    RDW 13 0 05/05/2023    MPV 10 1 05/05/2023    NRBC 0 05/05/2023     Imaging: I have personally reviewed pertinent reports       Other Studies: no new

## 2023-05-05 NOTE — PROGRESS NOTES
"Progress Note - Geriatric Medicine   Thaoana Rosen 82 y o  male MRN: [id number]  Unit/Bed#: S -01 Encounter: [id number]      Assessment/Plan:  1 -Closed fracture of multiple ribs on the right side  -  Patient is having treatment of her pain managed by acute pain department  He still has discomfort on movement when the pain becomes more intense  Addressed pain appears to be bearable  2 -Constipation -  Still has not had a bowel movement currently taking MiraLAX, Senokot, Colace consider giving fleets enema  Patient appears to be getting frustrated that he has not had a bowel movement  3 -  History of pelvic hematoma    4 -  Parkinsonism -  Patient will need postacute rehab has a very shuffling gait takes short steps    Subjective:   Patient is frustrated that he has not moved his bowels pain and movement still an issue  Review of Systems   Constitutional: Negative  HENT: Negative  Eyes: Negative  Respiratory: Negative  Cardiovascular: Negative  Gastrointestinal: Positive for constipation  Endocrine: Negative  Musculoskeletal: Positive for arthralgias  Skin: Negative  Hematological: Negative  Psychiatric/Behavioral: The patient is nervous/anxious  Objective:     Vitals: Blood pressure 115/70, pulse 81, temperature 98 2 °F (36 8 °C), resp  rate 16, height 5' 9\" (1 753 m), weight 99 2 kg (218 lb 11 1 oz), SpO2 90 %  ,Body mass index is 32 3 kg/m²  Intake/Output Summary (Last 24 hours) at 5/5/2023 1130  Last data filed at 5/5/2023 0915  Gross per 24 hour   Intake 720 ml   Output 1475 ml   Net -755 ml       Current Medications: Reviewed    Physical Exam:   Physical Exam  Constitutional:       Appearance: Normal appearance  HENT:      Head: Normocephalic and atraumatic  Nose: Nose normal       Mouth/Throat:      Mouth: Mucous membranes are moist    Eyes:      Pupils: Pupils are equal, round, and reactive to light     Cardiovascular:      Rate and Rhythm: " Normal rate and regular rhythm  Pulses: Normal pulses  Heart sounds: Normal heart sounds  Pulmonary:      Effort: Pulmonary effort is normal       Breath sounds: Normal breath sounds  Abdominal:      General: Bowel sounds are normal    Musculoskeletal:         General: Normal range of motion  Cervical back: Neck supple  Skin:     General: Skin is warm  Neurological:      Mental Status: He is oriented to person, place, and time  Mental status is at baseline  Psychiatric:         Mood and Affect: Mood normal           Invasive Devices     Peripheral Intravenous Line  Duration           Peripheral IV 05/02/23 Left Antecubital 3 days          Epidural Line  Duration           Nerve Block Catheter 05/04/23 <1 day                Lab, Imaging and other studies: I have personally reviewed pertinent reports

## 2023-05-05 NOTE — PLAN OF CARE
Problem: Potential for Falls  Goal: Patient will remain free of falls  Description: INTERVENTIONS:  - Educate patient/family on patient safety including physical limitations  - Instruct patient to call for assistance with activity   - Consult OT/PT to assist with strengthening/mobility   - Keep Call bell within reach  - Keep bed low and locked with side rails adjusted as appropriate  - Keep care items and personal belongings within reach  - Initiate and maintain comfort rounds  - Make Fall Risk Sign visible to staff  - Offer Toileting every  Hours, in advance of need  - Initiate/Maintain alarm  - Obtain necessary fall risk management equipment:  - Apply yellow socks and bracelet for high fall risk patients  - Consider moving patient to room near nurses station  Outcome: Progressing     Problem: MOBILITY - ADULT  Goal: Maintain or return to baseline ADL function  Description: INTERVENTIONS:  -  Assess patient's ability to carry out ADLs; assess patient's baseline for ADL function and identify physical deficits which impact ability to perform ADLs (bathing, care of mouth/teeth, toileting, grooming, dressing, etc )  - Assess/evaluate cause of self-care deficits   - Assess range of motion  - Assess patient's mobility; develop plan if impaired  - Assess patient's need for assistive devices and provide as appropriate  - Encourage maximum independence but intervene and supervise when necessary  - Involve family in performance of ADLs  - Assess for home care needs following discharge   - Consider OT consult to assist with ADL evaluation and planning for discharge  - Provide patient education as appropriate  Outcome: Progressing  Goal: Maintains/Returns to pre admission functional level  Description: INTERVENTIONS:  - Perform BMAT or MOVE assessment daily    - Set and communicate daily mobility goal to care team and patient/family/caregiver     - Collaborate with rehabilitation services on mobility goals if consulted  - Perform Range of Motion  times a day  - Reposition patient every  hours    - Dangle patient  times a day  - Stand patient  times a day  - Ambulate patient times a day  - Out of bed to chair times a day   - Out of bed for meals  times a day  - Out of bed for toileting  - Record patient progress and toleration of activity level   Outcome: Progressing     Problem: RESPIRATORY - ADULT  Goal: Achieves optimal ventilation and oxygenation  Description: INTERVENTIONS:  - Assess for changes in respiratory status  - Assess for changes in mentation and behavior  - Position to facilitate oxygenation and minimize respiratory effort  - Oxygen administered by appropriate delivery if ordered  - Initiate smoking cessation education as indicated  - Encourage broncho-pulmonary hygiene including cough, deep breathe, Incentive Spirometry  - Assess the need for suctioning and aspirate as needed  - Assess and instruct to report SOB or any respiratory difficulty  - Respiratory Therapy support as indicated  Outcome: Progressing     Problem: PAIN - ADULT  Goal: Verbalizes/displays adequate comfort level or baseline comfort level  Description: Interventions:  - Encourage patient to monitor pain and request assistance  - Assess pain using appropriate pain scale  - Administer analgesics based on type and severity of pain and evaluate response  - Implement non-pharmacological measures as appropriate and evaluate response  - Consider cultural and social influences on pain and pain management  - Notify physician/advanced practitioner if interventions unsuccessful or patient reports new pain  Outcome: Progressing     Problem: Prexisting or High Potential for Compromised Skin Integrity  Goal: Skin integrity is maintained or improved  Description: INTERVENTIONS:  - Identify patients at risk for skin breakdown  - Assess and monitor skin integrity  - Assess and monitor nutrition and hydration status  - Monitor labs   - Assess for incontinence   - Turn and reposition patient  - Assist with mobility/ambulation  - Relieve pressure over bony prominences  - Avoid friction and shearing  - Provide appropriate hygiene as needed including keeping skin clean and dry  - Evaluate need for skin moisturizer/barrier cream  - Collaborate with interdisciplinary team   - Patient/family teaching  - Consider wound care consult   Outcome: Progressing     Problem: Nutrition/Hydration-ADULT  Goal: Nutrient/Hydration intake appropriate for improving, restoring or maintaining nutritional needs  Description: Monitor and assess patient's nutrition/hydration status for malnutrition  Collaborate with interdisciplinary team and initiate plan and interventions as ordered  Monitor patient's weight and dietary intake as ordered or per policy  Utilize nutrition screening tool and intervene as necessary  Determine patient's food preferences and provide high-protein, high-caloric foods as appropriate       INTERVENTIONS:  - Monitor oral intake, urinary output, labs, and treatment plans  - Assess nutrition and hydration status and recommend course of action  - Evaluate amount of meals eaten  - Assist patient with eating if necessary   - Allow adequate time for meals  - Recommend/ encourage appropriate diets, oral nutritional supplements, and vitamin/mineral supplements  - Order, calculate, and assess calorie counts as needed  - Recommend, monitor, and adjust tube feedings and TPN/PPN based on assessed needs  - Assess need for intravenous fluids  - Provide specific nutrition/hydration education as appropriate  - Include patient/family/caregiver in decisions related to nutrition  Outcome: Progressing

## 2023-05-06 LAB — GLUCOSE SERPL-MCNC: 100 MG/DL (ref 65–140)

## 2023-05-06 RX ADMIN — CARBIDOPA AND LEVODOPA 2 TABLET: 25; 100 TABLET ORAL at 09:30

## 2023-05-06 RX ADMIN — LIDOCAINE 5% 1 PATCH: 700 PATCH TOPICAL at 09:30

## 2023-05-06 RX ADMIN — CARBIDOPA AND LEVODOPA 1 TABLET: 25; 100 TABLET ORAL at 21:27

## 2023-05-06 RX ADMIN — ENOXAPARIN SODIUM 30 MG: 30 INJECTION SUBCUTANEOUS at 10:59

## 2023-05-06 RX ADMIN — FENTANYL CITRATE: 50 INJECTION INTRAMUSCULAR; INTRAVENOUS at 15:38

## 2023-05-06 RX ADMIN — SENNOSIDES 17.2 MG: 8.6 TABLET, FILM COATED ORAL at 17:21

## 2023-05-06 RX ADMIN — ENOXAPARIN SODIUM 30 MG: 30 INJECTION SUBCUTANEOUS at 23:58

## 2023-05-06 RX ADMIN — CARBIDOPA AND LEVODOPA 2 TABLET: 25; 100 TABLET ORAL at 12:57

## 2023-05-06 RX ADMIN — Medication 1000 UNITS: at 09:30

## 2023-05-06 RX ADMIN — PANTOPRAZOLE SODIUM 40 MG: 40 TABLET, DELAYED RELEASE ORAL at 17:21

## 2023-05-06 RX ADMIN — ENOXAPARIN SODIUM 30 MG: 30 INJECTION SUBCUTANEOUS at 01:08

## 2023-05-06 RX ADMIN — METHOCARBAMOL TABLETS 500 MG: 500 TABLET, COATED ORAL at 11:01

## 2023-05-06 RX ADMIN — METHOCARBAMOL TABLETS 500 MG: 500 TABLET, COATED ORAL at 17:21

## 2023-05-06 RX ADMIN — FENTANYL CITRATE: 50 INJECTION INTRAMUSCULAR; INTRAVENOUS at 10:58

## 2023-05-06 RX ADMIN — DOCUSATE SODIUM 100 MG: 100 CAPSULE, LIQUID FILLED ORAL at 17:21

## 2023-05-06 RX ADMIN — CARBIDOPA AND LEVODOPA 2 TABLET: 25; 100 TABLET ORAL at 17:21

## 2023-05-06 RX ADMIN — DOCUSATE SODIUM 100 MG: 100 CAPSULE, LIQUID FILLED ORAL at 09:30

## 2023-05-06 RX ADMIN — LISINOPRIL 2.5 MG: 2.5 TABLET ORAL at 09:30

## 2023-05-06 RX ADMIN — HYDROCHLOROTHIAZIDE 12.5 MG: 12.5 TABLET ORAL at 09:30

## 2023-05-06 RX ADMIN — METHOCARBAMOL TABLETS 500 MG: 500 TABLET, COATED ORAL at 06:14

## 2023-05-06 RX ADMIN — PANTOPRAZOLE SODIUM 40 MG: 40 TABLET, DELAYED RELEASE ORAL at 09:30

## 2023-05-06 RX ADMIN — METHOCARBAMOL TABLETS 500 MG: 500 TABLET, COATED ORAL at 23:58

## 2023-05-06 RX ADMIN — POLYETHYLENE GLYCOL 3350 17 G: 17 POWDER, FOR SOLUTION ORAL at 09:41

## 2023-05-06 RX ADMIN — METHOCARBAMOL TABLETS 500 MG: 500 TABLET, COATED ORAL at 01:08

## 2023-05-06 RX ADMIN — SENNOSIDES 17.2 MG: 8.6 TABLET, FILM COATED ORAL at 09:30

## 2023-05-06 NOTE — ASSESSMENT & PLAN NOTE
- Multiple right-sided rib fractures (5-10), present on admission   - Continue rib fracture protocol   - Continue to encourage incentive spirometer use and adequate pulmonary hygiene  - Continue multimodal analgesic regimen  - Appreciate APS evaluation and recommendations  -paravertebral catheter placed 5/4  - Supplemental oxygen via nasal cannula as needed to maintain saturations greater than or equal to 94%  - Repeat chest x-ray from 5/3 reviewed  - PT and OT evaluation and treatment as indicated  - Outpatient follow-up in the trauma clinic for re-evaluation in approximately 2 weeks

## 2023-05-06 NOTE — ASSESSMENT & PLAN NOTE
- S/p fall down stairs  - Acute fracture of the right T7-T10 transverse processes with minimal displacement at T9 and T10  - Multimodal pain control, APS consulted  - Follow-up with trauma as an outpatient

## 2023-05-06 NOTE — PROGRESS NOTES
Peripheral Nerve Catheter Follow-up Note - Acute Pain Service    Marilu De La Rosa 82 y o  male MRN: [id number]  Unit/Bed#: S -01 Encounter: [id number]      Assessment:   Active Problems:    NPH (normal pressure hydrocephalus) ([facility])    Unsteady gait    Essential hypertension    Constipation    Parkinsonism ([address] )    Fall (on) (from) other stairs and steps, initial encounter    Closed fracture of multiple ribs of right side    Fracture of transverse process of thoracic vertebra, closed, initial encounter [facility])    Male pelvic hematoma    Marilu De La Rosa is a 82y o  year old male who presents status post fall downstairs with subsequent right lateral pelvic deep subcutaneous hematoma, acute fracture of the right T7-T10 transverse processes with mild displacement at T9 and T10, and multiple right-sided rib fractures, right 5-10 with minimal displacement of the posterior medial component of the right seventh through 10th ribs  Patient had a paravertebral catheter placed on 5/4 for improved rib fracture pain  Patient states pain is improved after increasing his continuous infusion rate yesterday and has minimal pain while at rest  He can his 1250ml on incentive spirometry and remains off of supplemental oxygen  He is nervous about getting OOBTC today since movement increases his pain  I think he would benefit from a higher dose of his continuous infusion rate to help his pain with his increased activity      Plan:   - Increase paravertebral catheter infusion rate to 12ml/hr  - Encourage incentive spirometer use and increased physical activity  - Expect paravertebral catheter to remain in throughout weekend, anticipate removal Monday/Tuesday    Pain History  Current pain location(s): Right chest wall  Pain Scale:   2/10  Quality: Achy  24 hour history: See above    Meds/Allergies     No Known Allergies    Objective     Temp:  [97 8 °F (36 6 °C)-98 7 °F (37 1 °C)] 98 4 °F (36 9 °C)  HR:  [69-87] 71  Resp:  [16-18] 18  BP: (115-137)/(68-74) 137/71    Physical Exam  Vitals and nursing note reviewed  Constitutional:       Appearance: Normal appearance  He is normal weight  HENT:      Head: Normocephalic and atraumatic  Right Ear: External ear normal       Left Ear: External ear normal       Nose: Nose normal       Mouth/Throat:      Mouth: Mucous membranes are moist       Pharynx: Oropharynx is clear  Eyes:      Conjunctiva/sclera: Conjunctivae normal    Cardiovascular:      Rate and Rhythm: Normal rate and regular rhythm  Pulses: Normal pulses  Heart sounds: Normal heart sounds  Pulmonary:      Effort: Pulmonary effort is normal       Breath sounds: Normal breath sounds  Chest:      Chest wall: Tenderness present  Abdominal:      General: Abdomen is flat  Bowel sounds are normal       Palpations: Abdomen is soft  Musculoskeletal:         General: Normal range of motion  Cervical back: Normal range of motion and neck supple  Skin:     General: Skin is warm and dry  Neurological:      General: No focal deficit present  Mental Status: He is alert and oriented to person, place, and time  Mental status is at baseline  Psychiatric:         Mood and Affect: Mood normal        Catheter: Catheter in good position, site clean, and not tender to palpation    Lab Results:   Results from last 7 days   Lab Units 05/05/23  0539   WBC Thousand/uL 8 43   HEMOGLOBIN g/dL 13 3   HEMATOCRIT % 40 6   PLATELETS Thousands/uL 250      Results from last 7 days   Lab Units 05/05/23  0539 05/03/23  0508 05/02/23  0907   POTASSIUM mmol/L 3 9   < > 3 8   CHLORIDE mmol/L 101   < > 103   CO2 mmol/L 26   < > 26   BUN mg/dL 19   < > 21   CREATININE mg/dL 0 92   < > 1 05   CALCIUM mg/dL 9 5   < > 9 8   ALK PHOS U/L  --   --  75   ALT U/L  --   --  41   AST U/L  --   --  39    < > = values in this interval not displayed  Counseling / Coordination of Care  Total floor / unit time spent today 15 min   Greater than 50% of total time was spent with the patient and / or family counseling and / or coordination of care  Please note that the APS provides consultative services regarding pain management only  With the exception of ketamine, peripheral nerve catheters, and epidural infusions (and except when indicated), final decisions regarding starting or changing doses of analgesic medications are at the discretion of the consulting service  Off hours consultation and/or medication management is generally not available      Zulma Pascal MD  Acute Pain Service

## 2023-05-06 NOTE — ASSESSMENT & PLAN NOTE
- S/p fall down stairs  - Right lateral pelvic deep subcutaneous hematoma overlying the tensor fascia rodolfo measures approximately 4 6 x 2 4 x 9 5 cm  - Minimal tenderness on exam  - Hemoglobin has remained stable     - Pain control  - DVT prophylaxis: Lovenox

## 2023-05-06 NOTE — ASSESSMENT & PLAN NOTE
- Patient states has been constipated for 3 days now  - No abdominal pain  Abdominal exam benign   - Continue bowel regimen  Fleets enema 5/5     - Continue to monitor

## 2023-05-06 NOTE — PLAN OF CARE
Problem: Potential for Falls  Goal: Patient will remain free of falls  Description: INTERVENTIONS:  - Educate patient/family on patient safety including physical limitations  - Instruct patient to call for assistance with activity   - Consult OT/PT to assist with strengthening/mobility   - Keep Call bell within reach  - Keep bed low and locked with side rails adjusted as appropriate  - Keep care items and personal belongings within reach  - Initiate and maintain comfort rounds  - Make Fall Risk Sign visible to staff  - Apply yellow socks and bracelet for high fall risk patients  - Consider moving patient to room near nurses station  Outcome: Progressing     Problem: MOBILITY - ADULT  Goal: Maintain or return to baseline ADL function  Description: INTERVENTIONS:  -  Assess patient's ability to carry out ADLs; assess patient's baseline for ADL function and identify physical deficits which impact ability to perform ADLs (bathing, care of mouth/teeth, toileting, grooming, dressing, etc )  - Assess/evaluate cause of self-care deficits   - Assess range of motion  - Assess patient's mobility; develop plan if impaired  - Assess patient's need for assistive devices and provide as appropriate  - Encourage maximum independence but intervene and supervise when necessary  - Involve family in performance of ADLs  - Assess for home care needs following discharge   - Consider OT consult to assist with ADL evaluation and planning for discharge  - Provide patient education as appropriate  Outcome: Progressing  Goal: Maintains/Returns to pre admission functional level  Description: INTERVENTIONS:  - Perform BMAT or MOVE assessment daily    - Set and communicate daily mobility goal to care team and patient/family/caregiver     - Collaborate with rehabilitation services on mobility goals if consulted  - Out of bed for toileting  - Record patient progress and toleration of activity level   Outcome: Progressing     Problem: RESPIRATORY - ADULT  Goal: Achieves optimal ventilation and oxygenation  Description: INTERVENTIONS:  - Assess for changes in respiratory status  - Assess for changes in mentation and behavior  - Position to facilitate oxygenation and minimize respiratory effort  - Oxygen administered by appropriate delivery if ordered  - Initiate smoking cessation education as indicated  - Encourage broncho-pulmonary hygiene including cough, deep breathe, Incentive Spirometry  - Assess the need for suctioning and aspirate as needed  - Assess and instruct to report SOB or any respiratory difficulty  - Respiratory Therapy support as indicated  Outcome: Progressing     Problem: PAIN - ADULT  Goal: Verbalizes/displays adequate comfort level or baseline comfort level  Description: Interventions:  - Encourage patient to monitor pain and request assistance  - Assess pain using appropriate pain scale  - Administer analgesics based on type and severity of pain and evaluate response  - Implement non-pharmacological measures as appropriate and evaluate response  - Consider cultural and social influences on pain and pain management  - Notify physician/advanced practitioner if interventions unsuccessful or patient reports new pain  Outcome: Progressing     Problem: Prexisting or High Potential for Compromised Skin Integrity  Goal: Skin integrity is maintained or improved  Description: INTERVENTIONS:  - Identify patients at risk for skin breakdown  - Assess and monitor skin integrity  - Assess and monitor nutrition and hydration status  - Monitor labs   - Assess for incontinence   - Turn and reposition patient  - Assist with mobility/ambulation  - Relieve pressure over bony prominences  - Avoid friction and shearing  - Provide appropriate hygiene as needed including keeping skin clean and dry  - Evaluate need for skin moisturizer/barrier cream  - Collaborate with interdisciplinary team   - Patient/family teaching  - Consider wound care consult   Outcome: Progressing     Problem: Nutrition/Hydration-ADULT  Goal: Nutrient/Hydration intake appropriate for improving, restoring or maintaining nutritional needs  Description: Monitor and assess patient's nutrition/hydration status for malnutrition  Collaborate with interdisciplinary team and initiate plan and interventions as ordered  Monitor patient's weight and dietary intake as ordered or per policy  Utilize nutrition screening tool and intervene as necessary  Determine patient's food preferences and provide high-protein, high-caloric foods as appropriate       INTERVENTIONS:  - Monitor oral intake, urinary output, labs, and treatment plans  - Assess nutrition and hydration status and recommend course of action  - Evaluate amount of meals eaten  - Assist patient with eating if necessary   - Allow adequate time for meals  - Recommend/ encourage appropriate diets, oral nutritional supplements, and vitamin/mineral supplements  - Order, calculate, and assess calorie counts as needed  - Recommend, monitor, and adjust tube feedings and TPN/PPN based on assessed needs  - Assess need for intravenous fluids  - Provide specific nutrition/hydration education as appropriate  - Include patient/family/caregiver in decisions related to nutrition  Outcome: Progressing

## 2023-05-06 NOTE — ASSESSMENT & PLAN NOTE
- Patient with Parkinson's and ambulatory dysfunction at baseline, which he uses rollator walker--suffered a mechanical fall down several steps   - Injuries noted below  - Geriatrics evaluation appreciated    - PT/OT eval recommending inpatient rehab  - CM for disposition planning  Rehab referrals placed

## 2023-05-07 ENCOUNTER — APPOINTMENT (INPATIENT)
Dept: RADIOLOGY | Facility: HOSPITAL | Age: 83
End: 2023-05-07

## 2023-05-07 LAB
ALBUMIN SERPL BCP-MCNC: 3.9 G/DL (ref 3.5–5)
ALP SERPL-CCNC: 94 U/L (ref 34–104)
ALT SERPL W P-5'-P-CCNC: 16 U/L (ref 7–52)
ANION GAP SERPL CALCULATED.3IONS-SCNC: 10 MMOL/L (ref 4–13)
AST SERPL W P-5'-P-CCNC: 27 U/L (ref 13–39)
ATRIAL RATE: 76 BPM
ATRIAL RATE: 78 BPM
ATRIAL RATE: 79 BPM
BACTERIA UR QL AUTO: ABNORMAL /HPF
BASOPHILS # BLD AUTO: 0.05 THOUSANDS/ÂΜL (ref 0–0.1)
BASOPHILS NFR BLD AUTO: 1 % (ref 0–1)
BILIRUB SERPL-MCNC: 0.82 MG/DL (ref 0.2–1)
BILIRUB UR QL STRIP: NEGATIVE
BUN SERPL-MCNC: 25 MG/DL (ref 5–25)
CALCIUM SERPL-MCNC: 9.5 MG/DL (ref 8.4–10.2)
CARDIAC TROPONIN I PNL SERPL HS: 6 NG/L (ref 8–18)
CHLORIDE SERPL-SCNC: 102 MMOL/L (ref 96–108)
CLARITY UR: CLEAR
CO2 SERPL-SCNC: 23 MMOL/L (ref 21–32)
COLOR UR: YELLOW
CREAT SERPL-MCNC: 1.03 MG/DL (ref 0.6–1.3)
EOSINOPHIL # BLD AUTO: 0.14 THOUSAND/ÂΜL (ref 0–0.61)
EOSINOPHIL NFR BLD AUTO: 1 % (ref 0–6)
ERYTHROCYTE [DISTWIDTH] IN BLOOD BY AUTOMATED COUNT: 13.2 % (ref 11.6–15.1)
GFR SERPL CREATININE-BSD FRML MDRD: 67 ML/MIN/1.73SQ M
GLUCOSE SERPL-MCNC: 102 MG/DL (ref 65–140)
GLUCOSE SERPL-MCNC: 103 MG/DL (ref 65–140)
GLUCOSE UR STRIP-MCNC: NEGATIVE MG/DL
HCT VFR BLD AUTO: 43.4 % (ref 36.5–49.3)
HGB BLD-MCNC: 13.8 G/DL (ref 12–17)
HGB UR QL STRIP.AUTO: NEGATIVE
IMM GRANULOCYTES # BLD AUTO: 0.03 THOUSAND/UL (ref 0–0.2)
IMM GRANULOCYTES NFR BLD AUTO: 0 % (ref 0–2)
KETONES UR STRIP-MCNC: ABNORMAL MG/DL
LACTATE SERPL-SCNC: 1 MMOL/L (ref 0.5–2)
LEUKOCYTE ESTERASE UR QL STRIP: NEGATIVE
LIPASE SERPL-CCNC: 34 U/L (ref 11–82)
LYMPHOCYTES # BLD AUTO: 1.75 THOUSANDS/ÂΜL (ref 0.6–4.47)
LYMPHOCYTES NFR BLD AUTO: 17 % (ref 14–44)
MCH RBC QN AUTO: 29.3 PG (ref 26.8–34.3)
MCHC RBC AUTO-ENTMCNC: 31.8 G/DL (ref 31.4–37.4)
MCV RBC AUTO: 92 FL (ref 82–98)
MONOCYTES # BLD AUTO: 1.07 THOUSAND/ÂΜL (ref 0.17–1.22)
MONOCYTES NFR BLD AUTO: 11 % (ref 4–12)
MUCOUS THREADS UR QL AUTO: ABNORMAL
NEUTROPHILS # BLD AUTO: 7.14 THOUSANDS/ÂΜL (ref 1.85–7.62)
NEUTS SEG NFR BLD AUTO: 70 % (ref 43–75)
NITRITE UR QL STRIP: NEGATIVE
NON-SQ EPI CELLS URNS QL MICRO: ABNORMAL /HPF
NRBC BLD AUTO-RTO: 0 /100 WBCS
P AXIS: 22 DEGREES
P AXIS: 36 DEGREES
P AXIS: 36 DEGREES
PH UR STRIP.AUTO: 5.5 [PH]
PLATELET # BLD AUTO: 300 THOUSANDS/UL (ref 149–390)
PMV BLD AUTO: 9.8 FL (ref 8.9–12.7)
POTASSIUM SERPL-SCNC: 4.1 MMOL/L (ref 3.5–5.3)
PR INTERVAL: 178 MS
PR INTERVAL: 180 MS
PR INTERVAL: 182 MS
PROT SERPL-MCNC: 7.2 G/DL (ref 6.4–8.4)
PROT UR STRIP-MCNC: ABNORMAL MG/DL
QRS AXIS: -24 DEGREES
QRS AXIS: -27 DEGREES
QRS AXIS: -29 DEGREES
QRSD INTERVAL: 92 MS
QRSD INTERVAL: 92 MS
QRSD INTERVAL: 98 MS
QT INTERVAL: 404 MS
QT INTERVAL: 410 MS
QT INTERVAL: 416 MS
QTC INTERVAL: 460 MS
QTC INTERVAL: 461 MS
QTC INTERVAL: 477 MS
RBC # BLD AUTO: 4.71 MILLION/UL (ref 3.88–5.62)
RBC #/AREA URNS AUTO: ABNORMAL /HPF
SODIUM SERPL-SCNC: 135 MMOL/L (ref 135–147)
SP GR UR STRIP.AUTO: 1.03 (ref 1–1.03)
T WAVE AXIS: -16 DEGREES
T WAVE AXIS: -19 DEGREES
T WAVE AXIS: -20 DEGREES
UROBILINOGEN UR STRIP-ACNC: <2 MG/DL
VENTRICULAR RATE: 76 BPM
VENTRICULAR RATE: 78 BPM
VENTRICULAR RATE: 79 BPM
WBC # BLD AUTO: 10.18 THOUSAND/UL (ref 4.31–10.16)
WBC #/AREA URNS AUTO: ABNORMAL /HPF

## 2023-05-07 RX ORDER — GUAIFENESIN 600 MG/1
1200 TABLET, EXTENDED RELEASE ORAL EVERY 12 HOURS SCHEDULED
Status: DISCONTINUED | OUTPATIENT
Start: 2023-05-07 | End: 2023-05-10 | Stop reason: HOSPADM

## 2023-05-07 RX ORDER — METHOCARBAMOL 750 MG/1
750 TABLET, FILM COATED ORAL EVERY 6 HOURS SCHEDULED
Status: DISCONTINUED | OUTPATIENT
Start: 2023-05-07 | End: 2023-05-10 | Stop reason: HOSPADM

## 2023-05-07 RX ORDER — LEVALBUTEROL INHALATION SOLUTION 1.25 MG/3ML
1.25 SOLUTION RESPIRATORY (INHALATION)
Status: DISCONTINUED | OUTPATIENT
Start: 2023-05-07 | End: 2023-05-07

## 2023-05-07 RX ADMIN — HYDROCHLOROTHIAZIDE 12.5 MG: 12.5 TABLET ORAL at 08:24

## 2023-05-07 RX ADMIN — ONDANSETRON 4 MG: 2 INJECTION INTRAMUSCULAR; INTRAVENOUS at 07:20

## 2023-05-07 RX ADMIN — SENNOSIDES 17.2 MG: 8.6 TABLET, FILM COATED ORAL at 16:35

## 2023-05-07 RX ADMIN — FENTANYL CITRATE: 50 INJECTION INTRAMUSCULAR; INTRAVENOUS at 10:57

## 2023-05-07 RX ADMIN — Medication 2.5 MG: at 16:35

## 2023-05-07 RX ADMIN — DOCUSATE SODIUM 100 MG: 100 CAPSULE, LIQUID FILLED ORAL at 16:36

## 2023-05-07 RX ADMIN — LISINOPRIL 2.5 MG: 2.5 TABLET ORAL at 08:24

## 2023-05-07 RX ADMIN — METHOCARBAMOL TABLETS 500 MG: 500 TABLET, COATED ORAL at 11:05

## 2023-05-07 RX ADMIN — GUAIFENESIN 1200 MG: 600 TABLET ORAL at 10:19

## 2023-05-07 RX ADMIN — CARBIDOPA AND LEVODOPA 2 TABLET: 25; 100 TABLET ORAL at 08:24

## 2023-05-07 RX ADMIN — PANTOPRAZOLE SODIUM 40 MG: 40 TABLET, DELAYED RELEASE ORAL at 08:24

## 2023-05-07 RX ADMIN — METHOCARBAMOL TABLETS 750 MG: 750 TABLET, COATED ORAL at 16:36

## 2023-05-07 RX ADMIN — SENNOSIDES 17.2 MG: 8.6 TABLET, FILM COATED ORAL at 08:25

## 2023-05-07 RX ADMIN — ENOXAPARIN SODIUM 30 MG: 30 INJECTION SUBCUTANEOUS at 11:05

## 2023-05-07 RX ADMIN — CARBIDOPA AND LEVODOPA 1 TABLET: 25; 100 TABLET ORAL at 21:26

## 2023-05-07 RX ADMIN — BISACODYL 10 MG: 10 SUPPOSITORY RECTAL at 08:24

## 2023-05-07 RX ADMIN — DOCUSATE SODIUM 100 MG: 100 CAPSULE, LIQUID FILLED ORAL at 08:24

## 2023-05-07 RX ADMIN — Medication 1000 UNITS: at 08:24

## 2023-05-07 RX ADMIN — LIDOCAINE 5% 1 PATCH: 700 PATCH TOPICAL at 08:25

## 2023-05-07 RX ADMIN — METHOCARBAMOL TABLETS 500 MG: 500 TABLET, COATED ORAL at 05:00

## 2023-05-07 RX ADMIN — LEVALBUTEROL HYDROCHLORIDE 1.25 MG: 1.25 SOLUTION RESPIRATORY (INHALATION) at 11:03

## 2023-05-07 RX ADMIN — PANTOPRAZOLE SODIUM 40 MG: 40 TABLET, DELAYED RELEASE ORAL at 16:36

## 2023-05-07 RX ADMIN — POLYETHYLENE GLYCOL 3350 17 G: 17 POWDER, FOR SOLUTION ORAL at 08:25

## 2023-05-07 RX ADMIN — CARBIDOPA AND LEVODOPA 2 TABLET: 25; 100 TABLET ORAL at 12:55

## 2023-05-07 RX ADMIN — GUAIFENESIN 1200 MG: 600 TABLET ORAL at 21:26

## 2023-05-07 RX ADMIN — CARBIDOPA AND LEVODOPA 2 TABLET: 25; 100 TABLET ORAL at 16:35

## 2023-05-07 NOTE — NURSING NOTE
0725: Upon arrival to patients room- patient diaphoretic, oxygen saturations 86-87% on room air, new complaints on nausea, abdomen distended with no heard bowel sounds  RN reached out to trauma AP to come assess patient as soon as possible  0730: Patient assessed and orders placed

## 2023-05-07 NOTE — PROGRESS NOTES
[facility]  Progress Note  Name: Jaleel Mendoza  MRN: [id number]  Unit/Bed#: S -01 I Date of Admission: 5/2/2023   Date of Service: 5/7/2023 I Hospital Day: 5    Assessment/Plan   Fall (on) (from) other stairs and steps, initial encounter  Assessment & Plan  - Patient with Parkinson's and ambulatory dysfunction at baseline, which he uses rollator walker--suffered a mechanical fall down several steps   - Injuries noted below  - Geriatrics evaluation appreciated    - PT/OT eval recommending inpatient rehab  - CM for disposition planning  Rehab referrals placed  Closed fracture of multiple ribs of right side  Assessment & Plan  - Multiple right-sided rib fractures (5-10), present on admission   - Continue rib fracture protocol   - Continue to encourage incentive spirometer use and adequate pulmonary hygiene  Flutter valve added  - Continue multimodal analgesic regimen  - Appreciate APS evaluation and recommendations  -paravertebral catheter placed 5/4  Will discuss removal with APS on Monday, 5/8     - Encouraged patient to take oral regimen and increased methocarbamol  He does not tolerate APAP  - Supplemental oxygen via nasal cannula as needed to maintain saturations greater than or equal to 94%  Currently on RA to 2 liters NC    - Repeat chest x-ray from 5/3 reviewed  - PT and OT evaluation and treatment as indicated  - Outpatient follow-up in the trauma clinic for re-evaluation in approximately 2 weeks  Fracture of transverse process of thoracic vertebra, closed, initial encounter [facility])  Assessment & Plan  - S/p fall down stairs  - Acute fracture of the right T7-T10 transverse processes with minimal displacement at T9 and T10  - Multimodal pain control, APS consulted  - Follow-up with trauma as an outpatient      Male pelvic hematoma  Assessment & Plan  - S/p fall down stairs  - Right lateral pelvic deep subcutaneous hematoma overlying the tensor fascia "rodolfo measures approximately 4 6 x 2 4 x 9 5 cm  - Minimal tenderness on exam  - Hemoglobin has remained stable  - Pain control  - DVT prophylaxis: Lovenox    Parkinsonism (HCC)  Assessment & Plan  - Continue home regimen of carvidopa-levadopa  - PT/OT recommending inpatient rehab  Constipation  Assessment & Plan  - No abdominal pain  Abdominal exam benign   - Continue bowel regimen  Had a BM on 5/5    - continue bowel regimen  - KUB on 5/7 with normal bowel gas pattern, no significant stool burden noted  - Continue to monitor  Essential hypertension  Assessment & Plan  - Monitor vital signs  - Continue home meds, hydrochlorothiazide and enalapril    Unsteady gait  Assessment & Plan  - Ambulatory dysfunction at baseline  Uses rollator   - PT/OT    NPH (normal pressure hydrocephalus) (HCC)  Assessment & Plan  - S/p shunt placement  - Follow-up with neurosurgery as an outpatient as scheduled             Bowel Regimen: colace, senna, dulcolax  Add miralax  VTE Prophylaxis:Sequential compression device (Venodyne)  and Enoxaparin (Lovenox)     Disposition: continue med-surg status, rehab placement pending  D/C paravertebral catheter on 5/8  Subjective   Chief Complaint: \"I'm doing better'    Subjective: Patient was feeling  Nauseated this morning  He is now feeling better  He is passing gas and tolerating a diet  No BM in 2 days  He has not been using the IS as much as he should  Discussed the importance of pulmonary toilette and added flutter valve  Objective   Vitals:   Temp:  [98 4 °F (36 9 °C)-99 °F (37 2 °C)] 98 5 °F (36 9 °C)  HR:  [73-85] 85  Resp:  [18-20] 20  BP: (112-129)/(63-73) 112/63    I/O       05/05 0701  05/06 0700 05/06 0701  05/07 0700 05/07 0701  05/08 0700    P  O  660 370 740    I V  (mL/kg) 330 (3 3)      Epidural   287 6    Total Intake(mL/kg) 990 (10) 370 (3 7) 1027 6 (10 4)    Urine (mL/kg/hr) 1125 (0 5) 800 (0 3) 175 (0 2)    Stool 0 0 0    Total Output 1125 800 175    " Net -135 1 -430 +852 6           Unmeasured Urine Occurrence  1 x 1 x    Unmeasured Stool Occurrence 1 x 0 x 0 x           Physical Exam:   GENERAL APPEARANCE: NAD  NEURO: GCS 15,non-focal  HEENT: NCAT  CV: RRR, no MGR  LUNGS: CTA bilaterally; + Pulling 1250 mls on IS  GI: soft,non-tender,non-distended  : voiding  MSK: no edema, contusion or deformity  SKIN: pink, warm ,dry    Invasive Devices     Peripheral Intravenous Line  Duration           Peripheral IV 05/06/23 Left Forearm 1 day          Epidural Line  Duration           Nerve Block Catheter 05/04/23 3 days                 PIC Score  PIC Pain Score: 2 (5/7/2023  4:35 PM)  PIC Incentive Spirometry Score: 4 (5/7/2023 12:00 PM)  PIC Cough Description: 3 (5/7/2023 12:00 PM)  PIC Total Score: 9 (5/7/2023 12:00 PM)       If the Total PIC Score </=5, did you consult APS and evaluate patient for further intervention?: N/A      Pain:    Incentive Spirometry  Cough  3 = Controlled  4 = Above goal volume 3 = Strong  2 = Moderate  3 = Goal to alert volume 2 = Weak  1 = Severe  2 = Below alert volume 1 = Absent     1 = Unable to perform IS         Lab Results:   Results: I have personally reviewed all pertinent laboratory/tests results, BMP/CMP:   Lab Results   Component Value Date    SODIUM 135 05/07/2023    K 4 1 05/07/2023     05/07/2023    CO2 23 05/07/2023    BUN 25 05/07/2023    CREATININE 1 03 05/07/2023    CALCIUM 9 5 05/07/2023    AST 27 05/07/2023    ALT 16 05/07/2023    ALKPHOS 94 05/07/2023    EGFR 67 05/07/2023    and CBC:   Lab Results   Component Value Date    WBC 10 18 (H) 05/07/2023    HGB 13 8 05/07/2023    HCT 43 4 05/07/2023    MCV 92 05/07/2023     05/07/2023    MCH 29 3 05/07/2023    MCHC 31 8 05/07/2023    RDW 13 2 05/07/2023    MPV 9 8 05/07/2023    NRBC 0 05/07/2023     Imaging: I have personally reviewed pertinent reports       Other Studies: no new

## 2023-05-07 NOTE — PLAN OF CARE
Problem: Nutrition/Hydration-ADULT  Goal: Nutrient/Hydration intake appropriate for improving, restoring or maintaining nutritional needs  Description: Monitor and assess patient's nutrition/hydration status for malnutrition  Collaborate with interdisciplinary team and initiate plan and interventions as ordered  Monitor patient's weight and dietary intake as ordered or per policy  Utilize nutrition screening tool and intervene as necessary  Determine patient's food preferences and provide high-protein, high-caloric foods as appropriate       INTERVENTIONS:  - Monitor oral intake, urinary output, labs, and treatment plans  - Assess nutrition and hydration status and recommend course of action  - Evaluate amount of meals eaten  - Assist patient with eating if necessary   - Allow adequate time for meals  - Recommend/ encourage appropriate diets, oral nutritional supplements, and vitamin/mineral supplements  - Order, calculate, and assess calorie counts as needed  - Recommend, monitor, and adjust tube feedings and TPN/PPN based on assessed needs  - Assess need for intravenous fluids  - Provide specific nutrition/hydration education as appropriate  - Include patient/family/caregiver in decisions related to nutrition  Outcome: Not Progressing     Problem: PAIN - ADULT  Goal: Verbalizes/displays adequate comfort level or baseline comfort level  Description: Interventions:  - Encourage patient to monitor pain and request assistance  - Assess pain using appropriate pain scale  - Administer analgesics based on type and severity of pain and evaluate response  - Implement non-pharmacological measures as appropriate and evaluate response  - Consider cultural and social influences on pain and pain management  - Notify physician/advanced practitioner if interventions unsuccessful or patient reports new pain  Outcome: Not Progressing     Problem: RESPIRATORY - ADULT  Goal: Achieves optimal ventilation and oxygenation  Description: INTERVENTIONS:  - Assess for changes in respiratory status  - Assess for changes in mentation and behavior  - Position to facilitate oxygenation and minimize respiratory effort  - Oxygen administered by appropriate delivery if ordered  - Initiate smoking cessation education as indicated  - Encourage broncho-pulmonary hygiene including cough, deep breathe, Incentive Spirometry  - Assess the need for suctioning and aspirate as needed  - Assess and instruct to report SOB or any respiratory difficulty  - Respiratory Therapy support as indicated  Outcome: Not Progressing     Problem: MOBILITY - ADULT  Goal: Maintains/Returns to pre admission functional level  Description: INTERVENTIONS:  - Perform BMAT or MOVE assessment daily    - Set and communicate daily mobility goal to care team and patient/family/caregiver     - Collaborate with rehabilitation services on mobility goals if consulted  - Out of bed for toileting  - Record patient progress and toleration of activity level   Outcome: Not Progressing     Problem: Potential for Falls  Goal: Patient will remain free of falls  Description: INTERVENTIONS:  - Educate patient/family on patient safety including physical limitations  - Instruct patient to call for assistance with activity   - Consult OT/PT to assist with strengthening/mobility   - Keep Call bell within reach  - Keep bed low and locked with side rails adjusted as appropriate  - Keep care items and personal belongings within reach  - Initiate and maintain comfort rounds  - Make Fall Risk Sign visible to staff  - Apply yellow socks and bracelet for high fall risk patients  - Consider moving patient to room near nurses station  Outcome: Progressing     Problem: GASTROINTESTINAL - ADULT  Goal: Minimal or absence of nausea and/or vomiting  Description: INTERVENTIONS:  - Administer IV fluids if ordered to ensure adequate hydration  - Maintain NPO status until nausea and vomiting are resolved  - Nasogastric tube if ordered  - Administer ordered antiemetic medications as needed  - Provide nonpharmacologic comfort measures as appropriate  - Advance diet as tolerated, if ordered  - Consider nutrition services referral to assist patient with adequate nutrition and appropriate food choices  Outcome: Not Progressing  Goal: Maintains or returns to baseline bowel function  Description: INTERVENTIONS:  - Assess bowel function  - Encourage oral fluids to ensure adequate hydration  - Administer IV fluids if ordered to ensure adequate hydration  - Administer ordered medications as needed  - Encourage mobilization and activity  - Consider nutritional services referral to assist patient with adequate nutrition and appropriate food choices  Outcome: Not Progressing

## 2023-05-07 NOTE — ASSESSMENT & PLAN NOTE
- Multiple right-sided rib fractures (5-10), present on admission   - Continue rib fracture protocol   - Continue to encourage incentive spirometer use and adequate pulmonary hygiene  Flutter valve added  - Continue multimodal analgesic regimen  - Appreciate APS evaluation and recommendations  -paravertebral catheter placed 5/4  Will discuss removal with APS on Monday, 5/8     - Encouraged patient to take oral regimen and increased methocarbamol  He does not tolerate APAP  - Supplemental oxygen via nasal cannula as needed to maintain saturations greater than or equal to 94%  Currently on RA to 2 liters NC    - Repeat chest x-ray from 5/3 reviewed  - PT and OT evaluation and treatment as indicated  - Outpatient follow-up in the trauma clinic for re-evaluation in approximately 2 weeks

## 2023-05-07 NOTE — PROGRESS NOTES
Peripheral Nerve Catheter Follow-up Note - Acute Pain Service    Christina Sena 82 y o  male MRN: [id number]  Unit/Bed#: S -01 Encounter: [id number]      Assessment:   Active Problems:    NPH (normal pressure hydrocephalus) ([facility])    Unsteady gait    Essential hypertension    Constipation    Parkinsonism ([address] )    Fall (on) (from) other stairs and steps, initial encounter    Closed fracture of multiple ribs of right side    Fracture of transverse process of thoracic vertebra, closed, initial encounter [facility])    Male pelvic hematoma    Christina Sena is a 82y o  year old male who presents status post fall downstairs with subsequent right lateral pelvic deep subcutaneous hematoma, acute fracture of the right T7-T10 transverse processes with mild displacement at T9 and T10, and multiple right-sided rib fractures, right 5-10 with minimal displacement of the posterior medial component of the right seventh through 10th ribs      Patient had a paravertebral catheter placed on 5/4 for improved rib fracture pain  Patient states pain is improved after increasing his continuous infusion rate again yesterday and has minimal pain while at rest  He can hit 1000-1250ml on incentive spirometry  He had an episode of SOB and diaphoresis this morning and is transiently on NC, given that this was so many hours after the increased rate adjustment and there was no change in BP I am doubtful it is related to his paravertebral catheter       Plan:   - Continue continuous infusion through paravertebral catheter at 12ml/hr  - Expect catheter removal early this week  - Encourage activity and incentive spirometry use    Pain History  Current pain location(s): Right chest wall  Pain Scale:   2/10  Quality: Achy  24 hour history: See above    Meds/Allergies     No Known Allergies    Objective     Temp:  [98 4 °F (36 9 °C)-99 °F (37 2 °C)] 98 6 °F (37 °C)  HR:  [73-83] 75  Resp:  [18-20] 20  BP: (117-129)/(69-78) 121/73    Physical Exam  Vitals and nursing note reviewed  Constitutional:       Appearance: Normal appearance  He is normal weight  HENT:      Head: Normocephalic and atraumatic  Right Ear: External ear normal       Left Ear: External ear normal       Nose: Nose normal       Mouth/Throat:      Mouth: Mucous membranes are moist       Pharynx: Oropharynx is clear  Eyes:      Conjunctiva/sclera: Conjunctivae normal    Cardiovascular:      Rate and Rhythm: Normal rate and regular rhythm  Pulses: Normal pulses  Heart sounds: Normal heart sounds  Pulmonary:      Effort: Pulmonary effort is normal       Breath sounds: Normal breath sounds  Chest:      Chest wall: Tenderness present  Abdominal:      General: Abdomen is flat  Bowel sounds are normal       Palpations: Abdomen is soft  Musculoskeletal:         General: Normal range of motion  Cervical back: Normal range of motion  Skin:     General: Skin is warm and dry  Neurological:      General: No focal deficit present  Mental Status: He is alert and oriented to person, place, and time  Mental status is at baseline  Psychiatric:         Mood and Affect: Mood normal        Catheter: Catheter in good position, site clean, and not tender to palpation    Lab Results:   Results from last 7 days   Lab Units 05/05/23  0539   WBC Thousand/uL 8 43   HEMOGLOBIN g/dL 13 3   HEMATOCRIT % 40 6   PLATELETS Thousands/uL 250      Results from last 7 days   Lab Units 05/07/23  0841   POTASSIUM mmol/L 4 1   CHLORIDE mmol/L 102   CO2 mmol/L 23   BUN mg/dL 25   CREATININE mg/dL 1 03   CALCIUM mg/dL 9 5   ALK PHOS U/L 94   ALT U/L 16   AST U/L 27       Counseling / Coordination of Care  Total floor / unit time spent today 15 min  Greater than 50% of total time was spent with the patient and / or family counseling and / or coordination of care  Please note that the APS provides consultative services regarding pain management only    With the exception of ketamine, peripheral nerve catheters, and epidural infusions (and except when indicated), final decisions regarding starting or changing doses of analgesic medications are at the discretion of the consulting service  Off hours consultation and/or medication management is generally not available      Suly Velasco MD  Acute Pain Service

## 2023-05-07 NOTE — ASSESSMENT & PLAN NOTE
- No abdominal pain  Abdominal exam benign   - Continue bowel regimen  Had a BM on 5/5    - continue bowel regimen  - KUB on 5/7 with normal bowel gas pattern, no significant stool burden noted  - Continue to monitor

## 2023-05-08 ENCOUNTER — APPOINTMENT (OUTPATIENT)
Dept: PHYSICAL THERAPY | Facility: CLINIC | Age: 83
End: 2023-05-08
Payer: MEDICARE

## 2023-05-08 ENCOUNTER — RA CDI HCC (OUTPATIENT)
Dept: OTHER | Facility: HOSPITAL | Age: 83
End: 2023-05-08

## 2023-05-08 LAB
ANION GAP SERPL CALCULATED.3IONS-SCNC: 8 MMOL/L (ref 4–13)
BASOPHILS # BLD AUTO: 0.04 THOUSANDS/ÂΜL (ref 0–0.1)
BASOPHILS NFR BLD AUTO: 0 % (ref 0–1)
BUN SERPL-MCNC: 24 MG/DL (ref 5–25)
CALCIUM SERPL-MCNC: 9.3 MG/DL (ref 8.4–10.2)
CHLORIDE SERPL-SCNC: 102 MMOL/L (ref 96–108)
CO2 SERPL-SCNC: 24 MMOL/L (ref 21–32)
CREAT SERPL-MCNC: 1.01 MG/DL (ref 0.6–1.3)
EOSINOPHIL # BLD AUTO: 0.31 THOUSAND/ÂΜL (ref 0–0.61)
EOSINOPHIL NFR BLD AUTO: 4 % (ref 0–6)
ERYTHROCYTE [DISTWIDTH] IN BLOOD BY AUTOMATED COUNT: 13.3 % (ref 11.6–15.1)
GFR SERPL CREATININE-BSD FRML MDRD: 69 ML/MIN/1.73SQ M
GLUCOSE SERPL-MCNC: 97 MG/DL (ref 65–140)
HCT VFR BLD AUTO: 41.5 % (ref 36.5–49.3)
HGB BLD-MCNC: 13.2 G/DL (ref 12–17)
IMM GRANULOCYTES # BLD AUTO: 0.05 THOUSAND/UL (ref 0–0.2)
IMM GRANULOCYTES NFR BLD AUTO: 1 % (ref 0–2)
LYMPHOCYTES # BLD AUTO: 2.15 THOUSANDS/ÂΜL (ref 0.6–4.47)
LYMPHOCYTES NFR BLD AUTO: 24 % (ref 14–44)
MAGNESIUM SERPL-MCNC: 2.1 MG/DL (ref 1.9–2.7)
MCH RBC QN AUTO: 29.1 PG (ref 26.8–34.3)
MCHC RBC AUTO-ENTMCNC: 31.8 G/DL (ref 31.4–37.4)
MCV RBC AUTO: 92 FL (ref 82–98)
MONOCYTES # BLD AUTO: 1.01 THOUSAND/ÂΜL (ref 0.17–1.22)
MONOCYTES NFR BLD AUTO: 11 % (ref 4–12)
NEUTROPHILS # BLD AUTO: 5.4 THOUSANDS/ÂΜL (ref 1.85–7.62)
NEUTS SEG NFR BLD AUTO: 60 % (ref 43–75)
NRBC BLD AUTO-RTO: 0 /100 WBCS
PHOSPHATE SERPL-MCNC: 3.2 MG/DL (ref 2.3–4.1)
PLATELET # BLD AUTO: 309 THOUSANDS/UL (ref 149–390)
PMV BLD AUTO: 10 FL (ref 8.9–12.7)
POTASSIUM SERPL-SCNC: 4 MMOL/L (ref 3.5–5.3)
RBC # BLD AUTO: 4.53 MILLION/UL (ref 3.88–5.62)
SODIUM SERPL-SCNC: 134 MMOL/L (ref 135–147)
WBC # BLD AUTO: 8.96 THOUSAND/UL (ref 4.31–10.16)

## 2023-05-08 RX ORDER — GABAPENTIN 100 MG/1
100 CAPSULE ORAL 3 TIMES DAILY
Status: DISCONTINUED | OUTPATIENT
Start: 2023-05-08 | End: 2023-05-10 | Stop reason: HOSPADM

## 2023-05-08 RX ORDER — LIDOCAINE 50 MG/G
2 PATCH TOPICAL DAILY
Status: DISCONTINUED | OUTPATIENT
Start: 2023-05-08 | End: 2023-05-10 | Stop reason: HOSPADM

## 2023-05-08 RX ADMIN — CARBIDOPA AND LEVODOPA 2 TABLET: 25; 100 TABLET ORAL at 12:03

## 2023-05-08 RX ADMIN — CARBIDOPA AND LEVODOPA 2 TABLET: 25; 100 TABLET ORAL at 17:54

## 2023-05-08 RX ADMIN — ENOXAPARIN SODIUM 30 MG: 30 INJECTION SUBCUTANEOUS at 12:59

## 2023-05-08 RX ADMIN — LISINOPRIL 2.5 MG: 2.5 TABLET ORAL at 08:02

## 2023-05-08 RX ADMIN — METHOCARBAMOL TABLETS 750 MG: 750 TABLET, COATED ORAL at 00:35

## 2023-05-08 RX ADMIN — CARBIDOPA AND LEVODOPA 1 TABLET: 25; 100 TABLET ORAL at 21:38

## 2023-05-08 RX ADMIN — DOCUSATE SODIUM 100 MG: 100 CAPSULE, LIQUID FILLED ORAL at 17:54

## 2023-05-08 RX ADMIN — Medication 1000 UNITS: at 08:02

## 2023-05-08 RX ADMIN — GABAPENTIN 100 MG: 100 CAPSULE ORAL at 17:54

## 2023-05-08 RX ADMIN — Medication 2.5 MG: at 09:46

## 2023-05-08 RX ADMIN — SENNOSIDES 17.2 MG: 8.6 TABLET, FILM COATED ORAL at 17:55

## 2023-05-08 RX ADMIN — HYDROCHLOROTHIAZIDE 12.5 MG: 12.5 TABLET ORAL at 08:02

## 2023-05-08 RX ADMIN — GUAIFENESIN 1200 MG: 600 TABLET ORAL at 21:38

## 2023-05-08 RX ADMIN — GABAPENTIN 100 MG: 100 CAPSULE ORAL at 21:38

## 2023-05-08 RX ADMIN — CARBIDOPA AND LEVODOPA 2 TABLET: 25; 100 TABLET ORAL at 08:03

## 2023-05-08 RX ADMIN — DOCUSATE SODIUM 100 MG: 100 CAPSULE, LIQUID FILLED ORAL at 08:02

## 2023-05-08 RX ADMIN — SENNOSIDES 17.2 MG: 8.6 TABLET, FILM COATED ORAL at 08:02

## 2023-05-08 RX ADMIN — FENTANYL CITRATE: 50 INJECTION INTRAMUSCULAR; INTRAVENOUS at 11:53

## 2023-05-08 RX ADMIN — ENOXAPARIN SODIUM 30 MG: 30 INJECTION SUBCUTANEOUS at 00:35

## 2023-05-08 RX ADMIN — METHOCARBAMOL TABLETS 750 MG: 750 TABLET, COATED ORAL at 12:03

## 2023-05-08 RX ADMIN — PANTOPRAZOLE SODIUM 40 MG: 40 TABLET, DELAYED RELEASE ORAL at 17:54

## 2023-05-08 RX ADMIN — ENOXAPARIN SODIUM 30 MG: 30 INJECTION SUBCUTANEOUS at 23:55

## 2023-05-08 RX ADMIN — GUAIFENESIN 1200 MG: 600 TABLET ORAL at 08:02

## 2023-05-08 RX ADMIN — METHOCARBAMOL TABLETS 750 MG: 750 TABLET, COATED ORAL at 17:54

## 2023-05-08 RX ADMIN — METHOCARBAMOL TABLETS 750 MG: 750 TABLET, COATED ORAL at 05:36

## 2023-05-08 RX ADMIN — LIDOCAINE 5% 1 PATCH: 700 PATCH TOPICAL at 08:03

## 2023-05-08 RX ADMIN — POLYETHYLENE GLYCOL 3350 17 G: 17 POWDER, FOR SOLUTION ORAL at 08:03

## 2023-05-08 RX ADMIN — METHOCARBAMOL TABLETS 750 MG: 750 TABLET, COATED ORAL at 23:55

## 2023-05-08 RX ADMIN — PANTOPRAZOLE SODIUM 40 MG: 40 TABLET, DELAYED RELEASE ORAL at 08:02

## 2023-05-08 NOTE — PROGRESS NOTES
Patient reevaluated 3 hours following holding the paravertebral catheter infusion  Patient recently out of bed to chair  Pain increased to 7 out of 10 and has not improved much at all after being in the chair  We will continue paravertebral catheter infusion and reassess again tomorrow      Gali Ewing PA-C

## 2023-05-08 NOTE — PROGRESS NOTES
[facility]  Progress Note  Name: Bailey Wells  MRN: [id number]  Unit/Bed#: S -01 I Date of Admission: 5/2/2023   Date of Service: 5/8/2023 I Hospital Day: 6    Assessment/Plan   Fall (on) (from) other stairs and steps, initial encounter  Assessment & Plan  - Patient with Parkinson's and ambulatory dysfunction at baseline, which he uses rollator walker--suffered a mechanical fall down several steps   - Injuries noted below  - Geriatrics evaluation appreciated    - PT/OT eval recommending inpatient rehab  - CM for disposition planning  Rehab referrals placed  Closed fracture of multiple ribs of right side  Assessment & Plan  - Multiple right-sided rib fractures (5-10), present on admission   - Continue rib fracture protocol   - Continue to encourage incentive spirometer use and adequate pulmonary hygiene  Flutter valve added  - Continue multimodal analgesic regimen  - Appreciate APS evaluation and recommendations  -paravertebral catheter placed 5/4  APS to remove catheter today, 5/8  Will transition to oral pain regimen and monitor pain  - Encouraged patient to take oral regimen and increased methocarbamol  He does not tolerate APAP  - Supplemental oxygen via nasal cannula as needed to maintain saturations greater than or equal to 94%  Currently on RA to 2 liters NC    - Repeat chest x-ray from 5/7 reviewed  - PT and OT evaluation and treatment as indicated  - Outpatient follow-up in the trauma clinic for re-evaluation in approximately 2 weeks  Fracture of transverse process of thoracic vertebra, closed, initial encounter [facility])  Assessment & Plan  - S/p fall down stairs  - Acute fracture of the right T7-T10 transverse processes with minimal displacement at T9 and T10  - Multimodal pain control, APS consulted  - Follow-up with trauma as an outpatient      Male pelvic hematoma  Assessment & Plan  - S/p fall down stairs  - Right lateral pelvic deep "subcutaneous hematoma overlying the tensor fascia rodolfo measures approximately 4 6 x 2 4 x 9 5 cm  - Minimal tenderness on exam  - Hemoglobin has remained stable  - Pain control  - DVT prophylaxis: Lovenox    Parkinsonism ([facility])  Assessment & Plan  - Continue home regimen of carvidopa-levadopa  - PT/OT recommending inpatient rehab  Constipation  Assessment & Plan  - No abdominal pain  Abdominal exam benign   - Continue bowel regimen  Had a BM on 5/5    - continue bowel regimen  - KUB on 5/7 with normal bowel gas pattern, no significant stool burden noted  - Continue to monitor  Essential hypertension  Assessment & Plan  - Monitor vital signs  - Continue home meds, hydrochlorothiazide and enalapril    Unsteady gait  Assessment & Plan  - Ambulatory dysfunction at baseline  Uses rollator   - PT/OT    Cognitive impairment  Assessment & Plan  - at baseline mental status  - geriatrics' following  - H/o Parkinson's disease    NPH (normal pressure hydrocephalus) ([facility])  Assessment & Plan  - S/p shunt placement  - Follow-up with neurosurgery as an outpatient as scheduled             Bowel Regimen: colace, senna, dulcolax prn  VTE Prophylaxis:Sequential compression device (Venodyne)  and Enoxaparin (Lovenox)     Disposition: continue med-surg status, rehab placement pending possibly tomorrow, 5/9 if pain controlled with paravertebral catheter removed    Subjective   Chief Complaint: \"I'm feeling well\"    Subjective: Patient is feeling well overall  He is in agreement with removal of the epidural catheter  He denies nausea or chest pain, denies SOB  Objective   Vitals:   Temp:  [97 6 °F (36 4 °C)-98 5 °F (36 9 °C)] 98 2 °F (36 8 °C)  HR:  [66-85] 73  Resp:  [16-20] 16  BP: (108-134)/(63-81) 134/74    I/O       05/06 0701  05/07 0700 05/07 0701  05/08 0700 05/08 0701  05/09 0700    P  O  370 740 250    I V  (mL/kg)       Epidural  287 6     Total Intake(mL/kg) 370 (3 7) 1027 6 (10 4) 250 (2 5)    Urine (mL/kg/hr) " 800 (0 3) 375 (0 2) 225 (0 5)    Stool 0 0     Total Output 800 375 225    Net -430 +652 6 +25           Unmeasured Urine Occurrence 1 x 1 x     Unmeasured Stool Occurrence 0 x 0 x            Physical Exam:   GENERAL APPEARANCE: NAD  NEURO: GCS 15,non-focal  HEENT: NCAT  CV: RRR, no MGR  LUNGS: CTA bilaterally; + Pulling 1250 mls on IS and using flutter as well  GI: soft,non-tender,non-distended, last BM 3 days ago  : voiding  MSK: no edema, contusion or deformity  SKIN: pink, warm, dry    Invasive Devices     Peripheral Intravenous Line  Duration           Peripheral IV 05/06/23 Left Forearm 2 days          Epidural Line  Duration           Nerve Block Catheter 05/04/23 3 days                 PIC Score  PIC Pain Score: 2 (5/8/2023  9:46 AM)  PIC Incentive Spirometry Score: 3 (5/8/2023  7:30 AM)  PIC Cough Description: 3 (5/8/2023  7:30 AM)  PIC Total Score: 9 (5/8/2023  7:30 AM)       If the Total PIC Score </=5, did you consult APS and evaluate patient for further intervention?: n/a      Pain:    Incentive Spirometry  Cough  3 = Controlled  4 = Above goal volume 3 = Strong  2 = Moderate  3 = Goal to alert volume 2 = Weak  1 = Severe  2 = Below alert volume 1 = Absent     1 = Unable to perform IS         Lab Results:   Results: I have personally reviewed all pertinent laboratory/tests results, BMP/CMP:   Lab Results   Component Value Date    SODIUM 134 (L) 05/08/2023    K 4 0 05/08/2023     05/08/2023    CO2 24 05/08/2023    BUN 24 05/08/2023    CREATININE 1 01 05/08/2023    CALCIUM 9 3 05/08/2023    EGFR 69 05/08/2023    and CBC:   Lab Results   Component Value Date    WBC 8 96 05/08/2023    HGB 13 2 05/08/2023    HCT 41 5 05/08/2023    MCV 92 05/08/2023     05/08/2023    MCH 29 1 05/08/2023    MCHC 31 8 05/08/2023    RDW 13 3 05/08/2023    MPV 10 0 05/08/2023    NRBC 0 05/08/2023     Imaging: I have personally reviewed pertinent reports       Other Studies: no new

## 2023-05-08 NOTE — ASSESSMENT & PLAN NOTE
- Multiple right-sided rib fractures (5-10), present on admission   - Continue rib fracture protocol   - Continue to encourage incentive spirometer use and adequate pulmonary hygiene  Flutter valve added  - Continue multimodal analgesic regimen  - Appreciate APS evaluation and recommendations  -paravertebral catheter placed 5/4  APS will remove catheter today  - Encouraged patient to take oral regimen and increased methocarbamol  He does not tolerate APAP  - Supplemental oxygen via nasal cannula as needed to maintain saturations greater than or equal to 94%  Currently on RA to 2 liters NC    - Repeat chest x-ray from 5/7 reviewed  - PT and OT evaluation and treatment as indicated  - Outpatient follow-up in the trauma clinic for re-evaluation in approximately 2 weeks

## 2023-05-08 NOTE — PLAN OF CARE
"  Problem: OCCUPATIONAL THERAPY ADULT  Goal: Performs self-care activities at highest level of function for planned discharge setting  See evaluation for individualized goals  Description: Treatment Interventions: ADL retraining, Endurance training, Cognitive reorientation, Patient/family training, Equipment evaluation/education, Compensatory technique education, Energy conservation, Activityengagement          See flowsheet documentation for full assessment, interventions and recommendations  Outcome: Progressing  Note: Limitation: Decreased ADL status, Decreased Safe judgement during ADL, Decreased cognition, Decreased endurance, Decreased self-care trans, Decreased high-level ADLs  Prognosis: Good  Assessment: Pt seen on this date for skilled OT treatment session  At start of session pt supine in bed  Pt requiring motivation to participate in treatment session  Pt with improved overall bed mobility and functional transfers, continues to require maximal increased time for functional mobility and VC to take bigger steps  Sitting in chair pt participating in ADL tasks, requiring maximal VC throughout for encouragement for continuation of tasks  Pt repeated stating \"I can't do that\" and requiring encouragement to participate  Pt would continue to benefit from skilled OT treatment sessions in order to address remaining deficits and continue to recommend d/c to rehab when medically stable       OT Discharge Recommendation: Post acute rehabilitation services          "

## 2023-05-08 NOTE — OCCUPATIONAL THERAPY NOTE
Occupational Therapy Progress Note     Patient Name: Rocio Wells  ZNFSK'M Date: 5/8/2023  Problem List  Active Problems:    NPH (normal pressure hydrocephalus) ([facility])    Cognitive impairment    Unsteady gait    Essential hypertension    Constipation    Parkinsonism ([address] )    Fall (on) (from) other stairs and steps, initial encounter    Closed fracture of multiple ribs of right side    Fracture of transverse process of thoracic vertebra, closed, initial encounter [facility])    Male pelvic hematoma              05/08/23 1135   OT Last Visit   OT Visit Date 05/08/23   Note Type   Note Type Treatment   Pain Assessment   Pain Assessment Tool 0-10   Pain Score 8   Pain Location/Orientation Orientation: Right;Location: [facility]   Hospital Pain Intervention(s) Repositioned; Ambulation/increased activity; Emotional support   Restrictions/Precautions   Weight Bearing Precautions Per Order No   Other Precautions Cognitive; Chair Alarm; Bed Alarm; Fall Risk;Pain;Multiple lines  (epidural attached, not running)   Lifestyle   Autonomy PTA pt living with wife in [facility], pt (I) with ADLs and shares IADLs, (+)falls, (-)drives, use of RW at baseline   Reciprocal Relationships supportive wife   Service to Others retired    Vick [address] enjoys going to OP PT   ADL   Grooming Assistance 5  Supervision/Setup   Grooming Deficit Setup   Grooming Comments washing face   [address]  Minimal Assistance   UB Bathing Deficit Increased time to complete;Supervision/safety;Verbal cueing   UB Bathing Comments Able to wash chest and underarms, A with abdomen, especially around R ribs   LB Bathing Assistance 4  Minimal Assistance   LB Bathing Deficit Increased time to complete;Supervision/safety;Verbal cueing;Right lower leg including foot; Buttocks   LB Bathing Comments Able to wash arlet-area, B thighs and L leg   UB Dressing Assistance 4  Minimal Assistance   UB Dressing Deficit Increased time to "complete;Supervision/safety;Verbal cueing   UB Dressing Comments A with donning gown past shoulders  Pt able to leena old gown and thread BUE into new gown and pull to elbows   LB Dressing Assistance 4  Minimal Assistance   LB Dressing Deficit Increased time to complete;Supervision/safety;Verbal cueing   LB Dressing Comments Able to doff B socks with modified tailor sit method, Able to don L sock, A with R sock   Bed Mobility   Supine to Sit 3  Moderate assistance   Additional items Assist x 1; Increased time required;LE management;Verbal cues   Transfers   Sit to Stand 4  Minimal assistance   Additional items Assist x 1; Increased time required;Verbal cues   Stand to Sit 4  Minimal assistance   Additional items Assist x 1; Increased time required;Verbal cues   Additional Comments use of RW   Functional Mobility   Functional Mobility 3  Moderate assistance   Additional Comments Ax1, able to complete forward functional mobility, limited ability to turn to sit into chair ,requiring chair to be brought behind pt  VC throughout for taking bigger steps   Additional items Rolling walker   Subjective   Subjective \"I don't think I can do that\" (pt requiring maximal encouragement to participate in all ADL tasks\"   Cognition   Overall Cognitive Status Impaired   Arousal/Participation Alert; Cooperative   Attention Attends with cues to redirect   Orientation Level Oriented to person;Oriented to place;Oriented to situation;Disoriented to time   Memory Decreased short term memory;Decreased recall of recent events   Following Commands Follows one step commands with increased time or repetition   Comments limited problem solving and motivation to participate   Activity Tolerance   Activity Tolerance Patient tolerated treatment well   Medical Staff Made Aware HEAVEN Cao, LUDMILA Bowers   Assessment   Assessment Pt seen on this date for skilled OT treatment session  At start of session pt supine in bed   Pt requiring motivation to participate in " "treatment session  Pt with improved overall bed mobility and functional transfers, continues to require maximal increased time for functional mobility and VC to take bigger steps  Sitting in chair pt participating in ADL tasks, requiring maximal VC throughout for encouragement for continuation of tasks  Pt repeated stating \"I can't do that\" and requiring encouragement to participate  Pt would continue to benefit from skilled OT treatment sessions in order to address remaining deficits and continue to recommend d/c to rehab when medically stable  Plan   Goal Expiration Date 05/13/23   OT Treatment Day 1   OT Frequency 3-5x/wk   Recommendation   OT Discharge Recommendation Post acute rehabilitation services   [facility] Daily Activity Inpatient   Lower Body Dressing 3   Bathing 3   Toileting 2   Upper Body Dressing 3   Grooming 3   Eating 3   Daily Activity Raw Score 17   Daily Activity Standardized Score (Calc for Raw Score >=11) 37 26   [facility] Applied Cognition Inpatient   Following a Speech/Presentation 3   Understanding Ordinary Conversation 4   Taking Medications 2   Remembering Where Things Are Placed or Put Away 1   Remembering List of 4-5 Errands 2   Taking Care of Complicated Tasks 1   Applied Cognition Raw Score 13   Applied Cognition Standardized Score 30 46   End of Consult   Patient Position at End of Consult Bedside chair;Bed/Chair alarm activated; All needs within reach       The patient's raw score on the AM-PAC Daily Activity Inpatient Short Form is 17  A raw score of less than 19 suggests the patient may benefit from discharge to post-acute rehabilitation services  Please refer to the recommendation of the Occupational Therapist for safe discharge planning        Natalio Echeverria MS, OTR/L             "

## 2023-05-08 NOTE — PROGRESS NOTES
[address]  coding opportunities       Chart reviewed, no opportunity found: CHART REVIEWED, NO OPPORTUNITY FOUND        Patients Insurance     Medicare Insurance: Medicare

## 2023-05-08 NOTE — ASSESSMENT & PLAN NOTE
· Right fifth through 10th rib fractures  · Right paravertebral catheter placed on 5/4/2023  · Current pain score 4 out of 10  · Able to pull 1250 mL consistently in my presence on incentive spirometry  · Bag ran dry approximately 1 hour prior to my arrival   · Will hold off on restarting paravertebral catheter infusion and recheck patient's pain in 2 to 3 hours  If patient's pain remains relatively well controlled, may remove catheter  · Continue lidocaine patch, on for 12 hours and off for 12 hours  · Continue Robaxin-750 milligrams p o  every 6 hours scheduled  · Continue oxycodone 2 5 mg p o  every 4 hours as needed moderate or severe pain  · Continue Dilaudid 0 2 mg IV every 4 hours as needed breakthrough pain  · Continue bowel regimen to avoid opioid-induced constipation while on opioid pain medication  · Ice to painful area for up to 20 minutes every hour as needed  · Encourage activity and mobility to avoid muscle stiffness and weakness which can lead to worsening pain

## 2023-05-08 NOTE — ASSESSMENT & PLAN NOTE
· We will attempt to minimize pain medications which can contribute to worsening mental status or cognition

## 2023-05-08 NOTE — PROGRESS NOTES
[facility]  Progress Note  Name: Rosalie Sommer  MRN: [id number]  Unit/Bed#: S -01 I Date of Admission: 5/2/2023   Date of Service: 5/8/2023  Hospital Day: 6    Assessment/Plan   Male pelvic hematoma  Assessment & Plan  · Minimal pain  Fracture of transverse process of thoracic vertebra, closed, initial encounter [facility])  Assessment & Plan  · Minimal pain at the site currently  Closed fracture of multiple ribs of right side  Assessment & Plan  · Right fifth through 10th rib fractures  · Right paravertebral catheter placed on 5/4/2023  · Current pain score 4 out of 10  · Able to pull 1250 mL consistently in my presence on incentive spirometry  · Bag ran dry approximately 1 hour prior to my arrival   · Will hold off on restarting paravertebral catheter infusion and recheck patient's pain in 2 to 3 hours  If patient's pain remains relatively well controlled, may remove catheter  · Continue lidocaine patch, on for 12 hours and off for 12 hours  · Continue Robaxin-750 milligrams p o  every 6 hours scheduled  · Continue oxycodone 2 5 mg p o  every 4 hours as needed moderate or severe pain  · Continue Dilaudid 0 2 mg IV every 4 hours as needed breakthrough pain  · Continue bowel regimen to avoid opioid-induced constipation while on opioid pain medication  · Ice to painful area for up to 20 minutes every hour as needed  · Encourage activity and mobility to avoid muscle stiffness and weakness which can lead to worsening pain  Cognitive impairment  Assessment & Plan  · We will attempt to minimize pain medications which can contribute to worsening mental status or cognition  Acute pain due to multiple right rib fractures  APS will continue to follow  Please contact Acute Pain Service [facility] via [employer] from [phone] with additional questions or concerns  See Rowena or Anayeli for additional contacts and after hours information      Pain History  Current pain location(s): Right chest wall  Pain Scale:   4 out of 10  Quality: Aching  24 hour history: Patient's pain remains relatively well controlled over the past 24 hours  Paravertebral catheter remains in place, however medication ran out approximately 1 hour prior to my examination  Despite this, patient's pain remains 4 out of 10  Discussed with patient leaving paravertebral infusion off and reevaluating in the next several hours  If pain not significantly worse, will remove catheter  Opioid requirement previous 24 hours: Oxycodone 2 5 mg p o      Meds/Allergies   all current active meds have been reviewed, current meds:   Current Facility-Administered Medications   Medication Dose Route Frequency   • acetaminophen (TYLENOL) tablet 650 mg  650 mg Oral Q6H PRN   • bisacodyl (DULCOLAX) rectal suppository 10 mg  10 mg Rectal Daily PRN   • calcium carbonate (TUMS) chewable tablet 1,000 mg  1,000 mg Oral BID PRN   • carbidopa-levodopa (SINEMET)  mg per tablet 1 tablet  1 tablet Oral HS   • carbidopa-levodopa (SINEMET)  mg per tablet 2 tablet  2 tablet Oral TID   • cholecalciferol (VITAMIN D3) tablet 1,000 Units  1,000 Units Oral Daily   • docusate sodium (COLACE) capsule 100 mg  100 mg Oral BID   • enoxaparin (LOVENOX) subcutaneous injection 30 mg  30 mg Subcutaneous Q12H   • guaiFENesin (MUCINEX) 12 hr tablet 1,200 mg  1,200 mg Oral Q12H YOLIE   • hydrochlorothiazide (HYDRODIURIL) tablet 12 5 mg  12 5 mg Oral Daily   • HYDROmorphone HCl (DILAUDID) injection 0 2 mg  0 2 mg Intravenous Q4H PRN   • lidocaine (LIDODERM) 5 % patch 1 patch  1 patch Topical Daily   • lisinopril (ZESTRIL) tablet 2 5 mg  2 5 mg Oral Daily   • methocarbamol (ROBAXIN) tablet 750 mg  750 mg Oral Q6H YOLIE   • ondansetron (ZOFRAN) injection 4 mg  4 mg Intravenous Q6H PRN   • oxyCODONE (ROXICODONE) split tablet 2 5 mg  2 5 mg Oral Q4H PRN   • pantoprazole (PROTONIX) EC tablet 40 mg  40 mg Oral BID   • polyethylene glycol (MIRALAX) packet "17 g  17 g Oral Daily   • ropivacaine 0 1% and fentaNYL 2 mcg/mL epidural infusion   Epidural Continuous   • senna (SENOKOT) tablet 17 2 mg  2 tablet Oral BID   • sodium phosphate-biphosphate (FLEET) enema 1 enema  1 enema Rectal Once    and PTA meds:   Prior to Admission Medications   Prescriptions Last Dose Informant Patient Reported? Taking? Aspirin-Calcium Carbonate  MG TABS Not Taking  Yes No   Sig: Take by mouth daily   Patient not taking: Reported on 5/2/2023   Cholecalciferol (VITAMIN D-3) 5000 units TABS 5/1/2023  Yes Yes   Sig: Take 1 tablet by mouth daily    Multiple Vitamin (MULTI-VITAMIN DAILY PO) 5/1/2023  Yes Yes   Sig: Take 1 tablet by mouth daily   atorvastatin (LIPITOR) 20 mg tablet Not Taking  No No   Sig: Take 1 tablet (20 mg total) by mouth daily   Patient not taking: Reported on 4/5/2023   carbidopa-levodopa (SINEMET)  mg per tablet 5/2/2023 at 1000  No Yes   Sig: Take 2tabs 8am, 1p, 6pm, and 1tab before bed   enalapril (VASOTEC) 5 mg tablet 5/1/2023  No Yes   Sig: Take half tablet by mouth once daily   Patient taking differently: Take 2 5 mg by mouth daily Take half tablet by mouth once daily   hydrochlorothiazide (HYDRODIURIL) 12 5 mg tablet 5/1/2023  No Yes   Sig: TAKE ONE TABLET BY MOUTH EVERY MORNING   hydrocortisone 2 5 % cream Not Taking  No No   Sig: Apply topically 2 (two) times a day   Patient not taking: Reported on 2/28/2023   ibuprofen (MOTRIN) 200 mg tablet 5/2/2023 at 0800  Yes Yes   Sig: Take 600 mg by mouth every 6 (six) hours as needed for mild pain   ketoconazole (NIZORAL) 2 % cream Not Taking  No No   Sig: Apply topically twice daily   Patient not taking: Reported on 5/2/2023   ketoconazole (NIZORAL) 2 % shampoo Past Week  No Yes   Sig: Daily for 2 weeks straight and then on \"Mondays, Wednesdays and Fridays\":  Lather into scalp and skin on face, neck, chest, and back; leave on for 5 minutes and then rinse off completely     pantoprazole (PROTONIX) 40 mg tablet " 5/1/2023  No Yes   Sig: Take 1 tablet (40 mg total) by mouth 2 (two) times a day      Facility-Administered Medications: None       No Known Allergies    Objective     Temp:  [97 6 °F (36 4 °C)-98 8 °F (37 1 °C)] 98 2 °F (36 8 °C)  HR:  [66-85] 73  Resp:  [16-20] 16  BP: (108-134)/(63-81) 134/74    Physical Exam  Gen: Awake and alert, NAD, Does not appear ill  Vital signs reviewed  Nursing notes reviewed  Eyes: PERRL, sclera/conjunctiva normal   ENT: No JVD, trachea midline, moist mucus membranes  MSK: Right chest wall tender to palpation  No crepitus  CV: Heart normal rhythm and normal rate  No extra systoles  Lungs:  CTA bilaterally  No wheeze  No rhonchi  Skin: Warm, dry  Cap refill <2 seconds  No diaphoresis  Neuro: A&O x 3, GCS 15 (E4, V5, M6)  No obvious focal motor deficit  Psych: Normal speech, normal attention, normal behavior  Lab Results:   Results from last 7 days   Lab Units 05/08/23  0615   WBC Thousand/uL 8 96   HEMOGLOBIN g/dL 13 2   HEMATOCRIT % 41 5   PLATELETS Thousands/uL 309      Results from last 7 days   Lab Units 05/08/23  0615 05/07/23  0841   POTASSIUM mmol/L 4 0 4 1   CHLORIDE mmol/L 102 102   CO2 mmol/L 24 23   BUN mg/dL 24 25   CREATININE mg/dL 1 01 1 03   CALCIUM mg/dL 9 3 9 5   ALK PHOS U/L  --  94   ALT U/L  --  16   AST U/L  --  27    Estimated Creatinine Clearance: 66 6 mL/min (by C-G formula based on SCr of 1 01 mg/dL)  Imaging Studies: I have personally reviewed pertinent reports  Counseling / Coordination of Care  Total floor / unit time spent today Level 3 = 55 minutes  Greater than 50% of total time was spent with the patient and / or family counseling and / or coordination of care  A description of the counseling / coordination of care: Patient interview, physical examination, review of medical record, review of imaging and laboratory data, development of pain management plan, discussion of pain management plan with patient and primary service  Explanation of risks and benefits of suggested pain medications  Please note that the APS provides consultative services regarding pain management only  With the exception of ketamine and epidural infusions and except when indicated, final decisions regarding starting or changing doses of analgesic medications are at the discretion of the consulting service  Off hours consultation and/or medication management is generally not available  Portions of the record may have been created with voice recognition software  Occasional wrong-word or 'sound-a-like' substitutions may have occurred due to the inherent limitations of voice recognition software       Les Real PA-C  Acute Pain Service

## 2023-05-08 NOTE — PROGRESS NOTES
Progress Note - Geriatric Medicine   Dartha Dakin 82 y o  male MRN: [id number]  Unit/Bed#: S -01 Encounter: [id number]      Assessment/Plan:  1 -Closed fracture of multiple ribs on the right side -In particular patient broke fifth through 10th ribs he has increased pain mainly in movement at rest his pain is about a 4 out of 10  He has been followed by pain management according to the note he was reevaluated following holding his paravertebral catheter infusion  The patient was out of bed to the chair does when he gets most of his discomfort and his pain increased from a 4 to a 7 out of 10 in view of that they felt that they would continue the paravertebral catheter infusion and reassess again tomorrow  2 -  Fracture of transverse process of thoracic vertebra T7-T10 with minimal displacement -She does not appear to be having pain in this area at all     3 -  Male pelvic hematoma -Continue to monitor    4 -Parkinsonism -Patient is continuing with his oral regimen    5 -Constipation -Patient has not had a bowel movement since May 5 would recommend that Dulcolax suppository  Continue on MiraLAX 17 g orally daily and senna tablets  Patient apparently refused fleets enema on May 5  Subjective:   Patient appears to be somewhat disappointed that his pain is not going away the way he would like it to  I did explain to him that he is going to be at higher risk of falling given his underlying condition and current gait  Review of Systems   Constitutional: Negative  HENT: Negative  Eyes: Negative  Respiratory: Negative  Cardiovascular: Negative  Gastrointestinal: Positive for constipation  Endocrine: Negative  Genitourinary: Negative  Musculoskeletal: Positive for arthralgias  Skin: Negative  Allergic/Immunologic: Negative  Neurological: Negative  Hematological: Negative  Psychiatric/Behavioral: Negative            Objective:     Vitals: Blood pressure 111/60, pulse "81, temperature 98 7 °F (37 1 °C), resp  rate 18, height 5' 9\" (1 753 m), weight 99 2 kg (218 lb 11 1 oz), SpO2 92 %  ,Body mass index is 32 3 kg/m²  Intake/Output Summary (Last 24 hours) at 5/8/2023 1609  Last data filed at 5/8/2023 1247  Gross per 24 hour   Intake 250 ml   Output 625 ml   Net -375 ml       Current Medications: Reviewed    Physical Exam:   Physical Exam  Constitutional:       Appearance: Normal appearance  HENT:      Head: Normocephalic and atraumatic  Right Ear: Ear canal and external ear normal       Left Ear: Ear canal and external ear normal       Nose: Nose normal    Eyes:      Pupils: Pupils are equal, round, and reactive to light  Cardiovascular:      Rate and Rhythm: Normal rate and regular rhythm  Pulses: Normal pulses  Heart sounds: Normal heart sounds  Pulmonary:      Effort: Pulmonary effort is normal       Breath sounds: Normal breath sounds  Abdominal:      General: Bowel sounds are normal       Palpations: Abdomen is soft  Musculoskeletal:      Cervical back: Neck supple  Skin:     General: Skin is dry  Neurological:      General: No focal deficit present  Mental Status: He is oriented to person, place, and time  Psychiatric:         Mood and Affect: Mood normal          Thought Content: Thought content normal          Judgment: Judgment normal           Invasive Devices     Peripheral Intravenous Line  Duration           Peripheral IV 05/06/23 Left Forearm 2 days          Epidural Line  Duration           Nerve Block Catheter 05/04/23 4 days                Lab, Imaging and other studies: I have personally reviewed pertinent reports      "

## 2023-05-08 NOTE — ASSESSMENT & PLAN NOTE
- Multiple right-sided rib fractures (5-10), present on admission   - Continue rib fracture protocol   - Continue to encourage incentive spirometer use and adequate pulmonary hygiene  Flutter valve added  - Continue multimodal analgesic regimen  - Appreciate APS evaluation and recommendations  -paravertebral catheter placed 5/4  APS to remove catheter today, 5/8  Will transition to oral pain regimen and monitor pain  - Encouraged patient to take oral regimen and increased methocarbamol  He does not tolerate APAP  - Supplemental oxygen via nasal cannula as needed to maintain saturations greater than or equal to 94%  Currently on RA to 2 liters NC    - Repeat chest x-ray from 5/7 reviewed  - PT and OT evaluation and treatment as indicated  - Outpatient follow-up in the trauma clinic for re-evaluation in approximately 2 weeks

## 2023-05-09 ENCOUNTER — APPOINTMENT (OUTPATIENT)
Dept: PHYSICAL THERAPY | Facility: CLINIC | Age: 83
End: 2023-05-09
Payer: MEDICARE

## 2023-05-09 RX ORDER — HEPARIN SODIUM 5000 [USP'U]/ML
5000 INJECTION, SOLUTION INTRAVENOUS; SUBCUTANEOUS EVERY 8 HOURS SCHEDULED
Status: DISCONTINUED | OUTPATIENT
Start: 2023-05-09 | End: 2023-05-10 | Stop reason: HOSPADM

## 2023-05-09 RX ADMIN — Medication 1000 UNITS: at 09:23

## 2023-05-09 RX ADMIN — CARBIDOPA AND LEVODOPA 2 TABLET: 25; 100 TABLET ORAL at 18:27

## 2023-05-09 RX ADMIN — METHOCARBAMOL TABLETS 750 MG: 750 TABLET, COATED ORAL at 12:43

## 2023-05-09 RX ADMIN — PANTOPRAZOLE SODIUM 40 MG: 40 TABLET, DELAYED RELEASE ORAL at 18:27

## 2023-05-09 RX ADMIN — Medication 2.5 MG: at 15:39

## 2023-05-09 RX ADMIN — GUAIFENESIN 1200 MG: 600 TABLET ORAL at 21:44

## 2023-05-09 RX ADMIN — LIDOCAINE 5% 2 PATCH: 700 PATCH TOPICAL at 09:23

## 2023-05-09 RX ADMIN — DOCUSATE SODIUM 100 MG: 100 CAPSULE, LIQUID FILLED ORAL at 09:23

## 2023-05-09 RX ADMIN — GABAPENTIN 100 MG: 100 CAPSULE ORAL at 09:23

## 2023-05-09 RX ADMIN — HEPARIN SODIUM 5000 UNITS: 5000 INJECTION INTRAVENOUS; SUBCUTANEOUS at 15:39

## 2023-05-09 RX ADMIN — GABAPENTIN 100 MG: 100 CAPSULE ORAL at 15:39

## 2023-05-09 RX ADMIN — SENNOSIDES 17.2 MG: 8.6 TABLET, FILM COATED ORAL at 18:27

## 2023-05-09 RX ADMIN — PANTOPRAZOLE SODIUM 40 MG: 40 TABLET, DELAYED RELEASE ORAL at 05:40

## 2023-05-09 RX ADMIN — HYDROMORPHONE HYDROCHLORIDE 0.2 MG: 0.2 INJECTION, SOLUTION INTRAMUSCULAR; INTRAVENOUS; SUBCUTANEOUS at 12:48

## 2023-05-09 RX ADMIN — GABAPENTIN 100 MG: 100 CAPSULE ORAL at 21:44

## 2023-05-09 RX ADMIN — SODIUM PHOSPHATE 1 ENEMA: 7; 19 ENEMA RECTAL at 10:44

## 2023-05-09 RX ADMIN — GUAIFENESIN 1200 MG: 600 TABLET ORAL at 09:22

## 2023-05-09 RX ADMIN — CARBIDOPA AND LEVODOPA 2 TABLET: 25; 100 TABLET ORAL at 09:22

## 2023-05-09 RX ADMIN — CARBIDOPA AND LEVODOPA 2 TABLET: 25; 100 TABLET ORAL at 12:44

## 2023-05-09 RX ADMIN — SENNOSIDES 17.2 MG: 8.6 TABLET, FILM COATED ORAL at 09:22

## 2023-05-09 RX ADMIN — DOCUSATE SODIUM 100 MG: 100 CAPSULE, LIQUID FILLED ORAL at 18:27

## 2023-05-09 RX ADMIN — HEPARIN SODIUM 5000 UNITS: 5000 INJECTION INTRAVENOUS; SUBCUTANEOUS at 21:44

## 2023-05-09 RX ADMIN — METHOCARBAMOL TABLETS 750 MG: 750 TABLET, COATED ORAL at 05:40

## 2023-05-09 RX ADMIN — METHOCARBAMOL TABLETS 750 MG: 750 TABLET, COATED ORAL at 18:27

## 2023-05-09 RX ADMIN — CARBIDOPA AND LEVODOPA 1 TABLET: 25; 100 TABLET ORAL at 21:44

## 2023-05-09 RX ADMIN — Medication 2.5 MG: at 09:23

## 2023-05-09 NOTE — ASSESSMENT & PLAN NOTE
· Right fifth through 10th rib fractures  · Right paravertebral catheter placed on 5/4/2023  · Current pain score 5 out of 10  · Able to pull 1250 mL consistently in my presence on incentive spirometry  · Paravertebral infusion held for several hours yesterday and patient had increased pain  · Paravertebral infusion held again this morning for several hours, catheter removed with tip intact  · Continue lidocaine patch, on for 12 hours and off for 12 hours  · Continue Robaxin-750 milligrams p o  every 6 hours scheduled  · Continue oxycodone 2 5 mg p o  every 4 hours as needed moderate or severe pain  · Continue Dilaudid 0 2 mg IV every 4 hours as needed breakthrough pain  · Continue bowel regimen to avoid opioid-induced constipation while on opioid pain medication  · Ice to painful area for up to 20 minutes every hour as needed  · Encourage activity and mobility to avoid muscle stiffness and weakness which can lead to worsening pain

## 2023-05-09 NOTE — PROGRESS NOTES
RN spoke to Jose Colbert with trauma  No need to hold Lovenox tonight for removal of paravertebral catheter tomorrow

## 2023-05-09 NOTE — PROGRESS NOTES
Progress Note - Geriatric Medicine   Shante Jad Rosen 82 y o  male MRN: [id number]  Unit/Bed#: S -01 Encounter: [id number]      Assessment/Plan:  Closed fracture multiple ribs on the right side  · S/p fall downstairs  · CT of the chest showed- Acute segmental fractures of the right 5th through 10th ribs with minimal displacement of the posterior medial component of the right 7th through 10th ribs  · Rib fracture protocol  · Encourage coughing, deep breathing, and incentive spirometry  · Multimodal pain regimen including geriatric pain protocol-consider scheduling extra strength Tylenol-continue lidocaine patch  · Catheter removed today by pain services  · Management per trauma    Fracture of transverse process of thoracic vertebrae T7-T10   · Status post fall downstairs  · CT of the chest showed- Acute fracture of the right T7-T10 transverse processes with minimal displacement at T9 and T10  · Multimodal pain regimen including geriatric pain protocol  · APS is following-the catheter was removed today by pain services  · Appreciate APS recommendations  · Management per trauma    Parkinsonism  · Continue home regimen of carbidopa levodopa  · PT/OT following and recommending STR    Constipation  Patient previously complained of constipation  · KUB from 5/7/2023 showed-IMPRESSION: Gas-filled small bowel loops throughout the abdomen  Nonobstructive bowel gas pattern  Last BM was today  Is currently on Senokot 2 tablets daily, MiraLAX daily, and Colace 100 mg twice daily  Also received an enema today and patient had bowel movement after  Encourage adequate p o   Hydration  Encourage prune juice as tolerated    Ambulatory dysfunction  At a baseline ambulates with RW  PT/OT following  Fall precautions  Out of bed as tolerated  Encourage early and frequent mobilization  Encourage adequate hydration and nutrition  Provide adequate pain management   Goal is STR  · Continue with PT/OT for continued strength and balance "training     Subjective:   Patient is being seen for a geriatrics follow-up  Upon examination patient was lying bed, resting  He appeared comfortable and was in no acute distress  He reports his pain is much better controlled today  He had trouble sleeping overnight due to pain  His appetite is improving  He moved his bowels today  Per nursing no acute concerns or issues at this time  Review of Systems   Constitutional: Positive for activity change and fatigue  Negative for appetite change, chills and fever  HENT: Negative for trouble swallowing  Eyes: Negative for visual disturbance  Respiratory: Negative for cough and shortness of breath  Cardiovascular: Negative for chest pain and palpitations  Gastrointestinal: Negative for abdominal pain, constipation, diarrhea, nausea and vomiting  Genitourinary: Negative for difficulty urinating, dysuria and hematuria  Musculoskeletal: Positive for arthralgias, back pain and gait problem  Skin: Negative for color change and rash  Neurological: Positive for dizziness, weakness and light-headedness  Negative for headaches  Psychiatric/Behavioral: Negative for confusion, dysphoric mood and sleep disturbance  The patient is not nervous/anxious  Objective:     Vitals: Blood pressure 106/62, pulse 86, temperature 98 6 °F (37 °C), resp  rate 16, height 5' 9\" (1 753 m), weight 99 2 kg (218 lb 11 1 oz), SpO2 90 %  ,Body mass index is 32 3 kg/m²  Intake/Output Summary (Last 24 hours) at 5/9/2023 1408  Last data filed at 5/9/2023 0648  Gross per 24 hour   Intake --   Output 350 ml   Net -350 ml       Current Medications: Reviewed    Physical Exam:   Physical Exam  Vitals reviewed  Constitutional:       General: He is not in acute distress  Appearance: He is well-developed  He is not ill-appearing  HENT:      Head: Normocephalic and atraumatic  Mouth/Throat:      Mouth: Mucous membranes are dry        Pharynx: No oropharyngeal " "exudate or posterior oropharyngeal erythema  Cardiovascular:      Rate and Rhythm: Normal rate and regular rhythm  Heart sounds: No murmur heard  Pulmonary:      Effort: Pulmonary effort is normal  No respiratory distress  Breath sounds: Normal breath sounds  Abdominal:      General: Bowel sounds are normal  There is no distension  Palpations: Abdomen is soft  Tenderness: There is no abdominal tenderness  Musculoskeletal:         General: No swelling  Right lower leg: No edema  Left lower leg: No edema  Skin:     General: Skin is warm and dry  Coloration: Skin is pale  Neurological:      General: No focal deficit present  Mental Status: He is alert and oriented to person, place, and time  Mental status is at baseline  Cranial Nerves: No cranial nerve deficit  Motor: Weakness present  Gait: Gait abnormal    Psychiatric:         Mood and Affect: Mood normal           Invasive Devices     Peripheral Intravenous Line  Duration           Peripheral IV 05/06/23 Left Forearm 3 days          Epidural Line  Duration           Nerve Block Catheter 05/04/23 5 days                Lab, Imaging and other studies: I have personally reviewed pertinent reports  Please note:  Voice-recognition software may have been used in the preparation of this document  Occasional wrong word or \"sound-alike\" substitutions may have occurred due to the inherent limitations of voice recognition software  Interpretation should be guided by context      "

## 2023-05-09 NOTE — PROGRESS NOTES
[facility]  Progress Note  Name: Blas Calvin  MRN: [id number]  Unit/Bed#: S -01 I Date of Admission: 5/2/2023   Date of Service: 5/9/2023 I Hospital Day: 7    Assessment/Plan   Fall (on) (from) other stairs and steps, initial encounter  Assessment & Plan  - Patient with Parkinson's and ambulatory dysfunction at baseline, which he uses rollator walker--suffered a mechanical fall down several steps   - Injuries noted below  - Geriatrics evaluation appreciated    - PT/OT eval recommending inpatient rehab  - CM for disposition planning  Rehab referrals placed  Closed fracture of multiple ribs of right side  Assessment & Plan  - Multiple right-sided rib fractures (5-10), present on admission   - Continue rib fracture protocol   - Continue to encourage incentive spirometer use and adequate pulmonary hygiene  Flutter valve added  - Continue multimodal analgesic regimen  - Appreciate APS evaluation and recommendations  -paravertebral catheter placed 5/4  APS will remove catheter today  - Encouraged patient to take oral regimen and increased methocarbamol  He does not tolerate APAP  - Supplemental oxygen via nasal cannula as needed to maintain saturations greater than or equal to 94%  Currently on RA to 2 liters NC    - Repeat chest x-ray from 5/7 reviewed  - PT and OT evaluation and treatment as indicated  - Outpatient follow-up in the trauma clinic for re-evaluation in approximately 2 weeks  Fracture of transverse process of thoracic vertebra, closed, initial encounter [facility])  Assessment & Plan  - S/p fall down stairs  - Acute fracture of the right T7-T10 transverse processes with minimal displacement at T9 and T10  - Multimodal pain control, APS consulted  - Follow-up with trauma as an outpatient      Male pelvic hematoma  Assessment & Plan  - S/p fall down stairs  - Right lateral pelvic deep subcutaneous hematoma overlying the tensor fascia rodolfo measures "approximately 4 6 x 2 4 x 9 5 cm  - Minimal tenderness on exam  - Hemoglobin has remained stable  - Pain control  - DVT prophylaxis: Lovenox    Parkinsonism (HCC)  Assessment & Plan  - Continue home regimen of carvidopa-levadopa  - PT/OT recommending inpatient rehab  Constipation  Assessment & Plan  - No abdominal pain  Abdominal exam benign   - Continue bowel regimen  Had a BM on 5/5    - continue bowel regimen  - KUB on 5/7 with normal bowel gas pattern, no significant stool burden noted  - Continue to monitor  Essential hypertension  Assessment & Plan  - Monitor vital signs  - Continue home meds, hydrochlorothiazide and enalapril    Unsteady gait  Assessment & Plan  - Ambulatory dysfunction at baseline  Uses rollator   - PT/OT    Cognitive impairment  Assessment & Plan  - at baseline mental status  - geriatrics' following  - H/o Parkinson's disease    NPH (normal pressure hydrocephalus) ([facility])  Assessment & Plan  - S/p shunt placement  - Follow-up with neurosurgery as an outpatient as scheduled             Bowel Regimen: colace, senna  VTE Prophylaxis:Sequential compression device (Venodyne)  and Enoxaparin (Lovenox)     Disposition: lorena ramirez    Subjective   Chief Complaint: \"I'm feeling better now\"    Subjective: Patient reports having an episode of increased pain overnight  He feel better this morning  He has not been consistently taking his oral pain medications  I discussed with him the importance of taking the pain medications, especially when the pain catheter comes out today  Objective   Vitals:   Temp:  [97 5 °F (36 4 °C)-98 7 °F (37 1 °C)] 98 °F (36 7 °C)  HR:  [68-97] 69  Resp:  [18] 18  BP: ()/(50-68) 103/62    I/O       05/07 0701  05/08 0700 05/08 0701  05/09 0700 05/09 0701  05/10 0700    P  O  740 250     Epidural 287 6      Total Intake(mL/kg) 1027 6 (10 4) 250 (2 5)     Urine (mL/kg/hr) 375 (0 2) 775 (0 3)     Stool 0      Total Output 375 775     Net +652 6 -525       " Unmeasured Urine Occurrence 1 x      Unmeasured Stool Occurrence 0 x             Physical Exam:   GENERAL APPEARANCE: NAD  NEURO: GCS 15,non-focal  HEENT: NCAT  CV: RRR, no MGR  LUNGS: CTA bilaterally; + Pulling 1250 mls on IS  GI: soft,non-tender,non-distended  : voiding  MSK: + R sided chest wall tenderness over ribs  SKIN: pink, warm ,dry    Invasive Devices     Peripheral Intravenous Line  Duration           Peripheral IV 05/06/23 Left Forearm 3 days          Epidural Line  Duration           Nerve Block Catheter 05/04/23 4 days                 PIC Score  PIC Pain Score: 2 (5/9/2023  9:23 AM)  PIC Incentive Spirometry Score: 3 (5/8/2023  7:30 AM)  PIC Cough Description: 3 (5/8/2023  7:30 AM)  PIC Total Score: 9 (5/8/2023  7:30 AM)       If the Total PIC Score </=5, did you consult APS and evaluate patient for further intervention?: N/A      Pain:    Incentive Spirometry  Cough  3 = Controlled  4 = Above goal volume 3 = Strong  2 = Moderate  3 = Goal to alert volume 2 = Weak  1 = Severe  2 = Below alert volume 1 = Absent     1 = Unable to perform IS         Lab Results: Results: I have personally reviewed all pertinent laboratory/tests results, BMP/CMP: No results found for: SODIUM, K, CL, CO2, ANIONGAP, BUN, CREATININE, GLUCOSE, CALCIUM, AST, ALT, ALKPHOS, PROT, BILITOT, EGFR and CBC: No results found for: WBC, HGB, HCT, MCV, PLT, ADJUSTEDWBC, MCH, MCHC, RDW, MPV, NRBC  Imaging: I have personally reviewed pertinent reports       Other Studies: no new

## 2023-05-09 NOTE — CASE MANAGEMENT
Case Management Discharge Planning Note    Patient name Hung VELASQUEZ /S -01 MRN [id number]  : 1940 Date 2023       Current Admission Date: 2023  Current Admission Diagnosis:Parkinsonism [facility])   Patient Active Problem List    Diagnosis Date Noted   • Fall (on) (from) other stairs and steps, initial encounter 2023   • Closed fracture of multiple ribs of right side 2023   • Fracture of transverse process of thoracic vertebra, closed, initial encounter [facility]) 2023   • Male pelvic hematoma 2023   • Parkinsonism ([address] ) 2021   • Venous insufficiency 2020   • 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 2020   • Cataract 2019   • Constipation 2018   • Adenomatous polyp of sigmoid colon 2018   • Tremor 2018   • Localized edema 2018   • NPH (normal pressure hydrocephalus) ([address] ) 2018   • Cognitive impairment 2018   • Unsteady gait 2018   • Hearing loss, bilateral 2018   • Hyperlipidemia 2018   • Essential hypertension 2018   • Positional lightheadedness 2018   • Hiatal hernia 2017   • Severe obesity with body mass index (BMI) of 35 0 to 39 9 2017   • Rhinitis 2017   • Elevated PSA 2017   • Cervical spondylosis with myelopathy 10/03/2017   • Sleep disturbances 10/03/2017   • Urinary incontinence 10/03/2017   • Arthritis 10/03/2017      LOS (days): 7  Geometric Mean LOS (GMLOS) (days): 3 30  Days to GMLOS:-3 6     OBJECTIVE:  Risk of Unplanned Readmission Score: 9 42         Current admission status: Inpatient   Preferred Pharmacy:   [address] [place] [id number]  Phone: [phone] Fax: [phone]    Primary Care Provider: Shawnee Reilly MD    Primary Insurance: MEDICARE  Secondary Insurance: Valentina García    DISCHARGE DETAILS:    Discharge planning discussed with[de-identified] Patient's spouse, Dawna Redmond  Freedom of Choice: Yes  Comments - Freedom of Choice: CM provide SNF choice list to patient's spouse and after reviewing and touring facilities, spouse requested [facility]  CM contacted family/caregiver?: Yes  Were Treatment Team discharge recommendations reviewed with patient/caregiver?: Yes  Did patient/caregiver verbalize understanding of patient care needs?: N/A- going to facility  Were patient/caregiver advised of the risks associated with not following Treatment Team discharge recommendations?: Yes    Contacts  Patient Contacts: Dawna Redmond - spouse  Relationship to Patient[de-identified] Family  Contact Method:  In Person  Reason/Outcome: Discharge [address]         Is the patient interested in [facility] at discharge?: No    DME Referral Provided  Referral made for DME?: No    Other Referral/Resources/Interventions Provided:  Interventions: Short Term Rehab  Referral Comments: [facility]    Would you like to participate in our [address] service program?  : No - Declined    Treatment Team Recommendation: Short Term Rehab  Discharge Destination Plan[de-identified] Short Term Rehab

## 2023-05-09 NOTE — PROGRESS NOTES
[facility]  Progress Note  Name: Hannah Driscoll  MRN: [id number]  Unit/Bed#: S -01 I Date of Admission: 5/2/2023   Date of Service: 5/9/2023  Hospital Day: 7    Assessment/Plan   Male pelvic hematoma  Assessment & Plan  · Minimal pain  Fracture of transverse process of thoracic vertebra, closed, initial encounter [facility])  Assessment & Plan  · Minimal pain at the site currently  Closed fracture of multiple ribs of right side  Assessment & Plan  · Right fifth through 10th rib fractures  · Right paravertebral catheter placed on 5/4/2023  · Current pain score 5 out of 10  · Able to pull 1250 mL consistently in my presence on incentive spirometry  · Paravertebral infusion held for several hours yesterday and patient had increased pain  · Paravertebral infusion held again this morning for several hours, catheter removed with tip intact  · Continue lidocaine patch, on for 12 hours and off for 12 hours  · Continue Robaxin-750 milligrams p o  every 6 hours scheduled  · Continue oxycodone 2 5 mg p o  every 4 hours as needed moderate or severe pain  · Continue Dilaudid 0 2 mg IV every 4 hours as needed breakthrough pain  · Continue bowel regimen to avoid opioid-induced constipation while on opioid pain medication  · Ice to painful area for up to 20 minutes every hour as needed  · Encourage activity and mobility to avoid muscle stiffness and weakness which can lead to worsening pain  Cognitive impairment  Assessment & Plan  · We will attempt to minimize pain medications which can contribute to worsening mental status or cognition  Acute pain due to multiple right rib fractures  APS will continue to follow  Please contact Acute Pain Service - [facility] via [place] from [phone] with additional questions or concerns  See Rowena or Anayeli for additional contacts and after hours information      Pain History  Current pain location(s): Right chest wall  Pain Scale:   5 out of 10  Quality: Aching, sharp  24 hour history: Paravertebral catheter infusion held today for several hours  Patient's pain minimally increased since that time  Catheter removed without incident  Patient encouraged to ask for and take as needed pain medications when needed      Opioid requirement previous 24 hours: Oxycodone 2 5 mg p o  x2     Meds/Allergies   all current active meds have been reviewed, current meds:   Current Facility-Administered Medications   Medication Dose Route Frequency   • acetaminophen (TYLENOL) tablet 650 mg  650 mg Oral Q6H PRN   • bisacodyl (DULCOLAX) rectal suppository 10 mg  10 mg Rectal Daily PRN   • calcium carbonate (TUMS) chewable tablet 1,000 mg  1,000 mg Oral BID PRN   • carbidopa-levodopa (SINEMET)  mg per tablet 1 tablet  1 tablet Oral HS   • carbidopa-levodopa (SINEMET)  mg per tablet 2 tablet  2 tablet Oral TID   • cholecalciferol (VITAMIN D3) tablet 1,000 Units  1,000 Units Oral Daily   • docusate sodium (COLACE) capsule 100 mg  100 mg Oral BID   • enoxaparin (LOVENOX) subcutaneous injection 30 mg  30 mg Subcutaneous Q12H   • gabapentin (NEURONTIN) capsule 100 mg  100 mg Oral TID   • guaiFENesin (MUCINEX) 12 hr tablet 1,200 mg  1,200 mg Oral Q12H YOLIE   • hydrochlorothiazide (HYDRODIURIL) tablet 12 5 mg  12 5 mg Oral Daily   • HYDROmorphone HCl (DILAUDID) injection 0 2 mg  0 2 mg Intravenous Q4H PRN   • lidocaine (LIDODERM) 5 % patch 2 patch  2 patch Topical Daily   • lisinopril (ZESTRIL) tablet 2 5 mg  2 5 mg Oral Daily   • methocarbamol (ROBAXIN) tablet 750 mg  750 mg Oral Q6H YOLIE   • ondansetron (ZOFRAN) injection 4 mg  4 mg Intravenous Q6H PRN   • oxyCODONE (ROXICODONE) split tablet 2 5 mg  2 5 mg Oral Q4H PRN   • pantoprazole (PROTONIX) EC tablet 40 mg  40 mg Oral BID   • polyethylene glycol (MIRALAX) packet 17 g  17 g Oral Daily   • ropivacaine 0 1% and fentaNYL 2 mcg/mL epidural infusion   Epidural Continuous   • senna (SENOKOT) tablet "17 2 mg  2 tablet Oral BID    and PTA meds:   Prior to Admission Medications   Prescriptions Last Dose Informant Patient Reported? Taking? Aspirin-Calcium Carbonate  MG TABS Not Taking  Yes No   Sig: Take by mouth daily   Patient not taking: Reported on 5/2/2023   Cholecalciferol (VITAMIN D-3) 5000 units TABS 5/1/2023  Yes Yes   Sig: Take 1 tablet by mouth daily    Multiple Vitamin (MULTI-VITAMIN DAILY PO) 5/1/2023  Yes Yes   Sig: Take 1 tablet by mouth daily   atorvastatin (LIPITOR) 20 mg tablet Not Taking  No No   Sig: Take 1 tablet (20 mg total) by mouth daily   Patient not taking: Reported on 4/5/2023   carbidopa-levodopa (SINEMET)  mg per tablet 5/2/2023 at 1000  No Yes   Sig: Take 2tabs 8am, 1p, 6pm, and 1tab before bed   enalapril (VASOTEC) 5 mg tablet 5/1/2023  No Yes   Sig: Take half tablet by mouth once daily   Patient taking differently: Take 2 5 mg by mouth daily Take half tablet by mouth once daily   hydrochlorothiazide (HYDRODIURIL) 12 5 mg tablet 5/1/2023  No Yes   Sig: TAKE ONE TABLET BY MOUTH EVERY MORNING   hydrocortisone 2 5 % cream Not Taking  No No   Sig: Apply topically 2 (two) times a day   Patient not taking: Reported on 2/28/2023   ibuprofen (MOTRIN) 200 mg tablet 5/2/2023 at 0800  Yes Yes   Sig: Take 600 mg by mouth every 6 (six) hours as needed for mild pain   ketoconazole (NIZORAL) 2 % cream Not Taking  No No   Sig: Apply topically twice daily   Patient not taking: Reported on 5/2/2023   ketoconazole (NIZORAL) 2 % shampoo Past Week  No Yes   Sig: Daily for 2 weeks straight and then on \"Mondays, Wednesdays and Fridays\":  Lather into scalp and skin on face, neck, chest, and back; leave on for 5 minutes and then rinse off completely     pantoprazole (PROTONIX) 40 mg tablet 5/1/2023  No Yes   Sig: Take 1 tablet (40 mg total) by mouth 2 (two) times a day      Facility-Administered Medications: None       No Known Allergies    Objective     Temp:  [97 5 °F (36 4 °C)-98 7 °F (37 1 " °C)] 98 6 °F (37 °C)  HR:  [68-88] 86  Resp:  [16-18] 16  BP: ()/(50-67) 106/62    Physical Exam  Gen: Awake and alert, NAD, Does not appear ill  Vital signs reviewed  Nursing notes reviewed  Eyes: PERRL, sclera/conjunctiva normal   ENT: No JVD, trachea midline, moist mucus membranes  MSK: Right chest wall tender to palpation  Increased pain with range of motion  CV: Heart normal rhythm and normal rate  No extra systoles  Lungs:  CTA bilaterally  No wheeze  No rhonchi  Skin: Warm, dry  Cap refill <2 seconds  No diaphoresis  Neuro: A&O x 3, GCS 15 (E4, V5, M6)  No obvious focal motor deficit  Psych: Normal speech, normal attention, normal behavior  Lab Results:   Results from last 7 days   Lab Units 05/08/23  0615   WBC Thousand/uL 8 96   HEMOGLOBIN g/dL 13 2   HEMATOCRIT % 41 5   PLATELETS Thousands/uL 309      Results from last 7 days   Lab Units 05/08/23  0615 05/07/23  0841   POTASSIUM mmol/L 4 0 4 1   CHLORIDE mmol/L 102 102   CO2 mmol/L 24 23   BUN mg/dL 24 25   CREATININE mg/dL 1 01 1 03   CALCIUM mg/dL 9 3 9 5   ALK PHOS U/L  --  94   ALT U/L  --  16   AST U/L  --  27    Estimated Creatinine Clearance: 66 6 mL/min (by C-G formula based on SCr of 1 01 mg/dL)  Imaging Studies: I have personally reviewed pertinent reports  Counseling / Coordination of Care  Total floor / unit time spent today Level 3 = 55 minutes  Greater than 50% of total time was spent with the patient and / or family counseling and / or coordination of care  A description of the counseling / coordination of care: Patient interview, physical examination, review of medical record, review of imaging and laboratory data, development of pain management plan, discussion of pain management plan with patient and primary service  Explanation of risks and benefits of suggested pain medications  Please note that the APS provides consultative services regarding pain management only    With the exception of ketamine and epidural infusions and except when indicated, final decisions regarding starting or changing doses of analgesic medications are at the discretion of the consulting service  Off hours consultation and/or medication management is generally not available  Portions of the record may have been created with voice recognition software  Occasional wrong-word or 'sound-a-like' substitutions may have occurred due to the inherent limitations of voice recognition software       Rodger Ramirez PA-C  Acute Pain Service

## 2023-05-10 ENCOUNTER — APPOINTMENT (OUTPATIENT)
Dept: PHYSICAL THERAPY | Facility: CLINIC | Age: 83
End: 2023-05-10
Payer: MEDICARE

## 2023-05-10 VITALS
TEMPERATURE: 98.3 F | OXYGEN SATURATION: 93 % | RESPIRATION RATE: 16 BRPM | SYSTOLIC BLOOD PRESSURE: 128 MMHG | DIASTOLIC BLOOD PRESSURE: 68 MMHG | BODY MASS INDEX: 32.39 KG/M2 | WEIGHT: 218.7 LBS | HEART RATE: 75 BPM | HEIGHT: 69 IN

## 2023-05-10 LAB
FLUAV RNA RESP QL NAA+PROBE: NEGATIVE
FLUBV RNA RESP QL NAA+PROBE: NEGATIVE
RSV RNA RESP QL NAA+PROBE: NEGATIVE
SARS-COV-2 RNA RESP QL NAA+PROBE: NEGATIVE

## 2023-05-10 RX ORDER — METHOCARBAMOL 750 MG/1
750 TABLET, FILM COATED ORAL EVERY 6 HOURS SCHEDULED
Qty: 20 TABLET | Refills: 0
Start: 2023-05-10

## 2023-05-10 RX ORDER — OXYCODONE HYDROCHLORIDE 5 MG/1
2.5 TABLET ORAL EVERY 4 HOURS PRN
Qty: 30 TABLET | Refills: 0 | Status: SHIPPED | OUTPATIENT
Start: 2023-05-10 | End: 2023-05-20

## 2023-05-10 RX ORDER — GABAPENTIN 100 MG/1
100 CAPSULE ORAL 3 TIMES DAILY
Qty: 30 CAPSULE | Refills: 0
Start: 2023-05-10

## 2023-05-10 RX ORDER — ACETAMINOPHEN 325 MG/1
650 TABLET ORAL EVERY 6 HOURS PRN
Qty: 30 TABLET | Refills: 0
Start: 2023-05-10

## 2023-05-10 RX ORDER — CALCIUM CARBONATE 500 MG/1
1000 TABLET, CHEWABLE ORAL 2 TIMES DAILY PRN
Qty: 30 TABLET | Refills: 0
Start: 2023-05-10

## 2023-05-10 RX ORDER — LISINOPRIL 2.5 MG/1
2.5 TABLET ORAL DAILY
Qty: 20 TABLET | Refills: 0
Start: 2023-05-11 | End: 2023-05-10

## 2023-05-10 RX ORDER — SENNOSIDES 8.6 MG
17.2 TABLET ORAL 2 TIMES DAILY
Qty: 10 TABLET | Refills: 0
Start: 2023-05-10

## 2023-05-10 RX ORDER — LIDOCAINE 50 MG/G
2 PATCH TOPICAL DAILY
Qty: 10 PATCH | Refills: 0
Start: 2023-05-11

## 2023-05-10 RX ADMIN — Medication 1000 UNITS: at 08:20

## 2023-05-10 RX ADMIN — LIDOCAINE 5% 2 PATCH: 700 PATCH TOPICAL at 08:20

## 2023-05-10 RX ADMIN — METHOCARBAMOL TABLETS 750 MG: 750 TABLET, COATED ORAL at 12:29

## 2023-05-10 RX ADMIN — CARBIDOPA AND LEVODOPA 2 TABLET: 25; 100 TABLET ORAL at 08:20

## 2023-05-10 RX ADMIN — GUAIFENESIN 1200 MG: 600 TABLET ORAL at 08:20

## 2023-05-10 RX ADMIN — Medication 2.5 MG: at 08:28

## 2023-05-10 RX ADMIN — GABAPENTIN 100 MG: 100 CAPSULE ORAL at 08:20

## 2023-05-10 RX ADMIN — LISINOPRIL 2.5 MG: 2.5 TABLET ORAL at 08:21

## 2023-05-10 RX ADMIN — DOCUSATE SODIUM 100 MG: 100 CAPSULE, LIQUID FILLED ORAL at 08:20

## 2023-05-10 RX ADMIN — CARBIDOPA AND LEVODOPA 2 TABLET: 25; 100 TABLET ORAL at 12:30

## 2023-05-10 RX ADMIN — HEPARIN SODIUM 5000 UNITS: 5000 INJECTION INTRAVENOUS; SUBCUTANEOUS at 05:05

## 2023-05-10 RX ADMIN — SENNOSIDES 17.2 MG: 8.6 TABLET, FILM COATED ORAL at 08:20

## 2023-05-10 RX ADMIN — METHOCARBAMOL TABLETS 750 MG: 750 TABLET, COATED ORAL at 05:06

## 2023-05-10 RX ADMIN — HYDROCHLOROTHIAZIDE 12.5 MG: 12.5 TABLET ORAL at 08:20

## 2023-05-10 RX ADMIN — PANTOPRAZOLE SODIUM 40 MG: 40 TABLET, DELAYED RELEASE ORAL at 05:06

## 2023-05-10 RX ADMIN — METHOCARBAMOL TABLETS 750 MG: 750 TABLET, COATED ORAL at 00:51

## 2023-05-10 NOTE — PHYSICAL THERAPY NOTE
PHYSICAL THERAPY NOTE          Patient Name: Christopher HERNANDES Date: 5/10/2023             05/10/23 1158   PT Last Visit   PT Visit Date 05/10/23   Note Type   Note Type Treatment   Pain Assessment   Pain Assessment Tool 0-10   Pain Score 5   Pain Location/Orientation Orientation: Right;Location: Rib Cage   Pain Onset/Description Onset: Ongoing   Effect of Pain on Daily Activities limited functional mobility and activity tolerance   Patient's Stated Pain Goal No pain   Hospital Pain Intervention(s) Repositioned; Ambulation/increased activity; Emotional support; Rest   Multiple Pain Sites No   Restrictions/Precautions   Weight Bearing Precautions Per Order No   Other Precautions Cognitive; Chair Alarm; Bed Alarm; Fall Risk;Pain   General   Chart Reviewed Yes   Response to Previous Treatment Patient with no complaints from previous session  Family/Caregiver Present No   Cognition   Overall Cognitive Status Impaired   Arousal/Participation Alert; Responsive; Cooperative   Attention Attends with cues to redirect   Orientation Level Oriented to person;Oriented to place;Oriented to situation   Memory Decreased short term memory;Decreased recall of recent events   Following Commands Follows one step commands with increased time or repetition   Comments pt was pleasant and cooperative throughout tx session   Subjective   Subjective pt was agreeable to participate in PT intervention   Bed Mobility   Supine to Sit 3  Moderate assistance   Additional items Assist x 1;HOB elevated; Bedrails; Increased time required;Verbal cues;LE management; Other  (HHA)   Sit to Supine 3  Moderate assistance   Additional items Assist x 1;Bedrails;HOB elevated;Verbal cues;LE management   Additional Comments pt was able to sit EOB and perform TE activities w/o LOB   Transfers   Sit to Stand 4  Minimal assistance   Additional items Assist x 1; Increased time required;Verbal cues  (w/ RW)   Stand to Sit 4  Minimal assistance   Additional items Assist x 1; Increased time required;Verbal cues  (w/ RW)   Stand pivot Unable to assess   Additional Comments pt was able to complete STS from EOB with RW x3 all with min ax1   Ambulation/Elevation   Gait pattern (S)    (attempted ambulation in todays tx session x2  pt was unable to advance LE's with RW UE support )   Ambulation/Elevation Additional Comments pt continues to be limited with funcitonal mobility but was improved with STS from EOB to RW as pt required min Ax1   Balance   Static Sitting Fair +   Dynamic Sitting Fair   Static Standing Fair -   Dynamic Standing Poor -   Ambulatory Zero   Endurance Deficit   Endurance Deficit Yes   Endurance Deficit Description limited functional mobility   Activity Tolerance   Activity Tolerance Patient limited by fatigue  (generalized weakness)   Nurse Made Aware Spoke to RN   Exercises   Quad Sets Supine;10 reps;AROM; Bilateral   Heelslides Supine;10 reps;AAROM; Bilateral   Hip Abduction Sitting;10 reps;AROM; Bilateral   Hip Adduction Sitting;10 reps;AROM; Bilateral   Knee AROM Long Arc Quad Sitting;10 reps;AROM; Bilateral   Ankle Pumps Supine;20 reps;AROM; Bilateral   Marching Sitting;10 reps;AAROM; Bilateral   Assessment   Prognosis Fair   Problem List Decreased strength;Decreased endurance; Impaired balance;Decreased mobility; Decreased coordination;Decreased cognition;Decreased skin integrity;Pain   Assessment pt began tx session lying supine in the bed and was agreeable to participate in PT intervention  pt continues to remain consistant with mod Ax1 for all bed mobility as pt required LE and HHA in order to complete a supine<>sit EOB transfer  pt w/o LOB while seated EOB and participating in TE activities in order to strengthen LE and increase static/dynamic sitting balance   pt was able to perform 3 STS in [facility] tx session all with min Ax1 and VC';s for hand placement while ascending to RW and descending to EOB  pt was unable to ambulate in todays tx sesison as pt demonstrated difficulty advance LE's in todays tx session  pt also demonstrated limited standing tolerance and required several rest breaks throughout tx session  pt was ablwe to participate in TE activities w/o increases in pain  pt required mod Ax2 to reposition towards HOB  Continue to recommend post acute rehab services at the time of D/C in order to maximize pt functional independence and safety with allm OOB mobility  Post tx pt in bed iwth call bell and RN present   Goals   Patient Goals to get some rest   STG Expiration Date 05/13/23   PT Treatment Day 3   Plan   Treatment/Interventions Functional transfer training;LE strengthening/ROM; Therapeutic exercise; Endurance training;Cognitive reorientation;Patient/family training;Equipment eval/education; Bed mobility;Gait training;Spoke to nursing   Progress No functional improvements   PT Frequency 3-5x/wk   Recommendation   PT Discharge Recommendation Post acute rehabilitation services   Equipment Recommended [facility] Recommended Wheeled walker   AM-PAC Basic Mobility Inpatient   Turning in Flat Bed Without Bedrails 2   Lying on Back to Sitting on Edge of Flat Bed Without Bedrails 2   Moving Bed to Chair 2   Standing Up From Chair Using Arms 3   Walk in Room 2   Climb 3-5 Stairs With Railing 1   Basic Mobility Inpatient Raw Score 12   Basic Mobility Standardized Score 32 23   Highest Level Of Mobility   -HL Goal 4: Move to chair/commode   -HL Achieved 5: Stand (1 or more minutes)   Education   Education Provided Mobility training;Assistive device; Other  (bed mobility and functional transfers)   Patient Reinforcement needed   End of Consult   Patient Position at End of Consult Supine;Bed/Chair alarm activated; All needs within reach   The patient's AM-PAC Basic Mobility Inpatient Short Form Raw Score is 12   A Raw score of less than or equal to 16 suggests the patient may benefit from discharge to post-acute rehabilitation services  Please also refer to the recommendation of the Physical Therapist for safe discharge planning  Mraianne helms

## 2023-05-10 NOTE — PLAN OF CARE
Problem: Potential for Falls  Goal: Patient will remain free of falls  Description: INTERVENTIONS:  - Educate patient/family on patient safety including physical limitations  - Instruct patient to call for assistance with activity   - Consult OT/PT to assist with strengthening/mobility   - Keep Call bell within reach  - Keep bed low and locked with side rails adjusted as appropriate  - Keep care items and personal belongings within reach  - Initiate and maintain comfort rounds  - Make Fall Risk Sign visible to staff  - Offer Toileting every  Hours, in advance of need  - Initiate/Maintainalarm  - Obtain necessary fall risk management equipm  - Apply yellow socks and bracelet for high fall risk patients  - Consider moving patient to room near nurses station  Outcome: Adequate for Discharge     Problem: MOBILITY - ADULT  Goal: Maintain or return to baseline ADL function  Description: INTERVENTIONS:  -  Assess patient's ability to carry out ADLs; assess patient's baseline for ADL function and identify physical deficits which impact ability to perform ADLs (bathing, care of mouth/teeth, toileting, grooming, dressing, etc )  - Assess/evaluate cause of self-care deficits   - Assess range of motion  - Assess patient's mobility; develop plan if impaired  - Assess patient's need for assistive devices and provide as appropriate  - Encourage maximum independence but intervene and supervise when necessary  - Involve family in performance of ADLs  - Assess for home care needs following discharge   - Consider OT consult to assist with ADL evaluation and planning for discharge  - Provide patient education as appropriate  Outcome: Adequate for Discharge  Goal: Maintains/Returns to pre admission functional level  Description: INTERVENTIONS:  - Perform BMAT or MOVE assessment daily    - Set and communicate daily mobility goal to care team and patient/family/caregiver     - Collaborate with rehabilitation services on mobility goals if consulted  - Perform Range of Motion  times a day  - Reposition patient every  hours  - Dangle patient  times a day  - Stand patient  times a day  - Ambulate patient  times a day  - Out of bed to chair times a day   - Out of bed for meals  times a day  - Out of bed for toileting  - Record patient progress and toleration of activity level   Outcome: Adequate for Discharge     Problem: RESPIRATORY - ADULT  Goal: Achieves optimal ventilation and oxygenation  Description: INTERVENTIONS:  - Assess for changes in respiratory status  - Assess for changes in mentation and behavior  - Position to facilitate oxygenation and minimize respiratory effort  - Oxygen administered by appropriate delivery if ordered  - Initiate smoking cessation education as indicated  - Encourage broncho-pulmonary hygiene including cough, deep breathe, Incentive Spirometry  - Assess the need for suctioning and aspirate as needed  - Assess and instruct to report SOB or any respiratory difficulty  - Respiratory Therapy support as indicated  Outcome: Adequate for Discharge     Problem: PAIN - ADULT  Goal: Verbalizes/displays adequate comfort level or baseline comfort level  Description: Interventions:  - Encourage patient to monitor pain and request assistance  - Assess pain using appropriate pain scale  - Administer analgesics based on type and severity of pain and evaluate response  - Implement non-pharmacological measures as appropriate and evaluate response  - Consider cultural and social influences on pain and pain management  - Notify physician/advanced practitioner if interventions unsuccessful or patient reports new pain  Outcome: Adequate for Discharge     Problem: PHYSICAL THERAPY ADULT  Goal: Performs mobility at highest level of function for planned discharge setting  See evaluation for individualized goals    Description: Treatment/Interventions: Functional transfer training, LE strengthening/ROM, Therapeutic exercise, Cognitive reorientation, Patient/family training, Equipment eval/education, Bed mobility, Gait training, Spoke to nursing, Spoke to case management, OT  Equipment Recommended: Allison Nguyen       See flowsheet documentation for full assessment, interventions and recommendations  Outcome: Adequate for Discharge     Problem: OCCUPATIONAL THERAPY ADULT  Goal: Performs self-care activities at highest level of function for planned discharge setting  See evaluation for individualized goals  Description: Treatment Interventions: ADL retraining, Endurance training, Cognitive reorientation, Patient/family training, Equipment evaluation/education, Compensatory technique education, Energy conservation, Activityengagement          See flowsheet documentation for full assessment, interventions and recommendations  Outcome: Adequate for Discharge     Problem: Prexisting or High Potential for Compromised Skin Integrity  Goal: Skin integrity is maintained or improved  Description: INTERVENTIONS:  - Identify patients at risk for skin breakdown  - Assess and monitor skin integrity  - Assess and monitor nutrition and hydration status  - Monitor labs   - Assess for incontinence   - Turn and reposition patient  - Assist with mobility/ambulation  - Relieve pressure over bony prominences  - Avoid friction and shearing  - Provide appropriate hygiene as needed including keeping skin clean and dry  - Evaluate need for skin moisturizer/barrier cream  - Collaborate with interdisciplinary team   - Patient/family teaching  - Consider wound care consult   Outcome: Adequate for Discharge     Problem: Nutrition/Hydration-ADULT  Goal: Nutrient/Hydration intake appropriate for improving, restoring or maintaining nutritional needs  Description: Monitor and assess patient's nutrition/hydration status for malnutrition  Collaborate with interdisciplinary team and initiate plan and interventions as ordered    Monitor patient's weight and dietary intake as ordered or per policy  Utilize nutrition screening tool and intervene as necessary  Determine patient's food preferences and provide high-protein, high-caloric foods as appropriate       INTERVENTIONS:  - Monitor oral intake, urinary output, labs, and treatment plans  - Assess nutrition and hydration status and recommend course of action  - Evaluate amount of meals eaten  - Assist patient with eating if necessary   - Allow adequate time for meals  - Recommend/ encourage appropriate diets, oral nutritional supplements, and vitamin/mineral supplements  - Order, calculate, and assess calorie counts as needed  - Recommend, monitor, and adjust tube feedings and TPN/PPN based on assessed needs  - Assess need for intravenous fluids  - Provide specific nutrition/hydration education as appropriate  - Include patient/family/caregiver in decisions related to nutrition  Outcome: Adequate for Discharge     Problem: GASTROINTESTINAL - ADULT  Goal: Minimal or absence of nausea and/or vomiting  Description: INTERVENTIONS:  - Administer IV fluids if ordered to ensure adequate hydration  - Maintain NPO status until nausea and vomiting are resolved  - Nasogastric tube if ordered  - Administer ordered antiemetic medications as needed  - Provide nonpharmacologic comfort measures as appropriate  - Advance diet as tolerated, if ordered  - Consider nutrition services referral to assist patient with adequate nutrition and appropriate food choices  Outcome: Adequate for Discharge  Goal: Maintains or returns to baseline bowel function  Description: INTERVENTIONS:  - Assess bowel function  - Encourage oral fluids to ensure adequate hydration  - Administer IV fluids if ordered to ensure adequate hydration  - Administer ordered medications as needed  - Encourage mobilization and activity  - Consider nutritional services referral to assist patient with adequate nutrition and appropriate food choices  Outcome: Adequate for Discharge  Goal: Maintains adequate nutritional intake  Description: INTERVENTIONS:  - Monitor percentage of each meal consumed  - Identify factors contributing to decreased intake, treat as appropriate  - Assist with meals as needed  - Monitor I&O, weight, and lab values if indicated  - Obtain nutrition services referral as needed  Outcome: Adequate for Discharge  Goal: Establish and maintain optimal ostomy function  Description: INTERVENTIONS:  - Assess bowel function  - Encourage oral fluids to ensure adequate hydration  - Administer IV fluids if ordered to ensure adequate hydration   - Administer ordered medications as needed  - Encourage mobilization and activity  - Nutrition services referral to assist patient with appropriate food choices  - Assess stoma site  - Consider wound care consult   Outcome: Adequate for Discharge  Goal: Oral mucous membranes remain intact  Description: INTERVENTIONS  - Assess oral mucosa and hygiene practices  - Implement preventative oral hygiene regimen  - Implement oral medicated treatments as ordered  - Initiate Nutrition services referral as needed  Outcome: Adequate for Discharge

## 2023-05-10 NOTE — DISCHARGE INSTR - AVS FIRST PAGE
Seek medical attention if you develop worsening chest pain or shortness of breath, dizziness/lightheadness, fevers/chills or sweats  No heavy lifting, pushing or pulling >10 pounds and no strenuous physical activity until cleared by trauma  No work or driving while taking narcotic pain medications and until cleared by trauma  Use your incentive spirometer at least hourly while awake

## 2023-05-10 NOTE — DISCHARGE SUMMARY
[facility]  Discharge- [address] [facility] 1940, 82 y o  male MRN: [id number]  Unit/Bed#: S -01 Encounter: [id number]  Primary Care Provider: Franklin Hunt MD   Date and time admitted to hospital: 5/2/2023  8:18 AM    Fracture of transverse process of thoracic vertebra, closed, initial encounter [facility])  Assessment & Plan  - S/p fall down stairs  - Acute fracture of the right T7-T10 transverse processes with minimal displacement at T9 and T10  - Multimodal pain control, APS consulted  - Follow-up with trauma as an outpatient  Closed fracture of multiple ribs of right side  Assessment & Plan  - Multiple right-sided rib fractures (5-10), present on admission   - Continue rib fracture protocol   - Continue to encourage incentive spirometer use and adequate pulmonary hygiene  Flutter valve added  - Continue multimodal analgesic regimen  - Appreciate APS evaluation and recommendations  -paravertebral catheter placed 5/4  APS will remove catheter today  - Encouraged patient to take oral regimen and increased methocarbamol  He does not tolerate APAP  - Supplemental oxygen via nasal cannula as needed to maintain saturations greater than or equal to 94%  Currently on RA to 2 liters NC    - Repeat chest x-ray from 5/7 reviewed  - PT and OT evaluation and treatment as indicated  - Outpatient follow-up in the trauma clinic for re-evaluation in approximately 2 weeks  Fall (on) (from) other stairs and steps, initial encounter  Assessment & Plan  - Patient with Parkinson's and ambulatory dysfunction at baseline, which he uses rollator walker--suffered a mechanical fall down several steps   - Injuries noted below  - Geriatrics evaluation appreciated    - PT/OT eval recommending inpatient rehab  - CM for disposition planning  Rehab referrals placed       Parkinsonism [facility])  Assessment & Plan  - Continue home regimen of carvidopa-levadopa  - PT/OT recommending inpatient "rehab  Constipation  Assessment & Plan  - No abdominal pain  Abdominal exam benign   - Continue bowel regimen  Had a BM on 5/5    - continue bowel regimen  - KUB on 5/7 with normal bowel gas pattern, no significant stool burden noted  - Continue to monitor  Essential hypertension  Assessment & Plan  - Monitor vital signs  - Continue home meds, hydrochlorothiazide and enalapril    Unsteady gait  Assessment & Plan  - Ambulatory dysfunction at baseline  Uses rollator   - PT/OT   - D/C to rehab    Cognitive impairment  Assessment & Plan  - at baseline mental status  - geriatrics' following  - H/o Parkinson's disease    NPH (normal pressure hydrocephalus) (HCC)  Assessment & Plan  - S/p shunt placement  - Follow-up with neurosurgery as an outpatient as scheduled      PE:  Sitting up in bed  States at time when asked he is doing well, other times will say he has pain, no acute distress observed,  RRR, no complaints of chest pain  Lungs CTA bilaterally, no shortness of breath  Abdomen is soft, tolerating a diet  Is moving extremities, does require assistance with certain things, unsteady gait  Skin is warm and dry          Medical Problems     Resolved Problems  Date Reviewed: 5/10/2023   None         Admission Date:   Admission Orders (From admission, onward)     Ordered        05/02/23 1238  Inpatient Admission  Once                        Admitting Diagnosis: Back injury [S39 92XA]  Pelvic hematoma in male [N50 1]  Closed fracture of transverse process of thoracic vertebra, initial encounter ([facility] [address] ) [S22 009A]  Closed fracture of multiple ribs of right side, initial encounter [S22 41XA]  Parkinsonism, unspecified Parkinsonism type ([facility] [address] ) [G20]    HPI: per resident JAMIE Weiss: Rosaura Tang is a 82 y o  male with Parkinson's disease who presents to the ED after fall downstairs this morning  Patient was reportedly using his rollator walker when he had a mechanical fall down several stairs    Patient did not " "strike his head, did not lose consciousness, and is not on blood thinners  He complains of anterior chest wall pain and back pain both on the right side of his upper chest and lower back  Patient was reportedly at physical therapy this morning which she attends frequently due to difficulty with ambulation associated with Parkinson's disease  Patient has otherwise been well and denies any symptoms of illness prior to injury\"    Procedures Performed: No orders of the defined types were placed in this encounter  Summary of Hospital Course: 83 y/o male admitted to trauma after a fall downstairs  Unsteady gait and uses a rollelator to ambulate  Complained of chest and back pain  Injuries included Right sided rib fractures and transverse process fracture of thoracic vertebrae  Mal pelvic hematoma  Has been improving and is ready for transfer to rehab  He will follow up with PCP, Trauma in 2 weeks  For more details of his stay, please refer to medical records  Significant Findings, Care, Treatment and Services Provided: XR chest portable    Result Date: 5/7/2023  Impression: Low lung volumes with mild bibasilar atelectasis  Workstation performed: [id number]     XR chest portable    Result Date: 5/3/2023  Impression: No evidence of pneumothorax Workstation performed: [id number]     XR abdomen 1 view kub    Result Date: 5/8/2023  Impression: Gas-filled small bowel loops throughout the abdomen Nonobstructive bowel gas pattern Workstation performed: [id number]     CT chest abdomen pelvis w contrast    Result Date: 5/2/2023  Impression: CT chest: Acute segmental fractures of the right 5th through 10th ribs with minimal displacement of the posterior medial component of the right 7th through 10th ribs  Acute fracture of the right T7-T10 transverse processes with minimal displacement at T9 and T10  Trace right pleural effusion  Minimal multilobar subsegmental atelectasis, right greater than left   No " pulmonary contusion  No pneumothorax  Chronic findings and negatives as above  CT abdomen and pelvis: Right lateral pelvic deep subcutaneous hematoma overlying the tensor fascia rodolfo measures approximately 4 6 x 2 4 x 9 5 cm  No acute fracture  No acute intraabdominopelvic process  Punctate bilateral nonobstructing calculi  Additional chronic findings and negatives as above  The study was marked in [facility] for immediate notification  Workstation performed: [id number]         Complications: no    Condition at Discharge: Improving         Discharge instructions/Information to patient and family:   See after visit summary for information provided to patient and family  Provisions for Follow-Up Care:  See after visit summary for information related to follow-up care and any pertinent home health orders  PCP: Román Singer MD    Disposition: [facility]    Planned Readmission: No    Discharge Statement   I spent 30 minutes discharging the patient  This time was spent on the day of discharge  I had direct contact with the patient on the day of discharge  Additional documentation is required if more than 30 minutes were spent on discharge  Discharge Medications:  See after visit summary for reconciled discharge medications provided to patient and family

## 2023-05-10 NOTE — PROGRESS NOTES
Progress Note - Geriatric Medicine   Mulberry Old Jessika 82 y o  male MRN: [id number]  Unit/Bed#: S -01 Encounter: [id number]      Assessment/Plan:  Closed fracture multiple ribs on the right side  · S/p fall downstairs  · CT of the chest showed- Acute segmental fractures of the right 5th through 10th ribs with minimal displacement of the posterior medial component of the right 7th through 10th ribs  · Rib fracture protocol  · Encourage coughing, deep breathing, and incentive spirometry  · Multimodal pain regimen including geriatric pain protocol- continue lidocaine patch  · Patient refusing Tylenol, states he does not tolerate it  · Patient had right paravertebral catheter placed on 5/4/2023 which was then removed on 5/9/2023  · APS is following-has patient on lidocaine patch daily, Robaxin-750mg every 6, oxycodone 2 5 mg every 4 as needed, and Tylenol 650 as needed  · IV Dilaudid was discontinued today  · Management per trauma    Fracture of transverse process of thoracic vertebrae T7-T10   · Status post fall downstairs  · CT of the chest showed- Acute fracture of the right T7-T10 transverse processes with minimal displacement at T9 and T10    · Multimodal pain regimen including geriatric pain protocol  · APS is following-the catheter was removed today by pain services  · Appreciate APS recommendations  · Management per trauma    Parkinsonism  · Continue home regimen of carbidopa levodopa  · PT/OT following and recommending STR    Delirium precautions  Patient is alert oriented x4  At risk for delirium due to cognitive impairment  Recommend delirium precautions  Maintain sleep-wake cycle, avoid nighttime interruptions  minimize overnight interruptions, group overnight vitals/labs/nursing checks as possible  dim lights, close blinds and turn off tv to minimize stimulation and encourage sleep environment in evenings  Provide adequate pain control  Avoid urinary retention and constipation  Provide frequent and early mobilization  Provide frequent redirection and reorientation as needed  Avoid medications that may worsen or precipitate delirium such as tramadol, benzodiazepines, anticholinergics, and Benadryl  Redirect unwanted behaviors as first-line therapy, avoid physical restraints as able to  · Continue to monitor    Constipation  Patient previously complained of constipation  · KUB from 5/7/2023 showed-IMPRESSION: Gas-filled small bowel loops throughout the abdomen  Nonobstructive bowel gas pattern  Last BM was today  Is currently on Senokot 2 tablets daily, MiraLAX daily, and Colace 100 mg twice daily  Encourage adequate p o  Hydration  Encourage prune juice as tolerated    Ambulatory dysfunction  At a baseline ambulates with RW  PT/OT following  Fall precautions  Out of bed as tolerated  Encourage early and frequent mobilization  Encourage adequate hydration and nutrition  Provide adequate pain management   Goal is STR today  · Continue with PT/OT for continued strength and balance training     Subjective:   Patient is being seen for a geriatrics follow-up  Upon examination patient was lying bed, resting  He appeared comfortable and was in no acute distress  Reports his pain is there but tolerable  We discussed his pain regimen and he stated he understood  He slept okay overnight  His appetite is improving  Per nursing no acute concerns or issues at this time  Review of Systems   Constitutional: Positive for fatigue  Negative for activity change, appetite change, chills and fever  HENT: Negative for trouble swallowing  Eyes: Negative for visual disturbance  Respiratory: Negative for cough and shortness of breath  Cardiovascular: Negative for chest pain and palpitations  Gastrointestinal: Negative for abdominal pain, constipation, diarrhea, nausea and vomiting  Genitourinary: Negative for difficulty urinating, dysuria and hematuria     Musculoskeletal: Positive for arthralgias, back pain and "gait problem  Skin: Negative for color change and rash  Neurological: Positive for dizziness, weakness and light-headedness  Negative for headaches  Psychiatric/Behavioral: Negative for confusion, dysphoric mood and sleep disturbance  The patient is not nervous/anxious  Objective:     Vitals: Blood pressure 128/68, pulse 75, temperature 98 3 °F (36 8 °C), temperature source Oral, resp  rate 16, height 5' 9\" (1 753 m), weight 99 2 kg (218 lb 11 1 oz), SpO2 93 %  ,Body mass index is 32 3 kg/m²  Intake/Output Summary (Last 24 hours) at 5/10/2023 1122  Last data filed at 5/10/2023 0750  Gross per 24 hour   Intake --   Output 625 ml   Net -625 ml       Current Medications: Reviewed    Physical Exam:   Physical Exam  Vitals reviewed  Constitutional:       General: He is not in acute distress  Appearance: He is well-developed  He is not ill-appearing  HENT:      Head: Normocephalic and atraumatic  Mouth/Throat:      Mouth: Mucous membranes are dry  Pharynx: No oropharyngeal exudate or posterior oropharyngeal erythema  Cardiovascular:      Rate and Rhythm: Normal rate and regular rhythm  Heart sounds: No murmur heard  Pulmonary:      Effort: Pulmonary effort is normal  No respiratory distress  Breath sounds: Normal breath sounds  Abdominal:      General: Bowel sounds are normal  There is no distension  Palpations: Abdomen is soft  Tenderness: There is no abdominal tenderness  Musculoskeletal:         General: No swelling  Right lower leg: No edema  Left lower leg: No edema  Skin:     General: Skin is warm and dry  Coloration: Skin is pale  Neurological:      General: No focal deficit present  Mental Status: He is alert and oriented to person, place, and time  Mental status is at baseline  Cranial Nerves: No cranial nerve deficit  Motor: Weakness present        Gait: Gait abnormal    Psychiatric:         Mood and Affect: Mood " "normal             Lab, Imaging and other studies: I have personally reviewed pertinent reports  Please note:  Voice-recognition software may have been used in the preparation of this document  Occasional wrong word or \"sound-alike\" substitutions may have occurred due to the inherent limitations of voice recognition software  Interpretation should be guided by context      "

## 2023-05-10 NOTE — ASSESSMENT & PLAN NOTE
- S/p shunt placement    - Follow-up with neurosurgery as an outpatient as scheduled Medication sent in. She is going to start the medication if her blood pressure is greater than 135/90. If it is below 135/89, I would wait again, and would recommend monitoring at least every 6 months.   If blood pressure is over 135/90, I would start the medication and recheck blood pressure in 4-6 weeks.

## 2023-05-10 NOTE — PROGRESS NOTES
[facility]  Progress Note  Name: Essence Whatley  MRN: [id number]  Unit/Bed#: S -01 I Date of Admission: 5/2/2023   Date of Service: 5/10/2023  Hospital Day: 8    Assessment/Plan   Male pelvic hematoma  Assessment & Plan  · Minimal pain  Fracture of transverse process of thoracic vertebra, closed, initial encounter [facility])  Assessment & Plan  · Minimal pain at the site currently  Closed fracture of multiple ribs of right side  Assessment & Plan  · Right fifth through 10th rib fractures  · Right paravertebral catheter placed on 5/4/2023, removed on 5/9/2023   · Current pain score 5 out of 10  · Able to pull 1250 mL consistently in my presence on incentive spirometry  · Continue lidocaine patch, on for 12 hours and off for 12 hours  · Continue Robaxin-750 milligrams p o  every 6 hours scheduled  · Continue oxycodone 2 5 mg p o  every 4 hours as needed moderate or severe pain  · Discontinue Dilaudid 0 2 mg IV every 4 hours as needed breakthrough pain  · Continue bowel regimen to avoid opioid-induced constipation while on opioid pain medication  · Ice to painful area for up to 20 minutes every hour as needed  · Encourage activity and mobility to avoid muscle stiffness and weakness which can lead to worsening pain  · At discharge, suggest the following:  · Lidocaine patch, on for 12 hours and off for 12 hours  · Tylenol 650 mg p o  every 6 hours as needed mild pain  · Robaxin-750 milligrams p o  every 6 hours as needed muscle spasm  · Oxycodone 2 5 mg p o  every 6 hours as needed moderate to severe pain x3 days  · Bowel regimen while on opioid pain medication  Cognitive impairment  Assessment & Plan  · We will attempt to minimize pain medications which can contribute to worsening mental status or cognition  Acute pain due to right rib fractures  APS will sign off at this time  Thank you for the consult   All opioids and other analgesics to be written at discretion of primary team  Please contact Acute Pain Service - [facility] via [employer] from [phone] with additional questions or concerns  See Rowena or Anayeli for additional contacts and after hours information  Pain History  Current pain location(s): Right chest wall  Pain Scale:   4-5 out of 10  Quality: Aching  24 hour history: Patient's pain remains well controlled with minimal as needed opioids following removal of right paravertebral catheter yesterday  Is anticipating discharge to rehab in the very near future  Opioid requirement previous 24 hours: Oxycodone 7 5 mg p o , Dilaudid 0 2 mg IV      Meds/Allergies   all current active meds have been reviewed, current meds:   Current Facility-Administered Medications   Medication Dose Route Frequency   • acetaminophen (TYLENOL) tablet 650 mg  650 mg Oral Q6H PRN   • bisacodyl (DULCOLAX) rectal suppository 10 mg  10 mg Rectal Daily PRN   • calcium carbonate (TUMS) chewable tablet 1,000 mg  1,000 mg Oral BID PRN   • carbidopa-levodopa (SINEMET)  mg per tablet 1 tablet  1 tablet Oral HS   • carbidopa-levodopa (SINEMET)  mg per tablet 2 tablet  2 tablet Oral TID   • cholecalciferol (VITAMIN D3) tablet 1,000 Units  1,000 Units Oral Daily   • docusate sodium (COLACE) capsule 100 mg  100 mg Oral BID   • gabapentin (NEURONTIN) capsule 100 mg  100 mg Oral TID   • guaiFENesin (MUCINEX) 12 hr tablet 1,200 mg  1,200 mg Oral Q12H YOLIE   • heparin (porcine) subcutaneous injection 5,000 Units  5,000 Units Subcutaneous Q8H [facility]   • hydrochlorothiazide (HYDRODIURIL) tablet 12 5 mg  12 5 mg Oral Daily   • HYDROmorphone HCl (DILAUDID) injection 0 2 mg  0 2 mg Intravenous Q4H PRN   • lidocaine (LIDODERM) 5 % patch 2 patch  2 patch Topical Daily   • lisinopril (ZESTRIL) tablet 2 5 mg  2 5 mg Oral Daily   • methocarbamol (ROBAXIN) tablet 750 mg  750 mg Oral Q6H [facility]   • ondansetron (ZOFRAN) injection 4 mg  4 mg Intravenous Q6H PRN   • oxyCODONE (ROXICODONE) split "tablet 2 5 mg  2 5 mg Oral Q4H PRN   • pantoprazole (PROTONIX) EC tablet 40 mg  40 mg Oral BID   • polyethylene glycol (MIRALAX) packet 17 g  17 g Oral Daily   • ropivacaine 0 1% and fentaNYL 2 mcg/mL epidural infusion   Epidural Continuous   • senna (SENOKOT) tablet 17 2 mg  2 tablet Oral BID    and PTA meds:   Prior to Admission Medications   Prescriptions Last Dose Informant Patient Reported? Taking?    Aspirin-Calcium Carbonate  MG TABS Not Taking  Yes No   Sig: Take by mouth daily   Patient not taking: Reported on 5/2/2023   Cholecalciferol (VITAMIN D-3) 5000 units TABS 5/1/2023  Yes Yes   Sig: Take 1 tablet by mouth daily    Multiple Vitamin (MULTI-VITAMIN DAILY PO) 5/1/2023  Yes Yes   Sig: Take 1 tablet by mouth daily   atorvastatin (LIPITOR) 20 mg tablet Not Taking  No No   Sig: Take 1 tablet (20 mg total) by mouth daily   Patient not taking: Reported on 4/5/2023   carbidopa-levodopa (SINEMET)  mg per tablet 5/2/2023 at 1000  No Yes   Sig: Take 2tabs 8am, 1p, 6pm, and 1tab before bed   enalapril (VASOTEC) 5 mg tablet 5/1/2023  No Yes   Sig: Take half tablet by mouth once daily   Patient taking differently: Take 2 5 mg by mouth daily Take half tablet by mouth once daily   hydrochlorothiazide (HYDRODIURIL) 12 5 mg tablet 5/1/2023  No Yes   Sig: TAKE ONE TABLET BY MOUTH EVERY MORNING   hydrocortisone 2 5 % cream Not Taking  No No   Sig: Apply topically 2 (two) times a day   Patient not taking: Reported on 2/28/2023   ibuprofen (MOTRIN) 200 mg tablet 5/2/2023 at 0800  Yes Yes   Sig: Take 600 mg by mouth every 6 (six) hours as needed for mild pain   ketoconazole (NIZORAL) 2 % cream Not Taking  No No   Sig: Apply topically twice daily   Patient not taking: Reported on 5/2/2023   ketoconazole (NIZORAL) 2 % shampoo Past Week  No Yes   Sig: Daily for 2 weeks straight and then on \"Mondays, Wednesdays and Fridays\":  Lather into scalp and skin on face, neck, chest, and back; leave on for 5 minutes and then " rinse off completely  pantoprazole (PROTONIX) 40 mg tablet 5/1/2023  No Yes   Sig: Take 1 tablet (40 mg total) by mouth 2 (two) times a day      Facility-Administered Medications: None       No Known Allergies    Objective     Temp:  [97 9 °F (36 6 °C)-99 2 °F (37 3 °C)] 98 3 °F (36 8 °C)  HR:  [72-86] 75  Resp:  [16-17] 16  BP: ()/(57-68) 128/68    Physical Exam  Gen: Awake and alert, NAD, Does not appear ill  Vital signs reviewed  Nursing notes reviewed  Eyes: PERRL, sclera/conjunctiva normal   ENT: No JVD, trachea midline, moist mucus membranes  MSK: Right chest wall tender to palpation  No crepitus  CV: Heart normal rhythm and normal rate  No extra systoles  Lungs:  CTA bilaterally  No wheeze  No rhonchi  Skin: Warm, dry  Cap refill <2 seconds  No diaphoresis  Neuro: A&O x 3, GCS 15 (E4, V5, M6)  No obvious focal motor deficit  Psych: Normal speech, normal attention, normal behavior  Lab Results:   Results from last 7 days   Lab Units 05/08/23  0615   WBC Thousand/uL 8 96   HEMOGLOBIN g/dL 13 2   HEMATOCRIT % 41 5   PLATELETS Thousands/uL 309      Results from last 7 days   Lab Units 05/08/23  0615 05/07/23  0841   POTASSIUM mmol/L 4 0 4 1   CHLORIDE mmol/L 102 102   CO2 mmol/L 24 23   BUN mg/dL 24 25   CREATININE mg/dL 1 01 1 03   CALCIUM mg/dL 9 3 9 5   ALK PHOS U/L  --  94   ALT U/L  --  16   AST U/L  --  27    Estimated Creatinine Clearance: 66 6 mL/min (by C-G formula based on SCr of 1 01 mg/dL)  Imaging Studies: I have personally reviewed pertinent reports  Counseling / Coordination of Care  Total floor / unit time spent today Level 3 = 55 minutes  Greater than 50% of total time was spent with the patient and / or family counseling and / or coordination of care   A description of the counseling / coordination of care: Patient interview, physical examination, review of medical record, review of imaging and laboratory data, development of pain management plan, discussion of pain management plan with patient and primary service  Explanation of risks and benefits of suggested pain medications  Please note that the APS provides consultative services regarding pain management only  With the exception of ketamine and epidural infusions and except when indicated, final decisions regarding starting or changing doses of analgesic medications are at the discretion of the consulting service  Off hours consultation and/or medication management is generally not available  Portions of the record may have been created with voice recognition software  Occasional wrong-word or 'sound-a-like' substitutions may have occurred due to the inherent limitations of voice recognition software       Juliet Chiang PA-C  Acute Pain Service

## 2023-05-10 NOTE — ASSESSMENT & PLAN NOTE
· Right fifth through 10th rib fractures  · Right paravertebral catheter placed on 5/4/2023, removed on 5/9/2023   · Current pain score 5 out of 10  · Able to pull 1250 mL consistently in my presence on incentive spirometry  · Continue lidocaine patch, on for 12 hours and off for 12 hours  · Continue Robaxin-750 milligrams p o  every 6 hours scheduled  · Continue oxycodone 2 5 mg p o  every 4 hours as needed moderate or severe pain  · Discontinue Dilaudid 0 2 mg IV every 4 hours as needed breakthrough pain  · Continue bowel regimen to avoid opioid-induced constipation while on opioid pain medication  · Ice to painful area for up to 20 minutes every hour as needed  · Encourage activity and mobility to avoid muscle stiffness and weakness which can lead to worsening pain  · At discharge, suggest the following:  · Lidocaine patch, on for 12 hours and off for 12 hours  · Tylenol 650 mg p o  every 6 hours as needed mild pain  · Robaxin-750 milligrams p o  every 6 hours as needed muscle spasm  · Oxycodone 2 5 mg p o  every 6 hours as needed moderate to severe pain x3 days  · Bowel regimen while on opioid pain medication

## 2023-05-10 NOTE — PLAN OF CARE
Problem: PHYSICAL THERAPY ADULT  Goal: Performs mobility at highest level of function for planned discharge setting  See evaluation for individualized goals  Description: Treatment/Interventions: Functional transfer training, LE strengthening/ROM, Therapeutic exercise, Cognitive reorientation, Patient/family training, Equipment eval/education, Bed mobility, Gait training, Spoke to nursing, Spoke to case management, OT  Equipment Recommended: Issac Anderson       See flowsheet documentation for full assessment, interventions and recommendations  Outcome: Not Progressing  Note: Prognosis: Fair  Problem List: Decreased strength, Decreased endurance, Impaired balance, Decreased mobility, Decreased coordination, Decreased cognition, Decreased skin integrity, Pain  Assessment: pt began tx session lying supine in the bed and was agreeable to participate in PT intervention  pt continues to remain consistant with mod Ax1 for all bed mobility as pt required LE and HHA in order to complete a supine<>sit EOB transfer  pt w/o LOB while seated EOB and participating in TE activities in order to strengthen LE and increase static/dynamic sitting balance  pt was able to perform 3 STS in [facility] tx session all with min Ax1 and VC';s for hand placement while ascending to RW and descending to EOB  pt was unable to ambulate in todays tx sesison as pt demonstrated difficulty advance LE's in [facility] tx session  pt also demonstrated limited standing tolerance and required several rest breaks throughout tx session  pt was ablwe to participate in TE activities w/o increases in pain  pt required mod Ax2 to reposition towards HOB  Continue to recommend post acute rehab services at the time of D/C in order to maximize pt functional independence and safety with allm OOB mobility   Post tx pt in bed iwth call bell and RN present  Barriers to Discharge: Inaccessible home environment     PT Discharge Recommendation: Post acute rehabilitation services    See flowsheet documentation for full assessment

## 2023-05-10 NOTE — CASE MANAGEMENT
Case Management Discharge Planning Note    Patient name Pantera Sloan  Location S /S -01 MRN [id number]  : 1940 Date 5/10/2023       Current Admission Date: 2023  Current Admission Diagnosis:Parkinsonism [facility])   Patient Active Problem List    Diagnosis Date Noted   • Fall (on) (from) other stairs and steps, initial encounter 2023   • Closed fracture of multiple ribs of right side 2023   • Fracture of transverse process of thoracic vertebra, closed, initial encounter [facility]) 2023   • Male pelvic hematoma 2023   • Parkinsonism ([facility] [address] ) 2021   • Venous insufficiency 2020   • 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 2020   • Cataract 2019   • Constipation 2018   • Adenomatous polyp of sigmoid colon 2018   • Tremor 2018   • Localized edema 2018   • NPH (normal pressure hydrocephalus) ([facility] [address] ) 2018   • Cognitive impairment 2018   • Unsteady gait 2018   • Hearing loss, bilateral 2018   • Hyperlipidemia 2018   • Essential hypertension 2018   • Positional lightheadedness 2018   • Hiatal hernia 2017   • Severe obesity with body mass index (BMI) of 35 0 to 39 9 2017   • Rhinitis 2017   • Elevated PSA 2017   • Cervical spondylosis with myelopathy 10/03/2017   • Sleep disturbances 10/03/2017   • Urinary incontinence 10/03/2017   • Arthritis 10/03/2017      LOS (days): 8  Geometric Mean LOS (GMLOS) (days): 3 30  Days to GMLOS:-4 6     OBJECTIVE:  Risk of Unplanned Readmission Score: 9 47         Current admission status: Inpatient   Preferred Pharmacy:   [place] [address], [address] [address]  Phone: [phone] Fax: [phone]    Primary Care Provider: Sabrina Gifford MD    Primary Insurance: MEDICARE  Secondary Insurance: Kaitlynn Kruse DETAILS:             Treatment Team Recommendation: Short Term Rehab  Discharge Destination Plan[de-identified] Short Term Rehab  Transport at Discharge : BLS Ambulance  Dispatcher Contacted: Yes  Number/Name of Dispatcher: Huan Monzon and Unit #): [place]  ETA of Transport (Date): 05/10/23  ETA of Transport (Time): 1415     Transfer Mode: Stretcher  Accompanied by: EMS personnel     IMM Given (Date):: 05/10/23  IMM Given to[de-identified] Family  Family notified[de-identified] Spouse     Arpita Silva IMM reviewed with patient's caregiver, patient's caregiver agrees with discharge determination        Accepting Facility Name, [address] : [facility]  Receiving Facility/Agency Phone Number: [phone]  Facility/Agency Fax Number: [phone]

## 2023-05-15 ENCOUNTER — APPOINTMENT (OUTPATIENT)
Dept: PHYSICAL THERAPY | Facility: CLINIC | Age: 83
End: 2023-05-15
Payer: MEDICARE

## 2023-05-17 ENCOUNTER — APPOINTMENT (OUTPATIENT)
Dept: PHYSICAL THERAPY | Facility: CLINIC | Age: 83
End: 2023-05-17
Payer: MEDICARE

## 2023-05-27 DIAGNOSIS — I10 ESSENTIAL HYPERTENSION: ICD-10-CM

## 2023-05-27 RX ORDER — ENALAPRIL MALEATE 5 MG/1
TABLET ORAL
Qty: 90 TABLET | Refills: 1 | Status: SHIPPED | OUTPATIENT
Start: 2023-05-27

## 2023-05-31 NOTE — TELEPHONE ENCOUNTER
Please call wife to reschedule follow up appt  Nursing home is asking me to adjust his medications (muscle relaxant) so that he is not woken up at night

## 2023-06-01 NOTE — TELEPHONE ENCOUNTER
Spoke with patients wife  He fell down the stairs May 8th  Broke 5 ribs and 2 broken vertebrae  He is in rehab, he is unable to walk  They are asking to adjust his medication so that he can sleep better and better participate in physical therapy  Patients wife stated she csn not get patient to the office and doctor can call her

## 2023-06-28 DIAGNOSIS — G20 PARKINSONISM, UNSPECIFIED PARKINSONISM TYPE: ICD-10-CM

## 2023-07-13 DIAGNOSIS — I10 ESSENTIAL HYPERTENSION: ICD-10-CM

## 2023-07-20 RX ORDER — HYDROCHLOROTHIAZIDE 12.5 MG/1
TABLET ORAL
Qty: 90 TABLET | Refills: 0 | OUTPATIENT
Start: 2023-07-20

## 2023-08-17 ENCOUNTER — TELEPHONE (OUTPATIENT)
Dept: NEUROLOGY | Facility: CLINIC | Age: 83
End: 2023-08-17

## 2023-08-17 NOTE — TELEPHONE ENCOUNTER
Patient's wife calling to find out if patient needs to be seen again and if patient needs medication adjustments since he is in a care center now. Please call back.

## 2023-08-25 NOTE — TELEPHONE ENCOUNTER
Wife called and scheduled patient a F/U with Caitie Martinez in [facility] 10/31/23 @ 11am

## 2023-09-05 DIAGNOSIS — G20 PARKINSONISM, UNSPECIFIED PARKINSONISM TYPE: ICD-10-CM

## 2023-09-15 ENCOUNTER — PATIENT MESSAGE (OUTPATIENT)
Dept: NEUROLOGY | Facility: CLINIC | Age: 83
End: 2023-09-15

## 2023-09-15 NOTE — PATIENT COMMUNICATION
Called patient to advise; reached vm, left detailed message and sent my chart message also. carbidopa-levodopa sent to [facility] pharmacy on 6/28 for a 90 day supply and 3 refills. Please call the pharmacy to fill.

## 2023-09-15 NOTE — TELEPHONE ENCOUNTER
A prescription for carbidopa-levodopa was sent to [facility] on 6/28 . Please call the pharmacy to fill. My chart message already sent.

## 2023-10-31 ENCOUNTER — OFFICE VISIT (OUTPATIENT)
Dept: NEUROLOGY | Facility: CLINIC | Age: 83
End: 2023-10-31

## 2023-10-31 VITALS — DIASTOLIC BLOOD PRESSURE: 60 MMHG | TEMPERATURE: 97.8 F | HEART RATE: 74 BPM | SYSTOLIC BLOOD PRESSURE: 108 MMHG

## 2023-10-31 DIAGNOSIS — G20.C PARKINSONISM, UNSPECIFIED PARKINSONISM TYPE: Primary | ICD-10-CM

## 2023-10-31 NOTE — PATIENT INSTRUCTIONS
Patient with Parkinson's disease. Unfortunately he has had some progression since a fall in May. He is having difficulty with gait and ambulation. He is now at [facility]. Currently he is taking Sinemet 2 tabs 3 times a day along with 1 tab before bed. No clear on or off between doses. He is getting physical therapy however per the patient and wife this will be coming to an end shortly. There is clear benefit with him getting physical therapy on a regular basis. On exam today he has some mild to moderate slowness. No tremor noted. Currently in a wheelchair. Wife has some questions in regards to the Parkinson's medications. We did discuss that the Sinemet dose can certainly be adjusted to see if there is any added benefit in regards to his ambulation and mobility. Currently he is not having any hallucinations, dyskinesia or dizziness when standing. We did however discussed that these are all potential side effects with increasing the levodopa. We could consider by starting taking Sinemet 2.5 tabs every other dose during the day and remaining on 1 tab before bed. If tolerated this could be further increased to 2.5 tabs 3 times a day, 1 tab before bed. Would also need to clarify with the nursing at the facility that medications should be given at the correct timing and interval.     Currently he is scheduled to see Dr. Rosa Pace tomorrow. They would like to keep this appointment given they have waited quite a while to get in with her. Because of this I will not make any adjustments to his medication at this time however they will discuss making adjustments with Dr. Rosa Pace tomorrow. We also had a discussion in regards to the importance of regular exercise and activity with Parkinson's disease. I do feel that he would benefit from maintenance therapy at least twice a week to help with his mobility and function.   The wife will discuss this further at his facility as she does state they have a secondary insurance that would potentially pick this up.

## 2023-10-31 NOTE — PROGRESS NOTES
Patient ID: Jose Manjarrez is a 82 y.o. male. Assessment/Plan:    Parkinsonism [facility])  Patient with Parkinson's disease. Unfortunately he has had some progression since a fall in May. He is having difficulty with gait and ambulation. He is now at [facility]. Currently he is taking Sinemet 2 tabs 3 times a day along with 1 tab before bed. No clear on or off between doses. He is getting physical therapy however per the patient and wife this will be coming to an end shortly. There is clear benefit with him getting physical therapy on a regular basis. On exam today he has some mild to moderate slowness. No tremor noted. Currently in a wheelchair. Wife has some questions in regards to the Parkinson's medications. We did discuss that the Sinemet dose can certainly be adjusted to see if there is any added benefit in regards to his ambulation and mobility. Currently he is not having any hallucinations, dyskinesia or dizziness when standing. We did however discussed that these are all potential side effects with increasing the levodopa. We could consider by starting taking Sinemet 2.5 tabs every other dose during the day and remaining on 1 tab before bed. If tolerated this could be further increased to 2.5 tabs 3 times a day, 1 tab before bed. Would also need to clarify with the nursing at the facility that medications should be given at the correct timing and interval.     Currently he is scheduled to see Dr. Sergey Figueroa tomorrow. They would like to keep this appointment given they have waited quite a while to get in with her. Because of this I will not make any adjustments to his medication at this time however they will discuss making adjustments with Dr. Sergey Figueroa tomorrow. We also had a discussion in regards to the importance of regular exercise and activity with Parkinson's disease.   I do feel that he would benefit from maintenance therapy at least twice a week to help with his mobility and function. The wife will discuss this further at his facility as she does state they have a secondary insurance that would potentially pick this up. Subjective:    Miriam Anne is an 82year old male with a past medical history of HTN, NPH with shunt placement about 13 years ago (follows with neurosurgery), GERD, cervical spinal and parkinsonism who presents for evaluation with the Movements Disorder clinic. To review, gait issues and tremors have been slowly progressive over years, but more recently it has significantly affected his day to day activities. He was started on Sinemet (9/2021) and referred to PT in the past with some overall benefit. At his last visit he was having more issues with freezing. Sinemet dose was increased. INTERVAL HISTORY:  He is now at [facility]   He had a fall in May. Fell backwards down the steps at home. Went to rehab for a while and then moved to [address] [place] [address]   Since the fall he has had more issues with gait   He uses a wheelchair more often now  He gets PT however this is only happening twice a week, per the wife this greatly helps him   No hallucinations   No dyskinesia   No dizziness when standing   His main concern at this time is not getting the medication on time at the facility   Medications are managed by nurses at the facility   No real on or off with the medication      Current medications:  Sinemet 2tabs 8am, 1pm, 6pm, 1tab at 10pm   Melatonin 5mg      Prior medications:  Carbidopa - used for nausea, this improved on own and medication no longer needed    I personally reviewed and updated the ROS. Objective:    Blood pressure 108/60, pulse 74, temperature 97.8 °F (36.6 °C). Physical Exam  Constitutional:       General: He is awake. Appearance: Normal appearance. HENT:      Right Ear: Hearing normal.      Left Ear: Hearing normal.   Eyes:      General: Lids are normal.      Extraocular Movements: Extraocular movements intact. Pupils: Pupils are equal, round, and reactive to light. Pulmonary:      Effort: Pulmonary effort is normal.   Neurological:      Mental Status: He is alert. Motor: Motor strength is normal.  Psychiatric:         Speech: Speech normal.         Neurological Exam  Mental Status  Awake and alert. Oriented to person, place and time. Speech is normal.    Cranial Nerves  CN III, IV, VI: Extraocular movements intact bilaterally. Normal lids and orbits bilaterally. Pupils equal round and reactive to light bilaterally. CN V:  Right: Facial sensation is normal.  Left: Facial sensation is normal on the left. CN VII:  Right: There is no facial weakness. Left: There is no facial weakness. CN VIII:  Right: Hearing is normal.  Left: Hearing is normal.  CN IX, X: Palate elevates symmetrically  CN XI: Shoulder shrug strength is normal.  CN XII: Tongue midline without atrophy or fasciculations. Motor   Increased muscle tone. Strength is 5/5 throughout all four extremities. Sitting in wheel chair . Sensory  Light touch is normal in upper and lower extremities. Reflexes  Glabellar tap present. Coordination  Right: Finger-to-nose abnormality:Left: Finger-to-nose abnormality:    Gait    Not ambulated in the office today .                                 Date of exam:   2/28/23 10/31/23           Speech  1  1   Facial Expression     1   Rigidity - Neck        Rigidity - Upper Extremity (Right)  1  1   Rigidity - Upper Extremity (Left)   0  1   Rigidity - Lower Extremity (Right)  0  0   Rigidity - Lower Extremity (Left)   0  0   Finger Taps (Right)   2  2   Finger Taps (Left)   1  2   Hand  (Right)  1  2   Hand  (Left)   0  1   Pronation/Supination (Right)  2  2   Pronation/Supination (Left)   1  1   Heel Taps (Right) 1  1   Heel Taps(Left) 1  1   Arising from Chair   1     Gait   2     Postural Stability         Posture 1     Global spontaneity of movement 1  1   Postural Tremor (Right) 0  0   Postural Tremor (Left) 0  0   Kinetic Tremor (Right)  0  0   Kinetic Tremor (Left)  0  0   Rest tremor  RUE 0  0   Rest tremor  LUE 0  0   Rest tremor  RLE 0  0   Reset tremor  LLE 0  0   Lip/Jaw Tremor        Motor Exam Total:              ROS:    Review of Systems   Constitutional:  Negative for appetite change, fatigue and fever. HENT: Negative. Negative for hearing loss, tinnitus, trouble swallowing and voice change. Eyes: Negative. Negative for photophobia, pain and visual disturbance. Respiratory: Negative. Negative for shortness of breath. Cardiovascular: Negative. Negative for palpitations. Gastrointestinal: Negative. Negative for nausea and vomiting. Endocrine: Negative. Negative for cold intolerance. Genitourinary: Negative. Negative for dysuria, frequency and urgency. Musculoskeletal:  Positive for gait problem (Freezing issues). Negative for back pain, myalgias and neck pain. Balance Issue, has gotten worse   Skin: Negative. Negative for rash. Allergic/Immunologic: Negative. Neurological:  Positive for speech difficulty (very little) and weakness (Legs). Negative for dizziness, tremors, seizures, syncope, facial asymmetry, light-headedness, numbness and headaches. Hematological: Negative. Does not bruise/bleed easily. Psychiatric/Behavioral:  Positive for sleep disturbance. Negative for confusion and hallucinations. All other systems reviewed and are negative.

## 2023-10-31 NOTE — ASSESSMENT & PLAN NOTE
Patient with Parkinson's disease. Unfortunately he has had some progression since a fall in May. He is having difficulty with gait and ambulation. He is now at [facility]. Currently he is taking Sinemet 2 tabs 3 times a day along with 1 tab before bed. No clear on or off between doses. He is getting physical therapy however per the patient and wife this will be coming to an end shortly. There is clear benefit with him getting physical therapy on a regular basis. On exam today he has some mild to moderate slowness. No tremor noted. Currently in a wheelchair. Wife has some questions in regards to the Parkinson's medications. We did discuss that the Sinemet dose can certainly be adjusted to see if there is any added benefit in regards to his ambulation and mobility. Currently he is not having any hallucinations, dyskinesia or dizziness when standing. We did however discussed that these are all potential side effects with increasing the levodopa. We could consider by starting taking Sinemet 2.5 tabs every other dose during the day and remaining on 1 tab before bed. If tolerated this could be further increased to 2.5 tabs 3 times a day, 1 tab before bed. Would also need to clarify with the nursing at the facility that medications should be given at the correct timing and interval.     Currently he is scheduled to see Dr. Richmond Jarrell tomorrow. They would like to keep this appointment given they have waited quite a while to get in with her. Because of this I will not make any adjustments to his medication at this time however they will discuss making adjustments with Dr. Richmond Jarrell tomorrow. We also had a discussion in regards to the importance of regular exercise and activity with Parkinson's disease. I do feel that he would benefit from maintenance therapy at least twice a week to help with his mobility and function.   The wife will discuss this further at his facility as she does state they have a secondary insurance that would potentially pick this up.

## 2023-11-01 ENCOUNTER — OFFICE VISIT (OUTPATIENT)
Dept: NEUROLOGY | Facility: CLINIC | Age: 83
End: 2023-11-01
Payer: MEDICARE

## 2023-11-01 VITALS — DIASTOLIC BLOOD PRESSURE: 58 MMHG | SYSTOLIC BLOOD PRESSURE: 110 MMHG | HEART RATE: 72 BPM

## 2023-11-01 DIAGNOSIS — G91.2 NPH (NORMAL PRESSURE HYDROCEPHALUS) (HCC): ICD-10-CM

## 2023-11-01 DIAGNOSIS — G20.C PARKINSONISM, UNSPECIFIED PARKINSONISM TYPE: Primary | ICD-10-CM

## 2023-11-01 PROCEDURE — 99214 OFFICE O/P EST MOD 30 MIN: CPT | Performed by: PSYCHIATRY & NEUROLOGY

## 2023-11-01 RX ORDER — CARBIDOPA/LEVODOPA 25MG-250MG
1 TABLET ORAL 3 TIMES DAILY
Qty: 270 TABLET | Refills: 2 | Status: SHIPPED | OUTPATIENT
Start: 2023-11-01

## 2023-11-01 NOTE — ASSESSMENT & PLAN NOTE
Parkinson with initial improvement in levodopa. Progression since May 2023 following fall in which she suffered lumbar and rib fractures. He is now living in [facility] and there is concerns with regards to timing of medication. He has not had much benefit with PT. On exam today he is noted to have significant freezing with initiation and on turn. For this reason he would benefit from increasing levodopa to see if this would help with gait and freezing. Increase carbidopa/levodopa 25/100 top 2.5 tabs at 8am, 2 tabs at 1pm and 2.5 tabs at 6pm, and 1 tab at 10pm for 1 week. Then further increase to 25/100 2.5 tabs at 8am, 1pm and 6pm and 1 tab at 10pm    It is important than medications are taken as close at time precsribed as possible as it can affect dexterity when feeding and walking.

## 2023-11-01 NOTE — PROGRESS NOTES
Review of Systems   Constitutional:  Negative for appetite change, fatigue and fever. HENT: Negative. Negative for hearing loss, tinnitus, trouble swallowing and voice change. Eyes: Negative. Negative for photophobia, pain and visual disturbance. Respiratory: Negative. Negative for shortness of breath. Cardiovascular: Negative. Negative for palpitations. Gastrointestinal: Negative. Negative for nausea and vomiting. Endocrine: Negative. Negative for cold intolerance. Genitourinary: Negative. Negative for dysuria, frequency and urgency. Musculoskeletal:  Positive for gait problem (Freezing issues). Negative for back pain, myalgias and neck pain. Balance issues have gotten worse     Skin: Negative. Negative for rash. Allergic/Immunologic: Negative. Neurological:  Positive for speech difficulty (Very little) and weakness (Legs). Negative for dizziness, tremors, seizures, syncope, facial asymmetry, light-headedness, numbness and headaches. Hematological: Negative. Does not bruise/bleed easily. Psychiatric/Behavioral:  Positive for sleep disturbance. Negative for confusion and hallucinations. All other systems reviewed and are negative.

## 2023-11-01 NOTE — PROGRESS NOTES
Patient ID: Jayashree Schrader is a 82 y.o. male    Assessment/Plan:    Parkinsonism ([address])  Parkinson with initial improvement in levodopa. Progression since May 2023 following fall in which she suffered lumbar and rib fractures. He is now living in [facility] and there is concerns with regards to timing of medication. He has not had much benefit with PT. On exam today he is noted to have significant freezing with initiation and on turn. For this reason he would benefit from increasing levodopa to see if this would help with gait and freezing. Increase carbidopa/levodopa 25/100 top 2.5 tabs at 8am, 2 tabs at 1pm and 2.5 tabs at 6pm, and 1 tab at 10pm for 1 week. Then further increase to 25/100 2.5 tabs at 8am, 1pm and 6pm and 1 tab at 10pm    It is important than medications are taken as close at time precsribed as possible as it can affect dexterity when feeding and walking. NPH (normal pressure hydrocephalus) ([address])  Last seen by neurosurgery,in May. No adjustments made at that time. Cervical spondylosis with myelopathy  Determined not to be a surgical candidate. Diagnoses and all orders for this visit:    Parkinsonism, unspecified Parkinsonism type  -     carbidopa-levodopa (SINEMET)  mg per tablet; 1 at 10 pm  -     carbidopa-levodopa (SINEMET)  mg per tablet; Take 1 tablet by mouth 3 (three) times a day At 8am, 1pm and 6pm    NPH (normal pressure hydrocephalus) ([address])    I have spent a total time of 45 minutes on 11/01/23 in caring for this patient including Prognosis, Risks and benefits of tx options, Instructions for management, Patient and family education, Impressions, Counseling / Coordination of care, Documenting in the medical record, Reviewing / ordering tests, medicine, procedures  , and Obtaining or reviewing history  .      Subjective:      Jayashree Schrader is an 82year old male with a past medical history of HTN, NPH with shunt placement about 13 years ago (follows with neurosurgery), GERD, cervical spinal stenosis,  and parkinsonism who presents for movement follow up. To review, gait issues and tremors have been slowly progressive over years, but more recently it has significantly affected his day to day activities. He was started on Sinemet (9/2021) and referred to PT in the past with some overall benefit. Gait decline in May 2023 after fall. Patient was seen by Chintan Sun PA-C yesterday but wished to keep appointment today for my opinion on treatment options.j  He presents from [address] [place] [address] has been since May following a fall down the steps at home. He suffered 5 fractured and lumbar fractures. He uses a walker within the studio. He is pushed in  wheelchair to the dining room. He is seen by PT twice weekly. They state PT has not felt that the medication was benefiting him and question if there was any other options. He has freezing of gait when walking. He gets PT however this is only happening twice a week, per the wife this greatly helps him   No hallucinations   No dyskinesia   No lightheadedness on standing. Denies any wearing off. Speech is soft. No difficulty chewing and swallowing. Dressing independently. Hygiene acts performed with assistance due to imbalance. He uses a shower chair. 82-year-old gentleman with right sided shunt for normal pressure hydrocephalus. He is also been diagnosed with Parkinson's disease and has an element of cervical myelopathy. He has noticed some decline in his gait which is likely multifactorial.  He is in the process of having his Parkinson's medications adjusted. Would not recommend any adjustment to his shunt at present under the circumstances. I did check his  shunt today which seems to be at a setting of 2. We will obtain a plain film of the skull for more definitive determination of the setting.   Could consider lowering to a setting of 1 depending on his progress, though overall it would be very difficult to predict the extent to which this would help any of his symptoms given competing etiologies in his overall age. We also did discuss the risks of shunt adjustment at present. They are agreeable to following up in 1 years time to check on his progress but will contact the office sooner should they wish to proceed with the shunt adjustment. Current medications:  Sinemet 2tabs 9am, 1pm, 6pm, 1tab at 10pm   Melatonin 5mg      Prior medications:  Carbidopa - used for nausea, this improved on own and medication no longer needed        Objective:    /58 (BP Location: Right arm, Patient Position: Sitting, Cuff Size: Standard)   Pulse 72       Physical Exam  Eyes:      Extraocular Movements: Extraocular movements intact. Pupils: Pupils are equal, round, and reactive to light. Neurological:      Mental Status: He is alert. Deep Tendon Reflexes:      Reflex Scores:       Patellar reflexes are 1+ on the right side and 2+ on the left side. Neurological Exam  Mental Status  Alert. Oriented only to person, place and situation. Speech: hypophonia. Language is fluent with no aphasia. Attention and concentration are normal.    Cranial Nerves  CN III, IV, VI: Extraocular movements intact bilaterally. Pupils equal round and reactive to light bilaterally. CN VII: Full and symmetric facial movement. CN VIII: Hearing is normal.  CN XI: Shoulder shrug strength is normal.  CN XII: Tongue midline without atrophy or fasciculations. Motor   Normal muscle tone. Strength is 5/5 in all four extremities except as noted. Right                     Left   Iliopsoas                                                        4   Hamstring                                                      5-    Sensory  Light touch is normal in upper and lower extremities.      Reflexes                                            Right                      Left  Patellar 1+                         2+    Coordination    See motor UPDRS. Gait   Unable to rise from chair without using arms. Arose using hands . Able to ambulate a few steps with walker. Shuffling. Freezing on initiation and on turn.  .                                      Date of exam:  11/1/23         Speech   1   Facial Expression   1   Rigidity - Neck   2   Rigidity - Upper Extremity (Right)   1   Rigidity - Upper Extremity (Left)    1   Rigidity - Lower Extremity (Right)   0   Rigidity - Lower Extremity (Left)    0   Finger Taps (Right)    2   Finger Taps (Left)    2   Hand  (Right)   2   Hand  (Left)    1   Pronation/Supination (Right)   2   Pronation/Supination (Left)    1   Heel Taps (Right)  2   Heel Taps(Left)  1   Arising from Chair       Gait       Postural Stability       Posture     Global spontaneity of movement  2   Postural Tremor (Right)  0   Postural Tremor (Left)  0   Kinetic Tremor (Right)   0   Kinetic Tremor (Left)   0   Rest tremor  RUE  0   Rest tremor  LUE  0   Rest tremor  RLE  0   Reset tremor  LLE  0   Lip/Jaw Tremor   0   Motor Exam Total:             Rosalie Damon MD  Movement disorder physician  [id number] [facility]

## 2023-11-01 NOTE — PATIENT INSTRUCTIONS
Parkinson with initial improvement in levodopa. Would benefit from increasing levodopa to see if this would help with gait and freezing. Increase carbidopa/levodopa 25/100 top 2.5 tabs at 8am, 2 tabs at 1pm and 2.5 tabs at 6pm, and 1 tab at 10pm for 1 week. Then further increase to 25/100 2.5 tabs at 8am, 1pm and 6pm and 1 tab at 10pm    It is important than medications are taken as close at time precsribed as possible as it can affect dexterity when feeding and walking.

## 2023-11-03 ENCOUNTER — TELEPHONE (OUTPATIENT)
Dept: NEUROLOGY | Facility: CLINIC | Age: 83
End: 2023-11-03

## 2023-11-03 NOTE — TELEPHONE ENCOUNTER
Sujey called in and would like an updated list of patient's medications list faxed to facility  Fax [phone]

## 2024-01-03 DIAGNOSIS — K21.9 GASTROESOPHAGEAL REFLUX DISEASE: ICD-10-CM

## 2024-01-03 DIAGNOSIS — I10 ESSENTIAL HYPERTENSION: ICD-10-CM

## 2024-01-04 RX ORDER — PANTOPRAZOLE SODIUM 40 MG/1
40 TABLET, DELAYED RELEASE ORAL 2 TIMES DAILY
Qty: 90 TABLET | Refills: 0 | Status: SHIPPED | OUTPATIENT
Start: 2024-01-04

## 2024-01-04 RX ORDER — ENALAPRIL MALEATE 5 MG/1
TABLET ORAL
Qty: 90 TABLET | Refills: 0 | Status: SHIPPED | OUTPATIENT
Start: 2024-01-04

## 2024-01-04 NOTE — TELEPHONE ENCOUNTER
Patient needs an appointment. Please contact the patient to schedule an appointment. Courtesy refill provided.

## 2024-02-05 DIAGNOSIS — L21.9 SEBORRHEIC DERMATITIS: Primary | ICD-10-CM

## 2024-02-05 DIAGNOSIS — I10 ESSENTIAL HYPERTENSION: ICD-10-CM

## 2024-02-05 RX ORDER — KETOCONAZOLE 20 MG/ML
SHAMPOO TOPICAL
Qty: 120 ML | Refills: 2 | Status: SHIPPED | OUTPATIENT
Start: 2024-02-05

## 2024-02-06 RX ORDER — HYDROCHLOROTHIAZIDE 12.5 MG/1
12.5 TABLET ORAL DAILY
Qty: 90 TABLET | Refills: 0 | OUTPATIENT
Start: 2024-02-06

## 2024-03-04 ENCOUNTER — TELEPHONE (OUTPATIENT)
Dept: NEUROLOGY | Facility: CLINIC | Age: 84
End: 2024-03-04

## 2024-03-04 NOTE — TELEPHONE ENCOUNTER
Spoke to  the patient about his appointment being canceled . Patient stated he will call once he gets an appointment with his primary care doctor . Until then he does not want to reschedule .

## 2024-04-08 ENCOUNTER — OFFICE VISIT (OUTPATIENT)
Dept: INTERNAL MEDICINE CLINIC | Facility: CLINIC | Age: 84
End: 2024-04-08

## 2024-04-08 ENCOUNTER — APPOINTMENT (OUTPATIENT)
Dept: LAB | Facility: CLINIC | Age: 84
End: 2024-04-08
Payer: MEDICARE

## 2024-04-08 VITALS
TEMPERATURE: 96.5 F | SYSTOLIC BLOOD PRESSURE: 126 MMHG | BODY MASS INDEX: 27.47 KG/M2 | WEIGHT: 186 LBS | HEART RATE: 72 BPM | OXYGEN SATURATION: 95 % | DIASTOLIC BLOOD PRESSURE: 78 MMHG

## 2024-04-08 DIAGNOSIS — R97.20 ELEVATED PSA: ICD-10-CM

## 2024-04-08 DIAGNOSIS — E78.49 OTHER HYPERLIPIDEMIA: ICD-10-CM

## 2024-04-08 DIAGNOSIS — Z00.00 MEDICARE ANNUAL WELLNESS VISIT, SUBSEQUENT: Primary | ICD-10-CM

## 2024-04-08 DIAGNOSIS — K22.70 BARRETT'S ESOPHAGUS WITHOUT DYSPLASIA: ICD-10-CM

## 2024-04-08 DIAGNOSIS — I10 ESSENTIAL HYPERTENSION: ICD-10-CM

## 2024-04-08 DIAGNOSIS — G20.C PARKINSONISM, UNSPECIFIED PARKINSONISM TYPE: ICD-10-CM

## 2024-04-08 DIAGNOSIS — G91.2 NPH (NORMAL PRESSURE HYDROCEPHALUS) (HCC): ICD-10-CM

## 2024-04-08 PROBLEM — S22.41XA CLOSED FRACTURE OF MULTIPLE RIBS OF RIGHT SIDE: Status: RESOLVED | Noted: 2023-05-02 | Resolved: 2024-04-08

## 2024-04-08 PROBLEM — S22.009A: Status: RESOLVED | Noted: 2023-05-02 | Resolved: 2024-04-08

## 2024-04-08 PROBLEM — E66.01 SEVERE OBESITY WITH BODY MASS INDEX (BMI) OF 35.0 TO 39.9 WITH SERIOUS COMORBIDITY (HCC): Status: RESOLVED | Noted: 2017-11-08 | Resolved: 2024-04-08

## 2024-04-08 LAB
CHOLEST SERPL-MCNC: 204 MG/DL
HDLC SERPL-MCNC: 64 MG/DL
LDLC SERPL CALC-MCNC: 117 MG/DL (ref 0–100)
PSA SERPL-MCNC: 16.69 NG/ML (ref 0–4)
TRIGL SERPL-MCNC: 114 MG/DL

## 2024-04-08 PROCEDURE — 80061 LIPID PANEL: CPT

## 2024-04-08 PROCEDURE — 36415 COLL VENOUS BLD VENIPUNCTURE: CPT

## 2024-04-08 PROCEDURE — 84153 ASSAY OF PSA TOTAL: CPT

## 2024-04-08 RX ORDER — ONDANSETRON 4 MG/1
TABLET, FILM COATED ORAL
COMMUNITY
Start: 2024-02-09 | End: 2024-04-08

## 2024-04-08 RX ORDER — SENNOSIDES A AND B 8.6 MG/1
1 TABLET, FILM COATED ORAL DAILY PRN
COMMUNITY

## 2024-04-08 NOTE — PROGRESS NOTES
Assessment and Plan:     Problem List Items Addressed This Visit       NPH (normal pressure hydrocephalus) (HCC)     Overdue for follow up with neurosurgery          Hyperlipidemia     Not on medication.  Due for labs.          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          Parkinsonism     On sinemet per neurology              Preventive health issues were discussed with patient, and age appropriate screening tests were ordered as noted in patient's After Visit Summary.  Personalized health advice and appropriate referrals for health education or preventive services given if needed, as noted in patient's After Visit Summary.     History of Present Illness:     Patient presents for a Medicare Wellness Visit    Ulises is here today for follow up  He will be moving to a new assisted living, the [facility].     He is doing well. Uses a walker. No recent falls.   In [facility].     Appetite is ok.   Sometimes he has issues with urinary leakage, this is not new or worsening.                  Patient Care Team:  Nadia Davenport MD as PCP - General  MD Marty Pearce MD Noel Martins, MD as Endoscopist     Review of Systems:     Review of Systems   Constitutional:  Negative for activity change, appetite change, fatigue and unexpected weight change.   Eyes:  Negative for visual disturbance.   Respiratory:  Negative for cough, chest tightness and shortness of breath.    Cardiovascular:  Negative for chest pain, palpitations and leg swelling.   Gastrointestinal:  Negative for abdominal pain,  blood in stool, constipation and diarrhea.   Genitourinary:  Negative for difficulty urinating.   Musculoskeletal:  Negative for arthralgias.   Neurological:  Negative for dizziness, light-headedness and headaches.   Psychiatric/Behavioral:  Negative for sleep disturbance.         Problem List:     Patient Active Problem List   Diagnosis    NPH (normal pressure hydrocephalus) ([facility])    Cognitive impairment    Unsteady gait    Hearing loss, bilateral    Hyperlipidemia    Essential hypertension    Adenomatous polyp of sigmoid colon    Tremor    Rhinitis    Localized edema    Cervical spondylosis with myelopathy    Elevated PSA    Hiatal hernia    Positional lightheadedness    Sleep disturbances    Urinary incontinence    Arthritis    Constipation    Cataract    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    Venous insufficiency    Parkinsonism    Fall (on) (from) other stairs and steps, initial encounter    Male pelvic hematoma      Past Medical and Surgical History:     Past Medical History:   Diagnosis Date    Arthritis     Closed fracture of multiple ribs of right side 05/02/2023    Fracture of transverse process of thoracic vertebra, closed, initial encounter ([facility]) 05/02/2023    GERD (gastroesophageal reflux disease)     Hiatal hernia     HL (hearing loss)     Hypertension     Infectious viral hepatitis     Obesity     Skin tag     Spinal stenosis      Past Surgical History:   Procedure Laterality Date    APPENDECTOMY  1952    NM COLONOSCOPY FLX DX W/COLLJ SPEC WHEN PFRMD N/A 1/23/2019    Procedure: COLONOSCOPY;  Surgeon: Alon Whitehead MD;  Location: [facility] GI [facility];  Service: Gastroenterology    NM ESOPHAGOGASTRODUODENOSCOPY TRANSORAL DIAGNOSTIC N/A 1/23/2019    Procedure: ESOPHAGOGASTRODUODENOSCOPY (EGD);  Surgeon: Alon Whitehead MD;  Location: [facility];  Service: Gastroenterology    VENTRICULOATRIAL SHUNT  06/2012    VENTRICULOPERITONEAL SHUNT        Family History:     Family History   Problem Relation Age of  Onset    Heart disease Mother         RHEUM    Prostate cancer Father     Cancer Maternal Uncle     Alcohol abuse Neg Hx     Depression Neg Hx     Mental illness Neg Hx     Substance Abuse Neg Hx       Social History:     Social History     Socioeconomic History    Marital status: /Civil Union     Spouse name: None    Number of children: 1    Years of education: None    Highest education level: None   Occupational History    Occupation: PHYSICIST, [employer]   Tobacco Use    Smoking status: Never    Smokeless tobacco: Never   Vaping Use    Vaping status: Never Used   Substance and Sexual Activity    Alcohol use: Not Currently    Drug use: No    Sexual activity: Not Currently     Partners: Female   Other Topics Concern    None   Social History Narrative    Lives at home with wife    Retired        [employer]     LIVES WITH SPOUSE     POST GRADUATE      Social Determinants of Health     Financial Resource Strain: Not on file   Food Insecurity: No Food Insecurity (4/8/2024)    Hunger Vital Sign     Worried About Running Out of Food in the Last Year: Never true     Ran Out of Food in the Last Year: Never true   Transportation Needs: No Transportation Needs (4/8/2024)    PRAPARE - Transportation     Lack of Transportation (Medical): No     Lack of Transportation (Non-Medical): No   Physical Activity: Not on file   Stress: Not on file   Social Connections: Not on file   Intimate Partner Violence: Not on file   Housing Stability: Low Risk  (4/8/2024)    Housing Stability Vital Sign     Unable to Pay for Housing in the Last Year: No     Number of Places Lived in the Last Year: 1     Unstable Housing in the Last Year: No      Medications and Allergies:     Current Outpatient Medications   Medication Sig Dispense Refill    senna (SENOKOT) 8.6 MG tablet Take 1 tablet by mouth daily as needed for constipation      carbidopa-levodopa (SINEMET)  mg per tablet 1 at 10 pm 90 tablet 3    carbidopa-levodopa (SINEMET)  " mg per tablet Take 1 tablet by mouth 3 (three) times a day At 8am, 1pm and 6pm 270 tablet 2    Cholecalciferol (VITAMIN D-3) 5000 units TABS Take 1 tablet by mouth daily       enalapril (VASOTEC) 5 mg tablet TAKE 1/2 TABLET BY MOUTH ONCE DAILY 90 tablet 0    hydrochlorothiazide (HYDRODIURIL) 12.5 mg tablet TAKE ONE TABLET BY MOUTH EVERY MORNING 90 tablet 0    ketoconazole (NIZORAL) 2 % shampoo Apply topically daily for 2 weeks straight and then on \"Mondays, Wednesdays and Fridays\": Lather into scalp and skin on face, neck, chest, and back; leave on for 5 minutes and then rinse off completely. 120 mL 2    Multiple Vitamin (MULTI-VITAMIN DAILY PO) Take 1 tablet by mouth daily      pantoprazole (PROTONIX) 40 mg tablet Take 1 tablet (40 mg total) by mouth 2 (two) times a day 90 tablet 0     No current facility-administered medications for this visit.     No Known Allergies   Immunizations:     Immunization History   Administered Date(s) Administered    COVID-19 MODERNA VACC 0.5 ML IM 01/23/2021, 02/20/2021, 05/05/2022    COVID-19 Moderna Vac BIVALENT 12 Yr+ IM 0.5 ML 10/19/2022    COVID-19 Moderna mRNA Vaccine 12 Yr+ 50 mcg/0.5 mL (Spikevax) 10/23/2023    INFLUENZA 09/19/2017, 10/01/2018, 10/23/2023    Influenza Split High Dose Preservative Free IM 10/10/2019    Influenza, high dose seasonal 0.7 mL 08/24/2020, 10/25/2021, 10/08/2022    Pneumococcal Polysaccharide PPV23 10/16/2013, 10/01/2018    Td (adult), adsorbed 09/07/2006      Health Maintenance:         Topic Date Due    Colorectal Cancer Screening  01/23/2024         Topic Date Due    Pneumococcal Vaccine: 65+ Years (2 of 2 - PCV) 10/01/2019    COVID-19 Vaccine (6 - 2023-24 season) 12/18/2023      Medicare Screening Tests and Risk Assessments:     Ulises is here for his Subsequent Wellness visit. Last Medicare Wellness visit information reviewed, patient interviewed and updates made to the record today.      Health Risk Assessment:   Patient rates overall " health as fair. Patient feels that their physical health rating is same. Patient is satisfied with their life. Eyesight was rated as slightly worse. Hearing was rated as much worse. Patient feels that their emotional and mental health rating is same. Patients states they are never, rarely angry. Patient states they are never, rarely unusually tired/fatigued. Pain experienced in the last 7 days has been none. Patient states that he has experienced no weight loss or gain in last 6 months.     Depression Screening:   PHQ-2 Score: 0      Fall Risk Screening:   In the past year, patient has experienced: no history of falling in past year      Home Safety:  Patient has trouble with stairs inside or outside of their home. Patient has working smoke alarms and has working carbon monoxide detector. Home safety hazards include: none.     Nutrition:   Current diet is Regular.     Medications:   Patient is currently taking over-the-counter supplements. OTC medications include: see medication list. Patient is not able to manage medications.     Activities of Daily Living (ADLs)/Instrumental Activities of Daily Living (IADLs):   Walk and transfer into and out of bed and chair?: Yes  Dress and groom yourself?: Yes    Bathe or shower yourself?: Yes    Feed yourself? Yes  Do your laundry/housekeeping?: No  Manage your money, pay your bills and track your expenses?: No  Make your own meals?: No    Do your own shopping?: No    Previous Hospitalizations:   Any hospitalizations or ED visits within the last 12 months?: No      Advance Care Planning:   Living will: Yes    Durable POA for healthcare: Yes    Advanced directive: Yes      PREVENTIVE SCREENINGS      Cardiovascular Screening:    General: Screening Not Indicated and History Lipid Disorder      Diabetes Screening:     General: Screening Current      Colorectal Cancer Screening:     General: Screening Current      Prostate Cancer Screening:    General: Screening Not Indicated       Osteoporosis Screening:    General: Screening Not Indicated      Abdominal Aortic Aneurysm (AAA) Screening:        General: Screening Not Indicated      Lung Cancer Screening:     General: Screening Not Indicated      Hepatitis C Screening:    General: Screening Not Indicated    Screening, Brief Intervention, and Referral to Treatment (SBIRT)    Screening  Typical number of drinks in a day: 0  Typical number of drinks in a week: 0  Interpretation: Low risk drinking behavior.    Single Item Drug Screening:  How often have you used an illegal drug (including marijuana) or a prescription medication for non-medical reasons in the past year? never    Single Item Drug Screen Score: 0  Interpretation: Negative screen for possible drug use disorder    No results found.     Physical Exam:     /78   Pulse 72   Temp (!) 96.5 °F (35.8 °C)   Wt 84.4 kg (186 lb)   SpO2 95%   BMI 27.47 kg/m²     Physical Exam  Vitals reviewed.   Constitutional:       Appearance: Normal appearance.   HENT:      Head: Normocephalic and atraumatic.   Eyes:      Conjunctiva/sclera: Conjunctivae normal.   Cardiovascular:      Rate and Rhythm: Normal rate and regular rhythm.      Heart sounds: Normal heart sounds.   Pulmonary:      Effort: Pulmonary effort is normal.      Breath sounds: Normal breath sounds.   Abdominal:      General: Bowel sounds are normal.      Palpations: Abdomen is soft.   Musculoskeletal:         General: Normal range of motion.      Cervical back: Neck supple.      Right lower leg: No edema.      Left lower leg: No edema.   Skin:     General: Skin is warm and dry.   Neurological:      Mental Status: He is alert and oriented to person, place, and time.   Psychiatric:         Mood and Affect: Mood normal.         Behavior: Behavior normal.          ANDREA Srivastava

## 2024-04-09 ENCOUNTER — TELEPHONE (OUTPATIENT)
Age: 84
End: 2024-04-09

## 2024-04-09 NOTE — TELEPHONE ENCOUNTER
Enrique from the [facility] called in stating they received paperwork from our office for pt but stated he is not currently a pt there. Enrique stated if we are looking to send the pt there, that is okay, but wanted us to know that he is not currently a pt there.    Please contact Enrique for any questions.    Phone number: [phone]  Fax #: [phone]

## 2024-08-13 ENCOUNTER — TELEPHONE (OUTPATIENT)
Dept: DERMATOLOGY | Facility: CLINIC | Age: 84
End: 2024-08-13

## 2024-08-13 NOTE — TELEPHONE ENCOUNTER
Patients spouse called the Rx refill line for cream but did not know the name of it. She will call and try to speak to someone in the office.

## 2024-08-19 DIAGNOSIS — I10 ESSENTIAL HYPERTENSION: ICD-10-CM

## 2024-08-20 RX ORDER — ENALAPRIL MALEATE 5 MG/1
TABLET ORAL
Qty: 90 TABLET | Refills: 1 | Status: SHIPPED | OUTPATIENT
Start: 2024-08-20

## 2024-08-21 DIAGNOSIS — G20.C PARKINSONISM, UNSPECIFIED PARKINSONISM TYPE: ICD-10-CM

## 2024-08-22 ENCOUNTER — TELEPHONE (OUTPATIENT)
Age: 84
End: 2024-08-22

## 2024-08-22 RX ORDER — CARBIDOPA/LEVODOPA 25MG-250MG
TABLET ORAL
Qty: 90 TABLET | Refills: 1 | Status: SHIPPED | OUTPATIENT
Start: 2024-08-22

## 2024-08-22 NOTE — TELEPHONE ENCOUNTER
Rec'd call from Bob from [facility], where patient is now residing. He is requesting prescriptions for hydrocortisone topical cream and 2% Crea lotion. He stated patient's daughter called earlier this week and was told that prescriptions could not prescribed until patient is seen in the office. He requested to speak to someone from the office to see if these prescriptions can be placed now to last until patient is able to get in to be seen.    Can someone contact Bob to discuss? Contact number is [phone] Patient last seen 11/2/22 Thank you in advance.

## 2024-08-22 NOTE — TELEPHONE ENCOUNTER
Called Bob back, pt states he is new to them but did come with medications that weren't listed on his medication list. Relays daughter was upset as medications were discarded but per daughter pt needs. Reviewed pt has not been seen since 2022 so confirmed pt would need to be seen first for additional refills. Confirmed 10/8/24 appt.     Bob will call back with any other questions.

## 2024-10-08 ENCOUNTER — OFFICE VISIT (OUTPATIENT)
Dept: DERMATOLOGY | Facility: CLINIC | Age: 84
End: 2024-10-08
Payer: MEDICARE

## 2024-10-08 DIAGNOSIS — L21.9 SEBORRHEIC DERMATITIS: Primary | ICD-10-CM

## 2024-10-08 PROCEDURE — 99214 OFFICE O/P EST MOD 30 MIN: CPT

## 2024-10-08 RX ORDER — HYDROCORTISONE 2.5 %
CREAM (GRAM) TOPICAL
Qty: 453.6 G | Refills: 1 | Status: SHIPPED | OUTPATIENT
Start: 2024-10-08

## 2024-10-08 RX ORDER — KETOCONAZOLE 20 MG/G
CREAM TOPICAL
Qty: 30 G | Refills: 10 | Status: SHIPPED | OUTPATIENT
Start: 2024-10-08

## 2024-10-08 RX ORDER — KETOCONAZOLE 20 MG/ML
SHAMPOO TOPICAL
Qty: 120 ML | Refills: 6 | Status: SHIPPED | OUTPATIENT
Start: 2024-10-08

## 2024-10-08 NOTE — PROGRESS NOTES
"[facility] Note     Patient Name: Ulises Rosen  Encounter Date: 10/8/24     Have you been cared for by a [facility] Dermatologist in the last 3 years and, if so, which description applies to you?    Yes.  I have been here within the last 3 years, and my medical history has NOT changed since that time.  I am MALE/not capable of bearing children.    REVIEW OF SYSTEMS:  Have you recently had or currently have any of the following? No changes in my recent health.   PAST MEDICAL HISTORY:  Have you personally ever had or currently have any of the following?  If \"YES,\" then please provide more detail. No changes in my medical history.   HISTORY OF IMMUNOSUPPRESSION: Do you have a history of any of the following:  Systemic Immunosuppression such as Diabetes, Biologic or Immunotherapy, Chemotherapy, Organ Transplantation, Bone Marrow Transplantation or Prednisone?  No     Answering \"YES\" requires the addition of the dotphrase \"IMMUNOSUPPRESSED\" as the first diagnosis of the patient's visit.   FAMILY HISTORY:  Any \"first degree relatives\" (parent, brother, sister, or child) with the following?    No changes in my family's known health.   PATIENT EXPERIENCE:    Do you want the Dermatologist to perform a COMPLETE skin exam today including a clinical examination under the \"bra and underwear\" areas?  NO  If necessary, do we have your permission to call and leave a detailed message on your Preferred Phone number that includes your specific medical information?  Yes      No Known Allergies   Current Outpatient Medications:     carbidopa-levodopa (SINEMET)  mg per tablet, 1 at 10 pm, Disp: 90 tablet, Rfl: 3    carbidopa-levodopa (SINEMET)  mg per tablet, TAKE 1 TABLET BY MOUTH THREE TIMES A DAY (8AM, 1PM, & 6PM), Disp: 90 tablet, Rfl: 1    Cholecalciferol (VITAMIN D-3) 5000 units TABS, Take 1 tablet by mouth daily , Disp: , Rfl:     enalapril (VASOTEC) 5 mg tablet, TAKE A HALF TABLET BY MOUTH ONCE " "DAILY, Disp: 90 tablet, Rfl: 1    hydrochlorothiazide (HYDRODIURIL) 12.5 mg tablet, TAKE ONE TABLET BY MOUTH EVERY MORNING, Disp: 90 tablet, Rfl: 0    ketoconazole (NIZORAL) 2 % shampoo, Apply topically daily for 2 weeks straight and then on \"Mondays, Wednesdays and Fridays\": Lather into scalp and skin on face, neck, chest, and back; leave on for 5 minutes and then rinse off completely., Disp: 120 mL, Rfl: 2    Multiple Vitamin (MULTI-VITAMIN DAILY PO), Take 1 tablet by mouth daily, Disp: , Rfl:     pantoprazole (PROTONIX) 40 mg tablet, Take 1 tablet (40 mg total) by mouth 2 (two) times a day, Disp: 90 tablet, Rfl: 0    senna (SENOKOT) 8.6 MG tablet, Take 1 tablet by mouth daily as needed for constipation, Disp: , Rfl:           Whom besides the patient is providing clinical information about today's encounter?   NO ADDITIONAL HISTORIAN (patient alone provided history)    Physical Exam and Assessment/Plan by Diagnosis:    SEBORRHEIC DERMATITIS    Physical Exam:  Anatomic Location Affected:  Face, Scalp  Morphological Description:  mild flaking with background erythema.   Pertinent Positives:  Pertinent Negatives:    Additional History of Present Condition:  Patient was seen in November of 2022. He returns for a refill for his medication.     Assessment and Plan:  Based on a thorough discussion of this condition and the management approach to it (including a comprehensive discussion of the known risks, side effects and potential benefits of treatment), the patient (family) agrees to implement the following specific plan:  Continue using Ketoconazole Shampoo 2% Daily for 2 weeks straight and then on \"Mondays, Wednesdays and Fridays\": Lather into scalp and skin on face, neck, chest; leave on for 5 minutes and then rinse off completely.   Continue ketoconazole Cream 1-2 times daily for two weeks and then 2-3 times a week for maintenance  Use hydrocortisone 2.5% cream twice daily for one week to affected areas of face " "when flared. Only restart as needed for flares. Avoid getting into eyes.      Seborrheic Dermatitis   Seborrheic dermatitis is a common, chronic or relapsing form of eczema/dermatitis that mainly affects the sebaceous, gland-rich regions of the scalp, face, and trunk.  There are infantile and adult forms of seborrhoeic dermatitis. It is sometimes associated with psoriasis and, in that clinical scenario, may be referred to as \"sebo-psoriasis.\"  Seborrheic dermatitis is also known as \"seborrheic eczema.\"  Dandruff (also called \"pityriasis capitis\") is an uninflamed form of seborrhoeic dermatitis. Dandruff presents as bran-like scaly patches scattered within hair-bearing areas of the scalp.  In an infant, this condition may be referred to as \"cradle cap.\"  The cause of seborrheic dermatitis is not completely understood. It is associated with proliferation of various species of the skin commensal Malassezia, in its yeast (non-pathogenic) form. Its metabolites (such as the fatty acids oleic acid, malssezin, and indole-3-carbaldehyde) may cause an inflammatory reaction. Differences in skin barrier lipid content and function may account for individual presentations.    Adult Seborrheic Dermatitis  Adult seborrheic dermatitis tends to begin in late adolescence; prevalence is greatest in young adults and in the elderly. It is more common in males than in females.    The following factors are sometimes associated with severe adult seborrheic dermatitis:  Oily skin  Familial tendency to seborrhoeic dermatitis or a family history of psoriasis  Immunosuppression: organ transplant recipient, human immunodeficiency virus (HIV) infection and patients with lymphoma  Neurological and psychiatric diseases: Parkinson disease, tardive dyskinesia, depression, epilepsy, facial nerve palsy, spinal cord injury and congenital disorders such as Down syndrome  Treatment for psoriasis with psoralen and ultraviolet A (PUVA) therapy  Lack of " sleep  Stressful events.    In adults, seborrheic dermatitis may typically affect the scalp, face (creases around the nose, behind ears, within eyebrows) and upper trunk. Typical clinical features include:  Winter flares, improving in summer following sun exposure  Minimal itch most of the time  Combination oily and dry mid-facial skin  Ill-defined localized scaly patches or diffuse scale in the scalp  Blepharitis; scaly red eyelid margins  [place]-pink, thin, scaly, and ill-defined plaques in skin folds on both sides of the face  Petal or ring-shaped flaky patches on hair-line and on anterior chest  Rash in armpits, under the breasts, in the groin folds and genital creases  Superficial folliculitis (inflamed hair follicles) on cheeks and upper trunk    Seborrheic dermatitis is diagnosed by its clinical appearance and behavior. Skin biopsy may be helpful but is rarely necessary to make this diagnosis.    Scribe Attestation      I,:  Carlos Manuel Person am acting as a scribe while in the presence of the attending physician.:       I,:  Tameka Chandler PA-C personally performed the services described in this documentation    as scribed in my presence.:

## 2024-10-10 DIAGNOSIS — G20.C PARKINSONISM, UNSPECIFIED PARKINSONISM TYPE (HCC): ICD-10-CM

## 2024-10-11 RX ORDER — CARBIDOPA/LEVODOPA 25MG-250MG
TABLET ORAL
Qty: 84 TABLET | Refills: 1 | Status: SHIPPED | OUTPATIENT
Start: 2024-10-11

## 2024-12-13 DIAGNOSIS — G20.C PARKINSONISM, UNSPECIFIED PARKINSONISM TYPE (HCC): ICD-10-CM

## 2024-12-13 RX ORDER — CARBIDOPA/LEVODOPA 25MG-250MG
1 TABLET ORAL 3 TIMES DAILY
Qty: 84 TABLET | Refills: 1 | Status: SHIPPED | OUTPATIENT
Start: 2024-12-13

## 2025-01-06 DIAGNOSIS — K21.9 GASTROESOPHAGEAL REFLUX DISEASE: ICD-10-CM

## 2025-01-07 RX ORDER — PANTOPRAZOLE SODIUM 40 MG/1
40 TABLET, DELAYED RELEASE ORAL DAILY
Qty: 30 TABLET | Refills: 5 | Status: SHIPPED | OUTPATIENT
Start: 2025-01-07

## 2025-01-10 DIAGNOSIS — G20.C PARKINSONISM, UNSPECIFIED PARKINSONISM TYPE (HCC): ICD-10-CM

## 2025-01-13 RX ORDER — CARBIDOPA AND LEVODOPA 25; 100 MG/1; MG/1
TABLET ORAL
Qty: 30 TABLET | Refills: 5 | Status: SHIPPED | OUTPATIENT
Start: 2025-01-13

## 2025-01-30 DIAGNOSIS — G20.C PARKINSONISM, UNSPECIFIED PARKINSONISM TYPE (HCC): ICD-10-CM

## 2025-01-30 RX ORDER — CARBIDOPA/LEVODOPA 25MG-250MG
1 TABLET ORAL 3 TIMES DAILY
Qty: 270 TABLET | Refills: 1 | Status: SHIPPED | OUTPATIENT
Start: 2025-01-30

## 2025-01-31 DIAGNOSIS — I10 ESSENTIAL HYPERTENSION: ICD-10-CM

## 2025-01-31 RX ORDER — HYDROCHLOROTHIAZIDE 12.5 MG/1
12.5 TABLET ORAL DAILY
Qty: 30 TABLET | Refills: 5 | Status: SHIPPED | OUTPATIENT
Start: 2025-01-31

## 2025-02-06 ENCOUNTER — TELEPHONE (OUTPATIENT)
Age: 85
End: 2025-02-06

## 2025-02-06 NOTE — TELEPHONE ENCOUNTER
Left message for Prasanna asking her to call Neurology for verification for carbidopa-levodopa as per Dr Davenport

## 2025-02-06 NOTE — TELEPHONE ENCOUNTER
Prasanna with the [facility]  called states needs something faxed to [phone] for clarification on hours for patient to take his Carbidopa. Used to be on script as take at 8 am, 1 pm, 6 pm and 10 pm. If any questions Prasanna can be reached at [phone]

## 2025-04-07 ENCOUNTER — TELEPHONE (OUTPATIENT)
Dept: NEUROLOGY | Facility: CLINIC | Age: 85
End: 2025-04-07

## 2025-04-07 DIAGNOSIS — E78.49 OTHER HYPERLIPIDEMIA: Primary | ICD-10-CM

## 2025-04-07 DIAGNOSIS — K21.9 GASTROESOPHAGEAL REFLUX DISEASE: ICD-10-CM

## 2025-04-07 DIAGNOSIS — G20.C PARKINSONISM, UNSPECIFIED PARKINSONISM TYPE (HCC): ICD-10-CM

## 2025-04-07 DIAGNOSIS — R41.89 COGNITIVE IMPAIRMENT: ICD-10-CM

## 2025-04-07 DIAGNOSIS — I10 ESSENTIAL HYPERTENSION: ICD-10-CM

## 2025-04-07 DIAGNOSIS — Z79.899 ENCOUNTER FOR LONG-TERM CURRENT USE OF MEDICATION: ICD-10-CM

## 2025-04-07 DIAGNOSIS — E55.9 VITAMIN D DEFICIENCY: ICD-10-CM

## 2025-04-07 RX ORDER — CARBIDOPA AND LEVODOPA 25; 100 MG/1; MG/1
TABLET ORAL
Qty: 90 TABLET | Refills: 1 | Status: CANCELLED | OUTPATIENT
Start: 2025-04-07

## 2025-04-07 RX ORDER — CARBIDOPA/LEVODOPA 25MG-250MG
1 TABLET ORAL 3 TIMES DAILY
Qty: 270 TABLET | Refills: 1 | Status: CANCELLED | OUTPATIENT
Start: 2025-04-07

## 2025-04-07 NOTE — TELEPHONE ENCOUNTER
The facility where patient resides is using a different pharmacy. New scripts are needed at [facility].    Reason for call:   [x] Refill   [] Prior Auth  [x] Other: pharmacy change    Office:   [x] PCP/Provider - Eileen CLEMENTE / [facility]    Medication:   Carbidopa-levodopa 25-100mg qd @ 10pm  #90    Carbidopa-levodopa 25-250mg tid  #270  Vit D3 5,000 units qd  #90  Enalapril 5mg 0.5 tab qd  #45  Hctz 12.5mg qd  #90  Multi vitamin qd  #90  Pantoprazole 40mg qd  #90    [id number] [place] - [address] [place] [address] Pharmacy   Does the patient have enough for 3 days?   [] Yes   [x] No - Send as HP to POD    Mail Away Pharmacy   Does the patient have enough for 10 days?   [] Yes   [] No - Send as HP to POD

## 2025-04-08 ENCOUNTER — OFFICE VISIT (OUTPATIENT)
Dept: NEUROLOGY | Facility: CLINIC | Age: 85
End: 2025-04-08
Payer: MEDICARE

## 2025-04-08 VITALS
HEIGHT: 70 IN | DIASTOLIC BLOOD PRESSURE: 70 MMHG | BODY MASS INDEX: 26.63 KG/M2 | SYSTOLIC BLOOD PRESSURE: 100 MMHG | HEART RATE: 60 BPM | OXYGEN SATURATION: 98 % | WEIGHT: 186 LBS

## 2025-04-08 DIAGNOSIS — G91.2 NPH (NORMAL PRESSURE HYDROCEPHALUS) (HCC): ICD-10-CM

## 2025-04-08 DIAGNOSIS — G47.9 SLEEP DISTURBANCES: ICD-10-CM

## 2025-04-08 DIAGNOSIS — G20.C PARKINSONISM, UNSPECIFIED PARKINSONISM TYPE (HCC): Primary | ICD-10-CM

## 2025-04-08 PROCEDURE — G2211 COMPLEX E/M VISIT ADD ON: HCPCS | Performed by: PHYSICIAN ASSISTANT

## 2025-04-08 PROCEDURE — 99215 OFFICE O/P EST HI 40 MIN: CPT | Performed by: PHYSICIAN ASSISTANT

## 2025-04-08 NOTE — PATIENT INSTRUCTIONS
Will continue with current dosing of Sinemet.  Sinemet 25/100mg 2.5tabs at 8am, 1pm, 6pm, 1 tab at 10pm.   With parkinson's it is extremely important that his Sinemet be given at consistent time intervals throughout the day in order to get the maximum benefit. He should be taking Sinemet every 4-5 hours, if given beyond 5 hours then his symptoms can worsen and it may be harder for him to function.     Will make a referral for PT and speech therapy   Discussed importance of regular exercises

## 2025-04-08 NOTE — PROGRESS NOTES
Name: Ulises Rosen      : 1940      MRN: [id number]  Encounter Provider: Jocelin Rodriguez PA-C  Encounter Date: 2025   Encounter department: [facility] [place]  :  Assessment & Plan  Parkinsonism, unspecified Parkinsonism type (HCC)  Patient denied any significant improvement with increasing Sinemet dose since last visit.  He is now at a new assisted living facility.  Unclear if perhaps the change may have altered any potential positive from the medication.  For the most part he remains in his wheelchair at this time.  He is able to get up and walk with assistance and a walker.  He denies any clear on or off with his medication dosing.  Once again it is likely that his gait issues are multifactorial given he does also have NPH status post shunt placement as well as cervical myelopathy.    At this time we will not make any further adjustments to his Sinemet dosing.  He will remain on Sinemet  mg 2.5 tabs 3 times a day along with 1 tab before bed.  We had a very long discussion regards to the importance of regular exercise and activity.  He recently did have physical therapy however this has been stopped.  I will make another referral at this time given this would certainly be the most beneficial for him.    He does also have some difficulty with low voice, speech therapy in the past was helpful as well.  I will make another referral for this to.    Provided this for his facility as there remains concern for the timing of his medication:  With parkinson's it is extremely important that his Sinemet be given at consistent time intervals throughout the day in order to get the maximum benefit. He should be taking Sinemet every 4-5 hours, if given beyond 5 hours then his symptoms can worsen and it may be harder for him to function.     Letter was also provided for a bed safety  as he does struggle with getting in and out of bed.  Previously he had a hospital bed which allowed  him to use the rails, however these are no longer able to be utilized at his facility.      Orders:    Ambulatory Referral to Physical Therapy; Future    Ambulatory Referral to Speech Therapy; Future    NPH (normal pressure hydrocephalus) ([facility])  Follows yearly with neurosurgery status post shunt placement.         Sleep disturbances  He struggles with falling asleep at night, he feels this is mostly related to anxiety secondary to when he is going to get his last dose of medication.  We once again discussed the option of melatonin however he is not interested in taking this at this time.               History of Present Illness   Ulises Rosen is an 85 year old male with a past medical history of HTN, NPH with shunt placement about 13 years ago (follows with neurosurgery), GERD, cervical spinal stenosis with cervical myelopathy,  and parkinsonism who presents for movement follow up. To review, gait issues and tremors have been slowly progressive over years. He was started on Sinemet (9/2021) and referred to PT in the past with some overall benefit. Gait decline in May 2023 after fall.    At his last visit his Sinemet dose was increased further given freezing.     INTERVAL HISTORY:  He resides at an assisted living facility, he has been there since a fall in May 2024  He is not getting the medication at clear set intervals   He is not sure if he had any benefit with the increased dose, when this was done he also changed facilities and was not sure if this made things worse   He had been going to [facility] through his facility for PT, he would like to restart   He was using a hospital bed, using the rails to get in and out of the bed   He is no longer able to have a hospital bed given restrictions at the facility   He is looking to purchase a bed safety    He can dress on his own   He gets assistance with showering   He is slower in general   He spends most of the time in his wheel chair   When doing rehab he can  "ambulate with the walker   He can feed himself, no issues with swallowing   He had been doing speech therapy in the past which he found to be helpful   No hallucinations   He does not sleep well      Current medications:  Sinemet 2.5tabs 8am, 1pm, 6pm, 1tab at 10pm   Melatonin 5mg      Prior medications:  Carbidopa - used for nausea, this improved on own and medication no longer needed            Review of Systems I have personally reviewed the MA's review of systems and made changes as necessary.         Objective   /70 (BP Location: Right arm, Patient Position: Sitting, Cuff Size: Adult)   Pulse 60   Ht 5' 10\" (1.778 m)   Wt 84.4 kg (186 lb)   SpO2 98%   BMI 26.69 kg/m²     Physical Exam  Constitutional:       General: He is awake.      Appearance: Normal appearance.   HENT:      Right Ear: Hearing normal.      Left Ear: Hearing normal.   Eyes:      General: Lids are normal.      Extraocular Movements: Extraocular movements intact.      Pupils: Pupils are equal, round, and reactive to light.   Pulmonary:      Effort: Pulmonary effort is normal.   Neurological:      Mental Status: He is alert.      Motor: Motor strength is normal.  Psychiatric:         Speech: Speech normal.       Neurological Exam  Mental Status  Awake and alert. Oriented to person, place and time. Speech is normal.    Cranial Nerves  CN III, IV, VI: Extraocular movements intact bilaterally. Normal lids and orbits bilaterally. Pupils equal round and reactive to light bilaterally.  CN V:  Right: Facial sensation is normal.  Left: Facial sensation is normal on the left.  CN VII:  Right: There is no facial weakness.  Left: There is no facial weakness.  CN VIII:  Right: Hearing is normal.  Left: Hearing is normal.  CN IX, X: Palate elevates symmetrically  CN XI: Shoulder shrug strength is normal.  CN XII: Tongue midline without atrophy or fasciculations.    Motor   Increased muscle tone. Strength is 5/5 throughout all four " extremities.  Sitting in wheel chair .    Sensory  Light touch is normal in upper and lower extremities.     Reflexes  Glabellar tap present.    Coordination  Right: Finger-to-nose abnormality:Left: Finger-to-nose abnormality:    Gait    Not ambulated in the office today .          Date of exam:  11/1/23 4/8/25          Speech   1 2   Facial Expression   1 1   Rigidity - Neck   2    Rigidity - Upper Extremity (Right)   1 1   Rigidity - Upper Extremity (Left)    1 1   Rigidity - Lower Extremity (Right)   0 0   Rigidity - Lower Extremity (Left)    0 0   Finger Taps (Right)    2 2   Finger Taps (Left)    2 2   Hand  (Right)   2 1   Hand  (Left)    1 1   Pronation/Supination (Right)   2 1   Pronation/Supination (Left)    1 1   Heel Taps (Right)  2 2   Heel Taps(Left)  1 2   Arising from Chair        Gait        Postural Stability        Posture      Global spontaneity of movement  2 2   Postural Tremor (Right)  0 0   Postural Tremor (Left)  0 0   Kinetic Tremor (Right)   0 0   Kinetic Tremor (Left)   0 0   Rest tremor  RUE  0 0   Rest tremor  LUE  0 0   Rest tremor  RLE  0 0   Reset tremor  LLE  0 0   Lip/Jaw Tremor   0 0   Motor Exam Total:                 Administrative Statements   I have spent a total time of 50 minutes in caring for this patient on the day of the visit/encounter including Prognosis, Risks and benefits of tx options, Instructions for management, Patient and family education, Impressions, Counseling / Coordination of care, Documenting in the medical record, and Obtaining or reviewing history  .

## 2025-04-08 NOTE — ASSESSMENT & PLAN NOTE
He struggles with falling asleep at night, he feels this is mostly related to anxiety secondary to when he is going to get his last dose of medication.  We once again discussed the option of melatonin however he is not interested in taking this at this time.

## 2025-04-08 NOTE — LETTER
To Whom It May Concern,     Ulises Rosen is a patient followed by [facility] for parkinsonism.  With this disease it makes it difficult for him to move around in and out of bed.  At this time he would benefit from a bed safety  to help him get in and out of bed easier.         Please call with any questions or concerns.             Sincerely,               Jocelin Rodriguez PA-C

## 2025-04-08 NOTE — ASSESSMENT & PLAN NOTE
Patient denied any significant improvement with increasing Sinemet dose since last visit.  He is now at a new assisted living facility.  Unclear if perhaps the change may have altered any potential positive from the medication.  For the most part he remains in his wheelchair at this time.  He is able to get up and walk with assistance and a walker.  He denies any clear on or off with his medication dosing.  Once again it is likely that his gait issues are multifactorial given he does also have NPH status post shunt placement as well as cervical myelopathy.    At this time we will not make any further adjustments to his Sinemet dosing.  He will remain on Sinemet  mg 2.5 tabs 3 times a day along with 1 tab before bed.  We had a very long discussion regards to the importance of regular exercise and activity.  He recently did have physical therapy however this has been stopped.  I will make another referral at this time given this would certainly be the most beneficial for him.    He does also have some difficulty with low voice, speech therapy in the past was helpful as well.  I will make another referral for this to.    Provided this for his facility as there remains concern for the timing of his medication:  With parkinson's it is extremely important that his Sinemet be given at consistent time intervals throughout the day in order to get the maximum benefit. He should be taking Sinemet every 4-5 hours, if given beyond 5 hours then his symptoms can worsen and it may be harder for him to function.     Letter was also provided for a bed safety  as he does struggle with getting in and out of bed.  Previously he had a hospital bed which allowed him to use the rails, however these are no longer able to be utilized at his facility.      Orders:    Ambulatory Referral to Physical Therapy; Future    Ambulatory Referral to Speech Therapy; Future

## 2025-04-09 RX ORDER — ENALAPRIL MALEATE 5 MG/1
TABLET ORAL
Qty: 45 TABLET | Refills: 0 | Status: SHIPPED | OUTPATIENT
Start: 2025-04-09

## 2025-04-09 RX ORDER — HYDROCHLOROTHIAZIDE 12.5 MG/1
12.5 TABLET ORAL DAILY
Qty: 90 TABLET | Refills: 0 | Status: SHIPPED | OUTPATIENT
Start: 2025-04-09

## 2025-04-09 RX ORDER — PANTOPRAZOLE SODIUM 40 MG/1
40 TABLET, DELAYED RELEASE ORAL DAILY
Qty: 90 TABLET | Refills: 0 | Status: SHIPPED | OUTPATIENT
Start: 2025-04-09

## 2025-04-09 RX ORDER — MULTIVITAMIN
1 TABLET ORAL DAILY
Qty: 90 TABLET | Refills: 0 | Status: SHIPPED | OUTPATIENT
Start: 2025-04-09

## 2025-04-10 NOTE — TELEPHONE ENCOUNTER
"Nursing, Jocelin is out of the office, I am covering her inbox today.    Jocelin recently saw patient on 4/8/25 and note states \"He will remain on Sinemet  mg 2.5 tabs 3 times a day along with 1 tab before bed\"    This refill request is for the  and also the , not with the same directions.  Can you please call his facility and confirm what he is taking?  I do not see mention of the  at all in recent notes.    Thanks   "

## 2025-05-07 RX ORDER — CARBIDOPA AND LEVODOPA 25; 100 MG/1; MG/1
TABLET ORAL
Qty: 30 TABLET | Refills: 5 | Status: SHIPPED | OUTPATIENT
Start: 2025-05-07

## 2025-05-07 RX ORDER — CARBIDOPA/LEVODOPA 25MG-250MG
1 TABLET ORAL 3 TIMES DAILY
Qty: 270 TABLET | Refills: 1 | Status: SHIPPED | OUTPATIENT
Start: 2025-05-07

## 2025-05-14 ENCOUNTER — TELEPHONE (OUTPATIENT)
Dept: NEUROLOGY | Facility: CLINIC | Age: 85
End: 2025-05-14

## 2025-06-23 ENCOUNTER — TELEPHONE (OUTPATIENT)
Dept: NEUROLOGY | Facility: CLINIC | Age: 85
End: 2025-06-23

## 2025-06-23 NOTE — TELEPHONE ENCOUNTER
Received forms from [employer] office needing to be completed by provider. Scanned into chart and routed to provider MA

## 2025-07-15 ENCOUNTER — OFFICE VISIT (OUTPATIENT)
Dept: DERMATOLOGY | Facility: CLINIC | Age: 85
End: 2025-07-15
Payer: MEDICARE

## 2025-07-15 DIAGNOSIS — L82.1 SEBORRHEIC KERATOSES: ICD-10-CM

## 2025-07-15 DIAGNOSIS — B07.8 OTHER VIRAL WARTS: ICD-10-CM

## 2025-07-15 DIAGNOSIS — L30.4 INTERTRIGO: ICD-10-CM

## 2025-07-15 DIAGNOSIS — L21.9 SEBORRHEIC DERMATITIS: Primary | ICD-10-CM

## 2025-07-15 PROCEDURE — 99214 OFFICE O/P EST MOD 30 MIN: CPT

## 2025-07-15 PROCEDURE — 17110 DESTRUCTION B9 LES UP TO 14: CPT

## 2025-07-15 RX ORDER — KETOCONAZOLE 20 MG/G
CREAM TOPICAL
Qty: 30 G | Refills: 3 | Status: SHIPPED | OUTPATIENT
Start: 2025-07-15

## 2025-07-15 RX ORDER — KETOCONAZOLE 20 MG/ML
SHAMPOO, SUSPENSION TOPICAL
Qty: 120 ML | Refills: 6 | Status: SHIPPED | OUTPATIENT
Start: 2025-07-15

## 2025-07-21 ENCOUNTER — TELEPHONE (OUTPATIENT)
Age: 85
End: 2025-07-21

## 2025-07-21 NOTE — TELEPHONE ENCOUNTER
New Patient      Insurance   Current Insurance? Medicare A and B / [employer]   Insurance E-verified? Y    History   Reason for appointment/active symptoms?  Establish care, establish yearly visits for PSA checks     Has the patient had any previous Urologist(s)? [facility]     Was the patient seen in the ED? N     Labs/Imaging(Including Out Of Network)? Y     Records Requested? Y  Records Visible in EPIC? Y      Personal history of cancer? N but does have Parkinson disease.     Appointment   Office location preference:  [place]  ?   Appointment Details   Date:  10/21/25  Time:  1:30 pm   Location:  [place]  Provider:  Sreedhar  Does the appointment need further review? N

## 2025-07-30 ENCOUNTER — TELEPHONE (OUTPATIENT)
Dept: NEUROLOGY | Facility: CLINIC | Age: 85
End: 2025-07-30